# Patient Record
Sex: FEMALE | Race: WHITE | NOT HISPANIC OR LATINO | Employment: UNEMPLOYED | URBAN - METROPOLITAN AREA
[De-identification: names, ages, dates, MRNs, and addresses within clinical notes are randomized per-mention and may not be internally consistent; named-entity substitution may affect disease eponyms.]

---

## 2021-05-27 ENCOUNTER — RECORDS - HEALTHEAST (OUTPATIENT)
Dept: ADMINISTRATIVE | Facility: CLINIC | Age: 36
End: 2021-05-27

## 2021-12-16 ENCOUNTER — MEDICAL CORRESPONDENCE (OUTPATIENT)
Dept: HEALTH INFORMATION MANAGEMENT | Facility: CLINIC | Age: 36
End: 2021-12-16

## 2021-12-16 ENCOUNTER — TRANSFERRED RECORDS (OUTPATIENT)
Dept: HEALTH INFORMATION MANAGEMENT | Facility: CLINIC | Age: 36
End: 2021-12-16

## 2021-12-22 ENCOUNTER — HOSPITAL ENCOUNTER (OUTPATIENT)
Facility: HOSPITAL | Age: 36
End: 2021-12-22
Attending: SPECIALIST | Admitting: SPECIALIST
Payer: MEDICAID

## 2024-01-22 LAB
ALT SERPL-CCNC: 53 U/L (ref 7–52)
AST SERPL-CCNC: 77 U/L (ref 13–39)
CREATININE (EXTERNAL): 0.79 MG/DL (ref 0.6–1.2)
GFR ESTIMATED (EXTERNAL): >75 ML/MIN/1.73M2 (ref 60–90)
GLUCOSE (EXTERNAL): 249 MG/DL (ref 74–109)
POTASSIUM (EXTERNAL): 4.4 MMOL/L (ref 3.5–5.1)

## 2024-01-29 ENCOUNTER — TRANSFERRED RECORDS (OUTPATIENT)
Dept: HEALTH INFORMATION MANAGEMENT | Facility: CLINIC | Age: 39
End: 2024-01-29

## 2024-01-31 ENCOUNTER — MEDICAL CORRESPONDENCE (OUTPATIENT)
Dept: HEALTH INFORMATION MANAGEMENT | Facility: CLINIC | Age: 39
End: 2024-01-31

## 2024-01-31 ENCOUNTER — TRANSFERRED RECORDS (OUTPATIENT)
Dept: HEALTH INFORMATION MANAGEMENT | Facility: CLINIC | Age: 39
End: 2024-01-31

## 2024-02-02 ENCOUNTER — TRANSFERRED RECORDS (OUTPATIENT)
Dept: HEALTH INFORMATION MANAGEMENT | Facility: CLINIC | Age: 39
End: 2024-02-02
Payer: COMMERCIAL

## 2024-02-02 ENCOUNTER — TRANSCRIBE ORDERS (OUTPATIENT)
Dept: OTHER | Age: 39
End: 2024-02-02

## 2024-02-02 ENCOUNTER — MEDICAL CORRESPONDENCE (OUTPATIENT)
Dept: HEALTH INFORMATION MANAGEMENT | Facility: CLINIC | Age: 39
End: 2024-02-02
Payer: COMMERCIAL

## 2024-02-05 ENCOUNTER — TRANSCRIBE ORDERS (OUTPATIENT)
Dept: OTHER | Age: 39
End: 2024-02-05

## 2024-02-05 DIAGNOSIS — N63.21 MASS OF UPPER OUTER QUADRANT OF LEFT BREAST: Primary | ICD-10-CM

## 2024-02-07 ENCOUNTER — TRANSCRIBE ORDERS (OUTPATIENT)
Dept: OTHER | Age: 39
End: 2024-02-07

## 2024-02-07 ENCOUNTER — PATIENT OUTREACH (OUTPATIENT)
Dept: ONCOLOGY | Facility: CLINIC | Age: 39
End: 2024-02-07
Payer: COMMERCIAL

## 2024-02-07 DIAGNOSIS — N63.21 MASS OF UPPER OUTER QUADRANT OF LEFT BREAST: Primary | ICD-10-CM

## 2024-02-07 DIAGNOSIS — C50.919 BREAST CANCER (H): Primary | ICD-10-CM

## 2024-02-07 NOTE — PROGRESS NOTES
New Patient Oncology Nurse Navigator Note     Referring provider: Madison Roach NP      Referring Clinic/Organization: [facility]      Referred to (specialty:) Medical Oncology and Cancer Surgery     Requested provider (if applicable): JOSLYN     Date Referral Received: February 7, 2024     Evaluation for:  Benign breast condition  N63.21 (ICD-10-CM) - Mass of upper outer quadrant of left breast     Has potential tumor on mammogram, has lump under left arm.     Clinical History (per Nurse review of records provided):      Mammogram and U/S done on 1/31/24  Biopsy schedule for 2/9/24.    Records Location: [facility]     Records Needed:   Mammogram/Us of breasts 1/31/24  Pathology from biopsy to be done on 2/9/24     Additional testing needed prior to consult: NA    February 7, 2024  Called patient this afternoon to introduce self and role of RN SUZAN. Patient confirmed that biopsy is scheduled this Friday 2/9/24. Will follow up with patient next week to schedule consultation accordingly. Provided her with contact information and encouraged her to call with any questions or concerns.     February 13, 2024  Patient called in this afternoon in follow up. She stated that her biopsy is back showing cancer and she is ready to schedule next steps. Biopsy in media tab in Epic showing invasive ductal carcinoma. Hormone status and HER pending but per patient she can see the results and it's ER/AK + HER2 equivocal. Patient would like to be scheduled at the [facility] for consultations. Transferred her to NPS to schedule accordingly.     Morena NEWBYN, RN   Oncology Nurse Navigator   [facility]   [phone] / [phone]

## 2024-02-09 ENCOUNTER — TRANSFERRED RECORDS (OUTPATIENT)
Dept: HEALTH INFORMATION MANAGEMENT | Facility: CLINIC | Age: 39
End: 2024-02-09
Payer: COMMERCIAL

## 2024-02-09 ENCOUNTER — ANCILLARY PROCEDURE (OUTPATIENT)
Dept: RADIOLOGY | Facility: CLINIC | Age: 39
End: 2024-02-09
Payer: COMMERCIAL

## 2024-02-13 NOTE — TELEPHONE ENCOUNTER
RECORDS STATUS - ALL OTHER DIAGNOSIS      Action February 13, 2024 4:17 PM    Action Taken Called Sally Ely for a copy of the path report on 2/9/24.  - Request is faxed to [facility] for images.     4:51 PM:  - Received Path report, faxed to HIM and emailed to NN.     February 19, 2024 11:27 AM:  - HER2/Fish report is ready. Sally will fax it over.    11:37 AM:  -received path report, faxed to HIM and emailed to NN.      Imaging Received  February 14, 2024 8:40 AM    Action: Breast Images from [facility] received and email sent to  Imaging team to resolve breast images to PACS.     RECORDS RECEIVED FROM: ELIO/Sally   DATE RECEIVED:    NOTES STATUS DETAILS   OFFICE NOTE from referring provider [facility] 1/31/24: Madison Roach NP   OPERATIVE REPORT [facility] 2/9/24: US Breast Bx   MEDICATION LIST [facility]    LABS     PATHOLOGY REPORTS Report in [facility]  (Slides Requested)  Sally Fuentes Tracking #: [id number] 2/9/24: [id number] (positive)   ANYTHING RELATED TO DIAGNOSIS [facility]- [facility] Most recent 1/29/24   $IMAGING (NEED IMAGES & REPORT)     MRI (Img req from [facility]) 1/17/24: MR Brain   MA (Img req from [facility]) 1/29/24   ULTRASOUND ([facility] req from [facility]) 2/9/24: US Breast Bx  2/9/24: US Breast

## 2024-02-15 ENCOUNTER — ONCOLOGY VISIT (OUTPATIENT)
Dept: ONCOLOGY | Facility: CLINIC | Age: 39
End: 2024-02-15
Attending: SURGERY
Payer: COMMERCIAL

## 2024-02-15 ENCOUNTER — LAB (OUTPATIENT)
Dept: LAB | Facility: CLINIC | Age: 39
End: 2024-02-15
Attending: SURGERY
Payer: COMMERCIAL

## 2024-02-15 ENCOUNTER — ANCILLARY PROCEDURE (OUTPATIENT)
Dept: MAMMOGRAPHY | Facility: CLINIC | Age: 39
End: 2024-02-15
Attending: SURGERY
Payer: COMMERCIAL

## 2024-02-15 ENCOUNTER — PRE VISIT (OUTPATIENT)
Dept: ONCOLOGY | Facility: CLINIC | Age: 39
End: 2024-02-15
Payer: COMMERCIAL

## 2024-02-15 ENCOUNTER — PATIENT OUTREACH (OUTPATIENT)
Dept: ONCOLOGY | Facility: CLINIC | Age: 39
End: 2024-02-15

## 2024-02-15 VITALS
DIASTOLIC BLOOD PRESSURE: 90 MMHG | RESPIRATION RATE: 16 BRPM | HEART RATE: 119 BPM | BODY MASS INDEX: 44.26 KG/M2 | TEMPERATURE: 98 F | OXYGEN SATURATION: 97 % | SYSTOLIC BLOOD PRESSURE: 103 MMHG | WEIGHT: 282 LBS | HEIGHT: 67 IN

## 2024-02-15 DIAGNOSIS — C50.412 MALIGNANT NEOPLASM OF UPPER-OUTER QUADRANT OF LEFT BREAST IN FEMALE, ESTROGEN RECEPTOR POSITIVE (H): Primary | ICD-10-CM

## 2024-02-15 DIAGNOSIS — Z17.0 MALIGNANT NEOPLASM OF UPPER-OUTER QUADRANT OF LEFT BREAST IN FEMALE, ESTROGEN RECEPTOR POSITIVE (H): ICD-10-CM

## 2024-02-15 DIAGNOSIS — C50.412 MALIGNANT NEOPLASM OF UPPER-OUTER QUADRANT OF LEFT BREAST IN FEMALE, ESTROGEN RECEPTOR POSITIVE (H): ICD-10-CM

## 2024-02-15 DIAGNOSIS — R22.9 LOCALIZED SUPERFICIAL SWELLING, MASS, OR LUMP: ICD-10-CM

## 2024-02-15 DIAGNOSIS — Z17.0 MALIGNANT NEOPLASM OF UPPER-OUTER QUADRANT OF LEFT BREAST IN FEMALE, ESTROGEN RECEPTOR POSITIVE (H): Primary | ICD-10-CM

## 2024-02-15 LAB
INTERPRETATION: NORMAL
LAB PDF RESULT: NORMAL
SIGNIFICANT RESULTS: NORMAL
SPECIMEN DESCRIPTION: NORMAL
TEST DETAILS, MDL: NORMAL

## 2024-02-15 PROCEDURE — 76882 US LMTD JT/FCL EVL NVASC XTR: CPT | Mod: LT | Performed by: RADIOLOGY

## 2024-02-15 PROCEDURE — G0463 HOSPITAL OUTPT CLINIC VISIT: HCPCS | Performed by: SURGERY

## 2024-02-15 PROCEDURE — 99417 PROLNG OP E/M EACH 15 MIN: CPT | Performed by: SURGERY

## 2024-02-15 PROCEDURE — 99205 OFFICE O/P NEW HI 60 MIN: CPT | Performed by: SURGERY

## 2024-02-15 RX ORDER — LIRAGLUTIDE 6 MG/ML
1.8 INJECTION SUBCUTANEOUS EVERY EVENING
COMMUNITY
Start: 2024-01-31 | End: 2024-08-13

## 2024-02-15 RX ORDER — PREGABALIN 200 MG/1
200 CAPSULE ORAL
COMMUNITY
Start: 2023-11-29 | End: 2024-04-23

## 2024-02-15 RX ORDER — AMLODIPINE BESYLATE 10 MG/1
10 TABLET ORAL AT BEDTIME
COMMUNITY
Start: 2024-01-31 | End: 2024-07-24

## 2024-02-15 RX ORDER — FLUTICASONE PROPIONATE 100 UG/1
1 POWDER, METERED RESPIRATORY (INHALATION)
Status: ON HOLD | COMMUNITY
Start: 2023-10-07 | End: 2024-03-11

## 2024-02-15 RX ORDER — IBUPROFEN 200 MG
200 TABLET ORAL
COMMUNITY

## 2024-02-15 RX ORDER — FLUTICASONE PROPIONATE AND SALMETEROL XINAFOATE 115; 21 UG/1; UG/1
2 AEROSOL, METERED RESPIRATORY (INHALATION) 2 TIMES DAILY
COMMUNITY
Start: 2024-01-31

## 2024-02-15 RX ORDER — ATORVASTATIN CALCIUM 10 MG/1
10 TABLET, FILM COATED ORAL AT BEDTIME
COMMUNITY
Start: 2023-11-29 | End: 2024-11-23

## 2024-02-15 RX ORDER — CETIRIZINE HYDROCHLORIDE 10 MG/1
5 TABLET ORAL PRN
COMMUNITY

## 2024-02-15 RX ORDER — PROCHLORPERAZINE MALEATE 10 MG
TABLET ORAL
Status: ON HOLD | COMMUNITY
Start: 2023-04-24 | End: 2024-03-11

## 2024-02-15 RX ORDER — METHOCARBAMOL 500 MG/1
1000 TABLET, FILM COATED ORAL
COMMUNITY
Start: 2024-01-24 | End: 2024-04-25

## 2024-02-15 RX ORDER — ALBUTEROL SULFATE 90 UG/1
2 AEROSOL, METERED RESPIRATORY (INHALATION) EVERY 4 HOURS PRN
COMMUNITY
Start: 2023-11-24 | End: 2024-07-23

## 2024-02-15 RX ORDER — HYDROCHLOROTHIAZIDE 25 MG/1
25 TABLET ORAL PRN
COMMUNITY
Start: 2023-11-29 | End: 2024-11-23

## 2024-02-15 RX ORDER — BLOOD-GLUCOSE METER
EACH MISCELLANEOUS
COMMUNITY
Start: 2024-01-18

## 2024-02-15 RX ORDER — EPINEPHRINE 0.3 MG/.3ML
INJECTION SUBCUTANEOUS
COMMUNITY
Start: 2023-04-24

## 2024-02-15 ASSESSMENT — PAIN SCALES - GENERAL: PAINLEVEL: SEVERE PAIN (6)

## 2024-02-15 NOTE — TELEPHONE ENCOUNTER
Action 02/15/24  12:49 PM AV   Action Taken  Slides [id number] received from Sally Ely, taken to 5th [facility] Path lab to be processed.

## 2024-02-15 NOTE — PROGRESS NOTES
NEW SURGICAL CONSULTATION  Feb 15, 2024    Eliezer Izquierdo is a 38 year old woman who presents with a left  breast complaint.  She was self-referred.    HPI:    She noted a left breast mass since 2023, some enlargement since. She has had some aching since the biopsy. No nipple discharge or inversion.    Imaging showed a 2.5 cm mass at 2:00, 10 cm FN in the LEFT breast.    A biopsy was performed and a clip was placed.  It showed invasive ductal carcinoma, grade 1, ER+ MA+ HER2 is pending. The Ki-67 was 24%.      BREAST-SPECIFIC HISTORY:  Prior breast surgeries: No  Prior radiation history: No  Bra size: DD  Dominant hand: Right     FAMILY HISTORY:  Breast ca: Yes mother (dx 55, adopted),   Ovarian ca: Yes PGM (possible uterine or ovaries)  Pancreatic ca: No  Melanoma: Yes - mother (dx 40)  Gastric ca: No  Colon ca: No  Other cancer: Yes pat great GF w/ lung cancer (smoker)    Hysterectomy at 21; ovaries intact.  right foot melanoma - treated with excision; stage unknown.    There is no problem list on file for this patient.   T2DM; ; HbA1c 11.1 in 2024  HTN  No MI, CVA    Past Medical History:   Diagnosis Date    Hypercholesteraemia     Irritable bowel     Migraines        No past surgical history on file.  Hysterectomy  Cholecystectomy  Tonsillectomy    Knee surgery  No GA issues    Current Outpatient Medications   Medication Sig Dispense Refill    albuterol (PROAIR HFA/PROVENTIL HFA/VENTOLIN HFA) 108 (90 Base) MCG/ACT inhaler Inhale 2 puffs into the lungs      amLODIPine (NORVASC) 10 MG tablet Take 10 mg by mouth      atorvastatin (LIPITOR) 10 MG tablet Take 10 mg by mouth      Blood Glucose Monitoring Suppl (ONETOUCH VERIO FLEX SYSTEM) w/Device KIT       EPINEPHrine (ANY BX GENERIC EQUIV) 0.3 MG/0.3ML injection 2-pack       fluticasone-salmeterol (ADVAIR HFA) 115-21 MCG/ACT inhaler Inhale 2 puffs into the lungs      hydrochlorothiazide (HYDRODIURIL) 25 MG tablet Take 25 mg by  "mouth      methocarbamol (ROBAXIN) 500 MG tablet Take 1,000 mg by mouth      Pregabalin (LYRICA) 200 MG capsule Take 200 mg by mouth      prochlorperazine (COMPAZINE) 10 MG tablet       VICTOZA PEN 18 MG/3ML soln Inject 1.8 mg Subcutaneous      cetirizine (ZYRTEC) 10 MG tablet Take 5 mg by mouth      Cyclobenzaprine HCl (FLEXERIL PO) Take 10 mg by mouth 3 times daily as needed for muscle spasms (Patient not taking: Reported on 2/15/2024)      FLOVENT DISKUS 100 MCG/ACT inhaler Inhale 1 puff into the lungs (Patient not taking: Reported on 2/15/2024)      Gabapentin (NEURONTIN PO) Take 600 mg by mouth At Bedtime  (Patient not taking: Reported on 2/15/2024)      HYDROcodone-acetaminophen (NORCO) 7.5-325 MG per tablet Take 2 tablets by mouth daily (Patient not taking: Reported on 2/15/2024)      ibuprofen (ADVIL/MOTRIN) 200 MG tablet Take 200 mg by mouth      OMEPRAZOLE PO Take 40 mg by mouth every morning (Patient not taking: Reported on 2/15/2024)      Prochlorperazine Maleate (COMPAZINE PO) Take 10 mg by mouth 3 times daily as needed for nausea      PROMETHAZINE HCL RE Place 25 mg rectally 3 times daily as needed for nausea      SIMVASTATIN PO Take 5 mg by mouth      UNKNOWN TO PATIENT as needed (steroid cream, for sun allergy) (Patient not taking: Reported on 2/15/2024)             Allergies   Allergen Reactions    Estrogens     Maxalt [Rizatriptan]     Penicillins     Sulfa Antibiotics     Toradol [Ketorolac] Anxiety        SOCIAL HISTORY:  Smokes: Yes - 10 cigarettes per day  Occupation: does not currently wokr    ROS:  Back pain: No  Headache: No  Abdominal pain: No  Unexpected weight loss: Yes - with Victoza  Easy bruising/bleeding: No  History of DVT/PE: No    BP (!) 103/90 (BP Location: Right arm, Patient Position: Sitting, Cuff Size: Adult Large)   Pulse 119   Temp 98  F (36.7  C) (Oral)   Resp 16   Ht 1.71 m (5' 7.32\")   Wt 127.9 kg (282 lb)   SpO2 97%   BMI 43.74 kg/m     Physical " Exam  Constitutional:       Appearance: She is well-developed.   Chest:   Breasts:     Breasts are symmetrical (Bilateral ptosis).      Right: No inverted nipple, mass, nipple discharge, skin change or tenderness.      Left: Mass present. No inverted nipple, nipple discharge, skin change or tenderness.          Comments: Patient was examined in both supine and upright positions.   Lymphadenopathy:      Cervical: No cervical adenopathy.      Right cervical: No superficial, deep or posterior cervical adenopathy.     Left cervical: No superficial, deep or posterior cervical adenopathy.      Upper Body:      Right upper body: No supraclavicular, axillary or pectoral adenopathy.      Left upper body: No supraclavicular, axillary or pectoral adenopathy.      Comments: No lymphedema in bilateral upper extremities.   Skin:     General: Skin is warm and dry.          INVESTIGATIONS:    Bilateral Breast Ultrasound from [facility] (2/9/2024) showed:  FINDINGS: An irregular shadowing solid hypovascular mass is present in the 2:00 position of the left breast 10 cm from the nipple corresponding to the mammographic finding measuring 2.5 x 2.5 x 2.0 cm. This is as tall as it is wide. No adjacent abnormalities.   BI-RADS 5: Highly suggestive of malignancy.     Diagnostic Mammogram from [facility] (1/29/2024) showed:  FINDINGS:   Bilateral tomosynthesis and synthesized MLO and CC views are reviewed.   An exaggerated CC lateral view was obtained of the right breast.   There are scattered areas of fibroglandular density.    In the posterior third of the left breast, upper outer quadrant, at about 2 o'clock there is a 2 cm spiculated lesion correlating with the palpable lump.   No mammographic abnormality is identified in the right breast to explain the palpable lumps. Only normal-appearing fatty tissue is identified.   BIRADS  Category 5:  Highly suggestive of Malignancy.     Biopsy from [facility], [id number] (2/9/2024) showed:  Final  "Diagnosis:  A) Left breast, 2:00 2 cm from nipple  Invasive ductal carcinoma, grade I of III  ER positive  MN positive  HER2 2+ by IHC, FISH pending (ordered 2/13/2024)    ASSESSMENT:  Eliezer Izquierdo is a 38 year old woman with a LEFT breast cancer.    Her staging is incomplete.  I recommend US evaluation of the LEFT axilla.    I personally reviewed the imaging above with our in-house breast radiologist.  The mass measures >3 cm on mammogram.     I reviewed the imaging, diagnosis, staging, and management (including surgery, chemotherapy/systemic therapy, radiation therapy, and endocrine therapy) of breast cancer with Eliezer Izquierdo and her father. A copy of the biopsy pathology report was also provided.    The mainstay of treatment for resectable breast cancer is surgical resection, in the form of either breast conservation (segmental mastectomy plus radiation) or mastectomy.  We reviewed that the two strategies are equivalent in terms of overall survival.  The advantages and disadvantages of each were discussed.    The surgeries are performed as day surgeries.  Eliezer Izquierdo IS a candidate for breast conservation therapy.  We discussed that this involves two necessary components: the lumpectomy (or \"segmental mastectomy\"), and several weeks of whole breast radiation therapy.  We discussed that the overall survival after breast conservation therapy is identical to mastectomy and that local recurrence rates are significantly higher if segmental mastectomy was performed without subsequent radiation.  We also discussed the significance of clear margins and that a subsequent procedure may be necessary to achieve this.    The risks of a segmental mastectomy were discussed with the patient and family, including the risks of bleeding, wound infection, wound dehiscence, and post-operative contour change (including scar formation) to the breast.  We discussed obtaining a supportive bra for postoperative " recovery and that prescriptions for opioids are not typically provided for this procedure. Depending on the margin status post-resection, further surgery may be necessary.     Alternatively, we also discussed the various types of mastectomy, including simple, skin-sparing, and nipple-sparing mastectomy.  We reviewed that the nipple-sparing technique is cosmetic; sensation and contractility will likely be lost.  Eliezer Izquierdo is not a candidate for nipple-sparing mastectomy owing to bilateral ptosis.  In addition, given the new diagnosis of diabetes (HbA1c 11) and active ongoing cigarette smoking, Eliezer Izquierdo is not currently a candidate for immediate reconstruction.  The risks of a mastectomy were discussed with the patient and family, including the risks of bleeding, wound infection, wound dehiscence, skin flap/nipple necrosis, poor cosmetic outcomes with skin folds, decreased shoulder range of motion, chest wall numbness, etc.   A drain would be placed intra-operatively.  We discussed delayed reconstruction may be an option. In addition, if she were to initiate neoadjuvant systemic therapy, it will give her time to improve blood sugar control and quit smoking to become a potential candidate for immediate reconstruction.  Eliezer Izquierdo was interested in this; a Plastic Surgery consultation was offered and will be arranged. Depending on the margin and farhan status post-mastectomy, radiation may be necessary.      In addition to the surgical management of the breast, a sentinel lymph node biopsy is recommended for farhan staging of the axilla.  This is performed with the combination of the radioactive Tilmanocept and dye (lymphazurin or indocyanine green). The risks of a sentinel lymph node biopsy were discussed with the patient and family, including the risks of lymphedema (5%), bleeding, wound infection, wound dehiscence, seroma formation, and paresthesias. There is an approximately 10% false  negative rate associated with sentinel lymph node biopsy as published in the literature.  The findings of the sentinel lymph node biopsy may result in the need for further treatment. There is a 1-2% chance of patients whose sentinel lymph nodes do not map despite dual tracer (radioactive Tilmanocept and dye). Should this be the case, we discussed that I would proceed with an axillary lymph node dissection at the index procedure if proceeding with a mastectomy.  The higher risks of an axillary lymph node dissection were also reviewed, including lymphedema (20-30%), bleeding, wound infection, wound dehiscence, seroma formation, nerve injury, limited arm range of motion and paresthesias. We discussed that a drain would be placed intra-operatively should an axillary lymph node dissection be performed.     We discussed that surgical pathology results will be reviewed at the postoperative visit to allow for careful discussion of next steps and for answering questions.    Finally, we reviewed that as part of team-based approach to breast cancer, medical oncology and radiation oncology referrals may also be made to discuss systemic/endocrine therapy and radiation therapy.      We discussed that the HER2 result is pending. If she has HER2 amplified cancer, I would strongly recommend neoadjuvant systemic therapy.  If she has HER2 non-amplified cancer, given the size of the primary tumor, consideration could also be given to neoadjuvant systemic therapy, particularly if she is strongly interested in immediate reconstruction. I discussed her case with Dr Nolan of Medical Oncology today; she has a scheduled appointment with Dr Nolan on 2/22/2024.    We reviewed the genetic testing guidelines by the American Society of Breast Surgeons from February 2019.  I have recommended genetic testing and counseling.  The natural history of pathogenic variants in genes like BRCA and breast cancer were discussed with the patient. Should a  pathogenic variant be identified, she may  no longer be a good candidate for breast conservation.  We also reviewed the risk reduction benefits of a contralateral mastectomy in this situation. Because genetic testing results will influence surgical management of this breast cancer, I have recommended germline genetic testing on an urgent basis with the Breast Actionable panel.  We discussed that results can take 2-3 weeks to result. We reviewed that genetic counseling will be arranged.  We discussed genetic testing results may also inform the risk of other cancers and can provide information for family members. Please see below for additional details.     All of the above was discussed with Eliezer Izquierdo and all questions were answered.     Total time spent with the patient was 65 minutes, of which 75% was counseling.     PLAN:  Medical oncology evaluation for possible neoadjuvant systemic therapy  HER2 pending  LEFT segmental mastectomy vs simple mastectomy  Not currently a candidate for skin-sparing mastectomy due to active smoking and elevated HbA1c   LEFT axillary US today  Germline genetic testing with the Breast Actionable panel - I will review results with patient via [employer].   Genetic counseling referral  Plastic surgery referral     Albina Ford MD MS [facility] FACS  Associate Professor of Surgery  Division of Surgical Oncology  [facility]     ADDENDUM (2/15/2024):  LEFT axillary US shows three abnormal axillary nodes. In addition to the plan detailed above, I recommend a needle biopsy of the most suspicious appearing node.     Albina Ford MD MS [facility] FACS  Associate Professor of Surgery  Division of Surgical Oncology  [facility]     80 minutes spent on the date of the encounter doing chart review, review of outside records, review of test result(s), interpretation of test(s), patient visit, documentation, care coordination, discussion with other physician(s), and discussion with  family.

## 2024-02-15 NOTE — PROGRESS NOTES
[facility]: Surgical Oncology Plan of Care Education Note                                     Discussion with Patient:                                                         Met with Eliezer and her father following her visit with Dr. Ford on 2/15/2024. Introduced self and explained role of RNCC.       Plan:                                                       Reviewed plan for possible neoadjuvant treatment vs surgery. Eliezer is aware a lymph node biopsy was recommended following her ultrasound today and will be scheduled at the time of her New Patient Consult with Dr. Nolan on 2/22/2024.     Breast Action Panel consent was completed and labs were drawn. Reviewed expected timing of results and that Dr. Ford would reach out to discuss any change in plan based on the results.      Writer answered all questions and concerns to the best of her ability and provided her contact information. Reviewed use of [employer] as alternative way to contact team.  Encouraged patient to contact with questions.         Nichelle Gomez, RNNICANOR  [facility]  Surgical Onolcogy      Approximately 5 minutes was spent in discussion with patient.

## 2024-02-15 NOTE — LETTER
2/15/2024         RE: Eliezer Izquierdo  [address] [facility] [address]        Dear Colleague,    Thank you for referring your patient, Eliezer Izquierdo, to the [facility]. Please see a copy of my visit note below.    NEW SURGICAL CONSULTATION  Feb 15, 2024    Eliezer Izquierdo is a 38 year old woman who presents with a left  breast complaint.  She was self-referred.    HPI:    She noted a left breast mass since 2023, some enlargement since. She has had some aching since the biopsy. No nipple discharge or inversion.    Imaging showed a 2.5 cm mass at 2:00, 10 cm FN in the LEFT breast.    A biopsy was performed and a clip was placed.  It showed invasive ductal carcinoma, grade 1, ER+ [place]+ HER2 is pending. The Ki-67 was 24%.      BREAST-SPECIFIC HISTORY:  Prior breast surgeries: No  Prior radiation history: No  Bra size: DD  Dominant hand: Right     FAMILY HISTORY:  Breast ca: Yes mother (dx 55, adopted),   Ovarian ca: Yes PGM (possible uterine or ovaries)  Pancreatic ca: No  Melanoma: Yes - mother (dx 40)  Gastric ca: No  Colon ca: No  Other cancer: Yes pat great GF w/ lung cancer (smoker)    Hysterectomy at 21; ovaries intact.  right foot melanoma - treated with excision; stage unknown.    There is no problem list on file for this patient.   T2DM; ; HbA1c 11.1 in 2024  HTN  No MI, CVA    Past Medical History:   Diagnosis Date    Hypercholesteraemia     Irritable bowel     Migraines        No past surgical history on file.  Hysterectomy  Cholecystectomy  Tonsillectomy    Knee surgery  No GA issues    Current Outpatient Medications   Medication Sig Dispense Refill    albuterol (PROAIR HFA/PROVENTIL HFA/VENTOLIN HFA) 108 (90 Base) MCG/ACT inhaler Inhale 2 puffs into the lungs      amLODIPine (NORVASC) 10 MG tablet Take 10 mg by mouth      atorvastatin (LIPITOR) 10 MG tablet Take 10 mg by mouth      Blood Glucose Monitoring Suppl (ONETOUCH  VERIO FLEX SYSTEM) w/Device KIT       EPINEPHrine (ANY BX GENERIC EQUIV) 0.3 MG/0.3ML injection 2-pack       fluticasone-salmeterol (ADVAIR HFA) 115-21 MCG/ACT inhaler Inhale 2 puffs into the lungs      hydrochlorothiazide (HYDRODIURIL) 25 MG tablet Take 25 mg by mouth      methocarbamol (ROBAXIN) 500 MG tablet Take 1,000 mg by mouth      Pregabalin (LYRICA) 200 MG capsule Take 200 mg by mouth      prochlorperazine (COMPAZINE) 10 MG tablet       VICTOZA PEN 18 MG/3ML soln Inject 1.8 mg Subcutaneous      cetirizine (ZYRTEC) 10 MG tablet Take 5 mg by mouth      Cyclobenzaprine HCl (FLEXERIL PO) Take 10 mg by mouth 3 times daily as needed for muscle spasms (Patient not taking: Reported on 2/15/2024)      FLOVENT DISKUS 100 MCG/ACT inhaler Inhale 1 puff into the lungs (Patient not taking: Reported on 2/15/2024)      Gabapentin (NEURONTIN PO) Take 600 mg by mouth At Bedtime  (Patient not taking: Reported on 2/15/2024)      HYDROcodone-acetaminophen (NORCO) 7.5-325 MG per tablet Take 2 tablets by mouth daily (Patient not taking: Reported on 2/15/2024)      ibuprofen (ADVIL/MOTRIN) 200 MG tablet Take 200 mg by mouth      OMEPRAZOLE PO Take 40 mg by mouth every morning (Patient not taking: Reported on 2/15/2024)      Prochlorperazine Maleate (COMPAZINE PO) Take 10 mg by mouth 3 times daily as needed for nausea      PROMETHAZINE HCL RE Place 25 mg rectally 3 times daily as needed for nausea      SIMVASTATIN PO Take 5 mg by mouth      UNKNOWN TO PATIENT as needed (steroid cream, for sun allergy) (Patient not taking: Reported on 2/15/2024)             Allergies   Allergen Reactions    Estrogens     Maxalt [Rizatriptan]     Penicillins     Sulfa Antibiotics     Toradol [Ketorolac] Anxiety        SOCIAL HISTORY:  Smokes: Yes - 10 cigarettes per day  Occupation: does not currently wokr    ROS:  Back pain: No  Headache: No  Abdominal pain: No  Unexpected weight loss: Yes - with Victoza  Easy bruising/bleeding: No  History of  "DVT/PE: No    BP (!) 103/90 (BP Location: Right arm, Patient Position: Sitting, Cuff Size: Adult Large)   Pulse 119   Temp 98  F (36.7  C) (Oral)   Resp 16   Ht 1.71 m (5' 7.32\")   Wt 127.9 kg (282 lb)   SpO2 97%   BMI 43.74 kg/m     Physical Exam  Constitutional:       Appearance: She is well-developed.   Chest:   Breasts:     Breasts are symmetrical (Bilateral ptosis).      Right: No inverted nipple, mass, nipple discharge, skin change or tenderness.      Left: Mass present. No inverted nipple, nipple discharge, skin change or tenderness.          Comments: Patient was examined in both supine and upright positions.   Lymphadenopathy:      Cervical: No cervical adenopathy.      Right cervical: No superficial, deep or posterior cervical adenopathy.     Left cervical: No superficial, deep or posterior cervical adenopathy.      Upper Body:      Right upper body: No supraclavicular, axillary or pectoral adenopathy.      Left upper body: No supraclavicular, axillary or pectoral adenopathy.      Comments: No lymphedema in bilateral upper extremities.   Skin:     General: Skin is warm and dry.          INVESTIGATIONS:    Bilateral Breast Ultrasound from [facility] (2/9/2024) showed:  FINDINGS: An irregular shadowing solid hypovascular mass is present in the 2:00 position of the left breast 10 cm from the nipple corresponding to the mammographic finding measuring 2.5 x 2.5 x 2.0 cm. This is as tall as it is wide. No adjacent abnormalities.   BI-RADS 5: Highly suggestive of malignancy.     Diagnostic Mammogram from [facility] (1/29/2024) showed:  FINDINGS:   Bilateral tomosynthesis and synthesized MLO and CC views are reviewed.   An exaggerated CC lateral view was obtained of the right breast.   There are scattered areas of fibroglandular density.    In the posterior third of the left breast, upper outer quadrant, at about 2 o'clock there is a 2 cm spiculated lesion correlating with the palpable lump.   No mammographic " "abnormality is identified in the right breast to explain the palpable lumps. Only normal-appearing fatty tissue is identified.   BIRADS  Category 5:  Highly suggestive of Malignancy.     [facility]opsy from [facility] [id number] (2/9/2024) showed:  Final Diagnosis:  A) Left breast, 2:00 2 cm from nipple  Invasive ductal carcinoma, grade I of III  ER positive  CA positive  HER2 2+ by IHC, FISH pending (ordered 2/13/2024)    ASSESSMENT:  Eliezer Izquierdo is a 38 year old woman with a LEFT breast cancer.    Her staging is incomplete.  I recommend US evaluation of the LEFT axilla.    I personally reviewed the imaging above with our in-house breast radiologist.  The mass measures >3 cm on mammogram.     I reviewed the imaging, diagnosis, staging, and management (including surgery, chemotherapy/systemic therapy, radiation therapy, and endocrine therapy) of breast cancer with Eliezer Izquierdo and her father. A copy of the biopsy pathology report was also provided.    The mainstay of treatment for resectable breast cancer is surgical resection, in the form of either breast conservation (segmental mastectomy plus radiation) or mastectomy.  We reviewed that the two strategies are equivalent in terms of overall survival.  The advantages and disadvantages of each were discussed.    The surgeries are performed as day surgeries.  Eliezer Izquierdo IS a candidate for breast conservation therapy.  We discussed that this involves two necessary components: the lumpectomy (or \"segmental mastectomy\"), and several weeks of whole breast radiation therapy.  We discussed that the overall survival after breast conservation therapy is identical to mastectomy and that local recurrence rates are significantly higher if segmental mastectomy was performed without subsequent radiation.  We also discussed the significance of clear margins and that a subsequent procedure may be necessary to achieve this.    The risks of a segmental mastectomy were " discussed with the patient and family, including the risks of bleeding, wound infection, wound dehiscence, and post-operative contour change (including scar formation) to the breast.  We discussed obtaining a supportive bra for postoperative recovery and that prescriptions for opioids are not typically provided for this procedure. Depending on the margin status post-resection, further surgery may be necessary.     Alternatively, we also discussed the various types of mastectomy, including simple, skin-sparing, and nipple-sparing mastectomy.  We reviewed that the nipple-sparing technique is cosmetic; sensation and contractility will likely be lost.  Eliezer Izquierdo is not a candidate for nipple-sparing mastectomy owing to bilateral ptosis.  In addition, given the new diagnosis of diabetes (HbA1c 11) and active ongoing cigarette smoking, Eliezer Izquierdo is not currently a candidate for immediate reconstruction.  The risks of a mastectomy were discussed with the patient and family, including the risks of bleeding, wound infection, wound dehiscence, skin flap/nipple necrosis, poor cosmetic outcomes with skin folds, decreased shoulder range of motion, chest wall numbness, etc.   A drain would be placed intra-operatively.  We discussed delayed reconstruction may be an option. In addition, if she were to initiate neoadjuvant systemic therapy, it will give her time to improve blood sugar control and quit smoking to become a potential candidate for immediate reconstruction.  Eliezer Izquierdo was interested in this; a Plastic Surgery consultation was offered and will be arranged. Depending on the margin and farhan status post-mastectomy, radiation may be necessary.      In addition to the surgical management of the breast, a sentinel lymph node biopsy is recommended for farhan staging of the axilla.  This is performed with the combination of the radioactive Tilmanocept and dye (lymphazurin or indocyanine green). The  risks of a sentinel lymph node biopsy were discussed with the patient and family, including the risks of lymphedema (5%), bleeding, wound infection, wound dehiscence, seroma formation, and paresthesias. There is an approximately 10% false negative rate associated with sentinel lymph node biopsy as published in the literature.  The findings of the sentinel lymph node biopsy may result in the need for further treatment. There is a 1-2% chance of patients whose sentinel lymph nodes do not map despite dual tracer (radioactive Tilmanocept and dye). Should this be the case, we discussed that I would proceed with an axillary lymph node dissection at the index procedure if proceeding with a mastectomy.  The higher risks of an axillary lymph node dissection were also reviewed, including lymphedema (20-30%), bleeding, wound infection, wound dehiscence, seroma formation, nerve injury, limited arm range of motion and paresthesias. We discussed that a drain would be placed intra-operatively should an axillary lymph node dissection be performed.     We discussed that surgical pathology results will be reviewed at the postoperative visit to allow for careful discussion of next steps and for answering questions.    Finally, we reviewed that as part of team-based approach to breast cancer, medical oncology and radiation oncology referrals may also be made to discuss systemic/endocrine therapy and radiation therapy.      We discussed that the HER2 result is pending. If she has HER2 amplified cancer, I would strongly recommend neoadjuvant systemic therapy.  If she has HER2 non-amplified cancer, given the size of the primary tumor, consideration could also be given to neoadjuvant systemic therapy, particularly if she is strongly interested in immediate reconstruction. I discussed her case with Dr Nolan of Medical Oncology today; she has a scheduled appointment with Dr Nolan on 2/22/2024.    We reviewed the genetic testing guidelines by  the American Society of Breast Surgeons from February 2019.  I have recommended genetic testing and counseling.  The natural history of pathogenic variants in genes like BRCA and breast cancer were discussed with the patient. Should a pathogenic variant be identified, she may  no longer be a good candidate for breast conservation.  We also reviewed the risk reduction benefits of a contralateral mastectomy in this situation. Because genetic testing results will influence surgical management of this breast cancer, I have recommended germline genetic testing on an urgent basis with the Breast Actionable panel.  We discussed that results can take 2-3 weeks to result. We reviewed that genetic counseling will be arranged.  We discussed genetic testing results may also inform the risk of other cancers and can provide information for family members. Please see below for additional details.     All of the above was discussed with Eliezer Izquierdo and all questions were answered.     Total time spent with the patient was 65 minutes, of which 75% was counseling.     PLAN:  Medical oncology evaluation for possible neoadjuvant systemic therapy  HER2 pending  LEFT segmental mastectomy vs simple mastectomy  Not currently a candidate for skin-sparing mastectomy due to active smoking and elevated HbA1c   LEFT axillary US today  Germline genetic testing with the Breast Actionable panel - I will review results with patient via [employer].   Genetic counseling referral  Plastic surgery referral     Albina Ford MD MS [facility] FACS  Associate Professor of Surgery  Division of Surgical Oncology  [facility]     ADDENDUM (2/15/2024):  LEFT axillary US shows three abnormal axillary nodes. In addition to the plan detailed above, I recommend a needle biopsy of the most suspicious appearing node.     Albina Ford MD MS FRC FACS  Associate Professor of Surgery  Division of Surgical Oncology  [facility]     80 minutes spent on  the date of the encounter doing chart review, review of outside records, review of test result(s), interpretation of test(s), patient visit, documentation, care coordination, discussion with other physician(s), and discussion with family.

## 2024-02-15 NOTE — TELEPHONE ENCOUNTER
Action    Action Taken 2/15/24  Pastora w/ [facility] - advised HER2/FISH is still pending  12:54 PM

## 2024-02-15 NOTE — NURSING NOTE
"Oncology Rooming Note    February 15, 2024 12:46 PM   Eliezer Izquierdo is a 38 year old female who presents for:    Chief Complaint   Patient presents with    Oncology Clinic Visit     Breast cancer     Initial Vitals: BP (!) 103/90 (BP Location: Right arm, Patient Position: Sitting, Cuff Size: Adult Large)   Pulse 119   Temp 98  F (36.7  C) (Oral)   Resp 16   Ht 1.71 m (5' 7.32\")   Wt 127.9 kg (282 lb)   SpO2 97%   BMI 43.74 kg/m   Estimated body mass index is 43.74 kg/m  as calculated from the following:    Height as of this encounter: 1.71 m (5' 7.32\").    Weight as of this encounter: 127.9 kg (282 lb). Body surface area is 2.46 meters squared.  Severe Pain (6) Comment: Data Unavailable   No LMP recorded. (Menstrual status: UNKNOWN).  Allergies reviewed: Yes  Medications reviewed: Yes    Medications: Medication refills not needed today.  Pharmacy name entered into [email or web address]:    [id number] [address] AT [facility] [address]  [facility] [address] [place] [phone]     Frailty Screening:   Is the patient here for a new oncology consult visit in cancer care? 1. Yes. Over the past month, have you experienced difficulty or required a caregiver to assist with:   1. Balance, walking or general mobility (including any falls)? NO  2. Completion of self-care tasks such as bathing, dressing, toileting, grooming/hygiene?  NO  3. Concentration or memory that affects your daily life?  NO       Clinical concerns: none       Tiffanie García              "

## 2024-02-15 NOTE — NURSING NOTE
Chief Complaint   Patient presents with    Labs Only    Blood Draw     Labs drawn via  by RN.     Labs collected from venipuncture by RN.    Flores Wong RN

## 2024-02-16 LAB — INTERPRETATION: NORMAL

## 2024-02-19 ENCOUNTER — LAB REQUISITION (OUTPATIENT)
Dept: LAB | Facility: CLINIC | Age: 39
End: 2024-02-19
Payer: COMMERCIAL

## 2024-02-19 PROCEDURE — 88321 CONSLTJ&REPRT SLD PREP ELSWR: CPT | Performed by: PATHOLOGY

## 2024-02-20 NOTE — PROGRESS NOTES
[facility] Medical Oncology Note       Date of visit: February 22, 2024  New Outpatient Clinic Note        Assessment:     Clinical T2N1 low grade invasive breast cancer in this 39 year old woman.  As discussed previously with Dr. Ford and again today with me, the patient is appropriate for neoadjuvant treatment.  She has a large breast cancer with nodes that are positive.  Neoadjuvant therapy can frequently downstage somebody's cancer and I think it would be a reasonable approach with Eliezer.  The question is is she a candidate for neoadjuvant chemotherapy, or endocrine therapy?  Either way, she would be appropriate for participation in our I-SPY neoadjuvant clinical trial.  As part of this trial, patients get a MammaPrint genomic test on their tumor.  If it is high risk, they go on intravenous systemic therapy with chemotherapy and perhaps other therapies such as immunotherapy.  If the MammaPrint is low risk, then she would be appropriate for the endocrine optimization arm.  It would appear that her histology is consistent with a luminal A breast cancer so it just may be this is what ultimately would be recommended to her.  Eliezer has voiced a real interest in participating.  Elevated LFTs which are probably related to her prior diagnosis of fatty liver and hepatosteatosis.  We will get a CT/PET scan to further assess.  We did talk about that she will likely get a breast MRI as part of I-SPY.  I reviewed her recent lab workup from her primary care which shows a normal CBC, but with a CMP that shows elevated transaminases.  I do not need to repeat her blood work today, based on her request.  She hates needles.  Because of the youngest of her cancer she is getting genetic testing.  This is still pending at the time of this dictation.      Plan:     We will present her tomorrow at our breast conference  We will get in touch with Leidy Arteaga who will get in touch with the patient about participation in  I-SPY  CT/PET scan as soon as can be arranged  We will send a MammaPrint on her tumor specimen  Return to clinic with me after the above is done to discuss where we go from here regarding management.    Zachary Nolan MD, MSc  Associate Professor of Medicine  [employer] [facility] [employer]  [facility]  [address] [place] [id number]  [phone]    __________________________________________________________________    CHIEF COMPLAINT     I was asked to see this patient in consultation to give an opinion regarding further evaluation and management of a recent diagnosis of a clinical T2N1 invasive breast cancer      History of Present Illness:     Eliezer Izquierdo is a 38 yo previously healthy woman who palpated a mass in the left breast a few months ago. Mammogram and US showed a 2.5 x 2.5 x 2.0 cm spiculated mass at 2:00 position.  Biopsy of the mass was done on 2/9/2024.    Final path showed a Soper grade I IDC, 97% strongly positive for ER, 99% strongly positive for AK, and negative for HER2 by FISH (IHC 2+). KI-67 is 24%.    The patient then saw Albina Ford 2/15/2024. She ordered a left axillary US that showed three abnormal axillary nodes.  Biopsy of a node is scheduled for later today.    Review of physical symptoms finds that Kathy feels pretty well.  She has a little bit of pain at the site of the breast biopsy, which was done 2 weeks ago.  She is on a low-carb diet.  She is trying to cut down smoking.  She does not menstruate due to hysterectomy done at the age of 21.  She still has her ovaries, but tells me she has been having hot flashes.  She has no cough or shortness of breath.  There is no weight loss.  She has no other lumps or bumps.  She Tubby her right breast has always been a lot larger than the left breast.  The rest of the multiorgan review of physical symptoms is negative.          Past Medical History:   Melanoma of right foot???     Past Medical History:    Diagnosis Date    Hypercholesteraemia     Irritable bowel     Migraines           Past Surgical History:    I have reviewed this patient's past surgical history         Social History:   Tobacco, ETOH, and rec drugs reviewed and as noted below with the following exceptions:  Kathy grew up many places in [place] and [place], but went to high school in [place] and graduated in 2000.  She thought about being a , but then got pregnant with her son Homero.  She had a lot of different for jobs.  She spent some time in [place] where her mom and other family member lives.  She then came back to [place].  She has a fiancé of 6 years named Saul and they currently live in [place].  She is an avid reader, and likes romance novels, but really anything.  She says she read 250 books last year.  She does smoke but is trying to quit.  She is currently on a low-carb diet.    Family History:     Family History   Problem Relation Age of Onset    Genitourinary Problems Mother         renal disease - minimal change, sponge kidney            Medications:     Current Outpatient Medications   Medication Sig Dispense Refill    albuterol (PROAIR HFA/PROVENTIL HFA/VENTOLIN HFA) 108 (90 Base) MCG/ACT inhaler Inhale 2 puffs into the lungs      amLODIPine (NORVASC) 10 MG tablet Take 10 mg by mouth      atorvastatin (LIPITOR) 10 MG tablet Take 10 mg by mouth      Blood Glucose Monitoring Suppl (ONETOUCH VERIO FLEX SYSTEM) w/Device KIT       cetirizine (ZYRTEC) 10 MG tablet Take 5 mg by mouth      Cyclobenzaprine HCl (FLEXERIL PO) Take 10 mg by mouth 3 times daily as needed for muscle spasms (Patient not taking: Reported on 2/15/2024)      EPINEPHrine (ANY BX GENERIC EQUIV) 0.3 MG/0.3ML injection 2-pack       FLOVENT DISKUS 100 MCG/ACT inhaler Inhale 1 puff into the lungs (Patient not taking: Reported on 2/15/2024)      fluticasone-salmeterol (ADVAIR HFA) 115-21 MCG/ACT inhaler Inhale 2 puffs into the lungs      Gabapentin  (NEURONTIN PO) Take 600 mg by mouth At Bedtime  (Patient not taking: Reported on 2/15/2024)      hydrochlorothiazide (HYDRODIURIL) 25 MG tablet Take 25 mg by mouth      HYDROcodone-acetaminophen (NORCO) 7.5-325 MG per tablet Take 2 tablets by mouth daily (Patient not taking: Reported on 2/15/2024)      ibuprofen (ADVIL/MOTRIN) 200 MG tablet Take 200 mg by mouth      methocarbamol (ROBAXIN) 500 MG tablet Take 1,000 mg by mouth      OMEPRAZOLE PO Take 40 mg by mouth every morning (Patient not taking: Reported on 2/15/2024)      Pregabalin (LYRICA) 200 MG capsule Take 200 mg by mouth      prochlorperazine (COMPAZINE) 10 MG tablet       Prochlorperazine Maleate (COMPAZINE PO) Take 10 mg by mouth 3 times daily as needed for nausea      PROMETHAZINE HCL RE Place 25 mg rectally 3 times daily as needed for nausea      SIMVASTATIN PO Take 5 mg by mouth      UNKNOWN TO PATIENT as needed (steroid cream, for sun allergy) (Patient not taking: Reported on 2/15/2024)      VICTOZA PEN 18 MG/3ML soln Inject 1.8 mg Subcutaneous                Physical Exam:   There were no vitals taken for this visit.    ECOG PS: 0  Constitutional: WDWN female in NAD, pleasant and appropriate  HEENT:  NC/AT, no icterus, OP clear, MMM  Skin: No jaundice nor ecchymoses  Lungs: CTAB, no w/r/r, nonlabored breathing  Cardiovascular: RRR, S1, S2, no m/r/g  Abdomen: +BS, morbidly obese, minimal periumbilical tenderness, no right upper quadrant tenderness.  MSK/Extremities: Warm, well perfused. No edema  LN: no cervical, supraclavicular, axillary, nor inguinal lymphadenopathy  Neurologic: alert, answering questions appropriately, moving all extremities spontaneously. CN 2-12 grossly intact.  Psych: appropriate affect  Breast exam: The left breast has a palpable deep mass in the 2 o'clock position measuring roughly 4 cm x 4 cm.  It is somewhat irregular.  The rest of the breast has a heterogeneous exam.  There are no nipple abnormalities.  The left axilla is  negative.  The left breast is smaller than the right side.  The right breast has a very heterogeneous exam but there is no obvious palpable lesion of concern.  The right axilla is negative.  Data:   Labs from a few weeks ago primary care were reviewed.  Her hemoglobin is 16 but the rest of the CBC is normal  Her CMP shows elevation in her transaminases to twice normal.  Her sodium is 128.    No results found for this or any previous visit (from the past 24 hour(s)).    Other Data       Seen with Christi Perez MD PGY-2    Labs, imaging and treatment plan reviewed with patient. All questions answered.        60 minutes spent on the date of the encounter doing chart review, review of outside records, review of test results, interpretation of tests, patient visit, documentation, discussion with other provider(s), and discussion with family

## 2024-02-22 ENCOUNTER — ANCILLARY PROCEDURE (OUTPATIENT)
Dept: MAMMOGRAPHY | Facility: CLINIC | Age: 39
End: 2024-02-22
Attending: SURGERY
Payer: COMMERCIAL

## 2024-02-22 ENCOUNTER — ONCOLOGY VISIT (OUTPATIENT)
Dept: ONCOLOGY | Facility: CLINIC | Age: 39
End: 2024-02-22
Attending: INTERNAL MEDICINE
Payer: COMMERCIAL

## 2024-02-22 VITALS
HEART RATE: 61 BPM | WEIGHT: 285 LBS | DIASTOLIC BLOOD PRESSURE: 89 MMHG | BODY MASS INDEX: 44.21 KG/M2 | OXYGEN SATURATION: 98 % | RESPIRATION RATE: 16 BRPM | SYSTOLIC BLOOD PRESSURE: 153 MMHG | TEMPERATURE: 98.2 F

## 2024-02-22 DIAGNOSIS — C50.919 BREAST CANCER (H): ICD-10-CM

## 2024-02-22 DIAGNOSIS — C50.412 MALIGNANT NEOPLASM OF UPPER-OUTER QUADRANT OF LEFT BREAST IN FEMALE, ESTROGEN RECEPTOR POSITIVE (H): Primary | ICD-10-CM

## 2024-02-22 DIAGNOSIS — Z17.0 MALIGNANT NEOPLASM OF UPPER-OUTER QUADRANT OF LEFT BREAST IN FEMALE, ESTROGEN RECEPTOR POSITIVE (H): ICD-10-CM

## 2024-02-22 DIAGNOSIS — C50.412 MALIGNANT NEOPLASM OF UPPER-OUTER QUADRANT OF LEFT BREAST IN FEMALE, ESTROGEN RECEPTOR POSITIVE (H): ICD-10-CM

## 2024-02-22 DIAGNOSIS — Z17.0 MALIGNANT NEOPLASM OF UPPER-OUTER QUADRANT OF LEFT BREAST IN FEMALE, ESTROGEN RECEPTOR POSITIVE (H): Primary | ICD-10-CM

## 2024-02-22 LAB
PATH REPORT.COMMENTS IMP SPEC: ABNORMAL
PATH REPORT.COMMENTS IMP SPEC: YES
PATH REPORT.FINAL DX SPEC: ABNORMAL
PATH REPORT.GROSS SPEC: ABNORMAL
PATH REPORT.MICROSCOPIC SPEC OTHER STN: ABNORMAL
PATH REPORT.RELEVANT HX SPEC: ABNORMAL
PATH REPORT.RELEVANT HX SPEC: ABNORMAL
PATH REPORT.SITE OF ORIGIN SPEC: ABNORMAL

## 2024-02-22 PROCEDURE — 88360 TUMOR IMMUNOHISTOCHEM/MANUAL: CPT | Mod: 26 | Performed by: PATHOLOGY

## 2024-02-22 PROCEDURE — 76942 ECHO GUIDE FOR BIOPSY: CPT | Mod: GC | Performed by: RADIOLOGY

## 2024-02-22 PROCEDURE — 38505 NEEDLE BIOPSY LYMPH NODES: CPT | Mod: LT | Performed by: RADIOLOGY

## 2024-02-22 PROCEDURE — 88360 TUMOR IMMUNOHISTOCHEM/MANUAL: CPT | Mod: TC | Performed by: SURGERY

## 2024-02-22 PROCEDURE — 99205 OFFICE O/P NEW HI 60 MIN: CPT | Performed by: INTERNAL MEDICINE

## 2024-02-22 PROCEDURE — 88305 TISSUE EXAM BY PATHOLOGIST: CPT | Mod: 26 | Performed by: PATHOLOGY

## 2024-02-22 PROCEDURE — G0463 HOSPITAL OUTPT CLINIC VISIT: HCPCS | Performed by: INTERNAL MEDICINE

## 2024-02-22 RX ORDER — LIDOCAINE HYDROCHLORIDE 10 MG/ML
9 INJECTION, SOLUTION EPIDURAL; INFILTRATION; INTRACAUDAL; PERINEURAL ONCE
Status: COMPLETED | OUTPATIENT
Start: 2024-02-22 | End: 2024-02-22

## 2024-02-22 RX ORDER — OXYCODONE AND ACETAMINOPHEN 5; 325 MG/1; MG/1
2 TABLET ORAL EVERY 6 HOURS PRN
COMMUNITY
Start: 2024-02-21 | End: 2024-02-28

## 2024-02-22 RX ADMIN — LIDOCAINE HYDROCHLORIDE 9 ML: 10 INJECTION, SOLUTION EPIDURAL; INFILTRATION; INTRACAUDAL; PERINEURAL at 11:33

## 2024-02-22 ASSESSMENT — PAIN SCALES - GENERAL: PAINLEVEL: NO PAIN (0)

## 2024-02-22 NOTE — LETTER
2/22/2024         RE: Eliezer Izquierdo  [address] [address] [facility] [address]        Dear Colleague,    Thank you for referring your patient, Eliezer Izquierdo, to the [facility]. Please see a copy of my visit note below.      [facility] Medical Oncology Note       Date of visit: February 22, 2024  New Outpatient Clinic Note        Assessment:     Clinical T2N1 low grade invasive breast cancer in this 39 year old woman.  As discussed previously with Dr. Ford and again today with me, the patient is appropriate for neoadjuvant treatment.  She has a large breast cancer with nodes that are positive.  Neoadjuvant therapy can frequently downstage somebody's cancer and I think it would be a reasonable approach with Eliezer.  The question is is she a candidate for neoadjuvant chemotherapy, or endocrine therapy?  Either way, she would be appropriate for participation in our I-SPY neoadjuvant clinical trial.  As part of this trial, patients get a MammaPrint genomic test on their tumor.  If it is high risk, they go on intravenous systemic therapy with chemotherapy and perhaps other therapies such as immunotherapy.  If the MammaPrint is low risk, then she would be appropriate for the endocrine optimization arm.  It would appear that her histology is consistent with a luminal A breast cancer so it just may be this is what ultimately would be recommended to her.  Eliezer has voiced a real interest in participating.  Elevated LFTs which are probably related to her prior diagnosis of fatty liver and hepatosteatosis.  We will get a CT/PET scan to further assess.  We did talk about that she will likely get a breast MRI as part of I-SPY.  I reviewed her recent lab workup from her primary care which shows a normal CBC, but with a CMP that shows elevated transaminases.  I do not need to repeat her blood work today, based on her request.  She hates needles.  Because of the youngest of her cancer  she is getting genetic testing.  This is still pending at the time of this dictation.      Plan:     We will present her tomorrow at our breast conference  We will get in touch with Leidy Arteaga who will get in touch with the patient about participation in I-SPY  CT/PET scan as soon as can be arranged  We will send a MammaPrint on her tumor specimen  Return to clinic with me after the above is done to discuss where we go from here regarding management.    Zachary Nolan MD, MSc  Associate Professor of Medicine  [facility] [employer]  [facility] [place] [address]  [phone]    __________________________________________________________________    CHIEF COMPLAINT     I was asked to see this patient in consultation to give an opinion regarding further evaluation and management of a recent diagnosis of a clinical T2N1 invasive breast cancer      History of Present Illness:     Eliezer Izquierdo is a 38 yo previously healthy woman who palpated a mass in the left breast a few months ago. Mammogram and US showed a 2.5 x 2.5 x 2.0 cm spiculated mass at 2:00 position.  Biopsy of the mass was done on 2/9/2024.    Final path showed a Jeniffer grade I IDC, 97% strongly positive for ER, 99% strongly positive for [place], and negative for HER2 by FISH (IHC 2+). KI-67 is 24%.    The patient then saw Albina Ford 2/15/2024. She ordered a left axillary US that showed three abnormal axillary nodes.  Biopsy of a node is scheduled for later today.    Review of physical symptoms finds that Kathy feels pretty well.  She has a little bit of pain at the site of the breast biopsy, which was done 2 weeks ago.  She is on a low-carb diet.  She is trying to cut down smoking.  She does not menstruate due to hysterectomy done at the age of 21.  She still has her ovaries, but tells me she has been having hot flashes.  She has no cough or shortness of breath.  There is no weight loss.  She  has no other lumps or bumps.  She Tubby her right breast has always been a lot larger than the left breast.  The rest of the multiorgan review of physical symptoms is negative.          Past Medical History:   Melanoma of right foot???     Past Medical History:   Diagnosis Date    Hypercholesteraemia     Irritable bowel     Migraines           Past Surgical History:    I have reviewed this patient's past surgical history         Social History:   Tobacco, ETOH, and rec drugs reviewed and as noted below with the following exceptions:  Kathy grew up many places in [place] and [place], but went to high school in [place] and graduated in 2000.  She thought about being a , but then got pregnant with her son Homero.  She had a lot of different for jobs.  She spent some time in [place] where her mom and other family member lives.  She then came back to [place].  She has a fiancé of 6 years named Saul and they currently live in [place].  She is an avid reader, and likes romance novels, but really anything.  She says she read 250 books last year.  She does smoke but is trying to quit.  She is currently on a low-carb diet.    Family History:     Family History   Problem Relation Age of Onset    Genitourinary Problems Mother         renal disease - minimal change, sponge kidney            Medications:     Current Outpatient Medications   Medication Sig Dispense Refill    albuterol (PROAIR HFA/PROVENTIL HFA/VENTOLIN HFA) 108 (90 Base) MCG/ACT inhaler Inhale 2 puffs into the lungs      amLODIPine (NORVASC) 10 MG tablet Take 10 mg by mouth      atorvastatin (LIPITOR) 10 MG tablet Take 10 mg by mouth      Blood Glucose Monitoring Suppl (ONETOUCH VERIO FLEX SYSTEM) w/Device KIT       cetirizine (ZYRTEC) 10 MG tablet Take 5 mg by mouth      Cyclobenzaprine HCl (FLEXERIL PO) Take 10 mg by mouth 3 times daily as needed for muscle spasms (Patient not taking: Reported on 2/15/2024)      EPINEPHrine (ANY BX GENERIC  EQUIV) 0.3 MG/0.3ML injection 2-pack       FLOVENT DISKUS 100 MCG/ACT inhaler Inhale 1 puff into the lungs (Patient not taking: Reported on 2/15/2024)      fluticasone-salmeterol (ADVAIR HFA) 115-21 MCG/ACT inhaler Inhale 2 puffs into the lungs      Gabapentin (NEURONTIN PO) Take 600 mg by mouth At Bedtime  (Patient not taking: Reported on 2/15/2024)      hydrochlorothiazide (HYDRODIURIL) 25 MG tablet Take 25 mg by mouth      HYDROcodone-acetaminophen (NORCO) 7.5-325 MG per tablet Take 2 tablets by mouth daily (Patient not taking: Reported on 2/15/2024)      ibuprofen (ADVIL/MOTRIN) 200 MG tablet Take 200 mg by mouth      methocarbamol (ROBAXIN) 500 MG tablet Take 1,000 mg by mouth      OMEPRAZOLE PO Take 40 mg by mouth every morning (Patient not taking: Reported on 2/15/2024)      Pregabalin (LYRICA) 200 MG capsule Take 200 mg by mouth      prochlorperazine (COMPAZINE) 10 MG tablet       Prochlorperazine Maleate (COMPAZINE PO) Take 10 mg by mouth 3 times daily as needed for nausea      PROMETHAZINE HCL RE Place 25 mg rectally 3 times daily as needed for nausea      SIMVASTATIN PO Take 5 mg by mouth      UNKNOWN TO PATIENT as needed (steroid cream, for sun allergy) (Patient not taking: Reported on 2/15/2024)      VICTOZA PEN 18 MG/3ML soln Inject 1.8 mg Subcutaneous                Physical Exam:   There were no vitals taken for this visit.    ECOG PS: 0  Constitutional: WDWN female in NAD, pleasant and appropriate  HEENT:  NC/AT, no icterus, OP clear, MMM  Skin: No jaundice nor ecchymoses  Lungs: CTAB, no w/r/r, nonlabored breathing  Cardiovascular: RRR, S1, S2, no m/r/g  Abdomen: +BS, morbidly obese, minimal periumbilical tenderness, no right upper quadrant tenderness.  MSK/Extremities: Warm, well perfused. No edema  LN: no cervical, supraclavicular, axillary, nor inguinal lymphadenopathy  Neurologic: alert, answering questions appropriately, moving all extremities spontaneously. CN 2-12 grossly intact.  Psych:  appropriate affect  Breast exam: The left breast has a palpable deep mass in the 2 o'clock position measuring roughly 4 cm x 4 cm.  It is somewhat irregular.  The rest of the breast has a heterogeneous exam.  There are no nipple abnormalities.  The left axilla is negative.  The left breast is smaller than the right side.  The right breast has a very heterogeneous exam but there is no obvious palpable lesion of concern.  The right axilla is negative.  Data:   Labs from a few weeks ago primary care were reviewed.  Her hemoglobin is 16 but the rest of the CBC is normal  Her CMP shows elevation in her transaminases to twice normal.  Her sodium is 128.    No results found for this or any previous visit (from the past 24 hour(s)).    Other Data       Seen with Christi Perez MD PGY-2    Labs, imaging and treatment plan reviewed with patient. All questions answered.        60 minutes spent on the date of the encounter doing chart review, review of outside records, review of test results, interpretation of tests, patient visit, documentation, discussion with other provider(s), and discussion with family

## 2024-02-23 ENCOUNTER — TUMOR CONFERENCE (OUTPATIENT)
Dept: ONCOLOGY | Facility: CLINIC | Age: 39
End: 2024-02-23
Payer: COMMERCIAL

## 2024-02-23 NOTE — TUMOR CONFERENCE
Tumor Conference Information       Due to her A1C she will not be eligible for ISPY. Will send a Mammoprint. Recommends patient get diabetes and quit smoking prior to surgery. Recommendation is to give neoadjuvant therapy.     Documentation / Disclaimer Cancer Tumor Board Note  Cancer tumor board recommendations do not override what is determined to be reasonable care and treatment, which is dependent on the circumstances of a patient's case; the patient's medical, social, and personal concerns; and the clinical judgment of the oncologist [physician].

## 2024-02-23 NOTE — TUMOR CONFERENCE
Tumor Conference Information  Tumor Conference: Breast  Specialties Present: Medical oncology, Radiation oncology, Pathology, Radiology, Surgery, Research  Patient Status: Prospective  Did the review exceed 30 minutes?: did not           Documentation / Disclaimer Cancer Tumor Board Note  Cancer tumor board recommendations do not override what is determined to be reasonable care and treatment, which is dependent on the circumstances of a patient's case; the patient's medical, social, and personal concerns; and the clinical judgment of the oncologist [physician].

## 2024-02-26 ENCOUNTER — PATIENT OUTREACH (OUTPATIENT)
Dept: ONCOLOGY | Facility: CLINIC | Age: 39
End: 2024-02-26
Payer: COMMERCIAL

## 2024-02-26 ENCOUNTER — TELEPHONE (OUTPATIENT)
Dept: GENERAL RADIOLOGY | Facility: CLINIC | Age: 39
End: 2024-02-26
Payer: COMMERCIAL

## 2024-02-26 DIAGNOSIS — C50.412 MALIGNANT NEOPLASM OF UPPER-OUTER QUADRANT OF LEFT BREAST IN FEMALE, ESTROGEN RECEPTOR POSITIVE (H): Primary | ICD-10-CM

## 2024-02-26 DIAGNOSIS — Z17.0 MALIGNANT NEOPLASM OF UPPER-OUTER QUADRANT OF LEFT BREAST IN FEMALE, ESTROGEN RECEPTOR POSITIVE (H): Primary | ICD-10-CM

## 2024-02-26 LAB
PATH REPORT.COMMENTS IMP SPEC: ABNORMAL
PATH REPORT.COMMENTS IMP SPEC: ABNORMAL
PATH REPORT.COMMENTS IMP SPEC: YES
PATH REPORT.FINAL DX SPEC: ABNORMAL
PATH REPORT.GROSS SPEC: ABNORMAL
PATH REPORT.MICROSCOPIC SPEC OTHER STN: ABNORMAL
PATHOLOGY SYNOPTIC REPORT: ABNORMAL
PHOTO IMAGE: ABNORMAL

## 2024-02-26 NOTE — TELEPHONE ENCOUNTER
Spoke with patient regarding left axillary lymph node biopsy results, which indicate metastatic breast carcinoma. Notified patient that the radiologist's recommendation is continued surgical and oncologic consultation. Patient verbalized understanding of these results and all questions answered to her satisfaction.

## 2024-02-26 NOTE — PROGRESS NOTES
[facility]: Cancer Care                                                                                          Mammaprint ordered     Ana NEWBYN, RN, OCN  Care Coordinator  [facility] [place]

## 2024-03-01 ENCOUNTER — DOCUMENTATION ONLY (OUTPATIENT)
Dept: ONCOLOGY | Facility: CLINIC | Age: 39
End: 2024-03-01

## 2024-03-01 ENCOUNTER — ANCILLARY PROCEDURE (OUTPATIENT)
Dept: CT IMAGING | Facility: CLINIC | Age: 39
End: 2024-03-01
Attending: INTERNAL MEDICINE
Payer: COMMERCIAL

## 2024-03-01 DIAGNOSIS — C50.412 MALIGNANT NEOPLASM OF UPPER-OUTER QUADRANT OF LEFT BREAST IN FEMALE, ESTROGEN RECEPTOR POSITIVE (H): ICD-10-CM

## 2024-03-01 DIAGNOSIS — Z17.0 MALIGNANT NEOPLASM OF UPPER-OUTER QUADRANT OF LEFT BREAST IN FEMALE, ESTROGEN RECEPTOR POSITIVE (H): Primary | ICD-10-CM

## 2024-03-01 DIAGNOSIS — C50.412 MALIGNANT NEOPLASM OF UPPER-OUTER QUADRANT OF LEFT BREAST IN FEMALE, ESTROGEN RECEPTOR POSITIVE (H): Primary | ICD-10-CM

## 2024-03-01 DIAGNOSIS — Z17.0 MALIGNANT NEOPLASM OF UPPER-OUTER QUADRANT OF LEFT BREAST IN FEMALE, ESTROGEN RECEPTOR POSITIVE (H): ICD-10-CM

## 2024-03-01 LAB
CREAT BLD-MCNC: 0.3 MG/DL (ref 0.5–1)
EGFRCR SERPLBLD CKD-EPI 2021: >60 ML/MIN/1.73M2

## 2024-03-01 PROCEDURE — 74177 CT ABD & PELVIS W/CONTRAST: CPT | Performed by: STUDENT IN AN ORGANIZED HEALTH CARE EDUCATION/TRAINING PROGRAM

## 2024-03-01 PROCEDURE — 82565 ASSAY OF CREATININE: CPT | Performed by: PATHOLOGY

## 2024-03-01 RX ORDER — IOPAMIDOL 755 MG/ML
122 INJECTION, SOLUTION INTRAVASCULAR ONCE
Status: COMPLETED | OUTPATIENT
Start: 2024-03-01 | End: 2024-03-01

## 2024-03-01 RX ADMIN — IOPAMIDOL 122 ML: 755 INJECTION, SOLUTION INTRAVASCULAR at 08:22

## 2024-03-01 NOTE — DISCHARGE INSTRUCTIONS

## 2024-03-01 NOTE — PROGRESS NOTES
Oncology phone note 3/1/2024    I just got off the phone with Eliezer.  Her CT scan looks good.  There is no sign of distant metastatic disease.  She does have fatty infiltration of the liver.  There is also some retroperitoneal adenopathy that has not changed since 2015.    Therefore, we can move forward with neoadjuvant therapy of her breast cancer. Unfortunatgely, given her Hgb A1c of>10, she is not a candidate for I-SPY2.  I await the MammaPrint to tell me whether or not chemotherapy will be of benefit.  There is no question however in the premenopausal patient population with brightly ER positive breast cancer, that ovarian function suppression is critical to the management of this cancer.  Therefore I am setting her up to come in next week to get started with goserelin 10.8 mg.  Will give it a every 3 month intervals.  At some point she may want to get her ovaries out.    After that, we await the results of the MammaPrint.  If it is high risk then we go forward with neoadjuvant chemotherapy that would include 12 weeks of Taxol as well as dose dense Adriamycin and Cytoxan.  If it is low risk however, then we would move forward with neoadjuvant endocrine therapy which would be ovarian function suppression and an aromatase inhibitor.    She does have an appointment with me at the end of March to discuss this.  I will try to get her in sooner, particularly once we get the results of her MammaPrint.    Zachary Nolan MD, MSc  Associate Professor of Medicine  [facility] [employer]  [facility] [address] [address] [phone]

## 2024-03-04 ENCOUNTER — VIRTUAL VISIT (OUTPATIENT)
Dept: ONCOLOGY | Facility: CLINIC | Age: 39
End: 2024-03-04
Attending: SURGERY
Payer: COMMERCIAL

## 2024-03-04 DIAGNOSIS — C50.412 MALIGNANT NEOPLASM OF UPPER-OUTER QUADRANT OF LEFT BREAST IN FEMALE, ESTROGEN RECEPTOR POSITIVE (H): Primary | ICD-10-CM

## 2024-03-04 DIAGNOSIS — Z17.0 MALIGNANT NEOPLASM OF UPPER-OUTER QUADRANT OF LEFT BREAST IN FEMALE, ESTROGEN RECEPTOR POSITIVE (H): Primary | ICD-10-CM

## 2024-03-04 DIAGNOSIS — Z80.3 FAMILY HISTORY OF MALIGNANT NEOPLASM OF BREAST: ICD-10-CM

## 2024-03-04 DIAGNOSIS — Z80.49 FAMILY HISTORY OF UTERINE CANCER: ICD-10-CM

## 2024-03-04 DIAGNOSIS — Z85.820 PERSONAL HISTORY OF MALIGNANT MELANOMA: ICD-10-CM

## 2024-03-04 PROCEDURE — 96040 HC GENETIC COUNSELING, EACH 30 MINUTES: CPT | Mod: TEL,95 | Performed by: GENETIC COUNSELOR, MS

## 2024-03-04 NOTE — PROGRESS NOTES
3/4/2024    Virtual Visit Details  Type of service:  Telephone Visit   Phone call duration: 43 minutes   Originating Location (pt. Location): Home  Distant Location (provider location):  Off-site    Referring Provider: Albina Ford MD    Presenting Information:   Today Eliezer elected for a virtual genetic counseling visit through the Cancer Risk Management Program to discuss her personal and family history of cancer. We reviewed this history, cancer screening recommendations, and available genetic testing options.    Personal History:  Eliezer is a 39 year old female. She had a mammogram in January 2024 after palpating a left-sided breast mass, which resulted in a biopsy. 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 Treatment is currently being planned, but will likely begin with neoadjuvant chemotherapy and/or endocrine therapy followed by surgery. She also reports having a melanoma removed from her right foot around age 24.    Elieezr had a hysterectomy at age 21 to address possible endometriosis; her ovaries remain in place and she has had no ovarian cancer screening to date.      Her first colonoscopy in her 20's to address IBS-type symptoms was normal and no specific follow-up was recommended. She does not regularly do any other cancer screening at this time.    Family History: (Please see scanned pedigree for detailed family history information)  Eliezer's mother is 62 and was diagnosed with breast cancer in her 50's, as well as melanoma (site of cancer unknown). She also recently had a PET scan that identified a possible cancerous spot on her tongue and is undergoing a work-up. She has not pursued genetic testing.  Of note, she was adopted and limited information is available regarding her biological relatives.  Eliezer's paternal grandmother was diagnosed with uterine cancer in her 30/40's; no other information is  "available regarding her health.  Her sister was diagnosed with breast cancer at an unknown age.  Her father was diagnosed with lung cancer and her mother was diagnosed with an unknown cancer.  An extended cousin may have been diagnosed with a \"female\" cancer.  Eliezer reports that she has no other known family history of cancer.  Her maternal ethnicity is Scandinavian, [place], and Eastern  Judaism. Her paternal ethnicity is [place].    Discussion:  We reviewed the features of sporadic, familial, and hereditary cancers. In looking at Eliezer's family history, it is possible that a cancer susceptibility gene is present as Eliezer and relatives on each side of her family have been diagnosed with potentially related cancers; Eliezer and several of her relatives were also diagnosed under age 50 and/or with multiple primary cancers.  In order to inform treatment decisions, Dr. Ford ordered testing of the BRCA1 and BRCA2 genes with reflex to the Breast Actionable Panel through the [facility]. Her blood was drawn on 2/15/24 and results are pending at this time.  We discussed the natural history and genetics of the 11 genes included in the complete panel: JAGDISH, BARD1, BRCA1, BRCA2, CDH1, CHEK2, NF1, PALB2, PTEN, STK11, and TP53. If a mutation is identified in one of these genes, it may inform current treatment decisions (i.e. lumpectomy versus bilateral mastectomy, certain chemotherapies, avoidance of radiation, etc.) as well as future medical management decisions (i.e. surgery to remove ovaries, more frequent colonoscopies, etc.).  A detailed handout regarding these genes/syndromes and the information we discussed was provided to Eliezer at the end of our appointment today and can be found in the after visit summary. Topics included: inheritance pattern, cancer risks, cancer screening recommendations, and also risks, benefits and limitations of testing.   Based on her " personal and family history, Eliezer meets current National Comprehensive Cancer Network (NCCN) criteria for genetic testing of high penetrance breast cancer genes (i.e. BRCA1, BRCA2, CDH1, PALB2, PTEN, TP53, etc.). Of note, We discussed that there are three mutations in the BRCA genes that are more common in the Ashkenazi Buddhist population. About 1 in 40 individuals of Ashkenazi Buddhist background have one of these three mutations.   We discussed that results are pending at this time and should be available shortly after insurance pre-authorization is obtained. The results will be communicated by Dr. Ford's team, but I would be happy to meet again if she has any questions regarding the results. Eliezer verbalized understanding.  We then discussed that there are additional genes that could cause increased risk for the cancers in her family that are not included in the Breast Actionable Panel. As many of these genes present with overlapping features in a family and accurate cancer risk cannot always be established based upon the pedigree analysis alone, it would be reasonable for Eliezer to consider expanded testing to analyze additional genes.  We reviewed Eliezer's expanded genetic testing options: Comprehensive Hereditary Cancer Panel + MC1R Gene and Comprehensive Hereditary Cancer Expanded Panel + MC1R Gene. She opted for the Comprehensive Hereditary Cancer Expanded Panel + MC1R Gene.  Genetic testing is available for 88 (77 additional) genes associated with cancer risk: ALK, APC, JAGDISH, AXIN2, BAP1, BARD1, BLM, BMPR1A, BRCA1, BRCA2, BRIP1, CASR, CDC73, CDH1, CDK4, CDKN1B, CDKN1C, CDKN2A, CEBPA, CHEK2, CTNNA1, DICER1, DIS3L2, EGFR, EPCAM, FH, FLCN, GATA2, GPC3, GREM1, HOXB13, HRAS, KIT, MAX, MBD4, MC1R, MEN1, MET, MITF, MLH1, MLH3, MRE11, MSH2, MSH3, MSH6, MUTYH, NBN, NF1, NF2, NTHL1, PALB2, PDGFRA, PHOX2B, PMS2, POLD1, POLE, POT1, ZJGYW1Z, PTCH1, PTCH2, PTEN, RAD50, RAD51C, RAD51D, RB1, RECQL4, RET, RUNX1, SDHA,  SDHAF2, SDHB, SDHC, SDHD, SMAD4, SMARCA4, SMARCB1, SMARCE1, STK11, SUFU, TERC, TERT, WPLU722, TP53, TSC1, TSC2, VHL, WRN, WT1.    Consent was obtained over the phone. Genetic testing using the Comprehensive Hereditary Cancer Expanded Panel + MC1R Gene will be performed by the [facility]; no additional blood sample is needed to complete this testing. Turn around time: 3-4 weeks after testing of the BRCA1 and BRCA2 genes, with reflex to the Breast Actionable Panel, is completed.  Medical Management: For Eliezer, we reviewed that the information from genetic testing may determine:  surgery to treat Eliezer's active cancer diagnosis (i.e. lumpectomy versus bilateral mastectomy),  additional cancer screening for which Eliezer may qualify (i.e. mammogram and breast MRI, more frequent colonoscopies, more frequent dermatologic exams, etc.),  options for risk reducing surgeries Eliezer could consider (i.e. surgery to remove her ovaries, etc.),    and targeted chemotherapies for Dats active cancer, or if she were to develop certain cancers in the future (i.e. immunotherapy for individuals with Altamirano syndrome, PARP inhibitors, etc.).   These recommendations and possible targeted chemotherapies will be discussed in detail once genetic testing is completed.     Plan:  1) Today we discussed testing of the BRCA1 and BRCA2 genes, with reflex to the Breast Actionable Panel, ordered by Dr. Ford through the [facility]. Results are pending at this time and will be communicated by Dr. Ford's team when available.  2) Eliezer then elected to proceed with additional testing using the Comprehensive Hereditary Cancer Expanded Panel + MC1R Gene; no additional blood sample is needed to complete this testing.  3) The results should be available 3-4 weeks after testing of the BRCA1 and BRCA2 genes, with reflex to the Breast Actionable Panel, is completed.  4) Eliezer will be  contacted to schedule a virtual return visit with me to discuss the results.    Qing Franco MS, [facility] [place]  Licensed, Certified Genetic Counselor  Office: [phone]  Pager: [phone]

## 2024-03-04 NOTE — PATIENT INSTRUCTIONS
BREAST ACTIONABLE PANEL    The Breast Actionable cancer panel is a genetic test which analyzes 11 genes known to be associated with an increased lifetime risk of breast and other cancers. Published medical guidelines exist for detection, prevention, risk reduction, and/or treatment for individuals with mutations in these genes.     Of note, the Breast Actionable cancer panel is not considered to be comprehensive. Individuals with a family history of other cancers should be seen by a genetic counselor to discuss the family history and the possibility of expanded genetic testing.     GENES (11) ASSOCIATED CANCER(S) ASSOCIATED CANCER SYNDROME(S)   JAGDISH breast, pancreas    BARD1 breast    BRCA1 breast, ovary, pancreas, melanoma, prostate, male breast Hereditary breast /ovarian cancer syndrome (HBOC)   BRCA2 breast, ovary, pancreas, melanoma, prostate, male breast Hereditary breast /ovarian cancer syndrome (HBOC)   CDH1 breast, stomach, colon Hereditary diffuse gastric cancer   CHEK2 breast, colon, prostate    NF1 breast, brain, peripheral nervous system Neurofibromatosis 1   PALB2 breast, pancreas    PTEN breast, thyroid, uterus, colon, kidney Cowden syndrome, Yvjvhjbf-Rkixj-Ayvbpwmkc syndrome (BRRS), PTEN-related Proteus syndrome (PS), Proteus-like syndrome   STK11 breast, ovary, colon, pancreas, stomach, uterus, cervix Peutz-Jeghers syndrome   TP53 breast, bone, brain, soft tissues, adrenal cortex Li-Fraumeni syndrome     Meeting with a Genetic Counselor  After you receive the results of the Breast Actionable Panel testing, providers will often refer you to see a genetic counselor. This is because patients can have a family history of cancer other than breast cancer and may benefit from a discussion about comprehensive, expanded genetic testing.     Additionally, you and the genetic counselor will discuss the impact of genetic testing on you and your family members, go over your family health history, and talk  about potential screening and interventions.     Assessing Cancer Risk  Only about 5-10% of cancers are thought to be due to an inherited cancer susceptibility gene.  These families often have:  Several people with the same or related types of cancer  Cancers diagnosed at a young age (before age 50)  Individuals with more than one primary cancer  Multiple generations of the family affected with cancer    Some people may be candidates for genetic testing of more than one gene.  For these families, genetic testing using a multi-gene cancer panel may be offered.  These panels may test genes known to increase the risk for breast (and other) cancers: JAGDISH, BRCA1, BRCA2, CDH1, CHEK2, PALB2, PTEN, and TP53.  The purpose of this handout is to serve as a brief summary of the high/moderate-risk breast cancer genes which have published clinical management guidelines for individuals who are found to carry a mutation.    BRCA1 and BRCA2-Hereditary Breast and Ovarian Cancer Syndrome (HBOC)  A single mutation in one of the copies of BRCA1 or BRCA2 increases the risk for breast and ovarian cancer, among others.  The risk for pancreatic cancer and melanoma may also be slightly increased in some families.  The tables below list the chance that someone with a BRCA mutation would develop cancer in his or her lifetime1,2,3,4.          Women   Men     General Population BRCA+    General Population BRCA+   Breast  12% 40-80%  Breast <1% ~7%   Ovarian  1-2% 10-40%  Prostate 16% 20%       A person s ethnic background is also important to consider, as individuals of Ashkenazi Gnosticist ancestry have a higher chance of having a BRCA gene mutation.  There are three BRCA mutations that occur more frequently in this population.     Individuals who inherit two BRCA2 mutations--one from their mother and one from their father--have a condition called Fanconi Anemia.  This rare autosomal recessive condition is associated with short stature,  developmental delay, bone marrow failure, and increased risk for childhood cancers.    TP53-Li-Fraumeni Syndrome (LFS)  LFS is a cancer predisposition syndrome. Individuals with LFS are at an increased risk for developing cancer at a young age. The general lifetime risk for development of cancer is 50% by age 30 and 90% by age 60.  LFS is caused by a mutation in the TP53 gene.  A single mutation in one of the copies of TP53 increases the risk for multiple cancers.     Core Cancers: Sarcomas, Breast, Brain, Lung, Leukemias/Lymphomas, Adrenocortical carcinomas  Other Cancers: Gastrointestinal, Thyroid, Skin, Genitourinary    PTEN-Cowden Syndrome  Cowden syndrome is a hereditary condition that increases the risk for breast, thyroid, endometrial, and kidney cancer.  Cowden syndrome is caused by a mutation in the PTEN gene.  A single mutation in one of the copies of PTEN causes Cowden syndrome and increases cancer risk.  The table below shows the chance that someone with a PTEN mutation would develop cancer in their lifetime5,6.  Other benign features seen in some individuals with Cowden syndrome include benign skin lesions (facial papules, keratoses, lipomas), learning disability, autism, thyroid nodules, colon polyps, and larger head size.      Lifetime Cancer Risk   Cancer Type General Population Cowden Syndrome   Breast  12% 25-50%*   Thyroid  1% 35%   Renal 1-2% 35%   Endometrial  2-3% 28%   Colon 5% 9%   Melanoma 2-3% >5%     *One recent study found breast cancer risk to be increased to 85%    CDH1-Hereditary Diffuse Gastric Cancer (HDGC)  Currently, one gene is known to cause hereditary diffuse gastric cancer: CDH1.  Individuals with HDGC are at increased risk for diffuse gastric cancer and lobular breast cancer. Of people diagnosed with HDGC, 30-50% have a mutation in the CDH1 gene.  This suggests there are likely other genes that may cause HDGC that have not been identified yet.      Lifetime Cancer Risks     General Pop HDGC    Diffuse Gastric  <1% ~80%   Breast 12% 39-52%       Additional Genes Associated with Increased Breast Cancer Risk  PALB2  Mutations in PALB2 have been shown to increase the risk of breast cancer up to 33-58% in some families; where individuals fall within this risk range is dependent upon family history.9 PALB2 mutations have also been associated with increased risk for pancreatic cancer, although this risk has not been quantified yet.  Individuals who inherit two PALB2 mutations--one from their mother and one from their father--have a condition called Fanconi Anemia.  This rare autosomal recessive condition is associated with short stature, developmental delay, bone marrow failure, and increased risk for childhood cancers.    JAGDISH  JAGDISH is a moderate-risk breast cancer gene. Women who have a mutation in JAGDISH can have between a 2-4 fold increased risk for breast cancer compared to the general population.10 JAGDISH mutations have also been associated with increased risk for pancreatic cancer, however an estimate of this cancer risk is not well understood.11  Individuals who inherit two JAGDISH mutations have a condition called ataxia-telangiectasia (AT).  This rare autosomal recessive condition affects the nervous system and immune system, and is associated with progressive cerebellar ataxia beginning in childhood.  Individuals with ataxia-telangiectasia often have a weakened immune system and have an increased risk for childhood cancers.         CHEK2   CHEK2 is a moderate-risk breast cancer gene.  Women who have a mutation in CHEK2 have around a 2-fold increased risk for breast cancer compared to the general population, and this risk may be higher depending upon family history.12,13,14 Mutations in CHEK2 have also been shown to increase the risk of a number of other cancers, including colon and prostate, however these cancer risks are currently not well understood.      Inheritance   All of the genes  reviewed above are inherited in an autosomal dominant pattern. This means that if a parent has a mutation, each of his or her children will have a 50% chance of inheriting that same mutation.  Therefore, each child--male or female--would have a 50% chance of being at increased risk for developing cancer.        Image obtained from Genetics Home Reference, 2013     In rare cases, there can be mutations in both copies of these genes (one from each parent), leading to different autosomal recessive conditions.  Mutations in some genes can occur de carlos, which means that a person s mutation occurred for the first time in them and was not inherited from a parent.  Now that they have the mutation, however, it can be passed on to future generations.     Genetic Testing  Genetic testing involves a blood test and will look for any harmful mutations within genes that are associated with increased cancer risk.  If possible, it is recommended that the person(s) who has had cancer be tested before other family members.  That person will give us the most useful information about whether or not a specific gene is associated with the cancer in the family.    Results  There are three possible results of genetic testing:  Positive--a harmful mutation was identified in one or more of the genes  Negative--no mutation was identified in any of the genes on this panel  Variant of unknown significance--a variation in one of the genes was identified, but it is unclear how this impacts cancer risk in the family    Advantages and Disadvantages  There are advantages and disadvantages to genetic testing.  Advantages  May clarify your cancer risk  Can help you make medical decisions  May explain the cancers in your family  May give useful information to your family members (if you share your results)    Disadvantages  Possible negative emotional impact of learning about inherited cancer risk  Uncertainty in interpreting a negative test result in  some situations  Possible genetic discrimination concerns (see below)    Genetic Information Nondiscrimination Act (KIKI)  KIKI is a federal law that protects individuals from health insurance or employment discrimination based on a genetic test result alone.  Although rare, there are currently no legal discrimination protections in terms of life insurance, long term care, or disability insurances. Visit the National Human Genome Research [place] genome.gov/17540378 to learn more.    Reducing Cancer Risk  Each of the genes listed within this handout have nationally recognized cancer screening guidelines that would be recommended for individuals who test positive.  In addition to increased cancer screening, surgeries may be offered or recommended to reduce cancer risk.  Recommendations are based upon an individual s genetic test result as well as their personal and family history of cancer.    Questions to Think About Regarding Genetic Testing  What effect will the test result have on me and my relationship with my family members if I have an inherited gene mutation?  If I don t have a gene mutation?  Should I share my test results, and how will my family react to this news, which may also affect them?  Are my children ready to learn new information that may one day affect their own health?    Resources  FORCE: Facing Our Risk of Cancer Empowered facingourrisk.org   Bright Pink bebrightpink.org   Li-Fraumeni Syndrome Association lfsassociation.org   PTEN World [employer].uShip   No stomach for cancer, Inc. nostomachforcancer.org   Stomach cancer relief network scrnet.org   Collaborative Group of the Americas on Inherited Colorectal Cancer (CGA) cgaicc.com    Cancer Care cancercare.org   American Cancer Society (ACS) cancer.org   National Cancer [place] (NCI) cancer.gov     Cancer Risk Management Program [phone] ([phone])  Denver Brewer, MS [facility] [place]  [phone]  Francesca Benítez, MS, [facility] [place] [phone]  Sue  Kera, MS, [facility]  [phone]  Jenna Lujan, MS, [facility]  [phone]  Wanda Couch, MS, [facility]  [phone]  Qing Franco, MS, [facility]  [phone]  Mildred Martinez, MS, [facility]  [phone]    References  Ashtyn MUELLER, Zuleyka PDP, Narod S, Rislisa STONE, Sushil JE, Anton JL, Addy N, Garry H, Yahir O, Jacky A, Haim B, Radiemilio P, Manvalentino S, Xiomara DM, Orozco N, Maru E, Christiano H, Mingo E, Annette J, Bernadette J, Michelle B, Gera H, Martin S, Eerola H, Emilie H, Mason K, Karoline OP. Average risks of breast and ovarian cancer associated with BRCA1 or BRCA2 mutations detected in case series unselected for family history: a combined analysis of 222 studies. Am J Hum Rosa. 2003;72:1117-30.  Nhung N, Gerda M, Amauri G.  BRCA1 and BRCA2 Hereditary Breast and Ovarian Cancer. Gene Reviews online. 2013.  Calin YC, Vini S, Stainslav G, Brown S. Breast cancer risk among male BRCA1 and BRCA2 mutation carriers. J Natl Cancer Inst. 2007;99:1811-4.  Pratik DG, Gokul I, Noam J, Geni E, Ellen ER, Ladewey F. Risk of breast cancer in male BRCA2 carriers. J Med Rosa. 2010;47:710-1.  Riley MH, Jason J, Mike J, Edward EVANS, Gail MS, Eng C. Lifetime cancer risks in individuals with germline PTEN mutations. Clin Cancer Res. 2012;18:400-7.  Pilaria MENA. Cowden Syndrome: A Critical Review of the Clinical Literature. J Rosa . 2009:18:13-27.  National Comprehensive Cancer Network. Clinical practice guidelines in oncology, colorectal cancer screening. Available online (registration required). 2013.  National Cancer [place]. SEER Cancer Stat Fact Sheets.  December 2013.  Ashtyn DRAPER, et al. Breast-Cancer Risk in Families with Mutations in PALB2. NE. 2014; 371(6):497-506.  Shannon A, Saroj D, Dick S, Zainab P, Jimi T, Fabien M, Nomi B, Elena H, Dmitri R, Alfredo K, Slade L, Pratik DG, Xiomara D, Sloan DF, Alex MR, The Breast Cancer Susceptibility Collaboration (UK) & Zak QUINONES. JAGDISH  mutations that cause ataxia-telangiectasia are breast cancer susceptibility alleles. Nature Genetics. 2006;38:873-875  Jean N , Seema Y, Reba J, Ruby L, Joann GM , Bonnie ML, Yvonne S, Crowe AG, Beverley S, Lukas ML, Kasey J , Kaitlynn R, Carmelita SZ, Erlinda JR, Car VE, Leslie M, Vojadielstein B, Ale N, Myra RH, Dianna KW, and Eddie AP. JAGDISH mutations in patients with hereditary pancreatic cancer. Cancer Discover. 2012;2:41-46  CHEK2 Breast Cancer Case-Control Consortium. CHEK2*1100delC and susceptibility to breast cancer: A collaborative analysis involving 10,860 breast cancer cases and 9,065 controls from 10 studies. Am J Hum Rosa, 74 (2004), pp. 6990-8080  Marcella T, Rupa S, Fay K, et al. Spectrum of Mutations in BRCA1, BRCA2, CHEK2, and TP53 in Families at High Risk of Breast Cancer. RANJANA, 2006;295(12):6178-5116.   Nyasia C, Mehreen D, Flako A, et al. Risk of breast cancer in women with a CHEK2 mutation with and without a family history of breast cancer. JClin Oncol. 2011;29:5243-9129.      TURNAROUND TIME  4 - 6 weeks    INSURANCE COVERAGE   A prior authorization will be submitted when your provider submits the order for this panel. Testing will be held until prior authorization is obtained. You will be contacted once prior authorization is completed. For details regarding coverage and out-of-pocket estimates, please contact a  at the [facility]) at [phone].    About the [facility] ([facility]), [facility]  In collaborative effort with the [facility] and the [place] [employer], the [facility] offers a full range of diagnostic testing services, with a strong focus on quality, accuracy, and timeliness.

## 2024-03-04 NOTE — LETTER
3/4/2024         RE: Eliezer Izquierdo  [address] [facility] [address]        Dear Colleague,    Thank you for referring your patient, Eliezer Izquierdo, to the [facility]. Please see a copy of my visit note below.    3/4/2024    Virtual Visit Details  Type of service:  Telephone Visit   Phone call duration: 43 minutes   Originating Location (pt. Location): Home  Distant Location (provider location):  Off-site    Referring Provider: Albina Ford MD    Presenting Information:   Today Eliezer elected for a virtual genetic counseling visit through the Cancer Risk Management Program to discuss her personal and family history of cancer. We reviewed this history, cancer screening recommendations, and available genetic testing options.    Personal History:  Eliezer is a 39 year old female. She had a mammogram in January 2024 after palpating a left-sided breast mass, which resulted in a biopsy. 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 Treatment is currently being planned, but will likely begin with neoadjuvant chemotherapy and/or endocrine therapy followed by surgery. She also reports having a melanoma removed from her right foot around age 24.    Eliezer had a hysterectomy at age 21 to address possible endometriosis; her ovaries remain in place and she has had no ovarian cancer screening to date.      Her first colonoscopy in her 20's to address IBS-type symptoms was normal and no specific follow-up was recommended. She does not regularly do any other cancer screening at this time.    Family History: (Please see scanned pedigree for detailed family history information)  Eliezer's mother is 62 and was diagnosed with breast cancer in her 50's, as well as melanoma (site of cancer unknown). She also recently had a PET scan that identified a possible cancerous spot on her tongue and is  "undergoing a work-up. She has not pursued genetic testing.  Of note, she was adopted and limited information is available regarding her biological relatives.  Eliezer's paternal grandmother was diagnosed with uterine cancer in her 30/40's; no other information is available regarding her health.  Her sister was diagnosed with breast cancer at an unknown age.  Her father was diagnosed with lung cancer and her mother was diagnosed with an unknown cancer.  An extended cousin may have been diagnosed with a \"female\" cancer.  Eliezer reports that she has no other known family history of cancer.  Her maternal ethnicity is Scandinavian, [place], and Eastern  [place]os[place]. Her paternal ethnicity is [place].    Discussion:  We reviewed the features of sporadic, familial, and hereditary cancers. In looking at Eliezer's family history, it is possible that a cancer susceptibility gene is present as Eliezer and relatives on each side of her family have been diagnosed with potentially related cancers; Eliezer and several of her relatives were also diagnosed under age 50 and/or with multiple primary cancers.  In order to inform treatment decisions, Dr. Ford ordered testing of the BRCA1 and BRCA2 genes with reflex to the Breast Actionable Panel through the [facility]. Her blood was drawn on 2/15/24 and results are pending at this time.  We discussed the natural history and genetics of the 11 genes included in the complete panel: JAGDISH, BARD1, BRCA1, BRCA2, CDH1, CHEK2, NF1, PALB2, PTEN, STK11, and TP53. If a mutation is identified in one of these genes, it may inform current treatment decisions (i.e. lumpectomy versus bilateral mastectomy, certain chemotherapies, avoidance of radiation, etc.) as well as future medical management decisions (i.e. surgery to remove ovaries, more frequent colonoscopies, etc.).  A detailed handout regarding these genes/syndromes and the information we discussed " was provided to Eliezer at the end of our appointment today and can be found in the after visit summary. Topics included: inheritance pattern, cancer risks, cancer screening recommendations, and also risks, benefits and limitations of testing.   Based on her personal and family history, Eliezer meets current National Comprehensive Cancer Network (NCCN) criteria for genetic testing of high penetrance breast cancer genes (i.e. BRCA1, BRCA2, CDH1, PALB2, PTEN, TP53, etc.). Of note, We discussed that there are three mutations in the BRCA genes that are more common in the Ashkenazi Yazidi population. About 1 in 40 individuals of Ashkenazi Yazidi background have one of these three mutations.   We discussed that results are pending at this time and should be available shortly after insurance pre-authorization is obtained. The results will be communicated by Dr. Ford's team, but I would be happy to meet again if she has any questions regarding the results. Eliezer verbalized understanding.  We then discussed that there are additional genes that could cause increased risk for the cancers in her family that are not included in the Breast Actionable Panel. As many of these genes present with overlapping features in a family and accurate cancer risk cannot always be established based upon the pedigree analysis alone, it would be reasonable for Eliezer to consider expanded testing to analyze additional genes.  We reviewed Eliezer's expanded genetic testing options: Comprehensive Hereditary Cancer Panel + MC1R Gene and Comprehensive Hereditary Cancer Expanded Panel + MC1R Gene. She opted for the Comprehensive Hereditary Cancer Expanded Panel + MC1R Gene.  Genetic testing is available for 88 (77 additional) genes associated with cancer risk: ALK, APC, JAGDISH, AXIN2, BAP1, BARD1, BLM, BMPR1A, BRCA1, BRCA2, BRIP1, CASR, CDC73, CDH1, CDK4, CDKN1B, CDKN1C, CDKN2A, CEBPA, CHEK2, CTNNA1, DICER1, DIS3L2, EGFR, EPCAM, FH, FLCN, GATA2,  GPC3, GREM1, HOXB13, HRAS, KIT, MAX, MBD4, MC1R, MEN1, MET, MITF, MLH1, MLH3, MRE11, MSH2, MSH3, MSH6, MUTYH, NBN, NF1, NF2, NTHL1, PALB2, PDGFRA, PHOX2B, PMS2, POLD1, POLE, POT1, DIWRY1K, PTCH1, PTCH2, PTEN, RAD50, RAD51C, RAD51D, RB1, RECQL4, RET, RUNX1, SDHA, SDHAF2, SDHB, SDHC, SDHD, SMAD4, SMARCA4, SMARCB1, SMARCE1, STK11, SUFU, TERC, TERT, DFDX965, TP53, TSC1, TSC2, VHL, WRN, WT1.    Consent was obtained over the phone. Genetic testing using the Comprehensive Hereditary Cancer Expanded Panel + MC1R Gene will be performed by the [facility]; no additional blood sample is needed to complete this testing. Turn around time: 3-4 weeks after testing of the BRCA1 and BRCA2 genes, with reflex to the Breast Actionable Panel, is completed.  Medical Management: For Eliezer, we reviewed that the information from genetic testing may determine:  surgery to treat Eliezer's active cancer diagnosis (i.e. lumpectomy versus bilateral mastectomy),  additional cancer screening for which Eliezer may qualify (i.e. mammogram and breast MRI, more frequent colonoscopies, more frequent dermatologic exams, etc.),  options for risk reducing surgeries Eliezer could consider (i.e. surgery to remove her ovaries, etc.),    and targeted chemotherapies for Dats active cancer, or if she were to develop certain cancers in the future (i.e. immunotherapy for individuals with Altamirano syndrome, PARP inhibitors, etc.).   These recommendations and possible targeted chemotherapies will be discussed in detail once genetic testing is completed.     Plan:  1) Today we discussed testing of the BRCA1 and BRCA2 genes, with reflex to the Breast Actionable Panel, ordered by Dr. Ford through the [facility]. Results are pending at this time and will be communicated by Dr. Ford's team when available.  2) Eliezer then elected to proceed with additional testing using the Comprehensive Hereditary  Cancer Expanded Panel + MC1R Gene; no additional blood sample is needed to complete this testing.  3) The results should be available 3-4 weeks after testing of the BRCA1 and BRCA2 genes, with reflex to the Breast Actionable Panel, is completed.  4) Eliezer will be contacted to schedule a virtual return visit with me to discuss the results.    Qing Franco MS, [facility]  Licensed, Certified Genetic Counselor  Office: [phone]  Pager: [phone]

## 2024-03-04 NOTE — NURSING NOTE
Is the patient currently in the state of [place]? YES    Visit mode:TELEPHONE    If the visit is dropped, the patient can be reconnected by: TELEPHONE VISIT: Phone number: [phone]    Will anyone else be joining the visit? NO  (If patient encounters technical issues they should call [phone]    How would you like to obtain your AVS? MyChart    Are changes needed to the allergy or medication list? N/A    Reason for visit: Consult    Reema VILLAGOMEZ

## 2024-03-06 ENCOUNTER — DOCUMENTATION ONLY (OUTPATIENT)
Dept: ONCOLOGY | Facility: CLINIC | Age: 39
End: 2024-03-06
Payer: COMMERCIAL

## 2024-03-06 DIAGNOSIS — C50.412 MALIGNANT NEOPLASM OF UPPER-OUTER QUADRANT OF LEFT BREAST IN FEMALE, ESTROGEN RECEPTOR POSITIVE (H): Primary | ICD-10-CM

## 2024-03-06 DIAGNOSIS — Z17.0 MALIGNANT NEOPLASM OF UPPER-OUTER QUADRANT OF LEFT BREAST IN FEMALE, ESTROGEN RECEPTOR POSITIVE (H): Primary | ICD-10-CM

## 2024-03-06 NOTE — PROGRESS NOTES
Oncology phone note 3/6/2024    I just got off the phone with Eliezer.    Now that the dust is settled, here is what we know.  She has a luminal B breast cancer, since her MammaPrint came back high risk.  Her imaging did not show any signs of distant metastatic disease.  Unfortunately, because of her hemoglobin A1c, she is not a candidate for our eye-spy clinical trial.  But nonetheless she is appropriate for neoadjuvant therapy, after presenting her at our breast conference.    Standard therapy in this setting is to give a combination of dose dense Adriamycin and Cytoxan for 4 cycles, as well as 12 weeks of paclitaxel.  The order in which these are given is not important for patients off of a clinical trial.  I tend to give VAC upfront because it is more difficult to take, and I have found patients like to get it over with as soon as possible.  The plan then will be for neoadjuvant dose dense AC x 4 cycles, followed by 12 weeks of paclitaxel.    We talked about the possible side effects of this regimen.  These include, but are not limited to, alopecia, fatigue, myelosuppression, constipation, diarrhea, possible nausea (the patient is intolerant of ondansetron which she has been given in the past to treat the nausea associated with her migraine headaches) as well as the very rare chance of cardiac damage and secondary leukemias.    Previously, we had talked about the importance of ovarian function suppression, but this is actually more important if she had luminal a breast cancer.  Because of the high risk MammaPrint, it is less important to move forward with OFS now.  In fact, I really expect the chemotherapy to cause temporary anovulation.  Once she has completed all of her systemic therapy and gets surgery, we could consider adjuvant OFS or bilateral oophorectomy.  But this is not important now.    It is important to get a baseline echocardiogram since of the potential cardiac toxicity of the Adriamycin.  We  will also refer her on to interventional radiology for port placement.  After this, the neck step is to get her into clinic and get started ASAP.    Eliezer voiced an understanding of all this and agreed appreciation that we can finally start moving forward.  We will schedule everything for her and get her into clinic as soon as we can.    Zachary Nolan MD, MSc  Associate Professor of Medicine  [employer] [facility] [employer]  [facility] [address] [address] [phone]

## 2024-03-07 ENCOUNTER — TELEPHONE (OUTPATIENT)
Dept: INTERVENTIONAL RADIOLOGY/VASCULAR | Facility: CLINIC | Age: 39
End: 2024-03-07
Payer: COMMERCIAL

## 2024-03-07 NOTE — TELEPHONE ENCOUNTER
INTERVENTIONAL RADIOLOGY INSTRUCTIONS     You are scheduled for an upcoming procedure in the   Interventional Radiology Department at [facility].       Date: 3/11/24      Procedure: Port Placement     Address: [facility]                 [address], [place]  [address]     [address] is located on [facility], across the street from hospital.  There is a skyway attached to this ramp that will direct you to the patient check in area.       Check into the Skyway Lounge at: 7:30 am    Do not eat any food after: midnight  You may drink clear liquids until 5:30 am then nothing to drink until after your procedure.  Clear liquids are: water, apple juice, black coffee (no cream, sugar or milk), gatorade, jello       Two visitors may accompany you to your procedure.    You will need to have someone available to drive you home.    We recommend that you have a responsible adult with you for 24 hours after you get home.    Hold your Victoza for 24 hours before your procedure. You can take all other medications with clear liquids in the morning.     If you have any questions, please call the IR nurses at [phone].     Thank you!      Interventional Radiology Intake Nurse Coordinator  [phone]

## 2024-03-11 ENCOUNTER — APPOINTMENT (OUTPATIENT)
Dept: INTERVENTIONAL RADIOLOGY/VASCULAR | Facility: CLINIC | Age: 39
End: 2024-03-11
Attending: INTERNAL MEDICINE
Payer: COMMERCIAL

## 2024-03-11 ENCOUNTER — HOSPITAL ENCOUNTER (OUTPATIENT)
Facility: CLINIC | Age: 39
Discharge: HOME | End: 2024-03-11
Admitting: RADIOLOGY
Payer: COMMERCIAL

## 2024-03-11 ENCOUNTER — HOSPITAL ENCOUNTER (OUTPATIENT)
Dept: CARDIOLOGY | Facility: CLINIC | Age: 39
Discharge: HOME | End: 2024-03-11
Attending: INTERNAL MEDICINE | Admitting: INTERNAL MEDICINE
Payer: COMMERCIAL

## 2024-03-11 VITALS
SYSTOLIC BLOOD PRESSURE: 128 MMHG | BODY MASS INDEX: 41.92 KG/M2 | RESPIRATION RATE: 18 BRPM | HEART RATE: 94 BPM | HEIGHT: 69 IN | OXYGEN SATURATION: 95 % | DIASTOLIC BLOOD PRESSURE: 66 MMHG | WEIGHT: 283 LBS | TEMPERATURE: 98.2 F

## 2024-03-11 DIAGNOSIS — C50.412 MALIGNANT NEOPLASM OF UPPER-OUTER QUADRANT OF LEFT BREAST IN FEMALE, ESTROGEN RECEPTOR POSITIVE (H): ICD-10-CM

## 2024-03-11 DIAGNOSIS — Z17.0 MALIGNANT NEOPLASM OF UPPER-OUTER QUADRANT OF LEFT BREAST IN FEMALE, ESTROGEN RECEPTOR POSITIVE (H): ICD-10-CM

## 2024-03-11 LAB — LVEF ECHO: NORMAL

## 2024-03-11 PROCEDURE — 272N000196 HC ACCESSORY CR5

## 2024-03-11 PROCEDURE — 999N000208 ECHOCARDIOGRAM LIMITED

## 2024-03-11 PROCEDURE — 250N000011 HC RX IP 250 OP 636: Performed by: PHYSICIAN ASSISTANT

## 2024-03-11 PROCEDURE — 93325 DOPPLER ECHO COLOR FLOW MAPG: CPT | Mod: 26 | Performed by: INTERNAL MEDICINE

## 2024-03-11 PROCEDURE — 255N000002 HC RX 255 OP 636: Performed by: INTERNAL MEDICINE

## 2024-03-11 PROCEDURE — 250N000011 HC RX IP 250 OP 636: Performed by: RADIOLOGY

## 2024-03-11 PROCEDURE — 93308 TTE F-UP OR LMTD: CPT | Mod: 26 | Performed by: INTERNAL MEDICINE

## 2024-03-11 PROCEDURE — 93321 DOPPLER ECHO F-UP/LMTD STD: CPT | Mod: 26 | Performed by: INTERNAL MEDICINE

## 2024-03-11 PROCEDURE — 250N000009 HC RX 250: Performed by: RADIOLOGY

## 2024-03-11 PROCEDURE — 999N000163 HC STATISTIC SIMPLE TUBE INSERTION/CHARGE, PORT, CATH, FISTULOGRAM

## 2024-03-11 PROCEDURE — 99152 MOD SED SAME PHYS/QHP 5/>YRS: CPT

## 2024-03-11 PROCEDURE — 250N000009 HC RX 250: Performed by: PHYSICIAN ASSISTANT

## 2024-03-11 PROCEDURE — C1788 PORT, INDWELLING, IMP: HCPCS

## 2024-03-11 RX ORDER — CLINDAMYCIN PHOSPHATE 900 MG/50ML
900 INJECTION, SOLUTION INTRAVENOUS
Status: COMPLETED | OUTPATIENT
Start: 2024-03-11 | End: 2024-03-11

## 2024-03-11 RX ORDER — ACETAMINOPHEN 325 MG/1
650 TABLET ORAL
Status: DISCONTINUED | OUTPATIENT
Start: 2024-03-11 | End: 2024-03-11 | Stop reason: HOSPADM

## 2024-03-11 RX ORDER — NALOXONE HYDROCHLORIDE 0.4 MG/ML
0.2 INJECTION, SOLUTION INTRAMUSCULAR; INTRAVENOUS; SUBCUTANEOUS
Status: DISCONTINUED | OUTPATIENT
Start: 2024-03-11 | End: 2024-03-11 | Stop reason: HOSPADM

## 2024-03-11 RX ORDER — NALOXONE HYDROCHLORIDE 0.4 MG/ML
0.4 INJECTION, SOLUTION INTRAMUSCULAR; INTRAVENOUS; SUBCUTANEOUS
Status: DISCONTINUED | OUTPATIENT
Start: 2024-03-11 | End: 2024-03-11 | Stop reason: HOSPADM

## 2024-03-11 RX ORDER — FLUMAZENIL 0.1 MG/ML
0.2 INJECTION, SOLUTION INTRAVENOUS
Status: DISCONTINUED | OUTPATIENT
Start: 2024-03-11 | End: 2024-03-11 | Stop reason: HOSPADM

## 2024-03-11 RX ORDER — OXYCODONE AND ACETAMINOPHEN 5; 325 MG/1; MG/1
2 TABLET ORAL EVERY 6 HOURS PRN
COMMUNITY
Start: 2024-02-28 | End: 2024-03-13

## 2024-03-11 RX ORDER — FENTANYL CITRATE 50 UG/ML
25-50 INJECTION, SOLUTION INTRAMUSCULAR; INTRAVENOUS EVERY 5 MIN PRN
Status: DISCONTINUED | OUTPATIENT
Start: 2024-03-11 | End: 2024-03-11 | Stop reason: HOSPADM

## 2024-03-11 RX ORDER — HEPARIN SODIUM (PORCINE) LOCK FLUSH IV SOLN 100 UNIT/ML 100 UNIT/ML
500 SOLUTION INTRAVENOUS ONCE
Status: COMPLETED | OUTPATIENT
Start: 2024-03-11 | End: 2024-03-11

## 2024-03-11 RX ADMIN — FENTANYL CITRATE 50 MCG: 50 INJECTION, SOLUTION INTRAMUSCULAR; INTRAVENOUS at 09:48

## 2024-03-11 RX ADMIN — FENTANYL CITRATE 25 MCG: 50 INJECTION, SOLUTION INTRAMUSCULAR; INTRAVENOUS at 09:27

## 2024-03-11 RX ADMIN — HEPARIN SODIUM (PORCINE) LOCK FLUSH IV SOLN 100 UNIT/ML 500 UNITS: 100 SOLUTION at 09:54

## 2024-03-11 RX ADMIN — FENTANYL CITRATE 25 MCG: 50 INJECTION, SOLUTION INTRAMUSCULAR; INTRAVENOUS at 09:53

## 2024-03-11 RX ADMIN — HUMAN ALBUMIN MICROSPHERES AND PERFLUTREN 9 ML: 10; .22 INJECTION, SOLUTION INTRAVENOUS at 08:49

## 2024-03-11 RX ADMIN — MIDAZOLAM 0.5 MG: 1 INJECTION INTRAMUSCULAR; INTRAVENOUS at 09:44

## 2024-03-11 RX ADMIN — MIDAZOLAM 1 MG: 1 INJECTION INTRAMUSCULAR; INTRAVENOUS at 09:48

## 2024-03-11 RX ADMIN — FENTANYL CITRATE 25 MCG: 50 INJECTION, SOLUTION INTRAMUSCULAR; INTRAVENOUS at 09:44

## 2024-03-11 RX ADMIN — CLINDAMYCIN PHOSPHATE 900 MG: 900 INJECTION, SOLUTION INTRAVENOUS at 09:09

## 2024-03-11 RX ADMIN — MIDAZOLAM 0.5 MG: 1 INJECTION INTRAMUSCULAR; INTRAVENOUS at 09:53

## 2024-03-11 RX ADMIN — LIDOCAINE HYDROCHLORIDE 20 ML: 10; .005 INJECTION, SOLUTION EPIDURAL; INFILTRATION; INTRACAUDAL; PERINEURAL at 09:54

## 2024-03-11 RX ADMIN — LIDOCAINE HYDROCHLORIDE 9 ML: 10 INJECTION, SOLUTION INFILTRATION; PERINEURAL at 09:54

## 2024-03-11 RX ADMIN — FENTANYL CITRATE 50 MCG: 50 INJECTION, SOLUTION INTRAMUSCULAR; INTRAVENOUS at 09:41

## 2024-03-11 RX ADMIN — MIDAZOLAM 0.5 MG: 1 INJECTION INTRAMUSCULAR; INTRAVENOUS at 09:27

## 2024-03-11 RX ADMIN — MIDAZOLAM 1 MG: 1 INJECTION INTRAMUSCULAR; INTRAVENOUS at 09:41

## 2024-03-11 ASSESSMENT — ACTIVITIES OF DAILY LIVING (ADL)
ADLS_ACUITY_SCORE: 35

## 2024-03-11 NOTE — DISCHARGE INSTRUCTIONS
Port Insertion Discharge Instructions     After you go home:    Have an adult stay with you for the first 6 hours  You may resume your normal diet       For 24 hours - due to the sedation you received:  Relax and take it easy  Do NOT make any important or legal decisions  Do NOT drive or operate machines at home or at work  Do NOT drink alcohol    Care of Puncture Sites:    Keep the dressings on your sites clean & dry for 24-48 hours. Change it only if it gets wet or dirty.  You may take a shower 24-48 hours after your procedure. Do not scrub site.  No submerging site for 2 weeks or until the incision is completely healed.  Do not remove the small white strips of tape, if present. Allow them to fall off on their own.   You may cover the wound with a bandaid if needed for comfort.      Activity     Avoid heavy lifting (greater than 10 pounds) or the overuse of your shoulder for 48-72 hours.    Bleeding:    If you start bleeding from the incision sites in your chest or neck - or have swelling in your neck, sit down and press on the site for 5-10 minutes.   If bleeding has not stopped after 10 minutes, call your provider.        Call 911 right away if you have heavy bleeding or bleeding that does not stop.      Medicines:    You may resume all medications  For minor pain, you may take Acetaminophen (Tylenol) or Ibuprofen (Advil)    Call the provider who ordered this procedure if:    You have swelling in your neck or over your port site  The incision area is red, swollen, hot or tender  You have chills or a fever greater than 101 F (38 C)  Any questions or concerns    Call  911 or go to the Emergency Room if you have:    Severe chest pain or trouble breathing  Bleeding that you cannot control    Additional Information:    Your port may be accessed right away.   You will need to have your port flushed every 30 days or after each use.      If you have questions call:          [facility] Radiology Dept @  [phone]

## 2024-03-11 NOTE — PROGRESS NOTES
Care Suites Admission Nursing Note    Patient Information  Name: Eliezer Izquierdo  Age: 39 year old  Reason for admission: port placement  Care Suites arrival time: 0700    Visitor Information  Name: [phone] - Nathan bonilla      Patient Admission/Assessment   Pre-procedure assessment complete: Yes  If abnormal assessment/labs, provider notified: No  NPO: Yes  Medications held per instructions/orders: Yes  Consent: obtained  If applicable, pregnancy test status: declined  Patient oriented to room: Yes  Education/questions answered: Yes  Plan/other: obtain consent and proceed with scheduled treatment or intervention      Discharge Planning  Discharge name/phone number: [phone] - Nathan bonilla   Overnight post sedation caregiver: [phone] - Nathan bonilla   Discharge location: home    Jose Ortiz RN

## 2024-03-11 NOTE — PROGRESS NOTES
Care Suites Post Procedure Note    Patient Information  Name: Eliezer Izquierdo  Age: 39 year old    Post Procedure  Time patient returned to Care Suites: 1010  Concerns/abnormal assessment: none  If abnormal assessment, provider notified: N/A  Plan/Other: Right port site with liquid bandage with steri-strips CDI, denies pain VSS. Tolerating PO. Given sandwich and fluids denies nausea.     Tracie Mariano RN      Care Suites Discharge Nursing Note    Patient Information  Name: Eliezer Izquierdo  Age: 39 year old    Discharge Education:  Discharge instructions reviewed: Yes  Additional education/resources provided: ID card and port information given to pt.  Patient/patient representative verbalizes understanding: Yes  Patient discharging on new medications: No  Medication education completed: N/A    Discharge Plans:   Discharge location: home  Discharge ride contacted: Yes  Approximate discharge time: 1055    Discharge Criteria:  Discharge criteria met and vital signs stable: Yes    Patient Belongs:  Patient belongings returned to patient: Yes    Tracie Mariano RN

## 2024-03-11 NOTE — PROGRESS NOTES
patient Name:  Eliezer Izquierdo    Medical Record Number: [id number]  Today's Date:  March 11, 2024  Procedure: right chest port placement with moderate sedation  Start Time: 0942  End of procedure time: 1002    Report given to: care suite RN  Time pt departs:  1005       Notes:       Pt transferred to IR table. Prepped and draped appropriately. Monitoring equipment applied. NC applied. No complaints of pain at this time. Timeout recorded.       VSS. Pt remains on RA. No c/o pain at this time. Right chest port site closed with sutures and derma bond, site  CDI, soft. No further questions from RN or pt.      Versed 3.5 mg,   Fentanyl 175 mcg  Heparin flush to lock port 500 units.     Brannon iSm RN

## 2024-03-11 NOTE — PRE-PROCEDURE
GENERAL PRE-PROCEDURE:   Procedure:  RIGHT CHEST PORT PLACEMENT WITH MODERATE SEDATION  Date/Time:  3/11/2024 8:42 AM    Written consent obtained?: Yes    Risks and benefits: Risks, benefits and alternatives were discussed    Consent given by:  Patient  Patient states understanding of procedure being performed: Yes    Patient's understanding of procedure matches consent: Yes    Procedure consent matches procedure scheduled: Yes    Expected level of sedation:  Moderate  Appropriately NPO:  Yes  ASA Class:  2  Mallampati  :  Grade 3- soft palate visible, posterior pharyngeal wall not visible  Lungs:  Lungs clear with good breath sounds bilaterally  Heart:  Normal heart sounds and rate  History & Physical reviewed:  History and physical reviewed and no updates needed  Statement of review:  I have reviewed the lab findings, diagnostic data, medications, and the plan for sedation

## 2024-03-13 ENCOUNTER — NURSE TRIAGE (OUTPATIENT)
Dept: ONCOLOGY | Facility: CLINIC | Age: 39
End: 2024-03-13

## 2024-03-13 ENCOUNTER — PATIENT OUTREACH (OUTPATIENT)
Dept: ONCOLOGY | Facility: CLINIC | Age: 39
End: 2024-03-13

## 2024-03-13 NOTE — PROGRESS NOTES
"[facility]: Cancer Care Plan of Care Education Note                                    Discussion with Patient:                                                      Met with Eliezer to discuss chemotherapy and resources available at the [facility]. Provided patient with \"My Cancer Guidebook\", and [facility] printouts on AC. Reviewed administration, side effects (including myelosuppression, nausea/vomiting, diarrhea/constipation, hair loss, mouth sores) and ongoing symptom management by APPs in clinic. Provided phone numbers for triage and after hours care. Highlighted steps to expect when getting chemotherapy (check in, labs, pre- meds, infusion), Discussed that chemo treatment may be delayed a day or two due to blood counts, infusion schedule or patient's need to modify. Included a one page resource of symptoms and when to contact the Cancer Clinic with questions or concerns.      Eliezer has a sensitivity to Zofran, its listed in her chart as \"muscle pain\". However, patient describes it more like a panic attack, she develops Chest pain and SOB. Patient prefers not to receive Aloxi or Zofran. Patient stated that Phenergan and Reglan has been helpful in the past.       Patient reported hearing a \"pop\" at her new port site after she bend down. She felt that it has moved positions. RN suggested someone look at it. Patient stated she had her PCP look at it and felt it was fine and hadn't moved. RN sent Martha and Dr. Nolan a message about port and medication sensitivityl  UPDATE: Since I talked to her she had called triage reporting a lump that has developed by her port site. She is seeking medical attention in the ED.     Answered all Eliezer's questions to her stated satisfaction.                                                                                                                                                       Goals          General    Other     Notes - Note created  3/13/2024  " 2:17 PM by Ana Cotto, JAMSHID    Goal Statement: I will use my clinic and care team resources as directed.  Date Goal set: 3/13/2024  Barriers: disease burden  Strengths: support, coping, motivation, health awareness, and involvement with care team  Date to Achieve By: ongoing  Patient expressed understanding of goal: Yes  Action steps to achieve this goal:  I will contact triage with new, worsening or uncontrolled symptoms.   I will contact triage with temperature over 100.4  I will call with difficulties of scheduling and/or transportation.   I will request needed refills when there are 7 days of medication remaining.   I will not send urgent or symptomatic messages through [employer].   I will contact scheduling to arrange or make changes in my appointments.                Assessment:                                                      Assessment completed with:: Patient    Plan of Care Education   Yearly learning assessment completed?: Yes (see Education tab)  Diagnosis:: breast cancer  Does patient understand diagnosis?: Yes  Tx plan/regimen:: AC  Does patient understand treatment plan/regimen?: Yes  Preparing for treatment:: Reviewed treatment preparation information with patient (vascular access, day of chemo, visitor policy, what to bring, etc.)  Vascular access education provided for:: Port  Side effect education:: Diarrhea/Constipation;Fatigue;Hair loss;Infection;Nausea/Vomiting;Mylosuppression;Mouth sores;Lab value monitoring (anemia, neutropenia, thrombocytopenia);Urinary  Safety/self care at home reviewed with patient:: Yes  Coping - concerns/fears reviewed with patient:: Yes  Plan of Care:: SHERYL follow-up appointment;Lab appointment;MD follow-up appointment;Treatment schedule  When to call provider:: Bleeding;Uncontrolled nausea/vomiting;Increased shortness of breath;New/worsening pain;Shaking chills;Temperature >100.4F;Uncontrolled diarrhea/constipation  Reasons for deferring treatment reviewed with  patient:: Yes  Additional education provided for: : Injectable therapies (Neulasta)    Evaluation of Learning  Patient Education Provided: Yes  Readiness:: Acceptance  Method:: Literature  Response:: Verbalizes understanding      Intervention/Education provided during outreach:                                                       See above    Patient to follow up as scheduled at next appt    Ana NEWBYN, RN, OCN  Care Coordinator  [facility]

## 2024-03-13 NOTE — TELEPHONE ENCOUNTER
"Eliezer (pt) called in, she was driving to local ED [facility] so reception was poor so this writer only heard bits and pieces, what this writer did hear is that    \"[place] a pop earlier (unsure if last night or this morning) and called after hours triage for IR then had General Practitioner assess pt in person at 9:10am, however since that in-person provider assessment now has a \"big lump size of an egg that developed on same size as port\" and is on way to local ED [facility] to get reevaluated.    This writer validated pt making right choice to go to nearest ED for further evaluation since pt lives in [place].     Will update care team.   "

## 2024-03-14 DIAGNOSIS — C50.412 MALIGNANT NEOPLASM OF UPPER-OUTER QUADRANT OF LEFT BREAST IN FEMALE, ESTROGEN RECEPTOR POSITIVE (H): Primary | ICD-10-CM

## 2024-03-14 DIAGNOSIS — Z17.0 MALIGNANT NEOPLASM OF UPPER-OUTER QUADRANT OF LEFT BREAST IN FEMALE, ESTROGEN RECEPTOR POSITIVE (H): Primary | ICD-10-CM

## 2024-03-14 DIAGNOSIS — L64.0 DRUG-INDUCED ANDROGENIC ALOPECIA: Primary | ICD-10-CM

## 2024-03-14 RX ORDER — DOXORUBICIN HYDROCHLORIDE 2 MG/ML
60 INJECTION, SOLUTION INTRAVENOUS ONCE
Status: CANCELLED | OUTPATIENT
Start: 2024-03-15

## 2024-03-14 RX ORDER — HEPARIN SODIUM,PORCINE 10 UNIT/ML
5-20 VIAL (ML) INTRAVENOUS DAILY PRN
Status: CANCELLED | OUTPATIENT
Start: 2024-03-15

## 2024-03-14 RX ORDER — DIPHENHYDRAMINE HYDROCHLORIDE 50 MG/ML
50 INJECTION INTRAMUSCULAR; INTRAVENOUS
Status: CANCELLED
Start: 2024-03-15

## 2024-03-14 RX ORDER — ALBUTEROL SULFATE 0.83 MG/ML
2.5 SOLUTION RESPIRATORY (INHALATION)
Status: CANCELLED | OUTPATIENT
Start: 2024-03-15

## 2024-03-14 RX ORDER — ALBUTEROL SULFATE 90 UG/1
1-2 AEROSOL, METERED RESPIRATORY (INHALATION)
Status: CANCELLED
Start: 2024-03-15

## 2024-03-14 RX ORDER — EPINEPHRINE 1 MG/ML
0.3 INJECTION, SOLUTION, CONCENTRATE INTRAVENOUS EVERY 5 MIN PRN
Status: CANCELLED | OUTPATIENT
Start: 2024-03-15

## 2024-03-14 RX ORDER — MEPERIDINE HYDROCHLORIDE 25 MG/ML
25 INJECTION INTRAMUSCULAR; INTRAVENOUS; SUBCUTANEOUS EVERY 30 MIN PRN
Status: CANCELLED | OUTPATIENT
Start: 2024-03-15

## 2024-03-14 RX ORDER — HEPARIN SODIUM (PORCINE) LOCK FLUSH IV SOLN 100 UNIT/ML 100 UNIT/ML
5 SOLUTION INTRAVENOUS
Status: CANCELLED | OUTPATIENT
Start: 2024-03-15

## 2024-03-14 RX ORDER — LORAZEPAM 2 MG/ML
0.5 INJECTION INTRAMUSCULAR EVERY 4 HOURS PRN
Status: CANCELLED | OUTPATIENT
Start: 2024-03-15

## 2024-03-15 ENCOUNTER — PATIENT OUTREACH (OUTPATIENT)
Dept: ONCOLOGY | Facility: CLINIC | Age: 39
End: 2024-03-15

## 2024-03-15 ENCOUNTER — INFUSION THERAPY VISIT (OUTPATIENT)
Dept: ONCOLOGY | Facility: CLINIC | Age: 39
End: 2024-03-15
Attending: INTERNAL MEDICINE
Payer: COMMERCIAL

## 2024-03-15 ENCOUNTER — APPOINTMENT (OUTPATIENT)
Dept: LAB | Facility: CLINIC | Age: 39
End: 2024-03-15
Attending: INTERNAL MEDICINE
Payer: COMMERCIAL

## 2024-03-15 VITALS
BODY MASS INDEX: 41.66 KG/M2 | HEART RATE: 121 BPM | SYSTOLIC BLOOD PRESSURE: 128 MMHG | TEMPERATURE: 99.1 F | RESPIRATION RATE: 18 BRPM | DIASTOLIC BLOOD PRESSURE: 87 MMHG | WEIGHT: 282.1 LBS | OXYGEN SATURATION: 96 %

## 2024-03-15 DIAGNOSIS — C50.412 MALIGNANT NEOPLASM OF UPPER-OUTER QUADRANT OF LEFT BREAST IN FEMALE, ESTROGEN RECEPTOR POSITIVE (H): ICD-10-CM

## 2024-03-15 DIAGNOSIS — T45.1X5A CHEMOTHERAPY INDUCED NAUSEA AND VOMITING: Primary | ICD-10-CM

## 2024-03-15 DIAGNOSIS — Z17.0 MALIGNANT NEOPLASM OF LOWER-OUTER QUADRANT OF BREAST OF FEMALE, ESTROGEN RECEPTOR POSITIVE, UNSPECIFIED LATERALITY (H): ICD-10-CM

## 2024-03-15 DIAGNOSIS — C50.519 MALIGNANT NEOPLASM OF LOWER-OUTER QUADRANT OF BREAST OF FEMALE, ESTROGEN RECEPTOR POSITIVE, UNSPECIFIED LATERALITY (H): ICD-10-CM

## 2024-03-15 DIAGNOSIS — Z17.0 MALIGNANT NEOPLASM OF UPPER-OUTER QUADRANT OF LEFT BREAST IN FEMALE, ESTROGEN RECEPTOR POSITIVE (H): ICD-10-CM

## 2024-03-15 DIAGNOSIS — C50.412 MALIGNANT NEOPLASM OF UPPER-OUTER QUADRANT OF LEFT BREAST IN FEMALE, ESTROGEN RECEPTOR POSITIVE (H): Primary | ICD-10-CM

## 2024-03-15 DIAGNOSIS — R11.2 CHEMOTHERAPY INDUCED NAUSEA AND VOMITING: Primary | ICD-10-CM

## 2024-03-15 DIAGNOSIS — Z17.0 MALIGNANT NEOPLASM OF UPPER-OUTER QUADRANT OF LEFT BREAST IN FEMALE, ESTROGEN RECEPTOR POSITIVE (H): Primary | ICD-10-CM

## 2024-03-15 DIAGNOSIS — Z80.3 FAMILY HISTORY OF MALIGNANT NEOPLASM OF BREAST: ICD-10-CM

## 2024-03-15 LAB
ALBUMIN SERPL BCG-MCNC: 4.1 G/DL (ref 3.5–5.2)
ALP SERPL-CCNC: 62 U/L (ref 40–150)
ALT SERPL W P-5'-P-CCNC: 28 U/L (ref 0–50)
ANION GAP SERPL CALCULATED.3IONS-SCNC: 12 MMOL/L (ref 7–15)
AST SERPL W P-5'-P-CCNC: 32 U/L (ref 0–45)
BASOPHILS # BLD AUTO: 0.1 10E3/UL (ref 0–0.2)
BASOPHILS NFR BLD AUTO: 1 %
BILIRUB SERPL-MCNC: 0.4 MG/DL
BUN SERPL-MCNC: 6.4 MG/DL (ref 6–20)
CALCIUM SERPL-MCNC: 9.4 MG/DL (ref 8.6–10)
CHLORIDE SERPL-SCNC: 98 MMOL/L (ref 98–107)
CREAT SERPL-MCNC: 0.43 MG/DL (ref 0.51–0.95)
DEPRECATED HCO3 PLAS-SCNC: 25 MMOL/L (ref 22–29)
EGFRCR SERPLBLD CKD-EPI 2021: >90 ML/MIN/1.73M2
EOSINOPHIL # BLD AUTO: 0.2 10E3/UL (ref 0–0.7)
EOSINOPHIL NFR BLD AUTO: 2 %
ERYTHROCYTE [DISTWIDTH] IN BLOOD BY AUTOMATED COUNT: 13.2 % (ref 10–15)
GLUCOSE SERPL-MCNC: 189 MG/DL (ref 70–99)
HCT VFR BLD AUTO: 43.2 % (ref 35–47)
HGB BLD-MCNC: 14.6 G/DL (ref 11.7–15.7)
IMM GRANULOCYTES # BLD: 0 10E3/UL
IMM GRANULOCYTES NFR BLD: 0 %
LYMPHOCYTES # BLD AUTO: 2.1 10E3/UL (ref 0.8–5.3)
LYMPHOCYTES NFR BLD AUTO: 21 %
MCH RBC QN AUTO: 30.7 PG (ref 26.5–33)
MCHC RBC AUTO-ENTMCNC: 33.8 G/DL (ref 31.5–36.5)
MCV RBC AUTO: 91 FL (ref 78–100)
MONOCYTES # BLD AUTO: 1 10E3/UL (ref 0–1.3)
MONOCYTES NFR BLD AUTO: 10 %
NEUTROPHILS # BLD AUTO: 6.6 10E3/UL (ref 1.6–8.3)
NEUTROPHILS NFR BLD AUTO: 66 %
NRBC # BLD AUTO: 0 10E3/UL
NRBC BLD AUTO-RTO: 0 /100
PLATELET # BLD AUTO: 335 10E3/UL (ref 150–450)
POTASSIUM SERPL-SCNC: 3.3 MMOL/L (ref 3.4–5.3)
PROT SERPL-MCNC: 7.2 G/DL (ref 6.4–8.3)
RBC # BLD AUTO: 4.75 10E6/UL (ref 3.8–5.2)
SODIUM SERPL-SCNC: 135 MMOL/L (ref 135–145)
WBC # BLD AUTO: 9.9 10E3/UL (ref 4–11)

## 2024-03-15 PROCEDURE — 96413 CHEMO IV INFUSION 1 HR: CPT

## 2024-03-15 PROCEDURE — 80053 COMPREHEN METABOLIC PANEL: CPT | Performed by: PHYSICIAN ASSISTANT

## 2024-03-15 PROCEDURE — 36591 DRAW BLOOD OFF VENOUS DEVICE: CPT | Performed by: PHYSICIAN ASSISTANT

## 2024-03-15 PROCEDURE — 96367 TX/PROPH/DG ADDL SEQ IV INF: CPT

## 2024-03-15 PROCEDURE — 96372 THER/PROPH/DIAG INJ SC/IM: CPT | Performed by: INTERNAL MEDICINE

## 2024-03-15 PROCEDURE — 250N000011 HC RX IP 250 OP 636: Performed by: INTERNAL MEDICINE

## 2024-03-15 PROCEDURE — 250N000011 HC RX IP 250 OP 636: Performed by: PHYSICIAN ASSISTANT

## 2024-03-15 PROCEDURE — 96375 TX/PRO/DX INJ NEW DRUG ADDON: CPT

## 2024-03-15 PROCEDURE — G0463 HOSPITAL OUTPT CLINIC VISIT: HCPCS | Mod: 25 | Performed by: PHYSICIAN ASSISTANT

## 2024-03-15 PROCEDURE — 250N000013 HC RX MED GY IP 250 OP 250 PS 637: Performed by: INTERNAL MEDICINE

## 2024-03-15 PROCEDURE — 96411 CHEMO IV PUSH ADDL DRUG: CPT

## 2024-03-15 PROCEDURE — 250N000013 HC RX MED GY IP 250 OP 250 PS 637: Performed by: PHYSICIAN ASSISTANT

## 2024-03-15 PROCEDURE — 96377 APPLICATON ON-BODY INJECTOR: CPT | Mod: 59

## 2024-03-15 PROCEDURE — 85025 COMPLETE CBC W/AUTO DIFF WBC: CPT | Performed by: PHYSICIAN ASSISTANT

## 2024-03-15 PROCEDURE — 258N000003 HC RX IP 258 OP 636: Performed by: INTERNAL MEDICINE

## 2024-03-15 PROCEDURE — 99215 OFFICE O/P EST HI 40 MIN: CPT | Performed by: PHYSICIAN ASSISTANT

## 2024-03-15 RX ORDER — HEPARIN SODIUM (PORCINE) LOCK FLUSH IV SOLN 100 UNIT/ML 100 UNIT/ML
5 SOLUTION INTRAVENOUS
Status: CANCELLED | OUTPATIENT
Start: 2024-03-18

## 2024-03-15 RX ORDER — ALBUTEROL SULFATE 0.83 MG/ML
2.5 SOLUTION RESPIRATORY (INHALATION)
Status: CANCELLED | OUTPATIENT
Start: 2024-03-18

## 2024-03-15 RX ORDER — BLOOD SUGAR DIAGNOSTIC
1 STRIP MISCELLANEOUS
COMMUNITY
Start: 2024-01-17 | End: 2024-04-26

## 2024-03-15 RX ORDER — POTASSIUM CHLORIDE 1500 MG/1
40 TABLET, EXTENDED RELEASE ORAL ONCE
Status: COMPLETED | OUTPATIENT
Start: 2024-03-15 | End: 2024-03-15

## 2024-03-15 RX ORDER — PROMETHAZINE HYDROCHLORIDE 25 MG/1
25 SUPPOSITORY RECTAL EVERY 6 HOURS PRN
Qty: 60 SUPPOSITORY | Refills: 3 | Status: SHIPPED | OUTPATIENT
Start: 2024-03-15 | End: 2024-05-10

## 2024-03-15 RX ORDER — DIPHENHYDRAMINE HYDROCHLORIDE 50 MG/ML
50 INJECTION INTRAMUSCULAR; INTRAVENOUS
Status: CANCELLED
Start: 2024-03-18

## 2024-03-15 RX ORDER — OXYCODONE AND ACETAMINOPHEN 5; 325 MG/1; MG/1
TABLET ORAL
COMMUNITY
Start: 2024-03-13 | End: 2024-04-10

## 2024-03-15 RX ORDER — SITAGLIPTIN 50 MG/1
50 TABLET, FILM COATED ORAL
COMMUNITY
Start: 2024-02-16 | End: 2024-07-23

## 2024-03-15 RX ORDER — HEPARIN SODIUM (PORCINE) LOCK FLUSH IV SOLN 100 UNIT/ML 100 UNIT/ML
5 SOLUTION INTRAVENOUS ONCE
Status: COMPLETED | OUTPATIENT
Start: 2024-03-15 | End: 2024-03-15

## 2024-03-15 RX ORDER — LORAZEPAM 0.5 MG/1
0.5 TABLET ORAL EVERY 4 HOURS PRN
Status: DISCONTINUED | OUTPATIENT
Start: 2024-03-15 | End: 2024-03-15 | Stop reason: HOSPADM

## 2024-03-15 RX ORDER — METHYLPREDNISOLONE SODIUM SUCCINATE 125 MG/2ML
125 INJECTION, POWDER, LYOPHILIZED, FOR SOLUTION INTRAMUSCULAR; INTRAVENOUS
Status: CANCELLED
Start: 2024-03-18

## 2024-03-15 RX ORDER — DEXAMETHASONE 4 MG/1
8 TABLET ORAL DAILY
Qty: 18 TABLET | Refills: 3 | Status: SHIPPED | OUTPATIENT
Start: 2024-03-15 | End: 2024-04-12

## 2024-03-15 RX ORDER — DOXORUBICIN HYDROCHLORIDE 2 MG/ML
60 INJECTION, SOLUTION INTRAVENOUS ONCE
Status: COMPLETED | OUTPATIENT
Start: 2024-03-15 | End: 2024-03-15

## 2024-03-15 RX ORDER — HEPARIN SODIUM,PORCINE 10 UNIT/ML
5-20 VIAL (ML) INTRAVENOUS DAILY PRN
Status: CANCELLED | OUTPATIENT
Start: 2024-03-18

## 2024-03-15 RX ORDER — LORAZEPAM 2 MG/ML
0.5 INJECTION INTRAMUSCULAR EVERY 4 HOURS PRN
Status: DISCONTINUED | OUTPATIENT
Start: 2024-03-15 | End: 2024-03-15

## 2024-03-15 RX ORDER — ALBUTEROL SULFATE 90 UG/1
1-2 AEROSOL, METERED RESPIRATORY (INHALATION)
Status: CANCELLED
Start: 2024-03-18

## 2024-03-15 RX ORDER — HEPARIN SODIUM (PORCINE) LOCK FLUSH IV SOLN 100 UNIT/ML 100 UNIT/ML
5 SOLUTION INTRAVENOUS
Status: DISCONTINUED | OUTPATIENT
Start: 2024-03-15 | End: 2024-03-15 | Stop reason: HOSPADM

## 2024-03-15 RX ORDER — EPINEPHRINE 1 MG/ML
0.3 INJECTION, SOLUTION INTRAMUSCULAR; SUBCUTANEOUS EVERY 5 MIN PRN
Status: CANCELLED | OUTPATIENT
Start: 2024-03-18

## 2024-03-15 RX ORDER — RAMELTEON 8 MG/1
8 TABLET ORAL
COMMUNITY
Start: 2024-02-21 | End: 2024-04-21

## 2024-03-15 RX ORDER — MEPERIDINE HYDROCHLORIDE 25 MG/ML
25 INJECTION INTRAMUSCULAR; INTRAVENOUS; SUBCUTANEOUS EVERY 30 MIN PRN
Status: CANCELLED | OUTPATIENT
Start: 2024-03-18

## 2024-03-15 RX ORDER — LORAZEPAM 0.5 MG/1
0.5 TABLET ORAL EVERY 6 HOURS PRN
Qty: 20 TABLET | Refills: 0 | Status: SHIPPED | OUTPATIENT
Start: 2024-03-15 | End: 2024-03-19

## 2024-03-15 RX ORDER — PROCHLORPERAZINE MALEATE 10 MG
10 TABLET ORAL EVERY 6 HOURS PRN
Qty: 30 TABLET | Refills: 2 | Status: SHIPPED | OUTPATIENT
Start: 2024-03-15 | End: 2024-06-28

## 2024-03-15 RX ORDER — OLANZAPINE 2.5 MG/1
2.5 TABLET, FILM COATED ORAL AT BEDTIME
Qty: 30 TABLET | Refills: 1 | Status: SHIPPED | OUTPATIENT
Start: 2024-03-15 | End: 2024-04-10

## 2024-03-15 RX ORDER — VARENICLINE TARTRATE 0.5 (11)-1
KIT ORAL
COMMUNITY
Start: 2024-02-21 | End: 2024-04-10

## 2024-03-15 RX ADMIN — Medication 5 ML: at 12:30

## 2024-03-15 RX ADMIN — PEGFILGRASTIM 6 MG: KIT SUBCUTANEOUS at 11:46

## 2024-03-15 RX ADMIN — FAMOTIDINE 20 MG: 10 INJECTION INTRAVENOUS at 11:24

## 2024-03-15 RX ADMIN — POTASSIUM CHLORIDE 40 MEQ: 1500 TABLET, EXTENDED RELEASE ORAL at 10:37

## 2024-03-15 RX ADMIN — CYCLOPHOSPHAMIDE 1500 MG: 1 INJECTION, POWDER, FOR SOLUTION INTRAVENOUS; ORAL at 11:44

## 2024-03-15 RX ADMIN — Medication 5 ML: at 08:24

## 2024-03-15 RX ADMIN — DEXAMETHASONE SODIUM PHOSPHATE: 10 INJECTION, SOLUTION INTRAMUSCULAR; INTRAVENOUS at 10:59

## 2024-03-15 RX ADMIN — DOXORUBICIN HYDROCHLORIDE 150 MG: 2 INJECTION, SOLUTION INTRAVENOUS at 11:32

## 2024-03-15 RX ADMIN — LORAZEPAM 0.5 MG: 0.5 TABLET ORAL at 11:08

## 2024-03-15 RX ADMIN — SODIUM CHLORIDE 250 ML: 9 INJECTION, SOLUTION INTRAVENOUS at 10:34

## 2024-03-15 ASSESSMENT — PAIN SCALES - GENERAL: PAINLEVEL: MILD PAIN (3)

## 2024-03-15 NOTE — NURSING NOTE
"Oncology Rooming Note    March 15, 2024 8:55 AM   Eliezer Izquierdo is a 39 year old female who presents for:    Chief Complaint   Patient presents with    Port Draw     Labs drawn via port by RN in lab. VS taken.     Oncology Clinic Visit     Malignant neoplasm of upper-outer quadrant of left breast in female, estrogen receptor positive         Initial Vitals: /87 (BP Location: Right arm, Patient Position: Sitting, Cuff Size: Adult Regular)   Pulse (!) 121   Temp 99.1  F (37.3  C) (Oral)   Resp 18   Wt 128 kg (282 lb 1.6 oz)   SpO2 96%   BMI 41.66 kg/m   Estimated body mass index is 41.66 kg/m  as calculated from the following:    Height as of 3/11/24: 1.753 m (5' 9\").    Weight as of this encounter: 128 kg (282 lb 1.6 oz). Body surface area is 2.5 meters squared.  Mild Pain (3) Comment: Data Unavailable   No LMP recorded. (Menstrual status: UNKNOWN).  Allergies reviewed: Yes  Medications reviewed: Yes    Medications: Medication refills not needed today.  Pharmacy name entered into EPIC: [facility] - [place], [place] - [address] [place] [address]    Frailty Screening:   Is the patient here for a new oncology consult visit in cancer care? 2. No      Clinical concerns: none.    Kirk Escamilla"

## 2024-03-15 NOTE — NURSING NOTE
Chief Complaint   Patient presents with    Port Draw     Labs drawn via port by RN in lab. VS taken.      Labs drawn via port by RN. Port accessed with 20g flat needle. Flushed with saline and heparin. Pt tolerated well. Vitals taken. Pt checked into next appt.     Idalia Pizano RN

## 2024-03-15 NOTE — PROGRESS NOTES
[facility] Medical Oncology Note       Date of visit: Mar 15, 2024            Assessment:     Clinical T2N1 low grade invasive breast cancer in this 39 year old woman.  As discussed previously with Dr. Ford and with Dr Nolan, the patient is appropriate for neoadjuvant treatment.  She has a large breast cancer with nodes that are positive.  Neoadjuvant therapy was recommended.  MammaPrint was high risk, however Eliezer was not a canditate for ISPY due to elevated hemoglobin A1C.  She discussed SOC with DR Nolan with ddAC-->t.    Eliezer has an extensive history of migraines with associated nausea/vomiting.  She has trialed may anti emetics and is very sensitive to N/V. We discussed a very thorough plan with anti emetics today.  Specifically using Zyprexa at night, tonight and for ~5-7 nights.  Watch for sedation.  During the day she prefers to use phenergan suppositories.  These have historically worked great for her.  We cannot use Reglan as this is a category X with Zyprxa and phenergan.  She can use prn compazine, but this increases the risk of anti-cholinergic side effects.  We reviewed if she needs anything further than the phenergan during they day, she can use lorazepam prn (watch for sedation) or the compazine (watch for anti-cholinergic side effects).  Ultimately, I scheduled her for a Day 4 IV + IV Dex/Emend -- if needed.  I am worried she will have severe nausea/vomiting, which is what we prepared her for.  She will take oral Dex (standard take home) on days 2-4.    We reviewed all other supportive cares needed for chemo toxicities including mucositis, GERD, stool changes.  She has alva's esophagus and already takes PPI.  She has IBS-diarrhea type and has frequent loose stools.   Her diabetes will be harder to manage with the Dex, discussed this.    Elevated LFTs which are probably related to her prior diagnosis of fatty liver and hepatosteatosis.  CT showed hepatic steatosis.   Because of the  "youngest of her cancer she is getting genetic testing.  This is still pending at the time of this dictation.  After neoadjuvant chemo, plan will be adjuvant OFS or bilateral oophorectomy with endocrine therapy.       Plan:     Start AC today, with neulasta support  Anti emetics per instructions (regimen printed off for her avoiding zofran/aloxi)  Day 4 IVF + IV anti emetics If needed  CT/PET scan as soon as can be arranged  Oncology psychotherapy referral placed  Provider/AC every 2 weeks x 4 schedule requested     Martha Kellogg PA-C    __________________________________________________________________    CHIEF COMPLAINT     New clinical T2N1 invasive breast cancer, here for pre chemo assessment       History of Present Illness:     Eliezer Izquierdo is a 40 yo previously healthy woman who palpated a mass in the left breast a few months ago. Mammogram and US showed a 2.5 x 2.5 x 2.0 cm spiculated mass at 2:00 position.  Biopsy of the mass was done on 2/9/2024.    Final path showed a Jeniffer grade I IDC, 97% strongly positive for ER, 99% strongly positive for DC, and negative for HER2 by FISH (IHC 2+). KI-67 is 24%.    The patient then saw Albina Ford 2/15/2024. She ordered a left axillary US that showed three abnormal axillary nodes.  Biopsy + for ER+ breast cancer    She did not qualify for ISPY due to elevated HgbA1c.     She presents here to start ddAC---weekly T.      Her port is feeling fine now, she felt a \"pop\" the other day, there was a swelling at her neck.  It was evaluated at the ER.  Its better now.     She has Barretts, takes Nexium BID.  Has IBS, diarrhea type, her stools alternate all the time.  She has chronic migraines.     She is trying to quit smoking.  Taking chantix.  Down to ~ 8 /day .        Past Medical History:   Melanoma of right foot???     Past Medical History:   Diagnosis Date    Hypercholesteraemia     Irritable bowel     Migraines           Past Surgical History:    I have reviewed " this patient's past surgical history         Social History:   Tobacco, ETOH, and rec drugs reviewed and as noted below with the following exceptions:  Kathy grew up many places in [place] and [place], but went to high school in [place] and graduated in 2000.  She thought about being a , but then got pregnant with her son Homero.  She had a lot of different for jobs.  She spent some time in [place] where her mom and other family member lives.  She then came back to [place].  She has a fiancé of 6 years named Saul and they currently live in [place].  She is an avid reader, and likes romance novels, but really anything.  She says she read 250 books last year.  She does smoke but is trying to quit.  She is currently on a low-carb diet.    Family History:     Family History   Problem Relation Age of Onset    Genitourinary Problems Mother         renal disease - minimal change, sponge kidney            Medications:     Current Outpatient Medications   Medication Sig Dispense Refill    FREESTYLE TEST STRIPS test strip 1 each      JANUVIA 50 MG tablet Take 50 mg by mouth      oxyCODONE-acetaminophen (PERCOCET) 5-325 MG tablet       varenicline (CHANTIX MIA) 0.5 MG X 11 & 1 MG X 42 tablet Take according to dose pack instructions      albuterol (PROAIR HFA/PROVENTIL HFA/VENTOLIN HFA) 108 (90 Base) MCG/ACT inhaler Inhale 2 puffs into the lungs      amLODIPine (NORVASC) 10 MG tablet Take 10 mg by mouth      atorvastatin (LIPITOR) 10 MG tablet Take 10 mg by mouth      Blood Glucose Monitoring Suppl (ONETOUCH VERIO FLEX SYSTEM) w/Device KIT       cetirizine (ZYRTEC) 10 MG tablet Take 5 mg by mouth      EPINEPHrine (ANY BX GENERIC EQUIV) 0.3 MG/0.3ML injection 2-pack       fluticasone-salmeterol (ADVAIR HFA) 115-21 MCG/ACT inhaler Inhale 2 puffs into the lungs      hydrochlorothiazide (HYDRODIURIL) 25 MG tablet Take 25 mg by mouth      ibuprofen (ADVIL/MOTRIN) 200 MG tablet Take 200 mg by mouth       methocarbamol (ROBAXIN) 500 MG tablet Take 1,000 mg by mouth      naloxone (NARCAN) 4 MG/0.1ML nasal spray Spray 4 mg into one nostril alternating nostrils as needed for opioid reversal every 2-3 minutes until assistance arrives      Pregabalin (LYRICA) 200 MG capsule Take 200 mg by mouth      Prochlorperazine Maleate (COMPAZINE PO) Take 10 mg by mouth 3 times daily as needed for nausea      PROMETHAZINE HCL RE Place 25 mg rectally 3 times daily as needed for nausea      ramelteon (ROZEREM) 8 MG tablet Take 8 mg by mouth      VICTOZA PEN 18 MG/3ML soln Inject 1.8 mg Subcutaneous                Physical Exam:   Blood pressure 128/87, pulse (!) 121, temperature 99.1  F (37.3  C), temperature source Oral, resp. rate 18, weight 128 kg (282 lb 1.6 oz), SpO2 96%.    ECOG PS: 0  Constitutional: WDWN female in NAD, pleasant and appropriate  HEENT:  NC/AT, no icterus, OP clear, MMM  Skin: No jaundice nor ecchymoses  Lungs: CTAB, no w/r/r, nonlabored breathing  Cardiovascular: RRR, S1, S2, no m/r/g  Abdomen: +BS, morbidly obese, minimal periumbilical tenderness, no right upper quadrant tenderness.  MSK/Extremities: Warm, well perfused. No edema  LN: no cervical, supraclavicular, axillary, nor inguinal lymphadenopathy  Neurologic: alert, answering questions appropriately, moving all extremities spontaneously. CN 2-12 grossly intact.  Psych: appropriate affect  Breast exam: The left breast has a palpable deep mass in the 2 o'clock position measuring roughly 5 cm x 4 cm.  It is somewhat irregular.  The rest of the breast has a heterogeneous exam.  There are no nipple abnormalities.  The left axilla is negative.    Data:   Most Recent 3 CBC's:  Recent Labs   Lab Test 03/15/24  0836   WBC 9.9   HGB 14.6   MCV 91      ANEUTAUTO 6.6     Most Recent 3 BMP's:  Recent Labs   Lab Test 03/15/24  0836 03/01/24  0817     --    POTASSIUM 3.3*  --    CHLORIDE 98  --    CO2 25  --    BUN 6.4  --    CR 0.43* 0.3*   ANIONGAP 12  --     REMI 9.4  --    *  --    PROTTOTAL 7.2  --    ALBUMIN 4.1  --     Most Recent 3 LFT's:  Recent Labs   Lab Test 03/15/24  0836   AST 32   ALT 28   ALKPHOS 62   BILITOTAL 0.4    Most Recent 2 TSH and T4:No lab results found.   I reviewed the above labs today.     No results found for this or any previous visit (from the past 24 hour(s)).      50 minutes spent on the date of the encounter doing chart review, review of outside records, review of test results, interpretation of tests, patient visit, documentation, discussion with other provider(s), and discussion with family

## 2024-03-15 NOTE — LETTER
3/15/2024         RE: Eliezer Izquierdo  [id number] [facility] [id number]        Dear Colleague,    Thank you for referring your patient, Eliezer Izquierdo, to the [facility]. Please see a copy of my visit note below.      [facility] Medical Oncology Note       Date of visit: Mar 15, 2024            Assessment:     Clinical T2N1 low grade invasive breast cancer in this 39 year old woman.  As discussed previously with Dr. Ford and with Dr Nolan, the patient is appropriate for neoadjuvant treatment.  She has a large breast cancer with nodes that are positive.  Neoadjuvant therapy was recommended.  MammaPrint was high risk, however Eliezer was not a canditate for ISPY due to elevated hemoglobin A1C.  She discussed SOC with DR Nolan with ddAC-->t.    Eliezer has an extensive history of migraines with associated nausea/vomiting.  She has trialed may anti emetics and is very sensitive to N/V. We discussed a very thorough plan with anti emetics today.  Specifically using Zyprexa at night, tonight and for ~5-7 nights.  Watch for sedation.  During the day she prefers to use phenergan suppositories.  These have historically worked great for her.  We cannot use Reglan as this is a category X with Zyprxa and phenergan.  She can use prn compazine, but this increases the risk of anti-cholinergic side effects.  We reviewed if she needs anything further than the phenergan during they day, she can use lorazepam prn (watch for sedation) or the compazine (watch for anti-cholinergic side effects).  Ultimately, I scheduled her for a Day 4 IV + IV Dex/Emend -- if needed.  I am worried she will have severe nausea/vomiting, which is what we prepared her for.  She will take oral Dex (standard take home) on days 2-4.    We reviewed all other supportive cares needed for chemo toxicities including mucositis, GERD, stool changes.  She has alva's esophagus and already takes PPI.  She has  "IBS-diarrhea type and has frequent loose stools.   Her diabetes will be harder to manage with the Dex, discussed this.    Elevated LFTs which are probably related to her prior diagnosis of fatty liver and hepatosteatosis.  CT showed hepatic steatosis.   Because of the youngest of her cancer she is getting genetic testing.  This is still pending at the time of this dictation.  After neoadjuvant chemo, plan will be adjuvant OFS or bilateral oophorectomy with endocrine therapy.       Plan:     Start AC today, with neulasta support  Anti emetics per instructions (regimen printed off for her avoiding zofran/aloxi)  Day 4 IVF + IV anti emetics If needed  CT/PET scan as soon as can be arranged  Oncology psychotherapy referral placed  Provider/AC every 2 weeks x 4 schedule requested     Martha Kellogg PA-C    __________________________________________________________________    CHIEF COMPLAINT     New clinical T2N1 invasive breast cancer, here for pre chemo assessment       History of Present Illness:     Eliezer Izquierdo is a 38 yo previously healthy woman who palpated a mass in the left breast a few months ago. Mammogram and US showed a 2.5 x 2.5 x 2.0 cm spiculated mass at 2:00 position.  Biopsy of the mass was done on 2/9/2024.    Final path showed a Jeniffer grade I IDC, 97% strongly positive for ER, 99% strongly positive for TN, and negative for HER2 by FISH (IHC 2+). KI-67 is 24%.    The patient then saw Albina Ford 2/15/2024. She ordered a left axillary US that showed three abnormal axillary nodes.  Biopsy + for ER+ breast cancer    She did not qualify for ISPY due to elevated HgbA1c.     She presents here to start ddAC---weekly T.      Her port is feeling fine now, she felt a \"pop\" the other day, there was a swelling at her neck.  It was evaluated at the ER.  Its better now.     She has Barretts, takes Nexium BID.  Has IBS, diarrhea type, her stools alternate all the time.  She has chronic migraines.     She is " trying to quit smoking.  Taking chantix.  Down to ~ 8 /day .        Past Medical History:   Melanoma of right foot???     Past Medical History:   Diagnosis Date    Hypercholesteraemia     Irritable bowel     Migraines           Past Surgical History:    I have reviewed this patient's past surgical history         Social History:   Tobacco, ETOH, and rec drugs reviewed and as noted below with the following exceptions:  Kathy grew up many places in [place] and [place], but went to high school in [place] and graduated in 2000.  She thought about being a , but then got pregnant with her son Homero.  She had a lot of different for jobs.  She spent some time in [place] where her mom and other family member lives.  She then came back to [place].  She has a fiancé of 6 years named Saul and they currently live in [place].  She is an avid reader, and likes romance novels, but really anything.  She says she read 250 books last year.  She does smoke but is trying to quit.  She is currently on a low-carb diet.    Family History:     Family History   Problem Relation Age of Onset    Genitourinary Problems Mother         renal disease - minimal change, sponge kidney            Medications:     Current Outpatient Medications   Medication Sig Dispense Refill    FREESTYLE TEST STRIPS test strip 1 each      JANUVIA 50 MG tablet Take 50 mg by mouth      oxyCODONE-acetaminophen (PERCOCET) 5-325 MG tablet       varenicline (CHANTIX MIA) 0.5 MG X 11 & 1 MG X 42 tablet Take according to dose pack instructions      albuterol (PROAIR HFA/PROVENTIL HFA/VENTOLIN HFA) 108 (90 Base) MCG/ACT inhaler Inhale 2 puffs into the lungs      amLODIPine (NORVASC) 10 MG tablet Take 10 mg by mouth      atorvastatin (LIPITOR) 10 MG tablet Take 10 mg by mouth      Blood Glucose Monitoring Suppl (ONETOUCH VERIO FLEX SYSTEM) w/Device KIT       cetirizine (ZYRTEC) 10 MG tablet Take 5 mg by mouth      EPINEPHrine (ANY BX GENERIC EQUIV) 0.3  MG/0.3ML injection 2-pack       fluticasone-salmeterol (ADVAIR HFA) 115-21 MCG/ACT inhaler Inhale 2 puffs into the lungs      hydrochlorothiazide (HYDRODIURIL) 25 MG tablet Take 25 mg by mouth      ibuprofen (ADVIL/MOTRIN) 200 MG tablet Take 200 mg by mouth      methocarbamol (ROBAXIN) 500 MG tablet Take 1,000 mg by mouth      naloxone (NARCAN) 4 MG/0.1ML nasal spray Spray 4 mg into one nostril alternating nostrils as needed for opioid reversal every 2-3 minutes until assistance arrives      Pregabalin (LYRICA) 200 MG capsule Take 200 mg by mouth      Prochlorperazine Maleate (COMPAZINE PO) Take 10 mg by mouth 3 times daily as needed for nausea      PROMETHAZINE HCL RE Place 25 mg rectally 3 times daily as needed for nausea      ramelteon (ROZEREM) 8 MG tablet Take 8 mg by mouth      VICTOZA PEN 18 MG/3ML soln Inject 1.8 mg Subcutaneous                Physical Exam:   Blood pressure 128/87, pulse (!) 121, temperature 99.1  F (37.3  C), temperature source Oral, resp. rate 18, weight 128 kg (282 lb 1.6 oz), SpO2 96%.    ECOG PS: 0  Constitutional: WDWN female in NAD, pleasant and appropriate  HEENT:  NC/AT, no icterus, OP clear, MMM  Skin: No jaundice nor ecchymoses  Lungs: CTAB, no w/r/r, nonlabored breathing  Cardiovascular: RRR, S1, S2, no m/r/g  Abdomen: +BS, morbidly obese, minimal periumbilical tenderness, no right upper quadrant tenderness.  MSK/Extremities: Warm, well perfused. No edema  LN: no cervical, supraclavicular, axillary, nor inguinal lymphadenopathy  Neurologic: alert, answering questions appropriately, moving all extremities spontaneously. CN 2-12 grossly intact.  Psych: appropriate affect  Breast exam: The left breast has a palpable deep mass in the 2 o'clock position measuring roughly 5 cm x 4 cm.  It is somewhat irregular.  The rest of the breast has a heterogeneous exam.  There are no nipple abnormalities.  The left axilla is negative.    Data:   Most Recent 3 CBC's:  Recent Labs   Lab Test  03/15/24  0836   WBC 9.9   HGB 14.6   MCV 91      ANEUTAUTO 6.6     Most Recent 3 BMP's:  Recent Labs   Lab Test 03/15/24  0836 03/01/24  0817     --    POTASSIUM 3.3*  --    CHLORIDE 98  --    CO2 25  --    BUN 6.4  --    CR 0.43* 0.3*   ANIONGAP 12  --    REMI 9.4  --    *  --    PROTTOTAL 7.2  --    ALBUMIN 4.1  --     Most Recent 3 LFT's:  Recent Labs   Lab Test 03/15/24  0836   AST 32   ALT 28   ALKPHOS 62   BILITOTAL 0.4    Most Recent 2 TSH and T4:No lab results found.   I reviewed the above labs today.     No results found for this or any previous visit (from the past 24 hour(s)).      50 minutes spent on the date of the encounter doing chart review, review of outside records, review of test results, interpretation of tests, patient visit, documentation, discussion with other provider(s), and discussion with family       Erica Kellogg PA-C

## 2024-03-15 NOTE — PATIENT INSTRUCTIONS
Nausea Regimen with AC     You will receive IV Dexamethasone, and Emend as premedications before chemo. These are long-acting and will last in your body for 3 days.      Evening of chemo:  Ativan 1 tablets every 4-6 hours during day watch for sedation  1-2 before bed as needed for the dex-related insomnia (OR OK TO TAKE OLANZAPINE instead)     Day 2: (day after chemo)  Rectal phenergan   Dexamethasone 2 tablets with breakfast  Ativan as needed  Compazine last resort (watch for dry mouth, dry eyes, urinary hesitancy, constipation)  Olazapine nightly x 5 nights (no ativan at night with this)     Day 3:  Same as day 2     Day 4:  Same as day 2     Day 5 and beyond: you will be finished with the Dex.  Just using phenergan + ativan, last resort compazine.            For Neulasta bone pain prevention:  Take Claritin (loratadine is an alternate name) 10 mg the evening prior to the shot (same evening as chemo), the evening of the shot, and the evening following the shot. OK for Tylenol, Ibu, or percocet for 1-2 days if you have severe pain     Constipation  Miralax daily, colace or Senna-s     Heartburn  Continue nexium OK to add in pepcid, tums, anything OTC     Fatigue  Severe days 3-7      Reasons to call: fever 100.5, any concern of infection, no BM in 3 days, concerning change in bowel habits, uncontrolled pain or nausea, dehydration/vomiting,

## 2024-03-15 NOTE — PROGRESS NOTES
Infusion Nursing Note:  Eliezer Izquierdo presents today for Cycle 1 Day 1 Adriamycin/Cytoxan, Neulasta On-Pro and Potassium replacement.    Patient seen by provider today: Yes: Martha Kellogg PA-C   present during visit today: Not Applicable.    Note: Patient new to oncology infusion room and is receiving A/C for the first time. Pt oriented to infusion room and call light. Chemotherapy teaching done previously by RNCC.  Reinforced chemotherapy teaching/side effects and schedule during infusion visit.     Explained increased risk of hypersensitivity reaction with new Emend. She did experience watery eyes when Emend first started, but symptoms resolved and no other symptoms developed throughout the remainder of infusion.      PRN Ativan and Pepsid given today per pt request. Pt has reflux issues at baseline d/t Nicolas's Esophagus. Also has anxiety.    Okay to replace potassium orally per protocol per written communication Martha Kellogg PA-C/Francesca Deleon RN @1013 3/15/24    Copy of AVS reviewed with patient verbally. Pt did not want a paper copy of her AVS. Pt instructed to call care coordinator, triage (or MD on call if after hours/weekends) with chills/temp >=100.4, questions/concerns. Pt states she has working thermometers at home. Pt stated understanding of plan.      Intravenous Access:  Implanted Port.    Treatment Conditions:  Lab Results   Component Value Date    HGB 14.6 03/15/2024    WBC 9.9 03/15/2024    ANEU 3.6 06/21/2015    ANEUTAUTO 6.6 03/15/2024     03/15/2024        Lab Results   Component Value Date     03/15/2024    POTASSIUM 3.3 (L) 03/15/2024    CR 0.43 (L) 03/15/2024    REMI 9.4 03/15/2024    BILITOTAL 0.4 03/15/2024    ALBUMIN 4.1 03/15/2024    ALT 28 03/15/2024    AST 32 03/15/2024       Results reviewed, labs MET treatment parameters, ok to proceed with treatment.  ECHO completed 3/11/24 EF 60-65%    Post Infusion Assessment:  Patient tolerated infusion  without incident.  Blood return noted pre and post infusion.  Blood return noted during Adriamycin administration every 2 cc.  Site patent and intact, free from redness, edema or discomfort.  No evidence of extravasations.  Access discontinued per protocol.     Neulasta Onpro On-Body injector applied to CARMEN at 1146.  Writer discussed Neulasta injection would start tomorrow 3/16 at 1450, approximately 27 hours after application applied today.    Written and Verbal instruction reviewed with patient.  Pt instructed when the dose delivery starts, it will take about 45 minutes to complete.  Pt aware Neulasta Onpro On-Body should have green flashing light and to call triage or on-call MD if injector flashes red or appears to be leaking.   Pt aware to keep Onpro On-Body Neulasta 4 inches away from electrical equipment and to avoid showering 4 hours prior to injection.   Neulasta Onpro Lot number: See MAR.      Discharge Plan:   Prescription refills given for Dex, Ativan, Zyprexa, Compazine, Phenergan suppository.  Discharge instructions reviewed with: Patient and father.  Patient and/or family verbalized understanding of discharge instructions and all questions answered.  AVS to patient via [employer].  Patient scheduled at  for possible fluids on Monday 3/18. If pt doesn't feel she needs it, she will call in advance to cancel. Otherwise will expect to return 3/19 for next chemo. Yet to be scheduled, will look on [employer] for updates and will call if no updates seen by early next week.  Patient discharged in stable condition accompanied by: father.  Departure Mode: Ambulatory.    After pt discharged, noticed that patient was scheduled for Neulasta on Sunday @ 1330 but not needed due to receiving On-Pro today. Appointment cancelled and patient called and notified.    Francesca Deleon RN

## 2024-03-19 ENCOUNTER — PATIENT OUTREACH (OUTPATIENT)
Dept: ONCOLOGY | Facility: CLINIC | Age: 39
End: 2024-03-19
Payer: COMMERCIAL

## 2024-03-19 DIAGNOSIS — R11.2 CHEMOTHERAPY INDUCED NAUSEA AND VOMITING: ICD-10-CM

## 2024-03-19 DIAGNOSIS — T45.1X5A CHEMOTHERAPY INDUCED NAUSEA AND VOMITING: ICD-10-CM

## 2024-03-19 RX ORDER — LORAZEPAM 0.5 MG/1
1 TABLET ORAL EVERY 6 HOURS PRN
Qty: 30 TABLET | Refills: 1 | Status: SHIPPED | OUTPATIENT
Start: 2024-03-19 | End: 2024-05-10

## 2024-03-19 NOTE — PROGRESS NOTES
[facility]: Cancer Care                                                                                        Patient called in and said she wasn't going to take her last dex dose. She reports it has caused her to have a hoarse throat. Patient feels like the antinausea meds have been helping and she doesn't feel she needs the dex. Dr. Nolan aware      Ana NEWBYN, RN, OCN  Care Coordinator  [facility]

## 2024-03-19 NOTE — PROGRESS NOTES
"[facility]: Cancer Care - RN CC Symptom Call          Situation                                                    RN called to follow up post AC infusion. Patient reports that right after her Neulasta injected she has bad muscle pain throughout her whole body, which isn't getting better with time. She rates it a \"9/10\". She has tried tylenol, ibuprofen and claritin. Oycodone and sleep have been helpful. She has been taking ativan at night to assist with her sleep. Zyprexa hasn't been working. Patient is requesting a refill of her ativan. Otherwise, no other side effects reported.                                               Assessment                                                      Presenting Problem: Pain  Cancer Diagnosis: breast cancer  Cancer treatment & last dose:   Infusion given in last 28 days       Administered MAR Action Medication Dose Rate Visit    03/15/2024 11:32 Given DOXOrubicin (ADRIAMYCIN) injection 150 mg 150 mg   Infusion Therapy Visit on 03/15/2024 in [facility]    03/15/2024 11:44 New Bag cycloPHOSphamide (CYTOXAN) 1,500 mg in sodium chloride 0.9 % 625 mL infusion 1,500 mg 1,250 mL/hr Infusion Therapy Visit on 03/15/2024 in [facility]            Subjective/Objective: see above    Pain Location: whole body   Pain Description: miserable  Pain Scale: 9/10    Onset: waxing and waning  Duration: ongoing  days    Progression of Symptoms: same    Accompanying signs and symptoms:        Fever: No       Nausea/vomiting: No       Abdominal pain: No       Other GI concerns: No       Other: N/A    History:        Taking pain medication: Yes: oxycodone       Recent medication - New or changes (RX or OTC): YES- zyprexa, which hasn't helped       Established with Pain Specialist or Palliative Care:NO/YES :[id number]}       Allergies verified: Yes       Recent infusions: Yes: AC       Oral chemotherapy: No    Precipitating or alleviating " factors: after Neulasta injected    Therapies tried and outcome: OTC pain medication, prescription pain medication, heat therapy, and ativan for sleep                Intervention                                                      Homecare instructions: continue to take her pain medication and Ativan for sleep. Recommended Epson Salt baths, however patient doesn't have a bathtub at her home.     Plan                                                         -   Documented and routed to MD/DO, SHERYL:   - recommendations     Patient verbalizes understanding of and agrees with plan? YES    Ana NEWBYN, RN, OCN  Care Coordinator  [facility]

## 2024-03-20 ENCOUNTER — LAB (OUTPATIENT)
Dept: LAB | Facility: CLINIC | Age: 39
End: 2024-03-20
Payer: COMMERCIAL

## 2024-03-20 DIAGNOSIS — C50.412 MALIGNANT NEOPLASM OF UPPER-OUTER QUADRANT OF LEFT BREAST IN FEMALE, ESTROGEN RECEPTOR POSITIVE (H): Primary | ICD-10-CM

## 2024-03-20 DIAGNOSIS — Z17.0 MALIGNANT NEOPLASM OF UPPER-OUTER QUADRANT OF LEFT BREAST IN FEMALE, ESTROGEN RECEPTOR POSITIVE (H): Primary | ICD-10-CM

## 2024-03-20 PROCEDURE — 81162 BRCA1&2 GEN FULL SEQ DUP/DEL: CPT | Performed by: SURGERY

## 2024-03-20 PROCEDURE — 36415 COLL VENOUS BLD VENIPUNCTURE: CPT

## 2024-03-20 PROCEDURE — G0452 MOLECULAR PATHOLOGY INTERPR: HCPCS | Mod: 26 | Performed by: STUDENT IN AN ORGANIZED HEALTH CARE EDUCATION/TRAINING PROGRAM

## 2024-03-23 ENCOUNTER — NURSE TRIAGE (OUTPATIENT)
Dept: NURSING | Facility: CLINIC | Age: 39
End: 2024-03-23
Payer: COMMERCIAL

## 2024-03-23 NOTE — TELEPHONE ENCOUNTER
Pt calling with NP on call. Pt has a dental abscess and the provider would like to prescribe her an ABX but wants to check if ok to prescribe this prior to pt starting chemo next week.     Transferred them to the [facility] service.    Consuelo Meadows, RN, BSN  [facility] Nurse Advisor 10:27 AM 3/23/2024    Reason for Disposition   [1] Caller has URGENT medicine question about med that PCP or specialist prescribed AND [2] triager unable to answer question    Additional Information   Negative: [1] Intentional drug overdose AND [2] suicidal thoughts or ideas   Negative: Drug overdose and triager unable to answer question   Negative: Caller requesting a renewal or refill of a medicine patient is currently taking   Negative: Caller requesting information unrelated to medicine   Negative: Caller requesting information about COVID-19 Vaccine   Negative: Caller requesting information about Emergency Contraception   Negative: Caller requesting information about Combined Birth Control Pills   Negative: Caller requesting information about Progestin Birth Control Pills   Negative: Caller requesting information about Post-Op pain or medicines   Negative: Caller requesting a prescription antibiotic (such as Penicillin) for Strep throat and has a positive culture result   Negative: Caller requesting a prescription anti-viral med (such as Tamiflu) and has influenza (flu) symptoms   Negative: Immunization reaction suspected   Negative: Rash while taking a medicine or within 3 days of stopping it   Negative: [1] Asthma and [2] having symptoms of asthma (cough, wheezing, etc.)   Negative: [1] Symptom of illness (e.g., headache, abdominal pain, earache, vomiting) AND [2] more than mild   Negative: Breastfeeding questions about mother's medicines and diet   Negative: MORE THAN A DOUBLE DOSE of a prescription or over-the-counter (OTC) drug   Negative: [1] DOUBLE DOSE (an extra dose or lesser amount) of prescription drug AND [2]  any symptoms (e.g., dizziness, nausea, pain, sleepiness)   Negative: [1] DOUBLE DOSE (an extra dose or lesser amount) of over-the-counter (OTC) drug AND [2] any symptoms (e.g., dizziness, nausea, pain, sleepiness)   Negative: Took another person's prescription drug   Negative: [1] DOUBLE DOSE (an extra dose or lesser amount) of prescription drug AND [2] NO symptoms  (Exception: A double dose of antibiotics.)   Negative: Diabetes drug error or overdose (e.g., took wrong type of insulin or took extra dose)   Negative: [1] Prescription not at pharmacy AND [2] was prescribed by PCP recently (Exception: Triager has access to EMR and prescription is recorded there. Go to Home Care and confirm for pharmacy.)   Negative: [1] Pharmacy calling with prescription question AND [2] triager unable to answer question    Protocols used: Medication Question Call-A-

## 2024-03-25 DIAGNOSIS — C50.412 MALIGNANT NEOPLASM OF UPPER-OUTER QUADRANT OF LEFT BREAST IN FEMALE, ESTROGEN RECEPTOR POSITIVE (H): Primary | ICD-10-CM

## 2024-03-25 DIAGNOSIS — Z17.0 MALIGNANT NEOPLASM OF UPPER-OUTER QUADRANT OF LEFT BREAST IN FEMALE, ESTROGEN RECEPTOR POSITIVE (H): Primary | ICD-10-CM

## 2024-03-25 RX ORDER — OXYCODONE HYDROCHLORIDE 5 MG/1
5 TABLET ORAL EVERY 6 HOURS PRN
Qty: 12 TABLET | Refills: 0 | Status: SHIPPED | OUTPATIENT
Start: 2024-03-25 | End: 2024-03-28

## 2024-03-26 ENCOUNTER — NURSE TRIAGE (OUTPATIENT)
Dept: NURSING | Facility: CLINIC | Age: 39
End: 2024-03-26
Payer: COMMERCIAL

## 2024-03-26 ENCOUNTER — PATIENT OUTREACH (OUTPATIENT)
Dept: ONCOLOGY | Facility: CLINIC | Age: 39
End: 2024-03-26
Payer: COMMERCIAL

## 2024-03-26 DIAGNOSIS — Z80.49 FAMILY HISTORY OF UTERINE CANCER: ICD-10-CM

## 2024-03-26 DIAGNOSIS — C50.412 MALIGNANT NEOPLASM OF UPPER-OUTER QUADRANT OF LEFT BREAST IN FEMALE, ESTROGEN RECEPTOR POSITIVE (H): ICD-10-CM

## 2024-03-26 DIAGNOSIS — Z85.820 PERSONAL HISTORY OF MALIGNANT MELANOMA: ICD-10-CM

## 2024-03-26 DIAGNOSIS — Z80.3 FAMILY HISTORY OF MALIGNANT NEOPLASM OF BREAST: ICD-10-CM

## 2024-03-26 DIAGNOSIS — Z17.0 MALIGNANT NEOPLASM OF UPPER-OUTER QUADRANT OF LEFT BREAST IN FEMALE, ESTROGEN RECEPTOR POSITIVE (H): ICD-10-CM

## 2024-03-26 PROCEDURE — G0452 MOLECULAR PATHOLOGY INTERPR: HCPCS | Mod: 26 | Performed by: PATHOLOGY

## 2024-03-26 PROCEDURE — 36415 COLL VENOUS BLD VENIPUNCTURE: CPT

## 2024-03-26 NOTE — PROGRESS NOTES
[facility]: Cancer Care Follow-Up Note                                    Discussion with Patient:                                                    RN was talking to patient on the phone about switching from virtual to in person d/t patient reporting that her breast lump appears firmer. Patient mentioned that she has been experiencing burning and pain in her esophagus while drinking and eating. Patient has hx of Nciolas's esophagus and is on a PPI. Patient is post chemo day # 8 of AC. Patient reports that this developed 3-4 days after her last infusion. Patient currently has a tooth abscess that started on her gumline, this has since drained. Patient is on a course of abx.She did vomit, however, hasn't vomiting for about 3 days, no blood noted.  Patient is taking her antiemetics as prescribed. Patient did drink a protein shake today and reported no burning or difficulties in swallowing. Patient reports adequate PO intake. She is scheduled to see her PCP tomorrow and follow up with Dr. Nolan on Thursday.         Goals          General    Other     Notes - Note created  3/13/2024  2:17 PM by Ana Cotto RN    Goal Statement: I will use my clinic and care team resources as directed.  Date Goal set: 3/13/2024  Barriers: disease burden  Strengths: support, coping, motivation, health awareness, and involvement with care team  Date to Achieve By: ongoing  Patient expressed understanding of goal: Yes  Action steps to achieve this goal:  I will contact triage with new, worsening or uncontrolled symptoms.   I will contact triage with temperature over 100.4  I will call with difficulties of scheduling and/or transportation.   I will request needed refills when there are 7 days of medication remaining.   I will not send urgent or symptomatic messages through [employer].   I will contact scheduling to arrange or make changes in my appointments.                Dates of Treatment:                                                       Infusion given in last 28 days       Administered MAR Action Medication Dose Rate Visit    03/15/2024 11:32 Given DOXOrubicin (ADRIAMYCIN) injection 150 mg 150 mg   Infusion Therapy Visit on 03/15/2024 in [facility]    03/15/2024 11:44 New Bag cycloPHOSphamide (CYTOXAN) 1,500 mg in sodium chloride 0.9 % 625 mL infusion 1,500 mg 1,250 mL/hr Infusion Therapy Visit on 03/15/2024 in [facility]            Assessment:                                                      See above    Intervention/Education provided during outreach:                                                       Drinking high caloric protein shakes, avoid spicy foods or foods that can irritate her esophagus. Seek immediate help for any vomiting up blood or difficulties breathing.      Patient to follow up as scheduled at next appt  Route to provider to further advise    Ana NEWBYN, RN, OCN  Care Coordinator  [facility]

## 2024-03-27 ENCOUNTER — PATIENT OUTREACH (OUTPATIENT)
Dept: ONCOLOGY | Facility: CLINIC | Age: 39
End: 2024-03-27
Payer: COMMERCIAL

## 2024-03-27 DIAGNOSIS — Z17.0 MALIGNANT NEOPLASM OF UPPER-OUTER QUADRANT OF LEFT BREAST IN FEMALE, ESTROGEN RECEPTOR POSITIVE (H): Primary | ICD-10-CM

## 2024-03-27 DIAGNOSIS — C50.412 MALIGNANT NEOPLASM OF UPPER-OUTER QUADRANT OF LEFT BREAST IN FEMALE, ESTROGEN RECEPTOR POSITIVE (H): Primary | ICD-10-CM

## 2024-03-27 NOTE — PROGRESS NOTES
"[facility]: Cancer Care - RN CC Symptom Call          Situation                                                    Patient called triage last night reporting \"10/10\" breat pain in her left breast mass. Was advised to go to the ED. RN called back today. Patient didn't go to the ED d/t boyfriend having to get up at 3 am. Patient feels like the mass is getting bigger and shifting to the middle. She reports white, thick discharge from the nipple, breast isn't warm to touch, in fact she says the mass is cold. She denies any fevers.  She is still on abx for her tooth abscess. She seen PCP today and prescribed percocet. Patient has  appt with Dr. Nolan tomorrow. Will notify Dr. Nolan                                       Assessment                                                      Presenting Problem: Pain  Cancer Diagnosis: breast cancer  Cancer treatment & last dose:   Infusion given in last 28 days       Administered MAR Action Medication Dose Rate Visit    03/15/2024 11:32 Given DOXOrubicin (ADRIAMYCIN) injection 150 mg 150 mg   Infusion Therapy Visit on 03/15/2024 in [facility]    03/15/2024 11:44 New Bag cycloPHOSphamide (CYTOXAN) 1,500 mg in sodium chloride 0.9 % 625 mL infusion 1,500 mg 1,250 mL/hr Infusion Therapy Visit on 03/15/2024 in [facility]            Subjective/Objective: see above    Pain Location: left breast mass  Pain Description: deep  Pain Scale: 10/10    Onset: gradual got worse yesterday  Duration: 1 days    Progression of Symptoms: same    Accompanying signs and symptoms:        Fever: No       Nausea/vomiting: No       Abdominal pain: No       Other GI concerns: No       Other:     History:        Taking pain medication: Yes: hydrocodone       Recent medication - New or changes (RX or OTC): YES abx for tooth abscess and percocet prescribed by her PCP today        Established with Pain Specialist or Palliative Care:NO/YES " :[id number]}       Allergies verified: Yes       Recent infusions: Yes: AC post chemo day # 9       Oral chemotherapy: No    Precipitating or alleviating factors: None    Therapies tried and outcome: prescription pain medication                Intervention                                                      Homecare instructions: Patient will take her pain meds and plan to see Dr. Nolan tomorrow.     Plan                                                         -   Notified Dr. Nolan, will order an ultrasound and try to make an appt with the surgical team     Patient verbalizes understanding of and agrees with plan? YES    Ana NEWBYN, RN, OCN  Care Coordinator  [facility]

## 2024-03-27 NOTE — TELEPHONE ENCOUNTER
Nurse Triage SBAR    Situation: Left breast pain.     Background: Patient calling. Hx of breast cancer.     Assessment: Severe 10/10 breast pain. She feels like the breast mass has gotten bigger and harder. Oxycodone is not helping for her pain. Denied feeling feverish. Discharge from the nipple - white.     Protocol Recommended Disposition: Emergency Department (Or PCP Triage)    Recommendation: According to the protocol, Patient should go to the ED now (Or PCP Triage). Advised Patient that the patient needs to go to the ED now (Or PCP Triage). Care advice given. Patient verbalizes understanding and agrees with plan of care. Reviewed concerning symptoms and when to call back.     Sienna Tran, RN Nursing Advisor 3/26/2024 11:18 PM     Reason for Disposition   Patient sounds very sick or weak to the triager    Additional Information   Negative: Chest pain   Negative: Breastfeeding questions about baby   Negative: Breastfeeding questions about mother (breast symptoms or feeling sick)   Negative: Breastfeeding questions about mother's medicines and diet   Negative: Postpartum breast pain and swelling, not breastfeeding   Negative: Small spot, skin growth or mole   Negative: [1] SEVERE breast pain AND [2] fever > 103 F (39.4 C)    Protocols used: Breast Symptoms-A-AH

## 2024-03-28 ENCOUNTER — ANCILLARY PROCEDURE (OUTPATIENT)
Dept: MAMMOGRAPHY | Facility: CLINIC | Age: 39
End: 2024-03-28
Attending: INTERNAL MEDICINE
Payer: COMMERCIAL

## 2024-03-28 ENCOUNTER — ONCOLOGY VISIT (OUTPATIENT)
Dept: ONCOLOGY | Facility: CLINIC | Age: 39
End: 2024-03-28
Attending: INTERNAL MEDICINE
Payer: COMMERCIAL

## 2024-03-28 VITALS
WEIGHT: 290.2 LBS | SYSTOLIC BLOOD PRESSURE: 145 MMHG | RESPIRATION RATE: 16 BRPM | BODY MASS INDEX: 43.98 KG/M2 | OXYGEN SATURATION: 97 % | DIASTOLIC BLOOD PRESSURE: 85 MMHG | HEART RATE: 117 BPM | HEIGHT: 68 IN | TEMPERATURE: 98 F

## 2024-03-28 DIAGNOSIS — C50.412 MALIGNANT NEOPLASM OF UPPER-OUTER QUADRANT OF LEFT BREAST IN FEMALE, ESTROGEN RECEPTOR POSITIVE (H): Primary | ICD-10-CM

## 2024-03-28 DIAGNOSIS — C50.412 MALIGNANT NEOPLASM OF UPPER-OUTER QUADRANT OF LEFT BREAST IN FEMALE, ESTROGEN RECEPTOR POSITIVE (H): ICD-10-CM

## 2024-03-28 DIAGNOSIS — Z17.0 MALIGNANT NEOPLASM OF UPPER-OUTER QUADRANT OF LEFT BREAST IN FEMALE, ESTROGEN RECEPTOR POSITIVE (H): ICD-10-CM

## 2024-03-28 DIAGNOSIS — Z17.0 MALIGNANT NEOPLASM OF UPPER-OUTER QUADRANT OF LEFT BREAST IN FEMALE, ESTROGEN RECEPTOR POSITIVE (H): Primary | ICD-10-CM

## 2024-03-28 PROCEDURE — 99215 OFFICE O/P EST HI 40 MIN: CPT | Performed by: INTERNAL MEDICINE

## 2024-03-28 PROCEDURE — G0463 HOSPITAL OUTPT CLINIC VISIT: HCPCS | Performed by: INTERNAL MEDICINE

## 2024-03-28 PROCEDURE — 76642 ULTRASOUND BREAST LIMITED: CPT | Mod: LT | Performed by: RADIOLOGY

## 2024-03-28 RX ORDER — LORAZEPAM 1 MG/1
1 TABLET ORAL EVERY 6 HOURS PRN
Qty: 50 TABLET | Refills: 1 | Status: SHIPPED | OUTPATIENT
Start: 2024-03-28 | End: 2024-04-21

## 2024-03-28 RX ORDER — ALBUTEROL SULFATE 90 UG/1
1-2 AEROSOL, METERED RESPIRATORY (INHALATION)
Status: CANCELLED
Start: 2024-03-29

## 2024-03-28 RX ORDER — HEPARIN SODIUM (PORCINE) LOCK FLUSH IV SOLN 100 UNIT/ML 100 UNIT/ML
5 SOLUTION INTRAVENOUS
Status: CANCELLED | OUTPATIENT
Start: 2024-03-29

## 2024-03-28 RX ORDER — METHYLPREDNISOLONE SODIUM SUCCINATE 125 MG/2ML
125 INJECTION, POWDER, LYOPHILIZED, FOR SOLUTION INTRAMUSCULAR; INTRAVENOUS
Status: CANCELLED
Start: 2024-03-29

## 2024-03-28 RX ORDER — EPINEPHRINE 1 MG/ML
0.3 INJECTION, SOLUTION INTRAMUSCULAR; SUBCUTANEOUS EVERY 5 MIN PRN
Status: CANCELLED | OUTPATIENT
Start: 2024-03-29

## 2024-03-28 RX ORDER — ALBUTEROL SULFATE 0.83 MG/ML
2.5 SOLUTION RESPIRATORY (INHALATION)
Status: CANCELLED | OUTPATIENT
Start: 2024-03-29

## 2024-03-28 RX ORDER — HEPARIN SODIUM,PORCINE 10 UNIT/ML
5-20 VIAL (ML) INTRAVENOUS DAILY PRN
Status: CANCELLED | OUTPATIENT
Start: 2024-03-29

## 2024-03-28 RX ORDER — DIPHENHYDRAMINE HYDROCHLORIDE 50 MG/ML
50 INJECTION INTRAMUSCULAR; INTRAVENOUS
Status: CANCELLED
Start: 2024-03-29

## 2024-03-28 RX ORDER — DOXORUBICIN HYDROCHLORIDE 2 MG/ML
60 INJECTION, SOLUTION INTRAVENOUS ONCE
Status: CANCELLED | OUTPATIENT
Start: 2024-03-29

## 2024-03-28 RX ORDER — MEPERIDINE HYDROCHLORIDE 25 MG/ML
25 INJECTION INTRAMUSCULAR; INTRAVENOUS; SUBCUTANEOUS EVERY 30 MIN PRN
Status: CANCELLED | OUTPATIENT
Start: 2024-03-29

## 2024-03-28 RX ORDER — OXYCODONE AND ACETAMINOPHEN 7.5; 325 MG/1; MG/1
1 TABLET ORAL EVERY 6 HOURS PRN
Qty: 60 TABLET | Refills: 0 | Status: SHIPPED | OUTPATIENT
Start: 2024-03-28 | End: 2024-04-10

## 2024-03-28 RX ORDER — LORAZEPAM 2 MG/ML
0.5 INJECTION INTRAMUSCULAR EVERY 4 HOURS PRN
Status: CANCELLED | OUTPATIENT
Start: 2024-03-29

## 2024-03-28 NOTE — LETTER
3/28/2024         RE: Eliezer Izquierdo  [address] [facility] [address]        Dear Colleague,    Thank you for referring your patient, Eliezer Izquierdo, to the [facility]. Please see a copy of my visit note below.      [facility] Medical Oncology Note  Date of visit: March 28, 2024  New Outpatient Clinic Note        Assessment:     Clinical T2N1 invasive breast cancer with a high risk Mammaprint in this 39 year old woman. As discussed previously with Dr. Ford and again today with me, the patient is appropriate for neoadjuvant treatment.    S/P one cycle of neoadjuvant ddAC with signifcant worsening of breast symptoms.  However, today's ultrasound was very reassuring.  Pain out of proportion to exam.  It starting to become clear to me that Kathy has a history of opiate use and while I am sympathetic to her pain, I do not want to participate in enabling drug dependence.  I told her that I could prescribe some more oxycodone today, but I insist that she be seen by our palliative care team to help manage her pain with an up-to-date approach to management.  She is in agreement with this.    Plan:     Referral to palliative care to help with pain management  RTC tomorrow for cycle 2 ddAC  Decrease IV dex (premed) to 10 mg, and go to prn dosing of the 4 mg po bid days 2-4  Percocet 7.5/325 q 6 hours. A total of 60 were given that I hope will last through the month  Lorazepam 1 mg po q 4-6 hours prn, for anxiety, nausea and sleep prn. A total of 50 were given.      Addendum:  After Kathy went to our pharmacy to  the Percocet and lorazepam, I was texted by Margarette, the pharmacist about her recent opiate history.  To whit:  3/27 norco 5 #36   3/27 percocet 5 #20   3/25 oxy 5 #12   3/20 percocet 5 #112   3/13 percocet 5 #112   2/28 percocet 5 #112     To me this is adequate evidence that she is using this new diagnosis of breast cancer as a way to get more opiates.   The plan is to refill the above on Monday, but she definitely needs to be seen by palliative care as soon as possible.    Zachary Nolan MD, MSc  Associate Professor of Medicine  [employer]  [facility]  [address] [place] [id number]  [phone]    __________________________________________________________________    CHIEF COMPLAINT     Clinical T2N1 invasive breast cancer diagnosed at breast biopsy 2/9/2024. Eliezer palpated a left breast lump. Mammogram and US showed a 2.5 x 2.5 x 2.0 cm spiculated mass at 2:00 position.   Biopsy showed a Jeniffer grade I IDC, 97% strongly positive for ER, 99% strongly positive for SC, and negative for HER2 by FISH (IHC 2+). KI-67 is 24%. Mammaprint was high risk Luminal B. Left axillary US that showed three abnormal axillary nodes, biopsy positive for IDC. Biopsy showed METASTATIC BREAST CARCINOMA, almost entirely effacing lymph node, at least 11 mm in linear extent.         History of Present Illness/Therapy to dte:       Neoadjuvant ddAC initiated 3/15/2024. She is back to day has an add-on before cycle 2.      Interval History   Developed 10/10 pain in the involved breast, with white discharge coming from the nipple.. Eliezer is afebrile.  On antibiotics for an oral abscess  States her breast is still pretty painful today.  Also says she has bilateral knee pain of longstanding.  Her partner Saul mentioned that she had been on narcotics previously but did wean herself off.  Now she is back on them.  She is relieved that her breast imaging is unremarkable  She denies cough or shortness of breath.  She does smoke.  She has heard it is bad for her.    Past Medical History:   Melanoma of right foot???     Past Medical History:   Diagnosis Date    Hypercholesteraemia     Irritable bowel     Migraines           Past Surgical History:    I have reviewed this patient's past surgical history         Social History:   Tobacco,  ETOH, and rec drugs reviewed and as noted below with the following exceptions:  Kathy grew up many places in [place] and [place], but went to high school in [employer] and graduated in 2000.  She thought about being a , but then got pregnant with her son Homero.  She had a lot of different for jobs.  She spent some time in [place] where her mom and other family member lives.  She then came back to [employer].  She has a fiancé of 6 years named Saul and they currently live in [place].  She is an avid reader, and likes romance novels, but really anything.  She says she read 250 books last year.  She does smoke but is trying to quit.  She is currently on a low-carb diet.    Family History:     Family History   Problem Relation Age of Onset    Genitourinary Problems Mother         renal disease - minimal change, sponge kidney            Medications:     Current Outpatient Medications   Medication Sig Dispense Refill    albuterol (PROAIR HFA/PROVENTIL HFA/VENTOLIN HFA) 108 (90 Base) MCG/ACT inhaler Inhale 2 puffs into the lungs      amLODIPine (NORVASC) 10 MG tablet Take 10 mg by mouth      atorvastatin (LIPITOR) 10 MG tablet Take 10 mg by mouth      Blood Glucose Monitoring Suppl (ONETOUCH VERIO FLEX SYSTEM) w/Device KIT       cetirizine (ZYRTEC) 10 MG tablet Take 5 mg by mouth      dexAMETHasone (DECADRON) 4 MG tablet Take 2 tablets (8 mg) by mouth daily for 12 doses Start on Day 2 of Cycles 1 through 4. 18 tablet 3    EPINEPHrine (ANY BX GENERIC EQUIV) 0.3 MG/0.3ML injection 2-pack       fluticasone-salmeterol (ADVAIR HFA) 115-21 MCG/ACT inhaler Inhale 2 puffs into the lungs      FREESTYLE TEST STRIPS test strip 1 each      hydrochlorothiazide (HYDRODIURIL) 25 MG tablet Take 25 mg by mouth      ibuprofen (ADVIL/MOTRIN) 200 MG tablet Take 200 mg by mouth      JANUVIA 50 MG tablet Take 50 mg by mouth      LORazepam (ATIVAN) 0.5 MG tablet Take 2 tablets (1 mg) by mouth every 6 hours as needed for nausea or  anxiety 30 tablet 1    methocarbamol (ROBAXIN) 500 MG tablet Take 1,000 mg by mouth      naloxone (NARCAN) 4 MG/0.1ML nasal spray Spray 4 mg into one nostril alternating nostrils as needed for opioid reversal every 2-3 minutes until assistance arrives      OLANZapine (ZYPREXA) 2.5 MG tablet Take 1 tablet (2.5 mg) by mouth at bedtime 30 tablet 1    oxyCODONE (ROXICODONE) 5 MG tablet Take 1 tablet (5 mg) by mouth every 6 hours as needed for pain 12 tablet 0    oxyCODONE-acetaminophen (PERCOCET) 5-325 MG tablet       Pregabalin (LYRICA) 200 MG capsule Take 200 mg by mouth      prochlorperazine (COMPAZINE) 10 MG tablet Take 1 tablet (10 mg) by mouth every 6 hours as needed for nausea or vomiting 30 tablet 2    Prochlorperazine Maleate (COMPAZINE PO) Take 10 mg by mouth 3 times daily as needed for nausea      promethazine (PHENERGAN) 25 MG suppository Place 1 suppository (25 mg) rectally every 6 hours as needed for nausea 60 suppository 3    PROMETHAZINE HCL RE Place 25 mg rectally 3 times daily as needed for nausea      ramelteon (ROZEREM) 8 MG tablet Take 8 mg by mouth      varenicline (CHANTIX MIA) 0.5 MG X 11 & 1 MG X 42 tablet Take according to dose pack instructions      VICTOZA PEN 18 MG/3ML soln Inject 1.8 mg Subcutaneous                Physical Exam:   There were no vitals taken for this visit.    ECOG PS: 0  Constitutional: WDWN female in NAD, pleasant and appropriate  HEENT:  NC/AT, no icterus, OP clear, MMM  Skin: No jaundice nor ecchymoses  Lungs: CTAB, no w/r/r, nonlabored breathing  Cardiovascular: RRR, S1, S2, no m/r/g  Abdomen: +BS, morbidly obese, minimal periumbilical tenderness, no right upper quadrant tenderness.  MSK/Extremities: Warm, well perfused. No edema  LN: no cervical, supraclavicular, axillary, nor inguinal lymphadenopathy  Neurologic: alert, answering questions appropriately, moving all extremities spontaneously. CN 2-12 grossly intact.  Psych: appropriate affect  Breast exam: The left  breast has a palpable deep mass in the 2 o'clock position measuring roughly 4 cm x 4 cm.  It is somewhat irregular.  It is unchanged from previous.  The rest of the breast has a heterogeneous exam.  There are no nipple abnormalities.  The left axilla is negative.  The left breast is smaller than the right side.  The right breast has a very heterogeneous exam but there is no obvious palpable lesion of concern.  The right axilla is negative.  Data:   Labs from a few weeks ago primary care were reviewed.  Her hemoglobin is 16 but the rest of the CBC is normal  Her CMP shows elevation in her transaminases to twice normal.  Her sodium is 128.    No results found for this or any previous visit (from the past 24 hour(s)).    Other Data     Breast ultrasound from today, personally reviewed with Dr. Madison Conley    Findings:     Targeted left breast ultrasound evaluation was performed by the  technologist and radiologist. No significant change in size of the  biopsied cancer at 2:00 position, 10 cm from the nipple, now measuring  2.3 x 2.5 x 1.4 cm (previously 2.5 cm on 2/9/2024). In the subareolar  region, there is only normal breast tissue.                                                                      IMPRESSION: BI-RADS CATEGORY: 6 - Known Biopsy-Proven Malignancy.      Labs, imaging and treatment plan reviewed with patient. All questions answered.        40 minutes spent on the date of the encounter doing chart review, review of outside records, review of test results, interpretation of tests, patient visit, documentation, discussion with other provider(s), and discussion with family  This is also included discussion with pharmacy noted above

## 2024-03-28 NOTE — NURSING NOTE
"Oncology Rooming Note    March 28, 2024 3:32 PM   Eliezer Izquierdo is a 39 year old female who presents for:    Chief Complaint   Patient presents with    Oncology Clinic Visit     Malignant neoplasm of upper-outer quadrant of left breast in female, estrogen receptor positive     Initial Vitals: BP (!) 145/85 (BP Location: Right arm, Patient Position: Sitting, Cuff Size: Adult Large)   Pulse 117   Temp 98  F (36.7  C) (Tympanic)   Resp 16   Ht 1.721 m (5' 7.75\")   Wt 131.6 kg (290 lb 3.2 oz)   SpO2 97%   BMI 44.45 kg/m   Estimated body mass index is 44.45 kg/m  as calculated from the following:    Height as of this encounter: 1.721 m (5' 7.75\").    Weight as of this encounter: 131.6 kg (290 lb 3.2 oz). Body surface area is 2.51 meters squared.  Data Unavailable Comment: Data Unavailable   No LMP recorded. (Menstrual status: UNKNOWN).  Allergies reviewed: Yes  Medications reviewed: Yes    Medications: Medication refills not needed today.  Pharmacy name entered into EPIC: [facility] - [place], [place] - [address] [place] [address]    Frailty Screening:   Is the patient here for a new oncology consult visit in cancer care? 2. No      Clinical concerns: none       Tiffanie García"

## 2024-03-29 ENCOUNTER — APPOINTMENT (OUTPATIENT)
Dept: LAB | Facility: CLINIC | Age: 39
End: 2024-03-29
Attending: INTERNAL MEDICINE
Payer: COMMERCIAL

## 2024-03-29 ENCOUNTER — INFUSION THERAPY VISIT (OUTPATIENT)
Dept: ONCOLOGY | Facility: CLINIC | Age: 39
End: 2024-03-29
Attending: INTERNAL MEDICINE
Payer: COMMERCIAL

## 2024-03-29 VITALS
WEIGHT: 289.2 LBS | TEMPERATURE: 98.4 F | OXYGEN SATURATION: 96 % | SYSTOLIC BLOOD PRESSURE: 138 MMHG | DIASTOLIC BLOOD PRESSURE: 81 MMHG | BODY MASS INDEX: 44.3 KG/M2 | RESPIRATION RATE: 16 BRPM | HEART RATE: 128 BPM

## 2024-03-29 DIAGNOSIS — Z17.0 MALIGNANT NEOPLASM OF UPPER-OUTER QUADRANT OF LEFT BREAST IN FEMALE, ESTROGEN RECEPTOR POSITIVE (H): Primary | ICD-10-CM

## 2024-03-29 DIAGNOSIS — C50.412 MALIGNANT NEOPLASM OF UPPER-OUTER QUADRANT OF LEFT BREAST IN FEMALE, ESTROGEN RECEPTOR POSITIVE (H): Primary | ICD-10-CM

## 2024-03-29 LAB
BASOPHILS # BLD AUTO: 0.1 10E3/UL (ref 0–0.2)
BASOPHILS NFR BLD AUTO: 1 %
EOSINOPHIL # BLD AUTO: 0 10E3/UL (ref 0–0.7)
EOSINOPHIL NFR BLD AUTO: 0 %
ERYTHROCYTE [DISTWIDTH] IN BLOOD BY AUTOMATED COUNT: 13.9 % (ref 10–15)
HCT VFR BLD AUTO: 37.3 % (ref 35–47)
HGB BLD-MCNC: 12.6 G/DL (ref 11.7–15.7)
IMM GRANULOCYTES # BLD: 0.1 10E3/UL
IMM GRANULOCYTES NFR BLD: 1 %
LYMPHOCYTES # BLD AUTO: 1.4 10E3/UL (ref 0.8–5.3)
LYMPHOCYTES NFR BLD AUTO: 22 %
MCH RBC QN AUTO: 31.1 PG (ref 26.5–33)
MCHC RBC AUTO-ENTMCNC: 33.8 G/DL (ref 31.5–36.5)
MCV RBC AUTO: 92 FL (ref 78–100)
MONOCYTES # BLD AUTO: 0.6 10E3/UL (ref 0–1.3)
MONOCYTES NFR BLD AUTO: 10 %
NEUTROPHILS # BLD AUTO: 4.1 10E3/UL (ref 1.6–8.3)
NEUTROPHILS NFR BLD AUTO: 66 %
NRBC # BLD AUTO: 0 10E3/UL
NRBC BLD AUTO-RTO: 0 /100
PLATELET # BLD AUTO: 293 10E3/UL (ref 150–450)
RBC # BLD AUTO: 4.05 10E6/UL (ref 3.8–5.2)
WBC # BLD AUTO: 6.2 10E3/UL (ref 4–11)

## 2024-03-29 PROCEDURE — 96375 TX/PRO/DX INJ NEW DRUG ADDON: CPT

## 2024-03-29 PROCEDURE — 96411 CHEMO IV PUSH ADDL DRUG: CPT

## 2024-03-29 PROCEDURE — 250N000013 HC RX MED GY IP 250 OP 250 PS 637: Performed by: INTERNAL MEDICINE

## 2024-03-29 PROCEDURE — 250N000011 HC RX IP 250 OP 636: Mod: JZ | Performed by: INTERNAL MEDICINE

## 2024-03-29 PROCEDURE — 85025 COMPLETE CBC W/AUTO DIFF WBC: CPT | Performed by: INTERNAL MEDICINE

## 2024-03-29 PROCEDURE — 96372 THER/PROPH/DIAG INJ SC/IM: CPT | Performed by: INTERNAL MEDICINE

## 2024-03-29 PROCEDURE — 96367 TX/PROPH/DG ADDL SEQ IV INF: CPT

## 2024-03-29 PROCEDURE — 96377 APPLICATON ON-BODY INJECTOR: CPT | Mod: 59

## 2024-03-29 PROCEDURE — 258N000003 HC RX IP 258 OP 636: Performed by: INTERNAL MEDICINE

## 2024-03-29 PROCEDURE — 36591 DRAW BLOOD OFF VENOUS DEVICE: CPT | Performed by: INTERNAL MEDICINE

## 2024-03-29 PROCEDURE — 96413 CHEMO IV INFUSION 1 HR: CPT

## 2024-03-29 RX ORDER — HEPARIN SODIUM (PORCINE) LOCK FLUSH IV SOLN 100 UNIT/ML 100 UNIT/ML
5 SOLUTION INTRAVENOUS
Status: DISCONTINUED | OUTPATIENT
Start: 2024-03-29 | End: 2024-03-29 | Stop reason: HOSPADM

## 2024-03-29 RX ORDER — DOXORUBICIN HYDROCHLORIDE 2 MG/ML
60 INJECTION, SOLUTION INTRAVENOUS ONCE
Status: COMPLETED | OUTPATIENT
Start: 2024-03-29 | End: 2024-03-29

## 2024-03-29 RX ORDER — HEPARIN SODIUM (PORCINE) LOCK FLUSH IV SOLN 100 UNIT/ML 100 UNIT/ML
500 SOLUTION INTRAVENOUS ONCE
Status: COMPLETED | OUTPATIENT
Start: 2024-03-29 | End: 2024-03-29

## 2024-03-29 RX ORDER — LORAZEPAM 0.5 MG/1
0.5 TABLET ORAL EVERY 4 HOURS PRN
Status: DISCONTINUED | OUTPATIENT
Start: 2024-03-29 | End: 2024-03-29 | Stop reason: HOSPADM

## 2024-03-29 RX ORDER — LORAZEPAM 2 MG/ML
0.5 INJECTION INTRAMUSCULAR EVERY 4 HOURS PRN
Status: DISCONTINUED | OUTPATIENT
Start: 2024-03-29 | End: 2024-03-29

## 2024-03-29 RX ADMIN — DEXAMETHASONE SODIUM PHOSPHATE: 10 INJECTION, SOLUTION INTRAMUSCULAR; INTRAVENOUS at 07:54

## 2024-03-29 RX ADMIN — CYCLOPHOSPHAMIDE 1500 MG: 1 INJECTION, POWDER, FOR SOLUTION INTRAVENOUS; ORAL at 08:39

## 2024-03-29 RX ADMIN — Medication 500 UNITS: at 06:37

## 2024-03-29 RX ADMIN — SODIUM CHLORIDE 250 ML: 9 INJECTION, SOLUTION INTRAVENOUS at 07:54

## 2024-03-29 RX ADMIN — PEGFILGRASTIM 6 MG: KIT SUBCUTANEOUS at 09:13

## 2024-03-29 RX ADMIN — LORAZEPAM 0.5 MG: 0.5 TABLET ORAL at 07:49

## 2024-03-29 RX ADMIN — DOXORUBICIN HYDROCHLORIDE 150 MG: 2 INJECTION, SOLUTION INTRAVENOUS at 08:26

## 2024-03-29 RX ADMIN — Medication 5 ML: at 09:23

## 2024-03-29 ASSESSMENT — PAIN SCALES - GENERAL: PAINLEVEL: EXTREME PAIN (8)

## 2024-03-29 NOTE — PROGRESS NOTES
Infusion Nursing Note:  Eliezer Izquierdo presents today for Cycle 2 Day 1 doxorubicin, cyclophosphamide; neulasta onpro application.    Patient seen by provider today: No, visit with Dr. Nolan yesterday   present during visit today: Not Applicable.    Note: Eliezer presents today feeling overall well. Endorses ongoing breast pain, but has not yet taken her pain medications this morning. Declines intervention for pain at this visit. Denies nausea/vomiting. Denies fevers/chills. Offers no changes or concerns since visit with Dr. Nolan yesterday. Requested ativan for anxiety, has not taken any at home today; administered per treatment plan orders.    Neulasta Onpro On-Body injector applied to R upper abdomen at 0915.  Writer discussed Neulasta injection would start tomorrow 03/30 at 1215, approximately 27 hours after application today.    Written and Verbal instruction reviewed with patient.  Pt instructed when the dose delivery starts, it will take about 45 minutes to complete.  Pt aware Neulasta Onpro On-Body should have green flashing light and to call triage or on-call MD if injector flashes red or appears to be leaking.   Pt aware to keep Onpro On-Body Neulasta 4 inches away from electrical equipment and to avoid showering 4 hours prior to injection.   Neulasta Onpro Lot number: [id number]      Intravenous Access:  Implanted Port.    Treatment Conditions:     Latest Reference Range & Units 03/29/24 06:35   WBC 4.0 - 11.0 10e3/uL 6.2   Hemoglobin 11.7 - 15.7 g/dL 12.6   Hematocrit 35.0 - 47.0 % 37.3   Platelet Count 150 - 450 10e3/uL 293   RBC Count 3.80 - 5.20 10e6/uL 4.05   MCV 78 - 100 fL 92   MCH 26.5 - 33.0 pg 31.1   MCHC 31.5 - 36.5 g/dL 33.8   RDW 10.0 - 15.0 % 13.9   % Neutrophils % 66   % Lymphocytes % 22   % Monocytes % 10   % Eosinophils % 0   % Basophils % 1   Absolute Basophils 0.0 - 0.2 10e3/uL 0.1   Absolute Eosinophils 0.0 - 0.7 10e3/uL 0.0   Absolute Immature Granulocytes <=0.4 10e3/uL  0.1   Absolute Lymphocytes 0.8 - 5.3 10e3/uL 1.4   Absolute Monocytes 0.0 - 1.3 10e3/uL 0.6   % Immature Granulocytes % 1   Absolute Neutrophils 1.6 - 8.3 10e3/uL 4.1   Absolute NRBCs 10e3/uL 0.0   NRBCs per 100 WBC <1 /100 0     Results reviewed, labs MET treatment parameters, ok to proceed with treatment.  ECHO/MUGA completed 03/11/24  EF 60-65%.      Post Infusion Assessment:  Patient tolerated infusion without incident.  Blood return noted pre and post infusion.  Site patent and intact, free from redness, edema or discomfort.  No evidence of extravasations.  Access discontinued per protocol.       Discharge Plan:   Patient declined prescription refills.  Discharge instructions reviewed with: Patient.  Patient and/or family verbalized understanding of discharge instructions and all questions answered.  AVS to patient via simfyT.  Patient will return 04/12 for next infusion appointment.   Patient discharged in stable condition accompanied by: father.  Departure Mode: Ambulatory.      Melissa Mcpherson RN

## 2024-03-29 NOTE — PATIENT INSTRUCTIONS
Neulasta injection will start on 03/30 at 12:15 PM, approximately 27 hours after application today. When the dose delivery starts, it will take about 45 minutes to complete. You may remove the On-Body Neulasta on 03/30 at 1 PM.  Neulasta Onpro On-Body should have green flashing light.  Call triage or on-call MD if injector flashes red or appears to be leaking.  Keep Onpro On-Body Neulasta 4 inches away from electrical equipment and to avoid showering 4 hours prior to injection.    [facility] Triage and after hours / weekends / holidays:  [phone]    Please call the triage or after hours line if you experience a temperature greater than or equal to 100.4, shaking chills, have uncontrolled nausea, vomiting and/or diarrhea, dizziness, shortness of breath, chest pain, bleeding, unexplained bruising, or if you have any other new/concerning symptoms, questions, or concerns.      If you are having any concerning symptoms or wish to speak to a provider before your next infusion visit, please call your care coordinator or triage to notify them so we can adequately serve you.     If you need a refill on a narcotic prescription or other medication, please call before your infusion appointment.

## 2024-03-29 NOTE — NURSING NOTE
"Chief Complaint   Patient presents with    Port Draw     Labs drawn via port by RN in lab.  VS taken       Port accessed with 20 gauge 3/4\" Power needle by RN, labs collected, line flushed with saline and heparin.  Vitals taken. Pt checked in for appointment(s).    Anjelica Sawyer RN    "

## 2024-04-05 DIAGNOSIS — Z80.49 FAMILY HISTORY OF UTERINE CANCER: ICD-10-CM

## 2024-04-05 DIAGNOSIS — C50.412 MALIGNANT NEOPLASM OF UPPER-OUTER QUADRANT OF LEFT BREAST IN FEMALE, ESTROGEN RECEPTOR POSITIVE (H): Primary | ICD-10-CM

## 2024-04-05 DIAGNOSIS — Z80.3 FAMILY HISTORY OF MALIGNANT NEOPLASM OF BREAST: ICD-10-CM

## 2024-04-05 DIAGNOSIS — Z17.0 MALIGNANT NEOPLASM OF UPPER-OUTER QUADRANT OF LEFT BREAST IN FEMALE, ESTROGEN RECEPTOR POSITIVE (H): Primary | ICD-10-CM

## 2024-04-05 DIAGNOSIS — Z85.820 PERSONAL HISTORY OF MALIGNANT MELANOMA: ICD-10-CM

## 2024-04-07 ENCOUNTER — HEALTH MAINTENANCE LETTER (OUTPATIENT)
Age: 39
End: 2024-04-07

## 2024-04-08 ENCOUNTER — PATIENT OUTREACH (OUTPATIENT)
Dept: ONCOLOGY | Facility: CLINIC | Age: 39
End: 2024-04-08
Payer: COMMERCIAL

## 2024-04-08 NOTE — PROGRESS NOTES
[facility]: Cancer Care - RN CC Symptom Call          Situation                                                    Patient called and reported N/V and diarrhea since chemotherapy on 3/29. Patient called 9-1-1 on 4/3 and was transported to nearest medical facility. Patient was given IVF and antemetic via port. Patient went to her PCP on 4/5 and was dx with thrush, was given medication. Patient requested reduction of dexamethasone at her last chemo appt and hasn't taking any post chemo. Patient stated she feels this was a mistake. Patient has been taking Imodium as directed. Patient reported a weight loss of 22# in 5 days. Currently patient has vomited daily in the morning and diarrhea is under control. Patient feels she is hydrated well and refused to come in for IVF and anti emetics. Patient wanted to cancel appt with mental health provider tomorrow. RN will send message                                    Assessment                                                      Presenting Problem: Diarrhea  Cancer Diagnosis: breast cancer  Cancer treatment & last dose:   Infusion given in last 28 days       Administered MAR Action Medication Dose Rate Visit    03/15/2024 11:32 Given DOXOrubicin (ADRIAMYCIN) injection 150 mg 150 mg   Infusion Therapy Visit on 03/15/2024 in [facility]    03/15/2024 11:44 New Bag cycloPHOSphamide (CYTOXAN) 1,500 mg in sodium chloride 0.9 % 625 mL infusion 1,500 mg 1,250 mL/hr Infusion Therapy Visit on 03/15/2024 in [facility]    03/29/2024 08:26 Given DOXOrubicin (ADRIAMYCIN) injection 150 mg 150 mg   Infusion Therapy Visit on 03/29/2024 in [facility]    03/29/2024 08:39 New Bag cycloPHOSphamide (CYTOXAN) 1,500 mg in sodium chloride 0.9 % 625 mL infusion 1,500 mg 900 mL/hr Infusion Therapy Visit on 03/29/2024 in [facility]            Subjective/Objective: see above  note    Onset: waxing and waning  Duration: since chemo on 3/29 days    Description:       Consistency of stool: loose       Blood in stool: No       Number of loose stools in the past 24 hours: 1 x daily in between doses of Immodium      Progression of Symptoms: same    Accompanying signs and symptoms:        Fever: No        Nausea/Vomiting: YES - see below assessment       Weight loss: YES - reports 22# in 5 days       Episodes of constipation: No        Dehydration concerns (i.e., dizziness, lightheadedness, decreased urination): No       Other: N/A    History:        Ill contacts: No       Recent use of antibiotics: No       History of C. Diff: No       Recent travels: No       Recent medication changes or new (RX or OTC): No       Allergies verified: Yes       Recent infusions: Yes: A/C       Oral chemotherapy: No    Precipitating or alleviating factors: chemotherapy     Therapies tried and outcome: Imodium AD                          Intervention                                                      Homecare instructions:      -   May use loperamide (Imodium). Follow package instructions. If treatment with immunotherapy or targeted therapies, consult provider first.      -   Avoid fried, fatty, greasy, and spicy foods.     -   Avoid high fiber foods (raw fruits and vegetables, skins, seeds, legumes).     -   Avoid caffeine, alcohol, dairy, sucrose, and sorbitol products     -   Apply skin barrier (e.g., zinc oxide) to protect the rectum from irritation.    Plan                                                    Patient has appt with SHERYL on Wednesday (virtual).       Patient verbalizes understanding of and agrees with plan? YES    Signature:  Ana Cotto RN      Protocol used: Cancer Care Nursing Triage - Diarrhea                            [facility]: Cancer Care - RN CC Symptom Call        Situation                                                               See above                                                  Assessment                                                      Presenting Problem: Nausea & Vomiting  Cancer Diagnosis: breast   Cancer treatment & last dose:   Infusion given in last 28 days       Administered MAR Action Medication Dose Rate Visit    03/15/2024 11:32 Given DOXOrubicin (ADRIAMYCIN) injection 150 mg 150 mg   Infusion Therapy Visit on 03/15/2024 in [facility]    03/15/2024 11:44 New Bag cycloPHOSphamide (CYTOXAN) 1,500 mg in sodium chloride 0.9 % 625 mL infusion 1,500 mg 1,250 mL/hr Infusion Therapy Visit on 03/15/2024 in [facility]    03/29/2024 08:26 Given DOXOrubicin (ADRIAMYCIN) injection 150 mg 150 mg   Infusion Therapy Visit on 03/29/2024 in [facility]    03/29/2024 08:39 New Bag cycloPHOSphamide (CYTOXAN) 1,500 mg in sodium chloride 0.9 % 625 mL infusion 1,500 mg 900 mL/hr Infusion Therapy Visit on 03/29/2024 in [facility]            Subjective/Objective: see above    Onset: waxing and waning  Duration: 10 days    Description: severe     Progression of Symptoms: improving    If vomiting, description: normal gastric contents    Accompanying signs and symptoms:        Abdominal Pain: No        Abdominal distention: No       Diarrhea:YES -  3 to 4 stools more than normal       Inability to keep down fluids: No       Fever: No        Passing flatus: Yes       Headache: No        Close contact with similar illness: No       Suspicious food or drink: No       Other neuro symptoms: No       Other: Patient not taking post chemo dex as prescribed     History:        History of abdominal surgery: No       Anxiety: YES - ativan PRN       Allergies verified: Yes       Recent infusions: Yes: AC       Oral chemotherapy: No    Precipitating or alleviating factors:        Recent use of narcotics or iron supplements: YES - oxycodone        Anti-emetic use?: Yes: phenergan,  didn't take her dex as prescribed        Any other new medication?: No        Other: N/A     Therapies tried and outcome:                              Aromatherapy?: No       Relaxation techniques?: No       Diet changes?: No       Other: N/A    Intervention                                                      Homecare instructions:      -   Try a clear liquid diet if nausea is accompanied by vomiting     -   Continue or start taking antiemetics as prescribed     -   Take antiemetics before meals for maximum benefit     -   Avoid spicy, fatty, and salty foods     -   Eat small, frequent meals    Plan                                                         -   Documented and routed to MD/DO, SHERYL: Dr. Nolan - discuss plan     Patient verbalizes understanding of and agrees with plan? YES    Ana NEWBYN, RN, OCN  Care Coordinator  [facility]       Protocol used: Cancer Care Nursing Triage - Nausea & Vomiting

## 2024-04-09 ENCOUNTER — VIRTUAL VISIT (OUTPATIENT)
Dept: PALLIATIVE CARE | Facility: CLINIC | Age: 39
End: 2024-04-09
Attending: STUDENT IN AN ORGANIZED HEALTH CARE EDUCATION/TRAINING PROGRAM
Payer: COMMERCIAL

## 2024-04-09 VITALS
BODY MASS INDEX: 42.38 KG/M2 | HEIGHT: 67 IN | SYSTOLIC BLOOD PRESSURE: 131 MMHG | WEIGHT: 270 LBS | DIASTOLIC BLOOD PRESSURE: 64 MMHG

## 2024-04-09 DIAGNOSIS — R11.2 CHEMOTHERAPY INDUCED NAUSEA AND VOMITING: ICD-10-CM

## 2024-04-09 DIAGNOSIS — G89.3 CANCER RELATED PAIN: ICD-10-CM

## 2024-04-09 DIAGNOSIS — Z71.89 ADVANCED CARE PLANNING/COUNSELING DISCUSSION: ICD-10-CM

## 2024-04-09 DIAGNOSIS — C50.412 MALIGNANT NEOPLASM OF UPPER-OUTER QUADRANT OF LEFT BREAST IN FEMALE, ESTROGEN RECEPTOR POSITIVE (H): Primary | ICD-10-CM

## 2024-04-09 DIAGNOSIS — N64.4 BREAST PAIN: ICD-10-CM

## 2024-04-09 DIAGNOSIS — Z51.5 ENCOUNTER FOR PALLIATIVE CARE: ICD-10-CM

## 2024-04-09 DIAGNOSIS — F41.9 ANXIETY: ICD-10-CM

## 2024-04-09 DIAGNOSIS — Z17.0 MALIGNANT NEOPLASM OF UPPER-OUTER QUADRANT OF LEFT BREAST IN FEMALE, ESTROGEN RECEPTOR POSITIVE (H): Primary | ICD-10-CM

## 2024-04-09 DIAGNOSIS — T45.1X5A CHEMOTHERAPY INDUCED NAUSEA AND VOMITING: ICD-10-CM

## 2024-04-09 PROCEDURE — 99205 OFFICE O/P NEW HI 60 MIN: CPT | Mod: 95 | Performed by: STUDENT IN AN ORGANIZED HEALTH CARE EDUCATION/TRAINING PROGRAM

## 2024-04-09 PROCEDURE — 99417 PROLNG OP E/M EACH 15 MIN: CPT | Mod: 95 | Performed by: STUDENT IN AN ORGANIZED HEALTH CARE EDUCATION/TRAINING PROGRAM

## 2024-04-09 RX ORDER — TRAMADOL HYDROCHLORIDE 50 MG/1
50 TABLET ORAL EVERY 6 HOURS PRN
Qty: 90 TABLET | Refills: 0 | Status: SHIPPED | OUTPATIENT
Start: 2024-04-09 | End: 2024-04-09

## 2024-04-09 RX ORDER — OLANZAPINE 2.5 MG/1
2.5-5 TABLET, FILM COATED ORAL 3 TIMES DAILY PRN
Qty: 150 TABLET | Refills: 2 | Status: SHIPPED | OUTPATIENT
Start: 2024-04-09

## 2024-04-09 RX ORDER — BUPRENORPHINE 10 UG/H
1 PATCH TRANSDERMAL
Qty: 4 PATCH | Refills: 0 | Status: SHIPPED | OUTPATIENT
Start: 2024-04-09 | End: 2024-04-11 | Stop reason: SINTOL

## 2024-04-09 RX ORDER — TRAMADOL HYDROCHLORIDE 50 MG/1
50 TABLET ORAL EVERY 6 HOURS PRN
Qty: 10 TABLET | Refills: 0 | Status: SHIPPED | OUTPATIENT
Start: 2024-04-09 | End: 2024-04-09

## 2024-04-09 RX ORDER — OLANZAPINE 2.5 MG/1
2.5-5 TABLET, FILM COATED ORAL 3 TIMES DAILY PRN
Qty: 150 TABLET | Refills: 2 | Status: SHIPPED | OUTPATIENT
Start: 2024-04-09 | End: 2024-04-09

## 2024-04-09 RX ORDER — BUPRENORPHINE 10 UG/H
1 PATCH TRANSDERMAL
Qty: 4 PATCH | Refills: 0 | Status: SHIPPED | OUTPATIENT
Start: 2024-04-09 | End: 2024-04-09

## 2024-04-09 RX ORDER — TRAMADOL HYDROCHLORIDE 50 MG/1
50 TABLET ORAL EVERY 6 HOURS PRN
Qty: 90 TABLET | Refills: 0 | Status: SHIPPED | OUTPATIENT
Start: 2024-04-09 | End: 2024-04-16

## 2024-04-09 ASSESSMENT — PAIN SCALES - GENERAL: PAINLEVEL: SEVERE PAIN (6)

## 2024-04-09 NOTE — PROGRESS NOTES
Palliative Care Clinic Consult Note    Patient Name: Eliezer Izquierdo  Primary Provider: Madison Roach    Chief Complaint/Patient ID: Eliezer Izquierdo is a 39 year old female with PMHx of T2 N1 invasive breast cancer on the left side.  Initiated neoadjuvant ddAC 3/15/24. Planning mastectomy in the future, however decision on type of surgery has not been made yet.  -Per chart review, some history of chronic pain (knees, migraines).  Mention about being on opiates in the past but weaned herself off.     Reviewed: Yes    Recent opiate prescriptions:  4/1  percocet 7.5 #60  3/27 norco 5 #36   3/27 percocet 5 #20   3/25 oxy 5 #12   3/20 percocet 5 #112   3/13 percocet 5 #112   2/28 percocet 5 #112    Additionally, Rx for #50 tablets 1mg lorazepam 3/28 and #56 tablets of 0.5mg lorazepam on 4/5 (patient reported higher dose made her more anxious).    History of Present Illness:  Eliezer Izquierdo is a 39 year old female who is seen for a new consult via Video visit today with Palliative Care.     Reviewed Surgical consult note from 2/15/24.  Discussed segmental mastectomy and assessment for candidacy for neoadjuvant chemo.     Reviewed family medicine note from 3/27.  Concern about pain management and opioid use, so recommended switching her pain management to oncology.    Reviewed oncology note from 3/28.  Concerned that pain is out of proportion to exam.  Provided refill for pain medication, however referred to palliative care for ongoing pain management.  Concerns were expressed about opiate use.    Reviewed oncology triage/symptom phone call from 4/8.  Patient had N/V and diarrhea since last chemotherapy round and was taken by EMS to ED on 4/3 where she received IVF and antiemetics.  Subsequently diagnosed with thrush by her PCP on 4/5 and given treatment.  Had been advised to follow-up with mental health clinician appointments on 4/9, however she requested to cancel these.    Pain Hx:    Reports that she  "had been on pain medications for many years.  Originally was thought to have endometriosis, however after having surgery, was found to have more of an IBS picture.  She is also dealt with chronic pain in both of her knees, as she needs knee replacements.  She gets injections in her knees regularly.  Additionally deals with daily migraines.  Currently working on [employer] process for the treatment for the migraines.     More recently has been having increased pain in her left breast.  When asked to describe it she states \"it pulls, aches, tears, stabs.  Right now it is about a 6/10\".  She states that she is taking the oxycodone, however she does not feel that it really does anything for the pain.  It might knock it down about 1 point on the scale.  She says this makes her feel like \"I am taking a pill to just take a pill\".  Describes a similar response to hydrocodone.  She says in the past, she has had a much better response to tramadol actually.  She has gotten this after her knee injections and states that when she takes the tramadol, her pain is significantly improved.  Wondering if this could be an option instead of the oxycodone.    Of note, she has been on extended release tramadol in the past and did not feel that that was as beneficial and caused her to have stomach pain.    She is on Lyrica 200 mg twice daily, and this was started for her migraines.  She does not feel that this changes any of her other pains substantially and has wondered if it is worth it to keep taking this as well.    For the rest pain, ice does help sometimes, however heat seems to make it feel worse.  She has not tried anything topical as she has had some allergies.    Appetite/Nausea:   Nausea has been significantly worse with chemotherapy.  She had to go to the emergency room a couple of times so far.  She actually got a banana bag yesterday and says she felt so much better this morning.  Wondering if these could be done with each of " her infusions.    No longer taking Zyprexa 2.5 mg at bedtime, as it looks like from chart documentation it was not doing anything for her at this dose.  She does get some benefit from Compazine.     Mood:   Describes increased anxiety since her diagnosis.  States she had previously dealt with occasional anxiety in the past, and she did not really seek treatment for it.  Had always been able to manage symptoms with breathing exercises and taking short walks/walking around.  Since going through her diagnosis and starting treatment, she is having anxiety symptoms more than half the days in a week, and feels she was having a panic attack during our conversation.  She got out and walked some laps around the car to try to help.  She has Ativan, however she feels that this actually worsens her anxiety while helping her nausea.  She is wondering about anything else that she could possibly try to help with anxiety symptoms.     Coping:  Struggling with more anxiety since her diagnosis, however states that she has really good support.  Her father is actively involved and helps her with appointments and such, and her fiancé is also very supportive.    She has been working on decreasing her tobacco use.  She previously was smoking 2 packs of cigarettes per day and is now down to 4 cigarettes/day.     Social History:  Lives with clifton.  Has a son named Homero.  Has lived in [place], [place], and [place].  Enjoys reading.  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  Social History     Tobacco Use    Smoking status: Former     Packs/day: 0     Types: Cigarettes    Smokeless tobacco: Never   Substance Use Topics    Alcohol use: No    Drug use: No     Advanced Care Planning: Has not completed an HCD.  States her dad would be her HCA.    Family History- Reviewed in Epic.     Medications- Reviewed in Epic.    Past Medical History- Reviewed in  [facility].    Past Surgical History- Reviewed in Epic.    Physical Exam:   Constitutional: Alert, pleasant, no apparent distress. Sitting up in chair.  Eyes: Sclera non-icteric, no eye discharge.  ENT: No nasal discharge. Ears grossly normal.  Respiratory: Unlabored respirations. Speaking in full sentences.  Musculoskeletal: Extremities appear normal- no gross deformities noted. No edema noted on upper body.   Skin: No suspicious lesions or rashes on visible skin.  Neurologic: Clear speech, no aphasia. No facial droop.  Psychiatric: Mentation appears normal, appropriate attention. Affect normal/bright. Does not appear anxious or depressed.    Key Data Reviewed:  LABS: 3/15/2024- Cr 0.43, GFR >90, Albumin 4.1, LFTs WNL.  3/29/2024- WBC 6.2, Hgb 12.6, Plts 293.     IMAGING: Left breast ultrasound 3/28/2024- Findings:     Targeted left breast ultrasound evaluation was performed by the  technologist and radiologist. No significant change in size of the  biopsied cancer at 2:00 position, 10 cm from the nipple, now measuring 2.3 x 2.5 x 1.4 cm (previously 2.5 cm on 2/9/2024). In the subareolar region, there is only normal breast tissue.                                                                  IMPRESSION: BI-RADS CATEGORY: 6 - Known Biopsy-Proven Malignancy.    Impression & Recommendations & Counseling:  Eliezer Izquierdo is a 39 year old female with history of left-sided breast cancer.     Introduced the role of palliative care as an interdisciplinary team that cares for patients with serious illness to help support symptom management, communication, coping for patients and their families as well as support with medical decision making.    Pain - Hx chronic pain related to joint disease in the knees as well as migraines, however she has now been having worsening breast pain related to the cancer.  Note that she has received a number of opiate prescriptions over the last month, and we discussed this.  She honestly  does not feel that hydrocodone or oxycodone formulations help her pain at all, and she had been taking more to try to get some sort of a result.  In the past, she has used tramadol with better results on pain control, and when she gets the prescription, it does sometimes last for longer than prescribed.  She was open to making some adjustments to her pain regimen today.  -We will trial starting Butrans patch at 10 mcg Q7 days.  Hopefully she does okay with the adhesive.  Discussed we are starting on a relatively low-dose, however there would be quite a bit of room to increase this if needed.  -If she does not tolerate the adhesive, we could try Belbuca.  -Will stop oxycodone/Percocet and send a new prescription for tramadol 50 mg Q6H PRN for breakthrough pain.  -Advised to dispose of any leftover opioid prescriptions at the nearest controlled substance dropbox.  -Continue to follow with Ortho and her migraine specialist for other chronic pain.  -Plan to continue Lyrica for now.  -Separate any opiates from the Ativan by at least 2 hours.  -She has an Rx for Narcan.    Anxiety - Has been more pronounced since going through her diagnosis and treatment.  She does have Ativan, however she does not feel that this helps anxiety.  -Trial of increasing Zyprexa to 2.5 to 5 mg up to 3 times daily as needed.  Recommended planning to schedule the dose at bedtime every night, and then she could use the other 2 doses as needed for severe anxiety symptoms.  -Asked her to let me know how this goes, as there are other options for anxiety as well.    Nausea - Ongoing struggle with chemo.  -Resuming Zyprexa at a higher dose as above.  -Okay to use Compazine, and Ativan.  Ensure to separate Ativan from opiates by at least 2 hours.  -She plans to talk to oncology tomorrow about possibly getting a banana bag with her infusions.    ACP - Has not completed an HCD.  Says her dad would be her HCA.  -Provided [place] honoring [place] information in  her after visit summary.      Follow up: About a month      Video-Visit Details  Video Start Time: 2:22PM  Video End Time: 2:57PM    Originating Location (pt. Location): Home     Distant Location (provider location):  Offsite- Personal Home      Platform used for Video Visit: Steve     Total time spent on day of encounter is 95 mins, including reviewing record, review of above studies, above visit with patient, symptomatic discussion as above, including medication adjustments/prescription management, and documentation.     Kylie Rodriguez,   Palliative Medicine   [id number]    Some chart documentation performed using Dragon Voice recognition Software. Although reviewed after completion, some words and grammatical errors may remain.

## 2024-04-09 NOTE — LETTER
4/9/2024       RE: Eliezer Izquierdo  [address] [facility] [address]       Dear Colleague,    Thank you for referring your patient, [facility]quierdo[facility]. Please see a copy of my visit note below.    Palliative Care Clinic Consult Note    Patient Name: Eliezer Izquierdo  Primary Provider: Madison Roach    Chief Complaint/Patient ID: Eliezer Izquierdo is a 39 year old female with PMHx of T2 N1 invasive breast cancer on the left side.  Initiated neoadjuvant ddAC 3/15/24. Planning mastectomy in the future, however decision on type of surgery has not been made yet.  -Per chart review, some history of chronic pain (knees, migraines).  Mention about being on opiates in the past but weaned herself off.     Reviewed: Yes    Recent opiate prescriptions:  4/1  percocet 7.5 #60  3/27 norco 5 #36   3/27 percocet 5 #20   3/25 oxy 5 #12   3/20 percocet 5 #112   3/13 percocet 5 #112   2/28 percocet 5 #112    Additionally, Rx for #50 tablets 1mg lorazepam 3/28 and #56 tablets of 0.5mg lorazepam on 4/5 (patient reported higher dose made her more anxious).    History of Present Illness:  Eliezer Izquierdo is a 39 year old female who is seen for a new consult via Video visit today with Palliative Care.     Reviewed Surgical consult note from 2/15/24.  Discussed segmental mastectomy and assessment for candidacy for neoadjuvant chemo.     Reviewed family medicine note from 3/27.  Concern about pain management and opioid use, so recommended switching her pain management to oncology.    Reviewed oncology note from 3/28.  Concerned that pain is out of proportion to exam.  Provided refill for pain medication, however referred to palliative care for ongoing pain management.  Concerns were expressed about opiate use.    Reviewed oncology triage/symptom phone call from 4/8.  Patient had N/V and diarrhea since last  "chemotherapy round and was taken by EMS to ED on 4/3 where she received IVF and antiemetics.  Subsequently diagnosed with thrush by her PCP on 4/5 and given treatment.  Had been advised to follow-up with mental health clinician appointments on 4/9, however she requested to cancel these.    Pain Hx:    Reports that she had been on pain medications for many years.  Originally was thought to have endometriosis, however after having surgery, was found to have more of an IBS picture.  She is also dealt with chronic pain in both of her knees, as she needs knee replacements.  She gets injections in her knees regularly.  Additionally deals with daily migraines.  Currently working on appeals process for the treatment for the migraines.     More recently has been having increased pain in her left breast.  When asked to describe it she states \"it pulls, aches, tears, stabs.  Right now it is about a 6/10\".  She states that she is taking the oxycodone, however she does not feel that it really does anything for the pain.  It might knock it down about 1 point on the scale.  She says this makes her feel like \"I am taking a pill to just take a pill\".  Describes a similar response to hydrocodone.  She says in the past, she has had a much better response to tramadol actually.  She has gotten this after her knee injections and states that when she takes the tramadol, her pain is significantly improved.  Wondering if this could be an option instead of the oxycodone.    Of note, she has been on extended release tramadol in the past and did not feel that that was as beneficial and caused her to have stomach pain.    She is on Lyrica 200 mg twice daily, and this was started for her migraines.  She does not feel that this changes any of her other pains substantially and has wondered if it is worth it to keep taking this as well.    For the rest pain, ice does help sometimes, however heat seems to make it feel worse.  She has not tried " anything topical as she has had some allergies.    Appetite/Nausea:   Nausea has been significantly worse with chemotherapy.  She had to go to the emergency room a couple of times so far.  She actually got a banana bag yesterday and says she felt so much better this morning.  Wondering if these could be done with each of her infusions.    No longer taking Zyprexa 2.5 mg at bedtime, as it looks like from chart documentation it was not doing anything for her at this dose.  She does get some benefit from Compazine.     Mood:   Describes increased anxiety since her diagnosis.  States she had previously dealt with occasional anxiety in the past, and she did not really seek treatment for it.  Had always been able to manage symptoms with breathing exercises and taking short walks/walking around.  Since going through her diagnosis and starting treatment, she is having anxiety symptoms more than half the days in a week, and feels she was having a panic attack during our conversation.  She got out and walked some laps around the car to try to help.  She has Ativan, however she feels that this actually worsens her anxiety while helping her nausea.  She is wondering about anything else that she could possibly try to help with anxiety symptoms.     Coping:  Struggling with more anxiety since her diagnosis, however states that she has really good support.  Her father is actively involved and helps her with appointments and such, and her fiancé is also very supportive.    She has been working on decreasing her tobacco use.  She previously was smoking 2 packs of cigarettes per day and is now down to 4 cigarettes/day.     Social History:  Lives with [place]ifton.  Has a son named Homero.  Has lived in [place], [place], and [place].  Enjoys reading.  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  Social History     Tobacco Use    Smoking status: Former      Packs/day: 0     Types: Cigarettes    Smokeless tobacco: Never   Substance Use Topics    Alcohol use: No    Drug use: No     Advanced Care Planning: Has not completed an HCD.  States her dad would be her HCA.    Family History- Reviewed in Epic.     Medications- Reviewed in Epic.    Past Medical History- Reviewed in [facility].    Past Surgical History- Reviewed in Epic.    Physical Exam:   Constitutional: Alert, pleasant, no apparent distress. Sitting up in chair.  Eyes: Sclera non-icteric, no eye discharge.  ENT: No nasal discharge. Ears grossly normal.  Respiratory: Unlabored respirations. Speaking in full sentences.  Musculoskeletal: Extremities appear normal- no gross deformities noted. No edema noted on upper body.   Skin: No suspicious lesions or rashes on visible skin.  Neurologic: Clear speech, no aphasia. No facial droop.  Psychiatric: Mentation appears normal, appropriate attention. Affect normal/bright. Does not appear anxious or depressed.    Key Data Reviewed:  LABS: 3/15/2024- Cr 0.43, GFR >90, Albumin 4.1, LFTs WNL.  3/29/2024- WBC 6.2, Hgb 12.6, Plts 293.     IMAGING: Left breast ultrasound 3/28/2024- Findings:     Targeted left breast ultrasound evaluation was performed by the  technologist and radiologist. No significant change in size of the  biopsied cancer at 2:00 position, 10 cm from the nipple, now measuring 2.3 x 2.5 x 1.4 cm (previously 2.5 cm on 2/9/2024). In the subareolar region, there is only normal breast tissue.                                                                  IMPRESSION: BI-RADS CATEGORY: 6 - Known Biopsy-Proven Malignancy.    Impression & Recommendations & Counseling:  Eliezer Izquierdo is a 39 year old female with history of left-sided breast cancer.     Introduced the role of palliative care as an interdisciplinary team that cares for patients with serious illness to help support symptom management, communication, coping for patients and their families as well as  support with medical decision making.    Pain - Hx chronic pain related to joint disease in the knees as well as migraines, however she has now been having worsening breast pain related to the cancer.  Note that she has received a number of opiate prescriptions over the last month, and we discussed this.  She honestly does not feel that hydrocodone or oxycodone formulations help her pain at all, and she had been taking more to try to get some sort of a result.  In the past, she has used tramadol with better results on pain control, and when she gets the prescription, it does sometimes last for longer than prescribed.  She was open to making some adjustments to her pain regimen today.  -We will trial starting Butrans patch at 10 mcg Q7 days.  Hopefully she does okay with the adhesive.  Discussed we are starting on a relatively low-dose, however there would be quite a bit of room to increase this if needed.  -If she does not tolerate the adhesive, we could try Belbuca.  -Will stop oxycodone/Percocet and send a new prescription for tramadol 50 mg Q6H PRN for breakthrough pain.  -Advised to dispose of any leftover opioid prescriptions at the nearest controlled substance dropbox.  -Continue to follow with Ortho and her migraine specialist for other chronic pain.  -Plan to continue Lyrica for now.  -Separate any opiates from the Ativan by at least 2 hours.  -She has an Rx for Narcan.    Anxiety - Has been more pronounced since going through her diagnosis and treatment.  She does have Ativan, however she does not feel that this helps anxiety.  -Trial of increasing Zyprexa to 2.5 to 5 mg up to 3 times daily as needed.  Recommended planning to schedule the dose at bedtime every night, and then she could use the other 2 doses as needed for severe anxiety symptoms.  -Asked her to let me know how this goes, as there are other options for anxiety as well.    Nausea - Ongoing struggle with chemo.  -Resuming Zyprexa at a higher  dose as above.  -Okay to use Compazine, and Ativan.  Ensure to separate Ativan from opiates by at least 2 hours.  -She plans to talk to oncology tomorrow about possibly getting a banana bag with her infusions.    ACP - Has not completed an HCD.  Says her dad would be her HCA.  -Provided [place] hon[place] information in her after visit summary.      Follow up: About a month      Total time spent on day of encounter is 95 mins, including reviewing record, review of above studies, above visit with patient, symptomatic discussion as above, including medication adjustments/prescription management, and documentation.       Some chart documentation performed using Dragon Voice recognition Software. Although reviewed after completion, some words and grammatical errors may remain.      Again, thank you for allowing me to participate in the care of your patient.      Sincerely,    Kylie Rodriguez, DO

## 2024-04-09 NOTE — NURSING NOTE
Is the patient currently in the state of [place]? YES    Visit mode:VIDEO    If the visit is dropped, the patient can be reconnected by: VIDEO VISIT: Text to cell phone:   Telephone Information:   Mobile [phone]       Will anyone else be joining the visit? NO  (If patient encounters technical issues they should call [phone]    How would you like to obtain your AVS? MyChart    Are changes needed to the allergy or medication list? Pt stated no changes to allergies and Pt stated no med changes    Are refills needed on medications prescribed by this physician?     Reason for visit: Consult    Kaylynn VILLAGOMEZ

## 2024-04-09 NOTE — PATIENT INSTRUCTIONS
"Recommendations:  -Described the different types of medications, including long and short acting opioid versions, in the context of how we try to manage pain.  I talked about how I mainly have experience treating pain related to cancer and that my background really does not include managing chronic noncancer related pain.  However, if some of the changes may make to try to help manage cancer pain help your other pain, that is wonderful.  -Plan to start the Butrans patch (buprenorphine) for longer acting pain control.  This patch is placed anywhere on the body, left in place for 7 days, and then changed out with a new patch.  I am purposely starting you on a little bit lower dose, the equivalent of 4 tablets of oxycodone given slow release in a 24-hour period.  There is then room to increase it if needed.  Please also let me know if you have an issue with the adhesive, as there are other options we can do for long-acting pain control.  -Okay with switching your short acting opioid to the tramadol.  You can take 1 tablet up to every 6 hours as needed for severe breakthrough pain.    -Please talk to us before making any adjustments to your medications, including increasing doses.  This might be something that we could do, however it needs to be done safely and in conversation with our team.  -If you do use the Ativan, please separate it from the tramadol by at least 2 hours for safety reasons.  -Recommend resuming the olanzapine (Zyprexa) but at a higher dose, as this can help with both anxiety and nausea.  Recommend 2 tablets at bedtime, and then he can take an additional 1 to 2 tablets up to twice daily as needed for breakthrough symptoms.  -We talked about starting to look at completing a healthcare directive.  I recommend you check out- [email or web address]     They have several tools and some advice about getting started.  Under the \"How do I document my " "choices\" section, I am a fan of the full length healthcare directive (for adults with serious illness) because I think it has a good combination of the medical questions that are important as well as the social and personal questions that allow healthcare providers to get to know you as a person if you are unable to speak for yourself.  You do not have to complete every question on the document.  I generally recommend patients start with 3 or 4 questions on the goals page and work from there.    There is also a Short Form, which primarily serves to designate health care agents. These are people who can speak on your behalf about your healthcare wishes/goals if you are not able to speak for yourself.      Follow up: We will plan on about a month, but please reach out sooner with any questions or concerns beforehand.      Reasons to Call    If you are having worsening/uncontrolled symptoms we want you to call!    You or your other physicians make any changes to medications we have prescribed.  -Please call for refills 4-5 days before you will run out of medication.    Important Phone Numbers, including: Refills, scheduling, and general questions     Palliative Care RN: Cecy Ghotra - [phone]  *After hours or on weekends. Will connect you with on call MD. [phone]    "

## 2024-04-10 ENCOUNTER — VIRTUAL VISIT (OUTPATIENT)
Dept: ONCOLOGY | Facility: CLINIC | Age: 39
End: 2024-04-10
Attending: PHYSICIAN ASSISTANT
Payer: COMMERCIAL

## 2024-04-10 VITALS — HEIGHT: 68 IN | WEIGHT: 270 LBS | BODY MASS INDEX: 40.92 KG/M2

## 2024-04-10 DIAGNOSIS — R19.7 DIARRHEA, UNSPECIFIED TYPE: ICD-10-CM

## 2024-04-10 DIAGNOSIS — C50.412 MALIGNANT NEOPLASM OF UPPER-OUTER QUADRANT OF LEFT BREAST IN FEMALE, ESTROGEN RECEPTOR POSITIVE (H): Primary | ICD-10-CM

## 2024-04-10 DIAGNOSIS — R11.2 CHEMOTHERAPY INDUCED NAUSEA AND VOMITING: ICD-10-CM

## 2024-04-10 DIAGNOSIS — Z17.0 MALIGNANT NEOPLASM OF UPPER-OUTER QUADRANT OF LEFT BREAST IN FEMALE, ESTROGEN RECEPTOR POSITIVE (H): Primary | ICD-10-CM

## 2024-04-10 DIAGNOSIS — T45.1X5A CHEMOTHERAPY INDUCED NAUSEA AND VOMITING: ICD-10-CM

## 2024-04-10 PROCEDURE — 99215 OFFICE O/P EST HI 40 MIN: CPT | Mod: 95 | Performed by: PHYSICIAN ASSISTANT

## 2024-04-10 ASSESSMENT — PAIN SCALES - GENERAL: PAINLEVEL: MODERATE PAIN (4)

## 2024-04-10 NOTE — LETTER
April 10, 2024       TO: Eliezer Izquierdo  [address] [facility] [address]       DearMs.Timbo,    We are writing to inform you of your test results.    {[facility] results letter list:[id number]}    No results found from the In Basket message.    ***

## 2024-04-10 NOTE — NURSING NOTE
Is the patient currently in the state of [place]? YES at [facility] in [place], [place]    Visit mode:VIDEO    If the visit is dropped, the patient can be reconnected by: VIDEO VISIT: Text to cell phone:   Telephone Information:   Mobile [phone]       How would you like to obtain your AVS? MyChart    Are changes needed to the allergy or medication list? Pt stated no changes to allergies and Pt stated no med changes      Reason for visit: WILI Devine LPN

## 2024-04-10 NOTE — PROGRESS NOTES
Virtual Visit Details    Type of service:  Video Visit   Video Start Time: 8:00 AM  Video End Time:8:19 AM    Originating Location (pt. Location): Home    Distant Location (provider location):  On-site  Platform used for Video Visit: [facility] Medical Oncology Note       Date of visit: Apr 10, 2024        Assessment:     Clinical T2N1 low grade invasive breast cancer in this 39 year old woman.  As discussed previously with Dr. Ford and with Dr Nolan, the patient is appropriate for neoadjuvant treatment.  She has a large breast cancer with nodes that are positive.  Neoadjuvant therapy was recommended.  MammaPrint was high risk, however Eliezer was not a canditate for ISPY due to elevated hemoglobin A1C.  She discussed SOC with DR Nolan with ddAC-->t.  She has completed two cycles now of AC.    Eliezer has an extensive history of migraines with associated nausea/vomiting.  She has trialed may anti emetics and is very sensitive to N/V.  Her C2, was complicated by multiple ED visit for IVF and anti emetics.  The Zyprexa works well, DR ARTIS instructed her to take 2.5-5 mg 2-3 x daily prn nausea and anxiety.  She uses the compazine prn as well.  We discussed coming in for Day 4 and Day 7 IVF and anti emetics to try and keep her out of the ED. She was appreciative of that.  She did not take the oral Dex (standard take home) on days 2-4 last cycle due to hyperglycemia.  This time she will consider taking a 2 mg dose as it does help with nausea but she needs to manage her sugars.    She has alva's esophagus and already takes PPI, no breakthrough GERD.  She has IBS-diarrhea type and has frequent loose stools. She had an exacerbation last week, with 5-6 watery stools daily, which also likely lead to dehydration. IVF scheduled will help.   Her diabetes will be harder to manage with the Dex, discussed this.    Elevated LFTs which are probably related to her prior diagnosis of fatty liver and  hepatosteatosis.  CT showed hepatic steatosis.   Genetic testing was negative   After neoadjuvant chemo, plan will be adjuvant OFS or bilateral oophorectomy with endocrine therapy.       Plan:     Pending labs, C3 of AC on Friday, with neulasta support  Anti emetics schedule changes: Day 4 and Day 7 IVF + Dex 6 mg + emend, PO 2mg dex on days 2-4 if needed (and if sugars can take it)  Provider/AC every 2 weeks x 4 schedule requested     Martha Kellogg PA-C    __________________________________________________________________    CHIEF COMPLAINT     New clinical T2N1 invasive breast cancer, here for pre chemo assessment       History of Present Illness/INTERVAL HISTORY:     Eliezer Izquierdo is a 40 yo previously healthy woman who palpated a mass in the left breast a few months ago. Mammogram and US showed a 2.5 x 2.5 x 2.0 cm spiculated mass at 2:00 position.  Biopsy of the mass was done on 2/9/2024.    Final path showed a [place] grade I IDC, 97% strongly positive for ER, 99% strongly positive for MO, and negative for HER2 by FISH (IHC 2+). KI-67 is 24%.    The patient then saw Albina Ford 2/15/2024. She ordered a left axillary US that showed three abnormal axillary nodes.  Biopsy + for ER+ breast cancer    She did not qualify for ISPY due to elevated HgbA1c.     She started ddAC, with plans for weekly T afterwards.  This week is C3 ddAC.  Cycle 2 was very hard with CINV and diarrhea.  The worst days were 4-8.  She did not take PO dex this cycle due to hyperglycemia.  One day out of desparation she took a 4 mg pill, which did help.  The zyprexa works well for her, she discussed with Dr ARTIS yesterday taking this 2-3 x daily for nausea/anxiety.   They also switched her to tramadol and a butrens patch, but she hasn't obtained the patch yet.      Has IBS, diarrhea type, her stools alternate all the time. Diarrhea the worse was x 6 episodes.  Felt very dehydrated last week.  Had a banana bag yesterday which helped.      Mouth feels sore, but no sores.  Lost about 35 pounds since 8 weeks.  Some was due to to stress prior to the chemo but most has been just decreased appetite during chemo.     She has Barretts, takes Nexium BID.  No flare.  She has chronic migraines.     She is trying to quit smoking.  Taking chantix.  Down to ~ 4 /day .        Past Medical History:   Melanoma of right foot???     Past Medical History:   Diagnosis Date    Hypercholesteraemia     Irritable bowel     Migraines           Past Surgical History:    I have reviewed this patient's past surgical history         Social History:   There was evidence in the  that she has been obtaining large amounts of opioids from various providers.  See Dr Nolan's note for details.  Oncology is not participating in distribution of opioids.    Patient now met with Dr ARTIS in [facility].       Family History:     Family History   Problem Relation Age of Onset    Genitourinary Problems Mother         renal disease - minimal change, sponge kidney            Medications:     Current Outpatient Medications   Medication Sig Dispense Refill    albuterol (PROAIR HFA/PROVENTIL HFA/VENTOLIN HFA) 108 (90 Base) MCG/ACT inhaler Inhale 2 puffs into the lungs      amLODIPine (NORVASC) 10 MG tablet Take 10 mg by mouth      atorvastatin (LIPITOR) 10 MG tablet Take 10 mg by mouth      Blood Glucose Monitoring Suppl (ONETOUCH VERIO FLEX SYSTEM) w/Device KIT       buprenorphine (BUTRANS) 10 MCG/HR WK patch Place 1 patch onto the skin every 7 days 4 patch 0    cetirizine (ZYRTEC) 10 MG tablet Take 5 mg by mouth      dexAMETHasone (DECADRON) 4 MG tablet Take 2 tablets (8 mg) by mouth daily for 12 doses Start on Day 2 of Cycles 1 through 4. 18 tablet 3    EPINEPHrine (ANY BX GENERIC EQUIV) 0.3 MG/0.3ML injection 2-pack       esomeprazole (NEXIUM) 20 MG DR capsule Take 20 mg by mouth daily before breakfast      fluticasone-salmeterol (ADVAIR HFA) 115-21 MCG/ACT inhaler Inhale 2 puffs into the  "lungs      FREESTYLE TEST STRIPS test strip 1 each      hydrochlorothiazide (HYDRODIURIL) 25 MG tablet Take 25 mg by mouth      ibuprofen (ADVIL/MOTRIN) 200 MG tablet Take 200 mg by mouth      JANUVIA 50 MG tablet Take 50 mg by mouth      LORazepam (ATIVAN) 0.5 MG tablet Take 2 tablets (1 mg) by mouth every 6 hours as needed for nausea or anxiety 30 tablet 1    LORazepam (ATIVAN) 1 MG tablet Take 1 tablet (1 mg) by mouth every 6 hours as needed for anxiety, nausea or sleep 50 tablet 1    methocarbamol (ROBAXIN) 500 MG tablet Take 1,000 mg by mouth      naloxone (NARCAN) 4 MG/0.1ML nasal spray Spray 4 mg into one nostril alternating nostrils as needed for opioid reversal every 2-3 minutes until assistance arrives      OLANZapine (ZYPREXA) 2.5 MG tablet Take 1-2 tablets (2.5-5 mg) by mouth 3 times daily as needed (Nausea or anxiety) 150 tablet 2    Pregabalin (LYRICA) 200 MG capsule Take 200 mg by mouth      prochlorperazine (COMPAZINE) 10 MG tablet Take 1 tablet (10 mg) by mouth every 6 hours as needed for nausea or vomiting 30 tablet 2    Prochlorperazine Maleate (COMPAZINE PO) Take 10 mg by mouth 3 times daily as needed for nausea      promethazine (PHENERGAN) 25 MG suppository Place 1 suppository (25 mg) rectally every 6 hours as needed for nausea 60 suppository 3    PROMETHAZINE HCL RE Place 25 mg rectally 3 times daily as needed for nausea      ramelteon (ROZEREM) 8 MG tablet Take 8 mg by mouth      traMADol (ULTRAM) 50 MG tablet Take 1 tablet (50 mg) by mouth every 6 hours as needed for severe pain 90 tablet 0    VICTOZA PEN 18 MG/3ML soln Inject 1.8 mg Subcutaneous                Physical Exam:   Height 1.721 m (5' 7.75\"), weight 122.5 kg (270 lb).    Video physical exam  General: Patient appears well in no acute distress.   Skin: No visualized rash or lesions on visualized skin  Eyes: EOMI, no erythema, sclera icterus or discharge noted  Resp: Appears to be breathing comfortably without accessory muscle " usage, speaking in full sentences, no cough  MSK: Appears to have normal range of motion based on visualized movements  Neurologic: No apparent tremors, facial movements symmetric  Psych: affect bright, alert and oriented    Data:   Most Recent 3 CBC's:  Recent Labs   Lab Test 03/29/24  0635 03/15/24  0836   WBC 6.2 9.9   HGB 12.6 14.6   MCV 92 91    335   ANEUTAUTO 4.1 6.6     Most Recent 3 BMP's:  Recent Labs   Lab Test 03/15/24  0836 03/01/24  0817     --    POTASSIUM 3.3*  --    CHLORIDE 98  --    CO2 25  --    BUN 6.4  --    CR 0.43* 0.3*   ANIONGAP 12  --    REMI 9.4  --    *  --    PROTTOTAL 7.2  --    ALBUMIN 4.1  --     Most Recent 3 LFT's:  Recent Labs   Lab Test 03/15/24  0836   AST 32   ALT 28   ALKPHOS 62   BILITOTAL 0.4    Most Recent 2 TSH and T4:No lab results found.   I reviewed the above labs today.     No results found for this or any previous visit (from the past 24 hour(s)).      50 minutes spent on the date of the encounter doing chart review, review of outside records, review of test results, interpretation of tests, patient visit, documentation, discussion with other provider(s), and discussion with family

## 2024-04-10 NOTE — LETTER
4/10/2024         RE: Eliezer Izquierdo  [id number] [facility] [address]        Dear Colleague,    Thank you for referring your patient, Eliezer Izquierdo, to the [facility]. Please see a copy of my visit note below.    Virtual Visit Details    Type of service:  Video Visit   Video Start Time: 8:00 AM  Video End Time:8:19 AM    Originating Location (pt. Location): Home    Distant Location (provider location):  On-site  Platform used for Video Visit: [facility] Medical Oncology Note       Date of visit: Apr 10, 2024        Assessment:     Clinical T2N1 low grade invasive breast cancer in this 39 year old woman.  As discussed previously with Dr. Ford and with Dr Nolan, the patient is appropriate for neoadjuvant treatment.  She has a large breast cancer with nodes that are positive.  Neoadjuvant therapy was recommended.  MammaPrint was high risk, however Eliezer was not a canditate for ISPY due to elevated hemoglobin A1C.  She discussed SOC with DR Nolan with ddAC-->t.  She has completed two cycles now of AC.    Eliezer has an extensive history of migraines with associated nausea/vomiting.  She has trialed may anti emetics and is very sensitive to N/V.  Her C2, was complicated by multiple ED visit for IVF and anti emetics.  The Zyprexa works well, DR ARTIS instructed her to take 2.5-5 mg 2-3 x daily prn nausea and anxiety.  She uses the compazine prn as well.  We discussed coming in for Day 4 and Day 7 IVF and anti emetics to try and keep her out of the ED. She was appreciative of that.  She did not take the oral Dex (standard take home) on days 2-4 last cycle due to hyperglycemia.  This time she will consider taking a 2 mg dose as it does help with nausea but she needs to manage her sugars.    She has alva's esophagus and already takes PPI, no breakthrough GERD.  She has IBS-diarrhea type and has frequent loose stools. She had an exacerbation last  week, with 5-6 watery stools daily, which also likely lead to dehydration. IVF scheduled will help.   Her diabetes will be harder to manage with the Dex, discussed this.    Elevated LFTs which are probably related to her prior diagnosis of fatty liver and hepatosteatosis.  CT showed hepatic steatosis.   Genetic testing was negative   After neoadjuvant chemo, plan will be adjuvant OFS or bilateral oophorectomy with endocrine therapy.       Plan:     Pending labs, C3 of AC on Friday, with neulasta support  Anti emetics schedule changes: Day 4 and Day 7 IVF + Dex 6 mg + emend, PO 2mg dex on days 2-4 if needed (and if sugars can take it)  Provider/AC every 2 weeks x 4 schedule requested     Martha Kellogg PA-C    __________________________________________________________________    CHIEF COMPLAINT     New clinical T2N1 invasive breast cancer, here for pre chemo assessment       History of Present Illness/INTERVAL HISTORY:     Eliezer Izquierdo is a 38 yo previously healthy woman who palpated a mass in the left breast a few months ago. Mammogram and US showed a 2.5 x 2.5 x 2.0 cm spiculated mass at 2:00 position.  Biopsy of the mass was done on 2/9/2024.    Final path showed a [place] grade I IDC, 97% strongly positive for ER, 99% strongly positive for CT, and negative for HER2 by FISH (IHC 2+). KI-67 is 24%.    The patient then saw Albina Ford 2/15/2024. She ordered a left axillary US that showed three abnormal axillary nodes.  Biopsy + for ER+ breast cancer    She did not qualify for ISPY due to elevated HgbA1c.     She started ddAC, with plans for weekly T afterwards.  This week is C3 ddAC.  Cycle 2 was very hard with CINV and diarrhea.  The worst days were 4-8.  She did not take PO dex this cycle due to hyperglycemia.  One day out of desparation she took a 4 mg pill, which did help.  The zyprexa works well for her, she discussed with Dr ARTIS yesterday taking this 2-3 x daily for nausea/anxiety.   They also switched  her to tramadol and a butrens patch, but she hasn't obtained the patch yet.      Has IBS, diarrhea type, her stools alternate all the time. Diarrhea the worse was x 6 episodes.  Felt very dehydrated last week.  Had a banana bag yesterday which helped.     Mouth feels sore, but no sores.  Lost about 35 pounds since 8 weeks.  Some was due to to stress prior to the chemo but most has been just decreased appetite during chemo.     She has Barretts, takes Nexium BID.  No flare.  She has chronic migraines.     She is trying to quit smoking.  Taking chantix.  Down to ~ 4 /day .        Past Medical History:   Melanoma of right foot???     Past Medical History:   Diagnosis Date    Hypercholesteraemia     Irritable bowel     Migraines           Past Surgical History:    I have reviewed this patient's past surgical history         Social History:   There was evidence in the  that she has been obtaining large amounts of opioids from various providers.  See Dr Nolan's note for details.  Oncology is not participating in distribution of opioids.    Patient now met with Dr ARTIS in [facility].       Family History:     Family History   Problem Relation Age of Onset    Genitourinary Problems Mother         renal disease - minimal change, sponge kidney            Medications:     Current Outpatient Medications   Medication Sig Dispense Refill    albuterol (PROAIR HFA/PROVENTIL HFA/VENTOLIN HFA) 108 (90 Base) MCG/ACT inhaler Inhale 2 puffs into the lungs      amLODIPine (NORVASC) 10 MG tablet Take 10 mg by mouth      atorvastatin (LIPITOR) 10 MG tablet Take 10 mg by mouth      Blood Glucose Monitoring Suppl (ONETOUCH VERIO FLEX SYSTEM) w/Device KIT       buprenorphine (BUTRANS) 10 MCG/HR WK patch Place 1 patch onto the skin every 7 days 4 patch 0    cetirizine (ZYRTEC) 10 MG tablet Take 5 mg by mouth      dexAMETHasone (DECADRON) 4 MG tablet Take 2 tablets (8 mg) by mouth daily for 12 doses Start on Day 2 of Cycles 1 through 4. 18  "tablet 3    EPINEPHrine (ANY BX GENERIC EQUIV) 0.3 MG/0.3ML injection 2-pack       esomeprazole (NEXIUM) 20 MG DR capsule Take 20 mg by mouth daily before breakfast      fluticasone-salmeterol (ADVAIR HFA) 115-21 MCG/ACT inhaler Inhale 2 puffs into the lungs      FREESTYLE TEST STRIPS test strip 1 each      hydrochlorothiazide (HYDRODIURIL) 25 MG tablet Take 25 mg by mouth      ibuprofen (ADVIL/MOTRIN) 200 MG tablet Take 200 mg by mouth      JANUVIA 50 MG tablet Take 50 mg by mouth      LORazepam (ATIVAN) 0.5 MG tablet Take 2 tablets (1 mg) by mouth every 6 hours as needed for nausea or anxiety 30 tablet 1    LORazepam (ATIVAN) 1 MG tablet Take 1 tablet (1 mg) by mouth every 6 hours as needed for anxiety, nausea or sleep 50 tablet 1    methocarbamol (ROBAXIN) 500 MG tablet Take 1,000 mg by mouth      naloxone (NARCAN) 4 MG/0.1ML nasal spray Spray 4 mg into one nostril alternating nostrils as needed for opioid reversal every 2-3 minutes until assistance arrives      OLANZapine (ZYPREXA) 2.5 MG tablet Take 1-2 tablets (2.5-5 mg) by mouth 3 times daily as needed (Nausea or anxiety) 150 tablet 2    Pregabalin (LYRICA) 200 MG capsule Take 200 mg by mouth      prochlorperazine (COMPAZINE) 10 MG tablet Take 1 tablet (10 mg) by mouth every 6 hours as needed for nausea or vomiting 30 tablet 2    Prochlorperazine Maleate (COMPAZINE PO) Take 10 mg by mouth 3 times daily as needed for nausea      promethazine (PHENERGAN) 25 MG suppository Place 1 suppository (25 mg) rectally every 6 hours as needed for nausea 60 suppository 3    PROMETHAZINE HCL RE Place 25 mg rectally 3 times daily as needed for nausea      ramelteon (ROZEREM) 8 MG tablet Take 8 mg by mouth      traMADol (ULTRAM) 50 MG tablet Take 1 tablet (50 mg) by mouth every 6 hours as needed for severe pain 90 tablet 0    VICTOZA PEN 18 MG/3ML soln Inject 1.8 mg Subcutaneous                Physical Exam:   Height 1.721 m (5' 7.75\"), weight 122.5 kg (270 lb).    Video " physical exam  General: Patient appears well in no acute distress.   Skin: No visualized rash or lesions on visualized skin  Eyes: EOMI, no erythema, sclera icterus or discharge noted  Resp: Appears to be breathing comfortably without accessory muscle usage, speaking in full sentences, no cough  MSK: Appears to have normal range of motion based on visualized movements  Neurologic: No apparent tremors, facial movements symmetric  Psych: affect bright, alert and oriented    Data:   Most Recent 3 CBC's:  Recent Labs   Lab Test 03/29/24  0635 03/15/24  0836   WBC 6.2 9.9   HGB 12.6 14.6   MCV 92 91    335   ANEUTAUTO 4.1 6.6     Most Recent 3 BMP's:  Recent Labs   Lab Test 03/15/24  0836 03/01/24  0817     --    POTASSIUM 3.3*  --    CHLORIDE 98  --    CO2 25  --    BUN 6.4  --    CR 0.43* 0.3*   ANIONGAP 12  --    REMI 9.4  --    *  --    PROTTOTAL 7.2  --    ALBUMIN 4.1  --     Most Recent 3 LFT's:  Recent Labs   Lab Test 03/15/24  0836   AST 32   ALT 28   ALKPHOS 62   BILITOTAL 0.4    Most Recent 2 TSH and T4:No lab results found.   I reviewed the above labs today.     No results found for this or any previous visit (from the past 24 hour(s)).      50 minutes spent on the date of the encounter doing chart review, review of outside records, review of test results, interpretation of tests, patient visit, documentation, discussion with other provider(s), and discussion with family         Erica Kellogg PA-C

## 2024-04-11 ENCOUNTER — TELEPHONE (OUTPATIENT)
Dept: PALLIATIVE CARE | Facility: CLINIC | Age: 39
End: 2024-04-11
Payer: COMMERCIAL

## 2024-04-11 DIAGNOSIS — Z17.0 MALIGNANT NEOPLASM OF UPPER-OUTER QUADRANT OF LEFT BREAST IN FEMALE, ESTROGEN RECEPTOR POSITIVE (H): Primary | ICD-10-CM

## 2024-04-11 DIAGNOSIS — C50.412 MALIGNANT NEOPLASM OF UPPER-OUTER QUADRANT OF LEFT BREAST IN FEMALE, ESTROGEN RECEPTOR POSITIVE (H): Primary | ICD-10-CM

## 2024-04-11 DIAGNOSIS — G89.3 CANCER RELATED PAIN: ICD-10-CM

## 2024-04-11 DIAGNOSIS — N64.4 BREAST PAIN: ICD-10-CM

## 2024-04-11 RX ORDER — DIPHENHYDRAMINE HYDROCHLORIDE 50 MG/ML
50 INJECTION INTRAMUSCULAR; INTRAVENOUS
Status: CANCELLED
Start: 2024-04-12

## 2024-04-11 RX ORDER — MEPERIDINE HYDROCHLORIDE 25 MG/ML
25 INJECTION INTRAMUSCULAR; INTRAVENOUS; SUBCUTANEOUS EVERY 30 MIN PRN
Status: CANCELLED | OUTPATIENT
Start: 2024-04-12

## 2024-04-11 RX ORDER — HEPARIN SODIUM (PORCINE) LOCK FLUSH IV SOLN 100 UNIT/ML 100 UNIT/ML
5 SOLUTION INTRAVENOUS
Status: CANCELLED | OUTPATIENT
Start: 2024-04-12

## 2024-04-11 RX ORDER — LORAZEPAM 2 MG/ML
0.5 INJECTION INTRAMUSCULAR EVERY 4 HOURS PRN
Status: CANCELLED | OUTPATIENT
Start: 2024-04-12

## 2024-04-11 RX ORDER — BUPRENORPHINE AND NALOXONE 4; 1 MG/1; MG/1
FILM, SOLUBLE BUCCAL; SUBLINGUAL
Qty: 28 FILM | Refills: 0 | Status: SHIPPED | OUTPATIENT
Start: 2024-04-11 | End: 2024-04-16

## 2024-04-11 RX ORDER — HEPARIN SODIUM,PORCINE 10 UNIT/ML
5-20 VIAL (ML) INTRAVENOUS DAILY PRN
Status: CANCELLED | OUTPATIENT
Start: 2024-04-12

## 2024-04-11 RX ORDER — ALBUTEROL SULFATE 0.83 MG/ML
2.5 SOLUTION RESPIRATORY (INHALATION)
Status: CANCELLED | OUTPATIENT
Start: 2024-04-12

## 2024-04-11 RX ORDER — METHYLPREDNISOLONE SODIUM SUCCINATE 125 MG/2ML
125 INJECTION, POWDER, LYOPHILIZED, FOR SOLUTION INTRAMUSCULAR; INTRAVENOUS
Status: CANCELLED
Start: 2024-04-12

## 2024-04-11 RX ORDER — DOXORUBICIN HYDROCHLORIDE 2 MG/ML
60 INJECTION, SOLUTION INTRAVENOUS ONCE
Status: CANCELLED | OUTPATIENT
Start: 2024-04-12

## 2024-04-11 RX ORDER — ALBUTEROL SULFATE 90 UG/1
1-2 AEROSOL, METERED RESPIRATORY (INHALATION)
Status: CANCELLED
Start: 2024-04-12

## 2024-04-11 RX ORDER — EPINEPHRINE 1 MG/ML
0.3 INJECTION, SOLUTION INTRAMUSCULAR; SUBCUTANEOUS EVERY 5 MIN PRN
Status: CANCELLED | OUTPATIENT
Start: 2024-04-12

## 2024-04-11 NOTE — TELEPHONE ENCOUNTER
Pt reporting a skin rash after applying the first Butrans patch. Will discontinue and change to suboxone films.    KEYONNA KulkarniN, RN  Palliative Care Nurse Clinician    [phone] (Direct)  [phone] (Main)  [phone] (Appointment Scheduling)

## 2024-04-12 ENCOUNTER — INFUSION THERAPY VISIT (OUTPATIENT)
Dept: ONCOLOGY | Facility: CLINIC | Age: 39
End: 2024-04-12
Attending: PHYSICIAN ASSISTANT
Payer: COMMERCIAL

## 2024-04-12 ENCOUNTER — APPOINTMENT (OUTPATIENT)
Dept: LAB | Facility: CLINIC | Age: 39
End: 2024-04-12
Attending: PHYSICIAN ASSISTANT
Payer: COMMERCIAL

## 2024-04-12 ENCOUNTER — MYC REFILL (OUTPATIENT)
Dept: ONCOLOGY | Facility: CLINIC | Age: 39
End: 2024-04-12

## 2024-04-12 VITALS
HEART RATE: 101 BPM | SYSTOLIC BLOOD PRESSURE: 159 MMHG | RESPIRATION RATE: 16 BRPM | DIASTOLIC BLOOD PRESSURE: 87 MMHG | OXYGEN SATURATION: 95 % | WEIGHT: 278.7 LBS | BODY MASS INDEX: 42.69 KG/M2 | TEMPERATURE: 98.9 F

## 2024-04-12 DIAGNOSIS — C50.412 MALIGNANT NEOPLASM OF UPPER-OUTER QUADRANT OF LEFT BREAST IN FEMALE, ESTROGEN RECEPTOR POSITIVE (H): Primary | ICD-10-CM

## 2024-04-12 DIAGNOSIS — R11.2 CHEMOTHERAPY INDUCED NAUSEA AND VOMITING: ICD-10-CM

## 2024-04-12 DIAGNOSIS — Z17.0 MALIGNANT NEOPLASM OF UPPER-OUTER QUADRANT OF LEFT BREAST IN FEMALE, ESTROGEN RECEPTOR POSITIVE (H): ICD-10-CM

## 2024-04-12 DIAGNOSIS — Z17.0 MALIGNANT NEOPLASM OF UPPER-OUTER QUADRANT OF LEFT BREAST IN FEMALE, ESTROGEN RECEPTOR POSITIVE (H): Primary | ICD-10-CM

## 2024-04-12 DIAGNOSIS — C50.412 MALIGNANT NEOPLASM OF UPPER-OUTER QUADRANT OF LEFT BREAST IN FEMALE, ESTROGEN RECEPTOR POSITIVE (H): ICD-10-CM

## 2024-04-12 DIAGNOSIS — T45.1X5A CHEMOTHERAPY INDUCED NAUSEA AND VOMITING: ICD-10-CM

## 2024-04-12 LAB
ALBUMIN SERPL BCG-MCNC: 4 G/DL (ref 3.5–5.2)
ALP SERPL-CCNC: 66 U/L (ref 40–150)
ALT SERPL W P-5'-P-CCNC: 20 U/L (ref 0–50)
ANION GAP SERPL CALCULATED.3IONS-SCNC: 13 MMOL/L (ref 7–15)
AST SERPL W P-5'-P-CCNC: 21 U/L (ref 0–45)
BASOPHILS # BLD AUTO: NORMAL 10*3/UL
BASOPHILS # BLD MANUAL: 0.1 10E3/UL (ref 0–0.2)
BASOPHILS NFR BLD AUTO: NORMAL %
BASOPHILS NFR BLD MANUAL: 1 %
BILIRUB SERPL-MCNC: 0.2 MG/DL
BUN SERPL-MCNC: 7 MG/DL (ref 6–20)
CALCIUM SERPL-MCNC: 9.3 MG/DL (ref 8.6–10)
CHLORIDE SERPL-SCNC: 102 MMOL/L (ref 98–107)
CREAT SERPL-MCNC: 0.44 MG/DL (ref 0.51–0.95)
DEPRECATED HCO3 PLAS-SCNC: 23 MMOL/L (ref 22–29)
EGFRCR SERPLBLD CKD-EPI 2021: >90 ML/MIN/1.73M2
EOSINOPHIL # BLD AUTO: NORMAL 10*3/UL
EOSINOPHIL # BLD MANUAL: 0 10E3/UL (ref 0–0.7)
EOSINOPHIL NFR BLD AUTO: NORMAL %
EOSINOPHIL NFR BLD MANUAL: 0 %
ERYTHROCYTE [DISTWIDTH] IN BLOOD BY AUTOMATED COUNT: 14.6 % (ref 10–15)
GLUCOSE SERPL-MCNC: 206 MG/DL (ref 70–99)
HCT VFR BLD AUTO: 37.1 % (ref 35–47)
HGB BLD-MCNC: 12.8 G/DL (ref 11.7–15.7)
IMM GRANULOCYTES # BLD: NORMAL 10*3/UL
IMM GRANULOCYTES NFR BLD: NORMAL %
LYMPHOCYTES # BLD AUTO: NORMAL 10*3/UL
LYMPHOCYTES # BLD MANUAL: 2.2 10E3/UL (ref 0.8–5.3)
LYMPHOCYTES NFR BLD AUTO: NORMAL %
LYMPHOCYTES NFR BLD MANUAL: 21 %
MAGNESIUM SERPL-MCNC: 1.8 MG/DL (ref 1.7–2.3)
MCH RBC QN AUTO: 31.2 PG (ref 26.5–33)
MCHC RBC AUTO-ENTMCNC: 34.5 G/DL (ref 31.5–36.5)
MCV RBC AUTO: 91 FL (ref 78–100)
MONOCYTES # BLD AUTO: NORMAL 10*3/UL
MONOCYTES # BLD MANUAL: 1.1 10E3/UL (ref 0–1.3)
MONOCYTES NFR BLD AUTO: NORMAL %
MONOCYTES NFR BLD MANUAL: 10 %
NEUTROPHILS # BLD AUTO: NORMAL 10*3/UL
NEUTROPHILS # BLD MANUAL: 7.2 10E3/UL (ref 1.6–8.3)
NEUTROPHILS NFR BLD AUTO: NORMAL %
NEUTROPHILS NFR BLD MANUAL: 68 %
NRBC # BLD AUTO: 0 10E3/UL
NRBC BLD AUTO-RTO: 0 /100
PHOSPHATE SERPL-MCNC: 4.7 MG/DL (ref 2.5–4.5)
PLAT MORPH BLD: ABNORMAL
PLATELET # BLD AUTO: 275 10E3/UL (ref 150–450)
POLYCHROMASIA BLD QL SMEAR: SLIGHT
POTASSIUM SERPL-SCNC: 3.5 MMOL/L (ref 3.4–5.3)
PROT SERPL-MCNC: 7 G/DL (ref 6.4–8.3)
RBC # BLD AUTO: 4.1 10E6/UL (ref 3.8–5.2)
RBC MORPH BLD: ABNORMAL
SODIUM SERPL-SCNC: 138 MMOL/L (ref 135–145)
WBC # BLD AUTO: 10.6 10E3/UL (ref 4–11)

## 2024-04-12 PROCEDURE — 96375 TX/PRO/DX INJ NEW DRUG ADDON: CPT

## 2024-04-12 PROCEDURE — 82374 ASSAY BLOOD CARBON DIOXIDE: CPT | Performed by: PHYSICIAN ASSISTANT

## 2024-04-12 PROCEDURE — 83735 ASSAY OF MAGNESIUM: CPT | Performed by: PHYSICIAN ASSISTANT

## 2024-04-12 PROCEDURE — 96411 CHEMO IV PUSH ADDL DRUG: CPT

## 2024-04-12 PROCEDURE — 258N000003 HC RX IP 258 OP 636: Performed by: PHYSICIAN ASSISTANT

## 2024-04-12 PROCEDURE — 85027 COMPLETE CBC AUTOMATED: CPT | Performed by: PHYSICIAN ASSISTANT

## 2024-04-12 PROCEDURE — 250N000011 HC RX IP 250 OP 636: Performed by: PHYSICIAN ASSISTANT

## 2024-04-12 PROCEDURE — 96372 THER/PROPH/DIAG INJ SC/IM: CPT | Performed by: INTERNAL MEDICINE

## 2024-04-12 PROCEDURE — 84100 ASSAY OF PHOSPHORUS: CPT | Performed by: PHYSICIAN ASSISTANT

## 2024-04-12 PROCEDURE — 85007 BL SMEAR W/DIFF WBC COUNT: CPT | Performed by: PHYSICIAN ASSISTANT

## 2024-04-12 PROCEDURE — 96377 APPLICATON ON-BODY INJECTOR: CPT | Mod: 59

## 2024-04-12 PROCEDURE — 250N000011 HC RX IP 250 OP 636: Performed by: INTERNAL MEDICINE

## 2024-04-12 PROCEDURE — 96367 TX/PROPH/DG ADDL SEQ IV INF: CPT

## 2024-04-12 PROCEDURE — 258N000003 HC RX IP 258 OP 636: Performed by: INTERNAL MEDICINE

## 2024-04-12 PROCEDURE — 96413 CHEMO IV INFUSION 1 HR: CPT

## 2024-04-12 PROCEDURE — 36591 DRAW BLOOD OFF VENOUS DEVICE: CPT | Performed by: PHYSICIAN ASSISTANT

## 2024-04-12 RX ORDER — DEXAMETHASONE 4 MG/1
8 TABLET ORAL DAILY
Qty: 18 TABLET | Refills: 3 | Status: SHIPPED | OUTPATIENT
Start: 2024-04-12 | End: 2024-05-10

## 2024-04-12 RX ORDER — METHYLPREDNISOLONE SODIUM SUCCINATE 125 MG/2ML
125 INJECTION, POWDER, LYOPHILIZED, FOR SOLUTION INTRAMUSCULAR; INTRAVENOUS
Status: CANCELLED
Start: 2024-04-12

## 2024-04-12 RX ORDER — EPINEPHRINE 1 MG/ML
0.3 INJECTION, SOLUTION, CONCENTRATE INTRAVENOUS EVERY 5 MIN PRN
Status: CANCELLED | OUTPATIENT
Start: 2024-04-12

## 2024-04-12 RX ORDER — DIPHENHYDRAMINE HYDROCHLORIDE 50 MG/ML
50 INJECTION INTRAMUSCULAR; INTRAVENOUS
Status: CANCELLED
Start: 2024-04-12

## 2024-04-12 RX ORDER — ALBUTEROL SULFATE 0.83 MG/ML
2.5 SOLUTION RESPIRATORY (INHALATION)
Status: CANCELLED | OUTPATIENT
Start: 2024-04-12

## 2024-04-12 RX ORDER — ALBUTEROL SULFATE 90 UG/1
1-2 AEROSOL, METERED RESPIRATORY (INHALATION)
Status: CANCELLED
Start: 2024-04-12

## 2024-04-12 RX ORDER — HEPARIN SODIUM,PORCINE 10 UNIT/ML
5-20 VIAL (ML) INTRAVENOUS DAILY PRN
Status: CANCELLED | OUTPATIENT
Start: 2024-04-12

## 2024-04-12 RX ORDER — HEPARIN SODIUM (PORCINE) LOCK FLUSH IV SOLN 100 UNIT/ML 100 UNIT/ML
5 SOLUTION INTRAVENOUS ONCE
Status: COMPLETED | OUTPATIENT
Start: 2024-04-12 | End: 2024-04-12

## 2024-04-12 RX ORDER — LORAZEPAM 2 MG/ML
0.5 INJECTION INTRAMUSCULAR EVERY 4 HOURS PRN
Status: DISCONTINUED | OUTPATIENT
Start: 2024-04-12 | End: 2024-04-12 | Stop reason: HOSPADM

## 2024-04-12 RX ORDER — MEPERIDINE HYDROCHLORIDE 25 MG/ML
25 INJECTION INTRAMUSCULAR; INTRAVENOUS; SUBCUTANEOUS EVERY 30 MIN PRN
Status: CANCELLED | OUTPATIENT
Start: 2024-04-12

## 2024-04-12 RX ORDER — DOXORUBICIN HYDROCHLORIDE 2 MG/ML
60 INJECTION, SOLUTION INTRAVENOUS ONCE
Status: COMPLETED | OUTPATIENT
Start: 2024-04-12 | End: 2024-04-12

## 2024-04-12 RX ORDER — HEPARIN SODIUM (PORCINE) LOCK FLUSH IV SOLN 100 UNIT/ML 100 UNIT/ML
5 SOLUTION INTRAVENOUS
Status: CANCELLED | OUTPATIENT
Start: 2024-04-12

## 2024-04-12 RX ORDER — HEPARIN SODIUM (PORCINE) LOCK FLUSH IV SOLN 100 UNIT/ML 100 UNIT/ML
5 SOLUTION INTRAVENOUS
Status: DISCONTINUED | OUTPATIENT
Start: 2024-04-12 | End: 2024-04-12 | Stop reason: HOSPADM

## 2024-04-12 RX ADMIN — PEGFILGRASTIM 6 MG: KIT SUBCUTANEOUS at 09:19

## 2024-04-12 RX ADMIN — Medication 5 ML: at 10:04

## 2024-04-12 RX ADMIN — CYCLOPHOSPHAMIDE 1500 MG: 1 INJECTION, POWDER, FOR SOLUTION INTRAVENOUS; ORAL at 09:17

## 2024-04-12 RX ADMIN — LORAZEPAM 0.5 MG: 2 INJECTION INTRAMUSCULAR; INTRAVENOUS at 08:37

## 2024-04-12 RX ADMIN — DOXORUBICIN HYDROCHLORIDE 150 MG: 2 INJECTION, SOLUTION INTRAVENOUS at 09:04

## 2024-04-12 RX ADMIN — DEXAMETHASONE SODIUM PHOSPHATE: 10 INJECTION, SOLUTION INTRAMUSCULAR; INTRAVENOUS at 08:40

## 2024-04-12 RX ADMIN — Medication 5 ML: at 07:26

## 2024-04-12 RX ADMIN — SODIUM CHLORIDE 1000 ML: 9 INJECTION, SOLUTION INTRAVENOUS at 08:18

## 2024-04-12 ASSESSMENT — PAIN SCALES - GENERAL: PAINLEVEL: SEVERE PAIN (6)

## 2024-04-12 NOTE — PATIENT INSTRUCTIONS
Contact Numbers    [facility] Main Line/[facility]: [phone]    Call with chills and/or temperature greater than or equal to 100.5, uncontrolled nausea/vomiting, diarrhea, constipation, dizziness, shortness of breath, chest pain, bleeding, unexplained bruising, or any new/concerning symptoms, questions/concerns.     If you are having any concerning symptoms or wish to speak to a provider before your next infusion visit, please call your care coordinator or triage to notify them so we can adequately serve you.       After Hours: [phone]    If after hours, weekends, or holidays, call main hospital  and ask for Oncology doctor on call.      April 2024 Sunday Monday Tuesday Wednesday Thursday Friday Saturday        1     2     3     4     5     6       7     8     9    NEW PALLIATIVE   2:05 PM   (80 min.)   Kylie Rodriguez DO   [facility] 10    RETURN CCSL   7:45 AM   (45 min.)   Erica Kellogg PA-C   [facility] 11     12    LAB CENTRAL   7:45 AM   (15 min.)    [facility]   [facility]    ONC INFUSION 2 HR (120 MIN)   8:30 AM   (120 min.)    ONC INFUSION NURSE   [facility] 13       14     15    RETURN CCSL   7:45 AM   (60 min.)   Qing Franco GC   [facility]    INFUSION 1 HR (60 MIN)   2:30 PM   (60 min.)   [place] CAN[place] INFUSION NURSE   [facility] 16     17     18    INFUSION 1 HR (60 MIN)   2:00 PM   (60 min.)   [place] CANCER INFUSION NURSE   [facility] 19     20       21     22     23     24     25     26    LAB CENTRAL  10:15 AM   (15 min.)   [facility]   [facility]    RETURN CCSL  10:30 AM   (30 min.)   Zachary Nolan MD   [facility]    ONC INFUSION 2 HR (120 MIN)  12:30 PM   (120 min.)   [facility]FUS[facility]  [facility] 27       28     29     30                                     May 2024      [place] Monday Tuesday Wednesday Thursday Friday Saturday                  1     2     3     4       5     6     7     8     9     10     11       12     13     14     15     16     17     18       19     20     21     22     23     24     25       26     27     28     29     30     31                          Lab Results:  Recent Results (from the past 12 hour(s))   Magnesium    Collection Time: 04/12/24  7:34 AM   Result Value Ref Range    Magnesium 1.8 1.7 - 2.3 mg/dL   Phosphorus    Collection Time: 04/12/24  7:34 AM   Result Value Ref Range    Phosphorus 4.7 (H) 2.5 - 4.5 mg/dL   Comprehensive metabolic panel    Collection Time: 04/12/24  7:34 AM   Result Value Ref Range    Sodium 138 135 - 145 mmol/L    Potassium 3.5 3.4 - 5.3 mmol/L    Carbon Dioxide (CO2) 23 22 - 29 mmol/L    Anion Gap 13 7 - 15 mmol/L    Urea Nitrogen 7.0 6.0 - 20.0 mg/dL    Creatinine 0.44 (L) 0.51 - 0.95 mg/dL    GFR Estimate >90 >60 mL/min/1.73m2    Calcium 9.3 8.6 - 10.0 mg/dL    Chloride 102 98 - 107 mmol/L    Glucose 206 (H) 70 - 99 mg/dL    Alkaline Phosphatase 66 40 - 150 U/L    AST 21 0 - 45 U/L    ALT 20 0 - 50 U/L    Protein Total 7.0 6.4 - 8.3 g/dL    Albumin 4.0 3.5 - 5.2 g/dL    Bilirubin Total 0.2 <=1.2 mg/dL   CBC with platelets and differential    Collection Time: 04/12/24  7:34 AM   Result Value Ref Range    WBC Count 10.6 4.0 - 11.0 10e3/uL    RBC Count 4.10 3.80 - 5.20 10e6/uL    Hemoglobin 12.8 11.7 - 15.7 g/dL    Hematocrit 37.1 35.0 - 47.0 %    MCV 91 78 - 100 fL    MCH 31.2 26.5 - 33.0 pg    MCHC 34.5 31.5 - 36.5 g/dL    RDW 14.6 10.0 - 15.0 %    Platelet Count 275 150 - 450 10e3/uL    % Neutrophils      % Lymphocytes      % Monocytes      % Eosinophils      % Basophils      % Immature Granulocytes      NRBCs per 100 WBC 0 <1 /100    Absolute Neutrophils      Absolute Lymphocytes      Absolute  Monocytes      Absolute Eosinophils      Absolute Basophils      Absolute Immature Granulocytes      Absolute NRBCs 0.0 10e3/uL   Manual Differential    Collection Time: 04/12/24  7:34 AM   Result Value Ref Range    % Neutrophils 68 %    % Lymphocytes 21 %    % Monocytes 10 %    % Eosinophils 0 %    % Basophils 1 %    Absolute Neutrophils 7.2 1.6 - 8.3 10e3/uL    Absolute Lymphocytes 2.2 0.8 - 5.3 10e3/uL    Absolute Monocytes 1.1 0.0 - 1.3 10e3/uL    Absolute Eosinophils 0.0 0.0 - 0.7 10e3/uL    Absolute Basophils 0.1 0.0 - 0.2 10e3/uL    RBC Morphology Confirmed RBC Indices     Platelet Assessment  Automated Count Confirmed. Platelet morphology is normal.     Automated Count Confirmed. Platelet morphology is normal.    Polychromasia Slight (A) None Seen

## 2024-04-12 NOTE — PROGRESS NOTES
"Infusion Nursing Note:  Eliezer Izquierdo presents today for Cycle 3, day 1 Adriamycin and Cytoxan.    Patient seen by provider today: No    Note: Patient presents to clinic today feeling well with no questions.  Pt would like to proceed with therapy today. Pt did not request or require any intervention for pain today.  Pt requested \"banana bag\".  Written communication 4/12/2024 DESEAN Champagne/ROSALIA Hernandez RN: Ok to administer IVF from therapy plan, does not need lytes.    Intravenous Access:  Implanted Port.    Treatment Conditions:  Lab Results   Component Value Date    HGB 12.8 04/12/2024    WBC 10.6 04/12/2024    ANEU 7.2 04/12/2024    ANEUTAUTO 4.1 03/29/2024     04/12/2024        Lab Results   Component Value Date     04/12/2024    POTASSIUM 3.5 04/12/2024    MAG 1.8 04/12/2024    CR 0.44 (L) 04/12/2024    REMI 9.3 04/12/2024    BILITOTAL 0.2 04/12/2024    ALBUMIN 4.0 04/12/2024    ALT 20 04/12/2024    AST 21 04/12/2024     ECHO/MUGA completed 3/11/24 EF 60-65%.    Post Infusion Assessment:  Blood return noted pre and post infusion.  Blood return noted during Adriamycin administration every 2 cc.  Site patent and intact, free from redness, edema or discomfort.  No evidence of extravasations.  Access discontinued per protocol.    Neulasta Onpro On-Body injector applied to RUQ of abdomen at 0920 with light facing up/toward pt.  Writer discussed Neulasta injection would start on 4/13/2024 at 1220, approximately 27 hours after application applied today.  Written and Verbal instruction reviewed with patient.  Pt instructed when the dose delivery starts, it will take about 45 minutes to complete.  Pt aware Neulasta Onpro On-Body should have green flashing light and to call triage or on-call MD if injector flashes red or appears to be leaking. Pt aware to keep Onpro On-Body Neualsta 4 inches away from electrical equipment and to avoid showering 4 hours prior to injection.   Neulasta Onpro Lot " number: [id number]    Discharge Plan:   Patient declined prescription refills.  Discharge instructions reviewed with: Patient.  Patient and/or family verbalized understanding of discharge instructions and all questions answered.  AVS to patient via [employer].  Patient will return 4/15/2024 for next appointment.   Patient discharged in stable condition accompanied by: father.  Departure Mode: Ambulatory.    Jessica Hernandez RN

## 2024-04-15 ENCOUNTER — INFUSION THERAPY VISIT (OUTPATIENT)
Dept: INFUSION THERAPY | Facility: CLINIC | Age: 39
End: 2024-04-15
Attending: INTERNAL MEDICINE
Payer: COMMERCIAL

## 2024-04-15 VITALS — TEMPERATURE: 98.7 F | HEART RATE: 117 BPM | SYSTOLIC BLOOD PRESSURE: 114 MMHG | DIASTOLIC BLOOD PRESSURE: 63 MMHG

## 2024-04-15 DIAGNOSIS — R11.2 CHEMOTHERAPY INDUCED NAUSEA AND VOMITING: Primary | ICD-10-CM

## 2024-04-15 DIAGNOSIS — T45.1X5A CHEMOTHERAPY INDUCED NAUSEA AND VOMITING: Primary | ICD-10-CM

## 2024-04-15 PROCEDURE — 250N000011 HC RX IP 250 OP 636: Performed by: PHYSICIAN ASSISTANT

## 2024-04-15 PROCEDURE — 96365 THER/PROPH/DIAG IV INF INIT: CPT

## 2024-04-15 PROCEDURE — 258N000003 HC RX IP 258 OP 636: Performed by: PHYSICIAN ASSISTANT

## 2024-04-15 RX ORDER — ALBUTEROL SULFATE 0.83 MG/ML
2.5 SOLUTION RESPIRATORY (INHALATION)
OUTPATIENT
Start: 2024-04-15

## 2024-04-15 RX ORDER — HEPARIN SODIUM,PORCINE 10 UNIT/ML
5-20 VIAL (ML) INTRAVENOUS DAILY PRN
OUTPATIENT
Start: 2024-04-15

## 2024-04-15 RX ORDER — HEPARIN SODIUM (PORCINE) LOCK FLUSH IV SOLN 100 UNIT/ML 100 UNIT/ML
5 SOLUTION INTRAVENOUS
OUTPATIENT
Start: 2024-04-15

## 2024-04-15 RX ORDER — DIPHENHYDRAMINE HYDROCHLORIDE 50 MG/ML
50 INJECTION INTRAMUSCULAR; INTRAVENOUS
Start: 2024-04-15

## 2024-04-15 RX ORDER — METHYLPREDNISOLONE SODIUM SUCCINATE 125 MG/2ML
125 INJECTION, POWDER, LYOPHILIZED, FOR SOLUTION INTRAMUSCULAR; INTRAVENOUS
Start: 2024-04-15

## 2024-04-15 RX ORDER — EPINEPHRINE 1 MG/ML
0.3 INJECTION, SOLUTION, CONCENTRATE INTRAVENOUS EVERY 5 MIN PRN
OUTPATIENT
Start: 2024-04-15

## 2024-04-15 RX ORDER — MEPERIDINE HYDROCHLORIDE 25 MG/ML
25 INJECTION INTRAMUSCULAR; INTRAVENOUS; SUBCUTANEOUS EVERY 30 MIN PRN
OUTPATIENT
Start: 2024-04-15

## 2024-04-15 RX ORDER — HEPARIN SODIUM (PORCINE) LOCK FLUSH IV SOLN 100 UNIT/ML 100 UNIT/ML
5 SOLUTION INTRAVENOUS
Status: DISCONTINUED | OUTPATIENT
Start: 2024-04-15 | End: 2024-04-15 | Stop reason: HOSPADM

## 2024-04-15 RX ORDER — ALBUTEROL SULFATE 90 UG/1
1-2 AEROSOL, METERED RESPIRATORY (INHALATION)
Start: 2024-04-15

## 2024-04-15 RX ADMIN — DEXAMETHASONE SODIUM PHOSPHATE: 10 INJECTION, SOLUTION INTRAMUSCULAR; INTRAVENOUS at 14:57

## 2024-04-15 RX ADMIN — Medication 5 ML: at 15:20

## 2024-04-15 RX ADMIN — SODIUM CHLORIDE 1000 ML: 9 INJECTION, SOLUTION INTRAVENOUS at 14:24

## 2024-04-15 NOTE — PROGRESS NOTES
Infusion Nursing Note:  Eliezer Izquierdo presents today for PAC fluids and Emend.    Patient seen by provider today: No   present during visit today: Not Applicable.    Note: Pt states she only wants half a liter today. She has been drinking fluids.     Intravenous Access:  Peripheral IV placed.    Treatment Conditions:  Not Applicable.    Post Infusion Assessment:  Patient tolerated infusion without incident.  Blood return noted pre and post infusion.  Site patent and intact, free from redness, edema or discomfort.  No evidence of extravasations.  Access discontinued per protocol.     Discharge Plan:   Patient discharged in stable condition accompanied by: self.  Departure Mode: Ambulatory.    Kevin Jamil RN

## 2024-04-16 DIAGNOSIS — Z17.0 MALIGNANT NEOPLASM OF UPPER-OUTER QUADRANT OF LEFT BREAST IN FEMALE, ESTROGEN RECEPTOR POSITIVE (H): ICD-10-CM

## 2024-04-16 DIAGNOSIS — N64.4 BREAST PAIN: ICD-10-CM

## 2024-04-16 DIAGNOSIS — C50.412 MALIGNANT NEOPLASM OF UPPER-OUTER QUADRANT OF LEFT BREAST IN FEMALE, ESTROGEN RECEPTOR POSITIVE (H): ICD-10-CM

## 2024-04-16 DIAGNOSIS — G89.3 CANCER RELATED PAIN: ICD-10-CM

## 2024-04-16 RX ORDER — FENTANYL 12.5 UG/1
1 PATCH TRANSDERMAL
Qty: 10 PATCH | Refills: 0 | Status: SHIPPED | OUTPATIENT
Start: 2024-04-16 | End: 2024-04-23 | Stop reason: DRUGHIGH

## 2024-04-16 RX ORDER — TRAMADOL HYDROCHLORIDE 50 MG/1
50-100 TABLET ORAL EVERY 6 HOURS PRN
Qty: 180 TABLET | Refills: 0 | Status: SHIPPED | OUTPATIENT
Start: 2024-04-16 | End: 2024-04-30

## 2024-04-16 NOTE — TELEPHONE ENCOUNTER
Pt reporting continued rash with use of the butrans patch. She is aware her insurance will not cover suboxone for pain. Pt agreeable to trying a fentanyl patch. She reports a number of GI issues with use of pain pills and is hopeful she will tolerate this since it is absorbed transdermally. Dr. Rodriguez would like her to start with a 12 mcg patch. Pt aware this may require a prior authorization. Tramadol dosing instructions have also been adjusted.    KEYONNA KulkarniN, RN  Palliative Care Nurse Clinician    [phone] (Direct)  [phone] (Main)  [phone] (Appointment Scheduling)

## 2024-04-17 ENCOUNTER — VIRTUAL VISIT (OUTPATIENT)
Dept: ONCOLOGY | Facility: CLINIC | Age: 39
End: 2024-04-17
Attending: GENETIC COUNSELOR, MS
Payer: COMMERCIAL

## 2024-04-17 DIAGNOSIS — Z14.8 CARRIER OF GENE MUTATION FOR HIGH RISK OF CANCER: Primary | ICD-10-CM

## 2024-04-17 DIAGNOSIS — C50.412 MALIGNANT NEOPLASM OF UPPER-OUTER QUADRANT OF LEFT BREAST IN FEMALE, ESTROGEN RECEPTOR POSITIVE (H): ICD-10-CM

## 2024-04-17 DIAGNOSIS — Z85.820 PERSONAL HISTORY OF MALIGNANT MELANOMA: ICD-10-CM

## 2024-04-17 DIAGNOSIS — Z80.3 FAMILY HISTORY OF MALIGNANT NEOPLASM OF BREAST: ICD-10-CM

## 2024-04-17 DIAGNOSIS — Z14.8 CARRIER OF HIGH RISK CANCER GENE MUTATION: ICD-10-CM

## 2024-04-17 DIAGNOSIS — Z17.0 MALIGNANT NEOPLASM OF UPPER-OUTER QUADRANT OF LEFT BREAST IN FEMALE, ESTROGEN RECEPTOR POSITIVE (H): ICD-10-CM

## 2024-04-17 DIAGNOSIS — Z80.49 FAMILY HISTORY OF UTERINE CANCER: ICD-10-CM

## 2024-04-17 PROCEDURE — 96040 HC GENETIC COUNSELING, EACH 30 MINUTES: CPT | Mod: TEL,95 | Performed by: GENETIC COUNSELOR, MS

## 2024-04-17 NOTE — NURSING NOTE
Is the patient currently in the state of [place]? YES    Visit mode:TELEPHONE    If the visit is dropped, the patient can be reconnected by: TELEPHONE VISIT: Phone number:   Telephone Information:   Mobile [phone]       Will anyone else be joining the visit? NO  (If patient encounters technical issues they should call [phone])    How would you like to obtain your AVS? MyChart    Are changes needed to the allergy or medication list? N/A    Are refills needed on medications prescribed by this physician? NO    Reason for visit: RECHECK    Cecilia VILLAGOMEZ

## 2024-04-17 NOTE — PROGRESS NOTES
"4/17/2024    Virtual Visit Details  Type of service:  Telephone Visit   Phone call duration: 23 minutes   Originating Location (pt. Location): Home  Distant Location (provider location):  Off-site    Referring Provider: Albina Ford MD    Presenting Information:   I spoke to Eliezer by phone today to discuss her genetic testing results. When we last met on 3/4/2024, Eliezer elected to proceed with expanded genetic testing using the Comprehensive Hereditary Cancer Expanded Panel + MC1R Gene. This testing was done because of Eliezer's personal and family history of cancer.    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 Specifically the risk variants are called c.451C>T (also known as p.Sgg308Qie) and c.478C>T (also known as p.Dbn690Xae). We discussed that these risk variants are associated moderately increased risk for melanoma. We discussed the impact of this testing on Eliezer in detail.     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 This variant is called c.2764A>G (also known as p.Xak664Ovy). No other variants or mutations were found in the RB1 gene. Given the uncertain significance of this result, medical management decisions should NOT be made based on this test result alone.    RB1 VUS Interpretation:  We discussed several different interpretations of this inconclusive test result. It is not clear if this variant in the RB1 gene is associated with increased cancer risk.  1. This variant may be a benign change that does not increase cancer risk.  2. This variant may be a harmful mutation that causes hereditary retinoblastoma.  The laboratory is working to determine if this variant is harmful or benign, and they will contact me if it is reclassified.  Eliezer's relatives may also carry this RB1 variant. Because it is unclear " "what, if any, risk is associated with this variant, clinical genetic testing for this RB1 variant alone is not recommended for relatives.    Of note, Eliezer also tested negative for variants and mutations in the following genes by sequencing and deletion/duplication analysis (unless otherwise specified in the test report): ALK, APC, JAGDISH, AXIN2, BAP1, BARD1, BLM, BMPR1A, BRCA1, BRCA2, BRIP1, CASR, CDC73, CDH1, CDK4, CDKN1B, CDKN1C, CDKN2A, CEBPA, CHEK2, CTNNA1, DICER1, DIS3L2, EGFR, EPCAM, FH, FLCN, GATA2, GPC3, GREM1, HOXB13, HRAS, KIT, MAX, MBD4, MEN1, MET, MITF, MLH1, MLH3, MRE11, MSH2, MSH3, MSH6, MUTYH, NBN, NF1, NF2, NTHL1, PALB2, PDGFRA, PHOX2B, PMS2, POLD1, POLE, POT1, ZNMSK2A, PTCH1, PTCH2, PTEN, RAD50, RAD51C, RAD51D, RECQL4, RET, RUNX1, SDHA, SDHAF2, SDHB, SDHC, SDHD, SMAD4, SMARCA4, SMARCB1, SMARCE1, STK11, SUFU, TERC, TERT, KJFG034, TP53, TSC1, TSC2, VHL, WRN, WT1.  We reviewed the autosomal dominant inheritance of these genes.   Eliezer cannot pass on a mutation in any of these genes to her son based on this test result. Mutations in these genes do not skip generations.      A copy of the test report can be found in the Laboratory tab, dated 3/26/2024, and named \"Next generation sequencing\".     MC1R Cancer Risks:   We reviewed that variants in this MC1R gene are common, as up to 11% of individuals in the general population carries at least one variant in the MC1R gene. Often, variants in this gene are associated with red hair color and/or fair skin coloring. Other variants in the MC1R gene are not significantly associated with hair or skin coloring.    Several of these variants, including the two specific variants identified in Eliezer, have also been linked to an increased chance of developing melanoma. These increased risk variants are often called \"R\" variants, as compared to \"r\" variants that are less associated with melanoma risk (MT Jalyn et al. MC1R, ASIP, and DNA Repair in Sporadic and " "Familial Melanoma in a Mediterranean Population. JNCI: Journal of the National Cancer [place]. 2005; ELIDA Mejia et al. MC1R variants, melanoma and red hair color phenotype: A meta-analysis. Int. J. Cancer. 2008; CHRIS Plaza; AMI Pollock et al. Familial Melanoma and Susceptibility Genes: A Review of the Most Common Clinical and Dermoscopic Phenotypic Aspect, Associated Malignancies and Practical Tips for Management. J. Clin. Med. 2021).   The lifetime risk associated with a single \"R\" variant in the MC1R gene is estimated to be 2-4 fold higher than the general population risk of 3% (for White individuals). Though Eliezer was diagnosed with a melanoma in her 20's, we reviewed that it is currently unknown if [id number]-[id number] melanomas typically occur at a younger age as compared to melanomas in the general population.  As this risk is considered a low-moderate risk, MC1R risk variants are not expected to be the single explanation for an individual's melanoma.   There are likely other genetic and/or environmental risk factors, that in combination with MC1R risk variants, cause a melanoma to develop.   We discussed that data from Eliezer's genetic test suggest the two MC1R variants are in trans, meaning one variant is located in each copy of her MC1R gene. Though the exact risks associated with two or more MC1R variants are uncertain, the risk may be as high as 6 times the general population risk.  There also appears to be an increased risk to develop multiple primary melanomas, though the exact risk is uncertain.  Carrying a MC1R risk variant also modifies melanoma risk for individuals that carry additional genetic risk factors for melanoma (i.e. individuals that carry both a CDKN2A mutation and a MC1R risk variant have a different melanoma risk as compared to individuals that only carry one CDKN2A mutation).  Studies have also suggested that MC1R risk variants also increase the risk for non-melanoma skin cancers, " specifically squamous cell carcinoma and basal cell carcinoma.     Other cancer risks potentially associated with a MC1R risk variant are not currently well understood. For example, MC1R risk variants have not been associated with increased risk for female breast cancer. As such, based on current data it is unlikely that these two MC1R risk variants are associated with D[place] breast cancer. This means there may be other genetic and/or environmental risk factors associated with her recent breast cancer diagnosis that are not identifiable using this genetic test.    Cancer Screening and Prevention:  We reviewed that at this time, there are no published cancer screening guidelines for individuals who carry one or more MC1R risk variants. Though this may change in the future, Eliezer and her relatives should currently follow cancer screening recommendations made based on their personal and family histories:  Eliezer should continue to follow all breast cancer treatment and follow-up recommendations as made by her medical providers.  Given Eliezer's prior history of a melanoma and these MC1R variants, it would be reasonable for Eliezer to reestablish care with a dermatologist for regular skin exams.  Eliezer's close female relatives remain at increased risk for breast cancer given their family history. Breast cancer screening is generally recommended to begin approximately 10 years younger than the earliest age of breast cancer diagnosis in the family, or at age 40, whichever comes first. In this family, screening may begin at age 30. Breast screening options should be discussed with an individual's primary care provider and a Genetic Counselor, to determine at what age to begin screening, what screening is appropriate, and if additional screening (such as breast MRI) is necessary based on personal/family history factors.  Due to the family history of melanoma and the moderate risks associated with MC1R risk  variants, Eliezer's son and siblings remain at some increased risk for developing melanoma likely regardless of whether or not they carry these MC1R risk variants. According to the American Cancer Society, they are encouraged to speak to their primary care providers about having regular skin exams and safe skin practices (i.e. sunscreen, self skin exams, limited sun exposure, etc.). As Eliezer was diagnosed with a melanoma at age 24, it may be recommended that skin exams begin at a younger age.  Other population cancer screening options, such as those recommended by the American Cancer Society and the National Comprehensive Cancer Network (NCCN), are also appropriate for Eliezer and her family. These screening recommendations may change if there are changes to Eliezer's personal and/or family history of cancer. Final screening recommendations should be made by each individual's primary care provider.    Of note, the above information is based on our current understanding of Eliezer's genetic findings. Eliezer is encouraged to reach out to me regularly and with any pertinent updates to her personal and/or family history of cancer, as our understanding of the genetic findings in her family may change over time.     Implications for Family Members:  We reviewed that variants in the MC1R gene are inherited in an autosomal dominant pattern. As data from Eliezer's testing suggest one variant is located in each copy of her MC1R gene, it is likely that she inherited one MC1R risk variant from each parent. Testing other relatives, such as her parents and/or her siblings, would be needed to confirm this interpretation, though.    If it is assumed that Eliezer inherited one MC1R variant from each parent:  Eliezer's son is expected to have inherited one of the MC1R variants identified in Eliezer.   Each of her full siblings has a 25% chance to inherit both MC1R variants, 50% chance of having just one MC1R variant, and  "25% chance to carry neither MC1R variant.   As each of Eliezer's parents are expected to carry one of the MC1R variants, extended relatives on each side of her family (such as her paternal half siblings) may also carry one of the MC1R variants. Eliezer is encouraged to share this information with her family members on both sides of the family.     I am happy to help her relatives connect with a genetic counselor in their area if they would like to discuss testing.    Additional Testing Considerations:  Even though Eliezer's genetic testing identified these two MC1R risk variants, other relatives may carry a different gene mutation associated with hereditary cancer. As such, Eliezer's relatives (particularly her relatives with cancer) are encouraged to meet with a genetic counselor to discuss their MC1R single gene versus multi-gene testing options. If any of her relatives do pursue genetic testing, Eliezer is encouraged to contact me so that we may review the impact of their test results on her.    Plan:  1. Eliezer was provided a copy of her test results via [employer].  2. I will provide a \"Dear Relative\" letter via [employer] (After Visit Summary) for Eliezer to share with her family members.  3. She plans to follow up with her medical providers, as needed.  4. Eliezer is encouraged to contact me regularly and if there are any changes to her personal/family history of cancer.    If Eliezer has additional questions, I encouraged her to contact me directly at [phone].     Qing Franco MS, [facility]  Licensed, Certified Genetic Counselor  Office: [phone]  [email or web address]  "

## 2024-04-17 NOTE — LETTER
"    4/17/2024         RE: Eliezer Izquierdo  [address] [facility] [address]        Dear Colleague,    Thank you for referring your patient, Eliezer Izquierdo, to the [facility]. Please see a copy of my visit note below.    4/17/2024    Virtual Visit Details  Type of service:  Telephone Visit   Phone call duration: 23 minutes   Originating Location (pt. Location): Home  Distant Location (provider location):  Off-site    Referring Provider: Albina Ford MD    Presenting Information:   I spoke to Eliezer by phone today to discuss her genetic testing results. When we last met on 3/4/2024, Eliezer elected to proceed with expanded genetic testing using the Comprehensive Hereditary Cancer Expanded Panel + MC1R Gene. This testing was done because of Eliezer's personal and family history of cancer.    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 Specifically the risk variants are called c.451C>T (also known as p.Sxi353Chp) and c.478C>T (also known as p.Gdw443Yaq). We discussed that these risk variants are associated moderately increased risk for melanoma. We discussed the impact of this testing on Eliezer in detail.     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 This variant is called c.2764A>G (also known as p.Wuc109Axw). No other variants or mutations were found in the RB1 gene. Given the uncertain significance of this result, medical management decisions should NOT be made based on this test result alone.    RB1 VUS Interpretation:  We discussed several different interpretations of this inconclusive test result. It is not clear if this variant in the RB1 gene is associated with increased cancer risk.  1. This variant may be a benign change that does not increase cancer risk.  2. This variant may be a " "harmful mutation that causes hereditary retinoblastoma.  The laboratory is working to determine if this variant is harmful or benign, and they will contact me if it is reclassified.  Eliezer's relatives may also carry this RB1 variant. Because it is unclear what, if any, risk is associated with this variant, clinical genetic testing for this RB1 variant alone is not recommended for relatives.    Of note, Eliezer also tested negative for variants and mutations in the following genes by sequencing and deletion/duplication analysis (unless otherwise specified in the test report): ALK, APC, JAGDISH, AXIN2, BAP1, BARD1, BLM, BMPR1A, BRCA1, BRCA2, BRIP1, CASR, CDC73, CDH1, CDK4, CDKN1B, CDKN1C, CDKN2A, CEBPA, CHEK2, CTNNA1, DICER1, DIS3L2, EGFR, EPCAM, FH, FLCN, GATA2, GPC3, GREM1, HOXB13, HRAS, KIT, MAX, MBD4, MEN1, MET, MITF, MLH1, MLH3, MRE11, MSH2, MSH3, MSH6, MUTYH, NBN, NF1, NF2, NTHL1, PALB2, PDGFRA, PHOX2B, PMS2, POLD1, POLE, POT1, KTAAD3D, PTCH1, PTCH2, PTEN, RAD50, RAD51C, RAD51D, RECQL4, RET, RUNX1, SDHA, SDHAF2, SDHB, SDHC, SDHD, SMAD4, SMARCA4, SMARCB1, SMARCE1, STK11, SUFU, TERC, TERT, BUSV514, TP53, TSC1, TSC2, VHL, WRN, WT1.  We reviewed the autosomal dominant inheritance of these genes.   Eliezer cannot pass on a mutation in any of these genes to her son based on this test result. Mutations in these genes do not skip generations.      A copy of the test report can be found in the Laboratory tab, dated 3/26/2024, and named \"Next generation sequencing\".     MC1R Cancer Risks:   We reviewed that variants in this MC1R gene are common, as up to 11% of individuals in the general population carries at least one variant in the MC1R gene. Often, variants in this gene are associated with red hair color and/or fair skin coloring. Other variants in the MC1R gene are not significantly associated with hair or skin coloring.    Several of these variants, including the two specific variants identified in Eliezer, have " "also been linked to an increased chance of developing melanoma. These increased risk variants are often called \"R\" variants, as compared to \"r\" variants that are less associated with melanoma risk (FLACO Gunderson et al. MC1R, ASIP, and DNA Repair in Sporadic and Familial Melanoma in a Mediterranean Population. JNCI: Journal of the National Cancer Scio. 2005; S Roberto et al. MC1R variants, melanoma and red hair color phenotype: A meta-analysis. Int. J. Cancer. 2008; [facility] [place].; AMI Pollock et al. Familial Melanoma and Susceptibility Genes: A Review of the Most Common Clinical and Dermoscopic Phenotypic Aspect, Associated Malignancies and Practical Tips for Management. J. Clin. Med. 2021).   The lifetime risk associated with a single \"R\" variant in the MC1R gene is estimated to be 2-4 fold higher than the general population risk of 3% (for White individuals). Though Eliezer was diagnosed with a melanoma in her 20's, we reviewed that it is currently unknown if ZT7L-[id number] melanomas typically occur at a younger age as compared to melanomas in the general population.  As this risk is considered a low-moderate risk, MC1R risk variants are not expected to be the single explanation for an individual's melanoma.   There are likely other genetic and/or environmental risk factors, that in combination with MC1R risk variants, cause a melanoma to develop.   We discussed that data from Eliezer's genetic test suggest the two MC1R variants are in trans, meaning one variant is located in each copy of her MC1R gene. Though the exact risks associated with two or more MC1R variants are uncertain, the risk may be as high as 6 times the general population risk.  There also appears to be an increased risk to develop multiple primary melanomas, though the exact risk is uncertain.  Carrying a MC1R risk variant also modifies melanoma risk for individuals that carry additional genetic risk factors for melanoma (i.e. individuals that " carry both a CDKN2A mutation and a MC1R risk variant have a different melanoma risk as compared to individuals that only carry one CDKN2A mutation).  Studies have also suggested that MC1R risk variants also increase the risk for non-melanoma skin cancers, specifically squamous cell carcinoma and basal cell carcinoma.     Other cancer risks potentially associated with a MC1R risk variant are not currently well understood. For example, MC1R risk variants have not been associated with increased risk for female breast cancer. As such, based on current data it is unlikely that these two MC1R risk variants are associated with Dats breast cancer. This means there may be other genetic and/or environmental risk factors associated with her recent breast cancer diagnosis that are not identifiable using this genetic test.    Cancer Screening and Prevention:  We reviewed that at this time, there are no published cancer screening guidelines for individuals who carry one or more MC1R risk variants. Though this may change in the future, Eliezer and her relatives should currently follow cancer screening recommendations made based on their personal and family histories:  Eliezer should continue to follow all breast cancer treatment and follow-up recommendations as made by her medical providers.  Given Eliezer's prior history of a melanoma and these MC1R variants, it would be reasonable for Eliezer to reestablish care with a dermatologist for regular skin exams.  Eliezer's close female relatives remain at increased risk for breast cancer given their family history. Breast cancer screening is generally recommended to begin approximately 10 years younger than the earliest age of breast cancer diagnosis in the family, or at age 40, whichever comes first. In this family, screening may begin at age 30. Breast screening options should be discussed with an individual's primary care provider and a Genetic Counselor, to determine at  what age to begin screening, what screening is appropriate, and if additional screening (such as breast MRI) is necessary based on personal/family history factors.  Due to the family history of melanoma and the moderate risks associated with MC1R risk variants, Eliezer's son and siblings remain at some increased risk for developing melanoma likely regardless of whether or not they carry these MC1R risk variants. According to the American Cancer Society, they are encouraged to speak to their primary care providers about having regular skin exams and safe skin practices (i.e. sunscreen, self skin exams, limited sun exposure, etc.). As Eliezer was diagnosed with a melanoma at age 24, it may be recommended that skin exams begin at a younger age.  Other population cancer screening options, such as those recommended by the American Cancer Society and the National Comprehensive Cancer Network (NCCN), are also appropriate for Eliezer and her family. These screening recommendations may change if there are changes to Eliezer's personal and/or family history of cancer. Final screening recommendations should be made by each individual's primary care provider.    Of note, the above information is based on our current understanding of Eliezer's genetic findings. Eliezer is encouraged to reach out to me regularly and with any pertinent updates to her personal and/or family history of cancer, as our understanding of the genetic findings in her family may change over time.     Implications for Family Members:  We reviewed that variants in the MC1R gene are inherited in an autosomal dominant pattern. As data from Eliezer's testing suggest one variant is located in each copy of her MC1R gene, it is likely that she inherited one MC1R risk variant from each parent. Testing other relatives, such as her parents and/or her siblings, would be needed to confirm this interpretation, though.    If it is assumed that Eliezer inherited  "one MC1R variant from each parent:  Eliezer's son is expected to have inherited one of the MC1R variants identified in Eliezer.   Each of her full siblings has a 25% chance to inherit both MC1R variants, 50% chance of having just one MC1R variant, and 25% chance to carry neither MC1R variant.   As each of Eliezer's parents are expected to carry one of the MC1R variants, extended relatives on each side of her family (such as her paternal half siblings) may also carry one of the MC1R variants. Eliezer is encouraged to share this information with her family members on both sides of the family.     I am happy to help her relatives connect with a genetic counselor in their area if they would like to discuss testing.    Additional Testing Considerations:  Even though Eliezer's genetic testing identified these two MC1R risk variants, other relatives may carry a different gene mutation associated with hereditary cancer. As such, Eliezer's relatives (particularly her relatives with cancer) are encouraged to meet with a genetic counselor to discuss their MC1R single gene versus multi-gene testing options. If any of her relatives do pursue genetic testing, Eliezer is encouraged to contact me so that we may review the impact of their test results on her.    Plan:  1. Eliezer was provided a copy of her test results via [email or web address].  2. I will provide a \"Dear Relative\" letter via [email or web address] (After Visit Summary) for Eliezer to share with her family members.  3. She plans to follow up with her medical providers, as needed.  4. Eliezer is encouraged to contact me regularly and if there are any changes to her personal/family history of cancer.    If Eliezer has additional questions, I encouraged her to contact me directly at [phone].     Qing Franco MS, [facility]  Licensed, Certified Genetic Counselor  Office: [phone]  [email or web address]    "

## 2024-04-19 NOTE — TELEPHONE ENCOUNTER
Retail Pharmacy Prior Authorization Team   Phone: [phone]    PA Initiation-Faxed form to pharmacy to be called in to [facility]    Medication: BUPRENORPHINE HCL-NALOXONE HCL 4-1 MG SL FILM  Insurance Company: [employer] ([facility]) - Phone [phone] Fax [phone]  Pharmacy Filling the Rx: [facility] - [place], [place] - [address] [place] [address]  Filling Pharmacy Phone: [phone]  Filling Pharmacy Fax: [phone]  Start Date: 4/11/2024

## 2024-04-19 NOTE — TELEPHONE ENCOUNTER
Prior Authorization Not Needed per Insurance-Her insurance doesn't cover this for pain. Alternative has been prescribed.     Medication: BUPRENORPHINE HCL-NALOXONE HCL 4-1 MG SL FILM  Insurance Company: [employer] ([facility]) - Phone [phone] Fax [phone]  Expected CoPay: $    Pharmacy Filling the Rx: [facility] - [place], [place] - [address] [place] [address]  Pharmacy Notified:   Patient Notified:

## 2024-04-21 ENCOUNTER — MYC REFILL (OUTPATIENT)
Dept: ONCOLOGY | Facility: CLINIC | Age: 39
End: 2024-04-21
Payer: COMMERCIAL

## 2024-04-21 DIAGNOSIS — C50.412 MALIGNANT NEOPLASM OF UPPER-OUTER QUADRANT OF LEFT BREAST IN FEMALE, ESTROGEN RECEPTOR POSITIVE (H): ICD-10-CM

## 2024-04-21 DIAGNOSIS — Z17.0 MALIGNANT NEOPLASM OF UPPER-OUTER QUADRANT OF LEFT BREAST IN FEMALE, ESTROGEN RECEPTOR POSITIVE (H): ICD-10-CM

## 2024-04-22 PROBLEM — Z14.8 CARRIER OF HIGH RISK CANCER GENE MUTATION: Status: ACTIVE | Noted: 2024-04-22

## 2024-04-22 RX ORDER — LORAZEPAM 1 MG/1
1 TABLET ORAL EVERY 6 HOURS PRN
Qty: 30 TABLET | Refills: 1 | Status: SHIPPED | OUTPATIENT
Start: 2024-04-22 | End: 2024-05-09

## 2024-04-22 NOTE — PATIENT INSTRUCTIONS
"Dear Relative:    The purpose of this letter is to inform you that your relative recently underwent genetic counseling and genetic testing due to the family history of cancer. The testing done through the [facility] identified two \"risk variants\" in the MC1R gene. Specifically, the variants are called c.451C>T (also known as p.Mkn133Nse) and c.478C>T (also known as p.Zie366Noz). The accession number linked to their test result is [id number].     Certain variants (or changes in the genetic code) cause a specific gene to stop working properly. Ultimately, individuals who have a \"risk variant\" in this MC1R gene are at increased risk for certain cancers, primarily melanoma. Studies suggest that individuals who carry at least one \"risk variant\" in the MC1R gene may have a lifetime melanoma risk of 2-6 fold higher than average. An individual's exact risk for melanoma, though, likely depends not only on the number of MC1R risk variants but also other genetic and/or environmental risk factors. Studies have suggested that individuals with at least one MC1R risk variant may also be at increased risk for non-melanoma skin cancers. Also, both men and women can carry MC1R risk variants and have a 50% chance of passing the variants on to each of their children.    If individuals are found to have MC1R risk variants, increased screening (such as regular skin exams) may be recommended based on not only their genetic testing results but also their personal and family history of cancer.     I understand that this may be surprising, unexpected, and even scary news. Because these MC1R risk variants have been identified in your family, you are at risk for having the same risk variants. As mentioned earlier, your children may also be at risk. Of note, depending on how you are related to other family members that have already pursued genetic testing, more targeted versus comprehensive analysis of " the MC1R gene may be recommended.    Scheduling a genetic counseling appointment does not mean you have to undergo genetic testing. The decision to pursue such testing is a very personal one that is discussed in more detail during the session. Much of cancer genetic counseling is providing valuable information to individuals who are impacted by genetic information such as this.      If you are interested in scheduling a genetic counseling appointment at [facility], please call [phone] to schedule an appointment. You can also find a genetic counselor close to you or at another health system at [email or web address]    Sincerely,   Qing Franco MS, [facility]  Licensed, Certified Genetic Counselor  Office: [phone]  [email or web address]

## 2024-04-22 NOTE — CONFIDENTIAL NOTE
Lorazepam Refill   Last prescribing provider: DR Nolan     Last clinic visit date: 4/10/24 Erica Kellogg     Recommendations for requested medication (if none, N/A): Copied from chart note 4/10/24 Erica Kellogg   LORazepam (ATIVAN) 0.5 MG tablet Take 2 tablets (1 mg) by mouth every 6 hours as needed for nausea or anxiety 30 tablet 1  LORazepam (ATIVAN) 1 MG tablet Take 1 tablet (1 mg) by mouth every 6 hours as needed for anxiety, nausea or sleep 50 tablet 1     Any other pertinent information (if none, N/A): N/A    Refilled: Y/N, if NO, why?

## 2024-04-23 DIAGNOSIS — C50.412 MALIGNANT NEOPLASM OF UPPER-OUTER QUADRANT OF LEFT BREAST IN FEMALE, ESTROGEN RECEPTOR POSITIVE (H): Primary | ICD-10-CM

## 2024-04-23 DIAGNOSIS — N64.4 BREAST PAIN: ICD-10-CM

## 2024-04-23 DIAGNOSIS — Z17.0 MALIGNANT NEOPLASM OF UPPER-OUTER QUADRANT OF LEFT BREAST IN FEMALE, ESTROGEN RECEPTOR POSITIVE (H): Primary | ICD-10-CM

## 2024-04-23 DIAGNOSIS — G89.3 CANCER RELATED PAIN: ICD-10-CM

## 2024-04-23 RX ORDER — PREGABALIN 200 MG/1
200 CAPSULE ORAL 3 TIMES DAILY
Qty: 90 CAPSULE | Refills: 0 | Status: SHIPPED | OUTPATIENT
Start: 2024-04-23 | End: 2024-04-25

## 2024-04-23 RX ORDER — FENTANYL 25 UG/1
1 PATCH TRANSDERMAL
Qty: 10 PATCH | Refills: 0 | Status: SHIPPED | OUTPATIENT
Start: 2024-04-23 | End: 2024-05-10

## 2024-04-23 NOTE — TELEPHONE ENCOUNTER
Pt reports poor pain control with current regimen of fentanyl and tramadol. Current dose of fentanyl is 12 mcg/hr. She is currently taking 8 tabs of tramadol in a 24 hour period. She also reports new burning/pulling pain in her chest. Will increase fentanyl to 25 mcg/hr. Will increase pregabalin to 200 mg TID.    Last refill of fentanyl: 4/16/24  Last refill of pregabalin: 4/22/24  Last office visit: 4/9/24  Scheduled for follow up 5/7/24     Will route request to MD for review.     Reviewed [place]  Report.

## 2024-04-24 NOTE — PROGRESS NOTES
[facility] Medical Oncology Note  Date of visit: April 26, 2024  New Outpatient Clinic Note        Assessment:     Clinical T2N1 invasive breast cancer with a high risk Mammaprint in this 39 year old woman. As discussed previously with Dr. Ford and again today with me, the patient is appropriate for neoadjuvant treatment.    S/P three cycles of neoadjuvant ddAC with exam showing a dramatic response.  The mass has gone from 4 cm across to just 1 cm.  I cannot palpate anything in the axilla.  I could not be happier for this very nice person.  She certainly has had some side effects with treatment and I get that.  She is done pretty well with fluids and antiemetics given these 3 and 7 in [place] and so we will make sure that this happens again for her.  She is clearly a candidate for breast conservation now.  However we are going to give her all the chemotherapy upfront in the hope that we can get her down to an RCB 1 or possibly pathologic complete response at the time of surgery.  Issues with chronic pain.  She is being managed beautifully by palliative care and I defer all management of her pain to that group.  She is doing very well on the fentanyl patch.  Fatigue I think it is due to the fact that she is becoming a little anemic.  She is a heavy smoker and when I first met her her hemoglobin was 16, implying an EPO driven compensatory response to chronic hypoxia caused by the smoking.  Now that her hemoglobin is lower she really does feel fatigued and it is understandable.  I would like her to quit smoking.  It does not look like this will be happening soon    Plan:     Cycle 4 dose dense AC today  On body Neulasta will go off tomorrow  We will make sure she gets fluids and antiemetics Monday and Thursday of next week at [place]  Return to clinic in 2 weeks to begin Taxol.  I would like to get a left breast ultrasound just prior to document the clinical response we are seeing on exam.  Continue to follow-up  "with palliative care for management of her chronic pain issues that predated her breast cancer diagnosis        Zachary Nolan MD, MSc  Associate Professor of Medicine  [employer]  [facility]  [address], [place] [address] [phone]    __________________________________________________________________    DIAGNOSES     Clinical T2N1 invasive breast cancer diagnosed at breast biopsy 2/9/2024. Alleyson palpated a left breast lump. Mammogram and US showed a 2.5 x 2.5 x 2.0 cm spiculated mass at 2:00 position.   Biopsy showed a [place] grade I IDC, 97% strongly positive for ER, 99% strongly positive for VT, and negative for HER2 by FISH (IHC 2+). KI-67 is 24%. Mammaprint was high risk Luminal B. Left axillary US that showed three abnormal axillary nodes, biopsy positive for IDC. Biopsy showed METASTATIC BREAST CARCINOMA, almost entirely effacing lymph node, at least 11 mm in linear extent.  My first exam prior to neoadjuvant therapy showed a palpable deep left breast mass in the 2 o'clock position measuring roughly 4 cm x 4 cm.   Chronic pain with history of opiate prescriptions from multiple providers. She is currently seeing palliative care (Dr. Kylie Rodriguez), and gets buprenorphine, started on 4/11/2024. 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  But it was negative for everything else.        History of Present Illness/Therapy to dte:       Neoadjuvant ddAC initiated 3/15/2024. She is back today prior to cycle 4. The plan will be to go weekly paclitaxel times 12 after this cycle.      Interval History   Got bilateral knee injections from ortho 4/15/2024.  Feels the mass is shrinking.  Does feel fatigued all of the time.  It is the major side effect here.  She also " "gets significant nausea in the first week after getting the AC.  This is improved with fluids and antiemetics given on days 4 and 7.  She would like this to happen again.  Pain is under excellent control on the fentanyl patch given to her by the palliative care people.  She is still smoking.  She knows it is bad for her.  She is ready for her last cycle of dose dense AC today.    Past Medical History:   Melanoma of right foot???     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  She thought about being a , but then got pregnant with her son Homero.  She had a lot of different for jobs.  She spent some time in [place] where her mom and other family member lives.  She then came back to [place].  She has a fiancé of 6 years named Saul and they currently live in [place].  She is an avid reader, and likes romance novels, but really anything.  She says she read 250 books last year.  She does smoke but is trying to quit.  She is currently on a low-carb diet.    Family History:     Family History   Problem Relation Age of Onset    Genitourinary Problems Mother         renal disease - minimal change, sponge kidney            Medications:     Current Outpatient Medications   Medication Sig Dispense Refill    albuterol (PROAIR HFA/PROVENTIL HFA/VENTOLIN HFA) 108 (90 Base) MCG/ACT inhaler Inhale 2 puffs into the lungs      amLODIPine (NORVASC) 10 MG tablet Take " 10 mg by mouth      atorvastatin (LIPITOR) 10 MG tablet Take 10 mg by mouth      Blood Glucose Monitoring Suppl (ONETOUCH VERIO FLEX SYSTEM) w/Device KIT       cetirizine (ZYRTEC) 10 MG tablet Take 5 mg by mouth      dexAMETHasone (DECADRON) 4 MG tablet Take 2 tablets (8 mg) by mouth daily Start on Day 2 of Cycles 1 through 4. 18 tablet 3    EPINEPHrine (ANY BX GENERIC EQUIV) 0.3 MG/0.3ML injection 2-pack       esomeprazole (NEXIUM) 20 MG DR capsule Take 20 mg by mouth daily before breakfast      fentaNYL (DURAGESIC) 25 mcg/hr 72 hr patch Place 1 patch onto the skin every 72 hours remove old patch. 10 patch 0    fluticasone-salmeterol (ADVAIR HFA) 115-21 MCG/ACT inhaler Inhale 2 puffs into the lungs      FREESTYLE TEST STRIPS test strip 1 each      hydrochlorothiazide (HYDRODIURIL) 25 MG tablet Take 25 mg by mouth      ibuprofen (ADVIL/MOTRIN) 200 MG tablet Take 200 mg by mouth      JANUVIA 50 MG tablet Take 50 mg by mouth      LORazepam (ATIVAN) 0.5 MG tablet Take 2 tablets (1 mg) by mouth every 6 hours as needed for nausea or anxiety 30 tablet 1    LORazepam (ATIVAN) 1 MG tablet Take 1 tablet (1 mg) by mouth every 6 hours as needed for anxiety, nausea or sleep 30 tablet 1    methocarbamol (ROBAXIN) 500 MG tablet Take 1,000 mg by mouth      naloxone (NARCAN) 4 MG/0.1ML nasal spray Spray 4 mg into one nostril alternating nostrils as needed for opioid reversal every 2-3 minutes until assistance arrives      OLANZapine (ZYPREXA) 2.5 MG tablet Take 1-2 tablets (2.5-5 mg) by mouth 3 times daily as needed (Nausea or anxiety) 150 tablet 2    Pregabalin (LYRICA) 200 MG capsule Take 1 capsule (200 mg) by mouth 3 times daily 90 capsule 0    prochlorperazine (COMPAZINE) 10 MG tablet Take 1 tablet (10 mg) by mouth every 6 hours as needed for nausea or vomiting 30 tablet 2    Prochlorperazine Maleate (COMPAZINE PO) Take 10 mg by mouth 3 times daily as needed for nausea      promethazine (PHENERGAN) 25 MG suppository Place 1  suppository (25 mg) rectally every 6 hours as needed for nausea 60 suppository 3    PROMETHAZINE HCL RE Place 25 mg rectally 3 times daily as needed for nausea      traMADol (ULTRAM) 50 MG tablet Take 1-2 tablets ( mg) by mouth every 6 hours as needed for severe pain 180 tablet 0    VICTOZA PEN 18 MG/3ML soln Inject 1.8 mg Subcutaneous                Physical Exam:   There were no vitals taken for this visit.    ECOG PS: 0  Constitutional: WDWN female in NAD, pleasant and appropriate  HEENT:  NC/AT, no icterus, OP clear, MMM  Skin: No jaundice nor ecchymoses  Lungs: CTAB, no w/r/r, nonlabored breathing  Cardiovascular: RRR, S1, S2, no m/r/g  Abdomen: +BS, morbidly obese, minimal periumbilical tenderness, no right upper quadrant tenderness.  MSK/Extremities: Warm, well perfused. No edema  LN: no cervical, supraclavicular, axillary, nor inguinal lymphadenopathy  Neurologic: alert, answering questions appropriately, moving all extremities spontaneously. CN 2-12 grossly intact.  Psych: appropriate affect  Breast exam: The left breast shows just a 1 cm deep mass in the periphery at the 2 o'clock position, down significantly from 4 cm x 4 cm prior to starting chemotherapy.    The rest of the breast has a heterogeneous exam.  There are no nipple abnormalities.  The left axilla is negative.  The left breast is smaller than the right side.  The right breast has a very heterogeneous exam but there is no obvious palpable lesion of concern.  The right axilla is negative.  Data:      Latest Reference Range & Units 04/26/24 10:26   WBC 4.0 - 11.0 10e3/uL 6.7   Hemoglobin 11.7 - 15.7 g/dL 10.9 (L)   Hematocrit 35.0 - 47.0 % 33.3 (L)   Platelet Count 150 - 450 10e3/uL 287   (L): Data is abnormally low    No results found for this or any previous visit (from the past 24 hour(s)).    Other Data     Breast ultrasound from today, personally reviewed with Dr. Madison Conley    Findings:     Targeted left breast ultrasound  evaluation was performed by the  technologist and radiologist. No significant change in size of the  biopsied cancer at 2:00 position, 10 cm from the nipple, now measuring  2.3 x 2.5 x 1.4 cm (previously 2.5 cm on 2/9/2024). In the subareolar  region, there is only normal breast tissue.                                                                      IMPRESSION: BI-RADS CATEGORY: 6 - Known Biopsy-Proven Malignancy.      Labs, imaging and treatment plan reviewed with patient. All questions answered.        40 minutes spent on the date of the encounter doing chart review, review of outside records, review of test results, interpretation of tests, patient visit, documentation, discussion with other provider(s), and discussion with family  This is also included discussion with pharmacy noted above

## 2024-04-25 DIAGNOSIS — N64.4 BREAST PAIN: ICD-10-CM

## 2024-04-25 DIAGNOSIS — C50.412 MALIGNANT NEOPLASM OF UPPER-OUTER QUADRANT OF LEFT BREAST IN FEMALE, ESTROGEN RECEPTOR POSITIVE (H): ICD-10-CM

## 2024-04-25 DIAGNOSIS — Z17.0 MALIGNANT NEOPLASM OF UPPER-OUTER QUADRANT OF LEFT BREAST IN FEMALE, ESTROGEN RECEPTOR POSITIVE (H): ICD-10-CM

## 2024-04-25 DIAGNOSIS — G89.3 CANCER RELATED PAIN: ICD-10-CM

## 2024-04-25 RX ORDER — PREGABALIN 200 MG/1
200 CAPSULE ORAL 2 TIMES DAILY
COMMUNITY
Start: 2024-04-25 | End: 2024-07-23

## 2024-04-25 RX ORDER — METHOCARBAMOL 500 MG/1
1500 TABLET, FILM COATED ORAL 3 TIMES DAILY PRN
Qty: 270 TABLET | Refills: 1 | Status: SHIPPED | OUTPATIENT
Start: 2024-04-25 | End: 2024-06-24

## 2024-04-25 NOTE — TELEPHONE ENCOUNTER
Pt reports trying an increased dose of lyrica (200 mg TID) yesterday however it caused her double vision and nausea. She gone back to twice daily dosing today. She also increased fentanyl to 25 mcg/hr yesterday. She is asking to rotate from tramadol to hydrocodone.     Discussed with Dr. Rodriguez. Will hold off on any further opiate changes until early next week since Fentanyl was doubled yesterday. Advised pt to take robaxin 1500 mg TID PRN in the meantime. Offered cymbalta 20 mg, however pt states she has previously not tolerated taking that.    KEYONNA KulkarniN, RN  Palliative Care Nurse Clinician    [phone] (Direct)  [phone] (Main)  [phone] (Appointment Scheduling)

## 2024-04-26 ENCOUNTER — APPOINTMENT (OUTPATIENT)
Dept: LAB | Facility: CLINIC | Age: 39
End: 2024-04-26
Attending: INTERNAL MEDICINE
Payer: COMMERCIAL

## 2024-04-26 ENCOUNTER — INFUSION THERAPY VISIT (OUTPATIENT)
Dept: ONCOLOGY | Facility: CLINIC | Age: 39
End: 2024-04-26
Attending: INTERNAL MEDICINE
Payer: COMMERCIAL

## 2024-04-26 VITALS
SYSTOLIC BLOOD PRESSURE: 128 MMHG | BODY MASS INDEX: 43.98 KG/M2 | WEIGHT: 287.1 LBS | TEMPERATURE: 97.9 F | HEART RATE: 129 BPM | OXYGEN SATURATION: 96 % | RESPIRATION RATE: 16 BRPM | DIASTOLIC BLOOD PRESSURE: 78 MMHG

## 2024-04-26 VITALS — HEART RATE: 124 BPM

## 2024-04-26 DIAGNOSIS — C50.412 MALIGNANT NEOPLASM OF UPPER-OUTER QUADRANT OF LEFT BREAST IN FEMALE, ESTROGEN RECEPTOR POSITIVE (H): Primary | ICD-10-CM

## 2024-04-26 DIAGNOSIS — Z17.0 MALIGNANT NEOPLASM OF UPPER-OUTER QUADRANT OF LEFT BREAST IN FEMALE, ESTROGEN RECEPTOR POSITIVE (H): Primary | ICD-10-CM

## 2024-04-26 LAB
ALBUMIN SERPL BCG-MCNC: 3.7 G/DL (ref 3.5–5.2)
ALP SERPL-CCNC: 70 U/L (ref 40–150)
ALT SERPL W P-5'-P-CCNC: 24 U/L (ref 0–50)
ANION GAP SERPL CALCULATED.3IONS-SCNC: 14 MMOL/L (ref 7–15)
AST SERPL W P-5'-P-CCNC: 36 U/L (ref 0–45)
BASOPHILS # BLD AUTO: 0.1 10E3/UL (ref 0–0.2)
BASOPHILS NFR BLD AUTO: 1 %
BILIRUB SERPL-MCNC: 0.2 MG/DL
BUN SERPL-MCNC: 7.7 MG/DL (ref 6–20)
CALCIUM SERPL-MCNC: 9.2 MG/DL (ref 8.6–10)
CHLORIDE SERPL-SCNC: 98 MMOL/L (ref 98–107)
CREAT SERPL-MCNC: 0.49 MG/DL (ref 0.51–0.95)
DEPRECATED HCO3 PLAS-SCNC: 24 MMOL/L (ref 22–29)
EGFRCR SERPLBLD CKD-EPI 2021: >90 ML/MIN/1.73M2
EOSINOPHIL # BLD AUTO: 0 10E3/UL (ref 0–0.7)
EOSINOPHIL NFR BLD AUTO: 0 %
ERYTHROCYTE [DISTWIDTH] IN BLOOD BY AUTOMATED COUNT: 16.1 % (ref 10–15)
GLUCOSE SERPL-MCNC: 415 MG/DL (ref 70–99)
HCT VFR BLD AUTO: 33.3 % (ref 35–47)
HGB BLD-MCNC: 10.9 G/DL (ref 11.7–15.7)
IMM GRANULOCYTES # BLD: 0.1 10E3/UL
IMM GRANULOCYTES NFR BLD: 2 %
LYMPHOCYTES # BLD AUTO: 1.5 10E3/UL (ref 0.8–5.3)
LYMPHOCYTES NFR BLD AUTO: 22 %
MCH RBC QN AUTO: 31 PG (ref 26.5–33)
MCHC RBC AUTO-ENTMCNC: 32.7 G/DL (ref 31.5–36.5)
MCV RBC AUTO: 95 FL (ref 78–100)
MONOCYTES # BLD AUTO: 1.1 10E3/UL (ref 0–1.3)
MONOCYTES NFR BLD AUTO: 17 %
NEUTROPHILS # BLD AUTO: 3.9 10E3/UL (ref 1.6–8.3)
NEUTROPHILS NFR BLD AUTO: 58 %
NRBC # BLD AUTO: 0 10E3/UL
NRBC BLD AUTO-RTO: 1 /100
PLATELET # BLD AUTO: 287 10E3/UL (ref 150–450)
POTASSIUM SERPL-SCNC: 3.9 MMOL/L (ref 3.4–5.3)
PROT SERPL-MCNC: 6.7 G/DL (ref 6.4–8.3)
RBC # BLD AUTO: 3.52 10E6/UL (ref 3.8–5.2)
SODIUM SERPL-SCNC: 136 MMOL/L (ref 135–145)
WBC # BLD AUTO: 6.7 10E3/UL (ref 4–11)

## 2024-04-26 PROCEDURE — 96413 CHEMO IV INFUSION 1 HR: CPT

## 2024-04-26 PROCEDURE — 96372 THER/PROPH/DIAG INJ SC/IM: CPT | Performed by: INTERNAL MEDICINE

## 2024-04-26 PROCEDURE — 250N000011 HC RX IP 250 OP 636: Performed by: INTERNAL MEDICINE

## 2024-04-26 PROCEDURE — 99214 OFFICE O/P EST MOD 30 MIN: CPT | Performed by: INTERNAL MEDICINE

## 2024-04-26 PROCEDURE — 96367 TX/PROPH/DG ADDL SEQ IV INF: CPT

## 2024-04-26 PROCEDURE — G0463 HOSPITAL OUTPT CLINIC VISIT: HCPCS | Performed by: INTERNAL MEDICINE

## 2024-04-26 PROCEDURE — 36591 DRAW BLOOD OFF VENOUS DEVICE: CPT | Performed by: INTERNAL MEDICINE

## 2024-04-26 PROCEDURE — 36591 DRAW BLOOD OFF VENOUS DEVICE: CPT

## 2024-04-26 PROCEDURE — 96375 TX/PRO/DX INJ NEW DRUG ADDON: CPT

## 2024-04-26 PROCEDURE — 96377 APPLICATON ON-BODY INJECTOR: CPT

## 2024-04-26 PROCEDURE — 82040 ASSAY OF SERUM ALBUMIN: CPT

## 2024-04-26 PROCEDURE — 85025 COMPLETE CBC W/AUTO DIFF WBC: CPT | Performed by: INTERNAL MEDICINE

## 2024-04-26 PROCEDURE — 258N000003 HC RX IP 258 OP 636: Performed by: INTERNAL MEDICINE

## 2024-04-26 RX ORDER — DIPHENHYDRAMINE HCL 25 MG
50 CAPSULE ORAL ONCE
Status: CANCELLED
Start: 2024-05-10

## 2024-04-26 RX ORDER — EPINEPHRINE 1 MG/ML
0.3 INJECTION, SOLUTION INTRAMUSCULAR; SUBCUTANEOUS EVERY 5 MIN PRN
Status: CANCELLED | OUTPATIENT
Start: 2024-05-10

## 2024-04-26 RX ORDER — HEPARIN SODIUM (PORCINE) LOCK FLUSH IV SOLN 100 UNIT/ML 100 UNIT/ML
5 SOLUTION INTRAVENOUS
Status: DISCONTINUED | OUTPATIENT
Start: 2024-04-26 | End: 2024-04-26 | Stop reason: HOSPADM

## 2024-04-26 RX ORDER — LORAZEPAM 2 MG/ML
0.5 INJECTION INTRAMUSCULAR EVERY 4 HOURS PRN
Status: CANCELLED | OUTPATIENT
Start: 2024-04-26

## 2024-04-26 RX ORDER — DIPHENHYDRAMINE HYDROCHLORIDE 50 MG/ML
50 INJECTION INTRAMUSCULAR; INTRAVENOUS
Status: CANCELLED
Start: 2024-05-10

## 2024-04-26 RX ORDER — HEPARIN SODIUM (PORCINE) LOCK FLUSH IV SOLN 100 UNIT/ML 100 UNIT/ML
5 SOLUTION INTRAVENOUS
Status: CANCELLED | OUTPATIENT
Start: 2024-04-26

## 2024-04-26 RX ORDER — LORAZEPAM 2 MG/ML
0.5 INJECTION INTRAMUSCULAR EVERY 4 HOURS PRN
Status: CANCELLED | OUTPATIENT
Start: 2024-05-10

## 2024-04-26 RX ORDER — MEPERIDINE HYDROCHLORIDE 25 MG/ML
25 INJECTION INTRAMUSCULAR; INTRAVENOUS; SUBCUTANEOUS EVERY 30 MIN PRN
Status: CANCELLED | OUTPATIENT
Start: 2024-04-26

## 2024-04-26 RX ORDER — METHYLPREDNISOLONE SODIUM SUCCINATE 125 MG/2ML
125 INJECTION, POWDER, LYOPHILIZED, FOR SOLUTION INTRAMUSCULAR; INTRAVENOUS
Status: CANCELLED
Start: 2024-04-26

## 2024-04-26 RX ORDER — DOXORUBICIN HYDROCHLORIDE 2 MG/ML
60 INJECTION, SOLUTION INTRAVENOUS ONCE
Status: CANCELLED | OUTPATIENT
Start: 2024-04-26

## 2024-04-26 RX ORDER — HEPARIN SODIUM (PORCINE) LOCK FLUSH IV SOLN 100 UNIT/ML 100 UNIT/ML
5 SOLUTION INTRAVENOUS
Status: CANCELLED | OUTPATIENT
Start: 2024-05-10

## 2024-04-26 RX ORDER — METHYLPREDNISOLONE SODIUM SUCCINATE 125 MG/2ML
125 INJECTION, POWDER, LYOPHILIZED, FOR SOLUTION INTRAMUSCULAR; INTRAVENOUS
Status: CANCELLED
Start: 2024-05-10

## 2024-04-26 RX ORDER — EPINEPHRINE 1 MG/ML
0.3 INJECTION, SOLUTION INTRAMUSCULAR; SUBCUTANEOUS EVERY 5 MIN PRN
Status: CANCELLED | OUTPATIENT
Start: 2024-04-26

## 2024-04-26 RX ORDER — MEPERIDINE HYDROCHLORIDE 25 MG/ML
25 INJECTION INTRAMUSCULAR; INTRAVENOUS; SUBCUTANEOUS EVERY 30 MIN PRN
Status: CANCELLED | OUTPATIENT
Start: 2024-05-10

## 2024-04-26 RX ORDER — DOXORUBICIN HYDROCHLORIDE 2 MG/ML
60 INJECTION, SOLUTION INTRAVENOUS ONCE
Status: COMPLETED | OUTPATIENT
Start: 2024-04-26 | End: 2024-04-26

## 2024-04-26 RX ORDER — ALBUTEROL SULFATE 0.83 MG/ML
2.5 SOLUTION RESPIRATORY (INHALATION)
Status: CANCELLED | OUTPATIENT
Start: 2024-05-10

## 2024-04-26 RX ORDER — DIPHENHYDRAMINE HYDROCHLORIDE 50 MG/ML
50 INJECTION INTRAMUSCULAR; INTRAVENOUS
Status: CANCELLED
Start: 2024-04-26

## 2024-04-26 RX ORDER — ALBUTEROL SULFATE 0.83 MG/ML
2.5 SOLUTION RESPIRATORY (INHALATION)
Status: CANCELLED | OUTPATIENT
Start: 2024-04-26

## 2024-04-26 RX ORDER — ALBUTEROL SULFATE 90 UG/1
1-2 AEROSOL, METERED RESPIRATORY (INHALATION)
Status: CANCELLED
Start: 2024-04-26

## 2024-04-26 RX ORDER — ALBUTEROL SULFATE 90 UG/1
1-2 AEROSOL, METERED RESPIRATORY (INHALATION)
Status: CANCELLED
Start: 2024-05-10

## 2024-04-26 RX ORDER — LORAZEPAM 2 MG/ML
0.5 INJECTION INTRAMUSCULAR EVERY 4 HOURS PRN
Status: DISCONTINUED | OUTPATIENT
Start: 2024-04-26 | End: 2024-04-26 | Stop reason: HOSPADM

## 2024-04-26 RX ORDER — HEPARIN SODIUM,PORCINE 10 UNIT/ML
5-20 VIAL (ML) INTRAVENOUS DAILY PRN
Status: CANCELLED | OUTPATIENT
Start: 2024-05-10

## 2024-04-26 RX ORDER — HEPARIN SODIUM,PORCINE 10 UNIT/ML
5-20 VIAL (ML) INTRAVENOUS DAILY PRN
Status: CANCELLED | OUTPATIENT
Start: 2024-04-26

## 2024-04-26 RX ADMIN — DEXAMETHASONE SODIUM PHOSPHATE: 10 INJECTION, SOLUTION INTRAMUSCULAR; INTRAVENOUS at 11:48

## 2024-04-26 RX ADMIN — Medication 5 ML: at 14:26

## 2024-04-26 RX ADMIN — LORAZEPAM 0.5 MG: 2 INJECTION INTRAMUSCULAR; INTRAVENOUS at 11:44

## 2024-04-26 RX ADMIN — CYCLOPHOSPHAMIDE 1500 MG: 1 INJECTION, POWDER, FOR SOLUTION INTRAVENOUS; ORAL at 13:38

## 2024-04-26 RX ADMIN — SODIUM CHLORIDE 500 ML: 9 INJECTION, SOLUTION INTRAVENOUS at 13:06

## 2024-04-26 RX ADMIN — SODIUM CHLORIDE 500 ML: 9 INJECTION, SOLUTION INTRAVENOUS at 11:37

## 2024-04-26 RX ADMIN — FAMOTIDINE 20 MG: 10 INJECTION INTRAVENOUS at 11:45

## 2024-04-26 RX ADMIN — Medication 5 ML: at 10:27

## 2024-04-26 RX ADMIN — PEGFILGRASTIM 6 MG: KIT SUBCUTANEOUS at 13:40

## 2024-04-26 RX ADMIN — DOXORUBICIN HYDROCHLORIDE 150 MG: 2 INJECTION, SOLUTION INTRAVENOUS at 13:20

## 2024-04-26 ASSESSMENT — PAIN SCALES - GENERAL: PAINLEVEL: MODERATE PAIN (4)

## 2024-04-26 NOTE — NURSING NOTE
Chief Complaint   Patient presents with    Port Draw     Vitals taken, port accessed, labs drawn, heparin locked, checked into next appt     /78 (BP Location: Left arm, Patient Position: Sitting, Cuff Size: Adult Large)   Pulse (!) 129   Temp 97.9  F (36.6  C) (Oral)   Resp 16   Wt 130.2 kg (287 lb 1.6 oz)   SpO2 96%   BMI 43.98 kg/m    Bala Meyers RN on 4/26/2024 at 10:32 AM

## 2024-04-26 NOTE — LETTER
4/26/2024         RE: Eliezer Izquierdo  [address] [facility] [address]        Dear Colleague,    Thank you for referring your patient, Eliezer Izquierdo, to the [facility]. Please see a copy of my visit note below.      [facility] Medical Oncology Note  Date of visit: April 26, 2024  New Outpatient Clinic Note        Assessment:     Clinical T2N1 invasive breast cancer with a high risk Mammaprint in this 39 year old woman. As discussed previously with Dr. Ford and again today with me, the patient is appropriate for neoadjuvant treatment.    S/P three cycles of neoadjuvant ddAC with exam showing a dramatic response.  The mass has gone from 4 cm across to just 1 cm.  I cannot palpate anything in the axilla.  I could not be happier for this very nice person.  She certainly has had some side effects with treatment and I get that.  She is done pretty well with fluids and antiemetics given these 3 and 7 in [place] and so we will make sure that this happens again for her.  She is clearly a candidate for breast conservation now.  However we are going to give her all the chemotherapy upfront in the hope that we can get her down to an RCB 1 or possibly pathologic complete response at the time of surgery.  Issues with chronic pain.  She is being managed beautifully by palliative care and I defer all management of her pain to that group.  She is doing very well on the fentanyl patch.  Fatigue I think it is due to the fact that she is becoming a little anemic.  She is a heavy smoker and when I first met her her hemoglobin was 16, implying an EPO driven compensatory response to chronic hypoxia caused by the smoking.  Now that her hemoglobin is lower she really does feel fatigued and it is understandable.  I would like her to quit smoking.  It does not look like this will be happening soon    Plan:     Cycle 4 dose dense AC today  On body Neulasta will go off tomorrow  We will make  "sure she gets fluids and antiemetics Monday and Thursday of next week at [place]  Return to clinic in 2 weeks to begin Taxol.  I would like to get a left breast ultrasound just prior to document the clinical response we are seeing on exam.  Continue to follow-up with palliative care for management of her chronic pain issues that predated her breast cancer diagnosis        Zachary Nolan MD, MSc  Associate Professor of Medicine  [employer] [employer]  [address] [address]  [phone]    __________________________________________________________________    DIAGNOSES     Clinical T2N1 invasive breast cancer diagnosed at breast biopsy 2/9/2024. Alleyson palpated a left breast lump. Mammogram and US showed a 2.5 x 2.5 x 2.0 cm spiculated mass at 2:00 position.   Biopsy showed a [place] grade I IDC, 97% strongly positive for ER, 99% strongly positive for TN, and negative for HER2 by FISH (IHC 2+). KI-67 is 24%. Mammaprint was high risk Luminal B. Left axillary US that showed three abnormal axillary nodes, biopsy positive for IDC. Biopsy showed METASTATIC BREAST CARCINOMA, almost entirely effacing lymph node, at least 11 mm in linear extent.  My first exam prior to neoadjuvant therapy showed a palpable deep left breast mass in the 2 o'clock position measuring roughly 4 cm x 4 cm.   Chronic pain with history of opiate prescriptions from multiple providers. She is currently seeing palliative care (Dr. Kylie Rodriguez), and gets buprenorphine, started on 4/11/2024. 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  But it was negative for everything else.        History of Present Illness/Therapy to dte:       Neoadjuvant ddAC initiated 3/15/2024. She is back today prior to " "cycle 4. The plan will be to go weekly paclitaxel times 12 after this cycle.      Interval History   Got bilateral knee injections from ortho 4/15/2024.  Feels the mass is shrinking.  Does feel fatigued all of the time.  It is the major side effect here.  She also gets significant nausea in the first week after getting the AC.  This is improved with fluids and antiemetics given on days 4 and 7.  She would like this to happen again.  Pain is under excellent control on the fentanyl patch given to her by the palliative care people.  She is still smoking.  She knows it is bad for her.  She is ready for her last cycle of dose dense AC today.    Past Medical History:   Melanoma of right foot???     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  She thought about being a , but then got pregnant with her son Homero.  She had a lot of different for jobs.  She spent some time in [place] where her mom and other family member lives.  She then came back to [place].  She has a fiancé of 6 years named Saul and they currently live in [place].  She is an avid reader, and likes romance novels, but really anything.  She says she read 250 books last year.  She does smoke but is trying to quit.  She is currently on a low-carb diet.    Family History:     Family History   Problem Relation Age of Onset    Genitourinary Problems Mother         renal disease - minimal " change, sponge kidney            Medications:     Current Outpatient Medications   Medication Sig Dispense Refill    albuterol (PROAIR HFA/PROVENTIL HFA/VENTOLIN HFA) 108 (90 Base) MCG/ACT inhaler Inhale 2 puffs into the lungs      amLODIPine (NORVASC) 10 MG tablet Take 10 mg by mouth      atorvastatin (LIPITOR) 10 MG tablet Take 10 mg by mouth      Blood Glucose Monitoring Suppl (ONETOUCH VERIO FLEX SYSTEM) w/Device KIT       cetirizine (ZYRTEC) 10 MG tablet Take 5 mg by mouth      dexAMETHasone (DECADRON) 4 MG tablet Take 2 tablets (8 mg) by mouth daily Start on Day 2 of Cycles 1 through 4. 18 tablet 3    EPINEPHrine (ANY BX GENERIC EQUIV) 0.3 MG/0.3ML injection 2-pack       esomeprazole (NEXIUM) 20 MG DR capsule Take 20 mg by mouth daily before breakfast      fentaNYL (DURAGESIC) 25 mcg/hr 72 hr patch Place 1 patch onto the skin every 72 hours remove old patch. 10 patch 0    fluticasone-salmeterol (ADVAIR HFA) 115-21 MCG/ACT inhaler Inhale 2 puffs into the lungs      FREESTYLE TEST STRIPS test strip 1 each      hydrochlorothiazide (HYDRODIURIL) 25 MG tablet Take 25 mg by mouth      ibuprofen (ADVIL/MOTRIN) 200 MG tablet Take 200 mg by mouth      JANUVIA 50 MG tablet Take 50 mg by mouth      LORazepam (ATIVAN) 0.5 MG tablet Take 2 tablets (1 mg) by mouth every 6 hours as needed for nausea or anxiety 30 tablet 1    LORazepam (ATIVAN) 1 MG tablet Take 1 tablet (1 mg) by mouth every 6 hours as needed for anxiety, nausea or sleep 30 tablet 1    methocarbamol (ROBAXIN) 500 MG tablet Take 1,000 mg by mouth      naloxone (NARCAN) 4 MG/0.1ML nasal spray Spray 4 mg into one nostril alternating nostrils as needed for opioid reversal every 2-3 minutes until assistance arrives      OLANZapine (ZYPREXA) 2.5 MG tablet Take 1-2 tablets (2.5-5 mg) by mouth 3 times daily as needed (Nausea or anxiety) 150 tablet 2    Pregabalin (LYRICA) 200 MG capsule Take 1 capsule (200 mg) by mouth 3 times daily 90 capsule 0    prochlorperazine  (COMPAZINE) 10 MG tablet Take 1 tablet (10 mg) by mouth every 6 hours as needed for nausea or vomiting 30 tablet 2    Prochlorperazine Maleate (COMPAZINE PO) Take 10 mg by mouth 3 times daily as needed for nausea      promethazine (PHENERGAN) 25 MG suppository Place 1 suppository (25 mg) rectally every 6 hours as needed for nausea 60 suppository 3    PROMETHAZINE HCL RE Place 25 mg rectally 3 times daily as needed for nausea      traMADol (ULTRAM) 50 MG tablet Take 1-2 tablets ( mg) by mouth every 6 hours as needed for severe pain 180 tablet 0    VICTOZA PEN 18 MG/3ML soln Inject 1.8 mg Subcutaneous                Physical Exam:   There were no vitals taken for this visit.    ECOG PS: 0  Constitutional: WDWN female in NAD, pleasant and appropriate  HEENT:  NC/AT, no icterus, OP clear, MMM  Skin: No jaundice nor ecchymoses  Lungs: CTAB, no w/r/r, nonlabored breathing  Cardiovascular: RRR, S1, S2, no m/r/g  Abdomen: +BS, morbidly obese, minimal periumbilical tenderness, no right upper quadrant tenderness.  MSK/Extremities: Warm, well perfused. No edema  LN: no cervical, supraclavicular, axillary, nor inguinal lymphadenopathy  Neurologic: alert, answering questions appropriately, moving all extremities spontaneously. CN 2-12 grossly intact.  Psych: appropriate affect  Breast exam: The left breast shows just a 1 cm deep mass in the periphery at the 2 o'clock position, down significantly from 4 cm x 4 cm prior to starting chemotherapy.    The rest of the breast has a heterogeneous exam.  There are no nipple abnormalities.  The left axilla is negative.  The left breast is smaller than the right side.  The right breast has a very heterogeneous exam but there is no obvious palpable lesion of concern.  The right axilla is negative.  Data:      Latest Reference Range & Units 04/26/24 10:26   WBC 4.0 - 11.0 10e3/uL 6.7   Hemoglobin 11.7 - 15.7 g/dL 10.9 (L)   Hematocrit 35.0 - 47.0 % 33.3 (L)   Platelet Count 150 - 450  10e3/uL 287   (L): Data is abnormally low    No results found for this or any previous visit (from the past 24 hour(s)).    Other Data     Breast ultrasound from today, personally reviewed with Dr. Madison Conley    Findings:     Targeted left breast ultrasound evaluation was performed by the  technologist and radiologist. No significant change in size of the  biopsied cancer at 2:00 position, 10 cm from the nipple, now measuring  2.3 x 2.5 x 1.4 cm (previously 2.5 cm on 2/9/2024). In the subareolar  region, there is only normal breast tissue.                                                                      IMPRESSION: BI-RADS CATEGORY: 6 - Known Biopsy-Proven Malignancy.      Labs, imaging and treatment plan reviewed with patient. All questions answered.        40 minutes spent on the date of the encounter doing chart review, review of outside records, review of test results, interpretation of tests, patient visit, documentation, discussion with other provider(s), and discussion with family  This is also included discussion with pharmacy noted above

## 2024-04-26 NOTE — PROGRESS NOTES
Infusion Nursing Note:  Eliezer Izquierdo presents today for C4D1 Adriamycin/Cytoxan/OnBody Neulasta.    Patient seen by provider today: Yes: Dr. Nolan   present during visit today: Not Applicable.    Note: Instruction given to patient as per provider. Verbalized understanding.    IV Ativan and Pepcid give as per patient request.    Appointment is not made by the time patient left the facility. Instructed patient if unable to see next few days to call . Verbalized understanding.    Intravenous Access:  Implanted Port.    Treatment Conditions:  Lab Results   Component Value Date    HGB 10.9 (L) 04/26/2024    WBC 6.7 04/26/2024    ANEU 7.2 04/12/2024    ANEUTAUTO 3.9 04/26/2024     04/26/2024        Lab Results   Component Value Date     04/26/2024    POTASSIUM 3.9 04/26/2024    MAG 1.8 04/12/2024    CR 0.49 (L) 04/26/2024    REMI 9.2 04/26/2024    BILITOTAL 0.2 04/26/2024    ALBUMIN 3.7 04/26/2024    ALT 24 04/26/2024    AST 36 04/26/2024       Results reviewed, labs MET treatment parameters, ok to proceed with treatment.  ECHO/MUGA completed 3/11/24  EF 6065%.    TORB: 4/26/24/1130H/Dr. Nolan/Sofie Govea RN/ Please draw CMP. Give IV N/S 500ml as per patient request. , no intervention needed.    TORB: 4/26/24/1244H/Dr. Nolan/Sofie Govea RN/ Glucose 412, may proceed with treatment as planned. Please give additional IV N/S 500ml.     Neulasta Onpro On-Body injector applied to right lower abdomen at 4/29/24 at 1345 with light facing out.  Writer discussed Neulasta injection would start on 4/29/24 at 1645, approximately 27 hours after application applied today. Verbal instruction reviewed with patient.  Pt instructed when the dose delivery starts, it will take about 45 minutes to complete.  Pt aware Neulasta Onpro On-Body should have green flashing light and to call triage or on-call MD if injector flashes red or appears to be leaking. Pt aware to keep Onpro On-Body  Neulasta 4 inches away from electrical equipment and to avoid showering 4 hours prior to injection.   Neulasta Onpro Lot number: See MAR     Post Infusion Assessment:  Patient tolerated infusion without incident.  Patient tolerated injection without incident.  Blood return noted pre and post infusion.  Blood return noted during Doxorubicin administration every 2 cc.  Site patent and intact, free from redness, edema or discomfort.  No evidence of extravasations.  Access discontinued per protocol.       Discharge Plan:   Patient declined prescription refills.  Discharge instructions reviewed with: Patient.  Patient and/or family verbalized understanding of discharge instructions and all questions answered.  AVS to patient via Fishidy[employer].  Patient will return on 4/29 for fluids/Emend and  2 weeks for next appointment.   Patient discharged in stable condition accompanied by: self.  Departure Mode: Ambulatory.      MICHAELA KATZ RN

## 2024-04-26 NOTE — NURSING NOTE
"Oncology Rooming Note    April 26, 2024 10:41 AM   Eliezer Izquierdo is a 39 year old female who presents for:    Chief Complaint   Patient presents with    Port Draw     Vitals taken, port accessed, labs drawn, heparin locked, checked into next appt    Oncology Clinic Visit     Breast CA     Initial Vitals: /78 (BP Location: Left arm, Patient Position: Sitting, Cuff Size: Adult Large)   Pulse (!) 129   Temp 97.9  F (36.6  C) (Oral)   Resp 16   Wt 130.2 kg (287 lb 1.6 oz)   SpO2 96%   BMI 43.98 kg/m   Estimated body mass index is 43.98 kg/m  as calculated from the following:    Height as of 4/10/24: 1.721 m (5' 7.75\").    Weight as of this encounter: 130.2 kg (287 lb 1.6 oz). Body surface area is 2.49 meters squared.  Moderate Pain (4) Comment: knee right   No LMP recorded. (Menstrual status: UNKNOWN).  Allergies reviewed: Yes  Medications reviewed: Yes    Medications: Medication refills not needed today.  Pharmacy name entered into EPIC:    [facility] - [place], [place] - [address] [facility] [place], [place] - [address]  [facility] [place], [place] - [address] [place] [address]    Frailty Screening:   Is the patient here for a new oncology consult visit in cancer care? 2. No      Clinical concerns:        Leidy Arriaza              "

## 2024-04-29 DIAGNOSIS — Z17.0 MALIGNANT NEOPLASM OF UPPER-OUTER QUADRANT OF LEFT BREAST IN FEMALE, ESTROGEN RECEPTOR POSITIVE (H): Primary | ICD-10-CM

## 2024-04-29 DIAGNOSIS — C50.412 MALIGNANT NEOPLASM OF UPPER-OUTER QUADRANT OF LEFT BREAST IN FEMALE, ESTROGEN RECEPTOR POSITIVE (H): Primary | ICD-10-CM

## 2024-04-29 RX ORDER — MEPERIDINE HYDROCHLORIDE 25 MG/ML
25 INJECTION INTRAMUSCULAR; INTRAVENOUS; SUBCUTANEOUS EVERY 30 MIN PRN
Status: CANCELLED | OUTPATIENT
Start: 2024-05-24

## 2024-04-29 RX ORDER — ALBUTEROL SULFATE 0.83 MG/ML
2.5 SOLUTION RESPIRATORY (INHALATION)
Status: CANCELLED | OUTPATIENT
Start: 2024-05-31

## 2024-04-29 RX ORDER — DIPHENHYDRAMINE HCL 25 MG
50 CAPSULE ORAL
Status: CANCELLED
Start: 2024-05-24

## 2024-04-29 RX ORDER — EPINEPHRINE 1 MG/ML
0.3 INJECTION, SOLUTION INTRAMUSCULAR; SUBCUTANEOUS EVERY 5 MIN PRN
Status: CANCELLED | OUTPATIENT
Start: 2024-05-31

## 2024-04-29 RX ORDER — ALBUTEROL SULFATE 90 UG/1
1-2 AEROSOL, METERED RESPIRATORY (INHALATION)
Status: CANCELLED
Start: 2024-05-17

## 2024-04-29 RX ORDER — METHYLPREDNISOLONE SODIUM SUCCINATE 125 MG/2ML
125 INJECTION, POWDER, LYOPHILIZED, FOR SOLUTION INTRAMUSCULAR; INTRAVENOUS
Status: CANCELLED
Start: 2024-05-24

## 2024-04-29 RX ORDER — DIPHENHYDRAMINE HCL 25 MG
50 CAPSULE ORAL ONCE
Status: CANCELLED
Start: 2024-05-17

## 2024-04-29 RX ORDER — DIPHENHYDRAMINE HCL 25 MG
50 CAPSULE ORAL
Status: CANCELLED
Start: 2024-05-31

## 2024-04-29 RX ORDER — HEPARIN SODIUM,PORCINE 10 UNIT/ML
5-20 VIAL (ML) INTRAVENOUS DAILY PRN
Status: CANCELLED | OUTPATIENT
Start: 2024-05-24

## 2024-04-29 RX ORDER — ALBUTEROL SULFATE 0.83 MG/ML
2.5 SOLUTION RESPIRATORY (INHALATION)
Status: CANCELLED | OUTPATIENT
Start: 2024-05-24

## 2024-04-29 RX ORDER — ONDANSETRON 2 MG/ML
8 INJECTION INTRAMUSCULAR; INTRAVENOUS ONCE
Status: CANCELLED | OUTPATIENT
Start: 2024-05-31

## 2024-04-29 RX ORDER — DIPHENHYDRAMINE HYDROCHLORIDE 50 MG/ML
50 INJECTION INTRAMUSCULAR; INTRAVENOUS
Status: CANCELLED
Start: 2024-05-24

## 2024-04-29 RX ORDER — LORAZEPAM 2 MG/ML
0.5 INJECTION INTRAMUSCULAR EVERY 4 HOURS PRN
Status: CANCELLED | OUTPATIENT
Start: 2024-05-31

## 2024-04-29 RX ORDER — HEPARIN SODIUM (PORCINE) LOCK FLUSH IV SOLN 100 UNIT/ML 100 UNIT/ML
5 SOLUTION INTRAVENOUS
Status: CANCELLED | OUTPATIENT
Start: 2024-05-17

## 2024-04-29 RX ORDER — METHYLPREDNISOLONE SODIUM SUCCINATE 125 MG/2ML
125 INJECTION, POWDER, LYOPHILIZED, FOR SOLUTION INTRAMUSCULAR; INTRAVENOUS
Status: CANCELLED
Start: 2024-05-31

## 2024-04-29 RX ORDER — LORAZEPAM 2 MG/ML
0.5 INJECTION INTRAMUSCULAR EVERY 4 HOURS PRN
Status: CANCELLED | OUTPATIENT
Start: 2024-05-17

## 2024-04-29 RX ORDER — DIPHENHYDRAMINE HYDROCHLORIDE 50 MG/ML
50 INJECTION INTRAMUSCULAR; INTRAVENOUS
Status: CANCELLED
Start: 2024-05-31

## 2024-04-29 RX ORDER — MEPERIDINE HYDROCHLORIDE 25 MG/ML
25 INJECTION INTRAMUSCULAR; INTRAVENOUS; SUBCUTANEOUS EVERY 30 MIN PRN
Status: CANCELLED | OUTPATIENT
Start: 2024-05-31

## 2024-04-29 RX ORDER — ALBUTEROL SULFATE 90 UG/1
1-2 AEROSOL, METERED RESPIRATORY (INHALATION)
Status: CANCELLED
Start: 2024-05-31

## 2024-04-29 RX ORDER — ALBUTEROL SULFATE 0.83 MG/ML
2.5 SOLUTION RESPIRATORY (INHALATION)
Status: CANCELLED | OUTPATIENT
Start: 2024-05-17

## 2024-04-29 RX ORDER — HEPARIN SODIUM,PORCINE 10 UNIT/ML
5-20 VIAL (ML) INTRAVENOUS DAILY PRN
Status: CANCELLED | OUTPATIENT
Start: 2024-05-31

## 2024-04-29 RX ORDER — METHYLPREDNISOLONE SODIUM SUCCINATE 125 MG/2ML
125 INJECTION, POWDER, LYOPHILIZED, FOR SOLUTION INTRAMUSCULAR; INTRAVENOUS
Status: CANCELLED
Start: 2024-05-17

## 2024-04-29 RX ORDER — EPINEPHRINE 1 MG/ML
0.3 INJECTION, SOLUTION INTRAMUSCULAR; SUBCUTANEOUS EVERY 5 MIN PRN
Status: CANCELLED | OUTPATIENT
Start: 2024-05-17

## 2024-04-29 RX ORDER — ONDANSETRON 2 MG/ML
8 INJECTION INTRAMUSCULAR; INTRAVENOUS ONCE
Status: CANCELLED | OUTPATIENT
Start: 2024-05-24

## 2024-04-29 RX ORDER — LORAZEPAM 2 MG/ML
0.5 INJECTION INTRAMUSCULAR EVERY 4 HOURS PRN
Status: CANCELLED | OUTPATIENT
Start: 2024-05-24

## 2024-04-29 RX ORDER — ALBUTEROL SULFATE 90 UG/1
1-2 AEROSOL, METERED RESPIRATORY (INHALATION)
Status: CANCELLED
Start: 2024-05-24

## 2024-04-29 RX ORDER — DIPHENHYDRAMINE HYDROCHLORIDE 50 MG/ML
50 INJECTION INTRAMUSCULAR; INTRAVENOUS
Status: CANCELLED
Start: 2024-05-17

## 2024-04-29 RX ORDER — HEPARIN SODIUM (PORCINE) LOCK FLUSH IV SOLN 100 UNIT/ML 100 UNIT/ML
5 SOLUTION INTRAVENOUS
Status: CANCELLED | OUTPATIENT
Start: 2024-05-24

## 2024-04-29 RX ORDER — MEPERIDINE HYDROCHLORIDE 25 MG/ML
25 INJECTION INTRAMUSCULAR; INTRAVENOUS; SUBCUTANEOUS EVERY 30 MIN PRN
Status: CANCELLED | OUTPATIENT
Start: 2024-05-17

## 2024-04-29 RX ORDER — HEPARIN SODIUM (PORCINE) LOCK FLUSH IV SOLN 100 UNIT/ML 100 UNIT/ML
5 SOLUTION INTRAVENOUS
Status: CANCELLED | OUTPATIENT
Start: 2024-05-31

## 2024-04-29 RX ORDER — EPINEPHRINE 1 MG/ML
0.3 INJECTION, SOLUTION INTRAMUSCULAR; SUBCUTANEOUS EVERY 5 MIN PRN
Status: CANCELLED | OUTPATIENT
Start: 2024-05-24

## 2024-04-29 RX ORDER — HEPARIN SODIUM,PORCINE 10 UNIT/ML
5-20 VIAL (ML) INTRAVENOUS DAILY PRN
Status: CANCELLED | OUTPATIENT
Start: 2024-05-17

## 2024-04-30 DIAGNOSIS — N64.4 BREAST PAIN: ICD-10-CM

## 2024-04-30 DIAGNOSIS — C50.412 MALIGNANT NEOPLASM OF UPPER-OUTER QUADRANT OF LEFT BREAST IN FEMALE, ESTROGEN RECEPTOR POSITIVE (H): Primary | ICD-10-CM

## 2024-04-30 DIAGNOSIS — Z17.0 MALIGNANT NEOPLASM OF UPPER-OUTER QUADRANT OF LEFT BREAST IN FEMALE, ESTROGEN RECEPTOR POSITIVE (H): Primary | ICD-10-CM

## 2024-04-30 NOTE — TELEPHONE ENCOUNTER
Pt reporting tramadol is not helping with breakthrough pain. Dr. Rodriguez would like her to discontinue it and try hydrocodone 1-2 tabs Q 6 hr PRN. Will send in a week supply. Pt to continue fentanyl and robaxin.    KEYONNA KulkarniN, RN  Palliative Care Nurse Clinician    [phone] (Direct)  [phone] (Main)  [phone] (Appointment Scheduling)

## 2024-05-01 RX ORDER — HYDROCODONE BITARTRATE AND ACETAMINOPHEN 5; 325 MG/1; MG/1
1-2 TABLET ORAL EVERY 6 HOURS PRN
Qty: 56 TABLET | Refills: 0 | Status: SHIPPED | OUTPATIENT
Start: 2024-05-01 | End: 2024-05-07

## 2024-05-07 ENCOUNTER — VIRTUAL VISIT (OUTPATIENT)
Dept: PALLIATIVE CARE | Facility: CLINIC | Age: 39
End: 2024-05-07
Attending: STUDENT IN AN ORGANIZED HEALTH CARE EDUCATION/TRAINING PROGRAM
Payer: COMMERCIAL

## 2024-05-07 VITALS — HEIGHT: 67 IN | WEIGHT: 280 LBS | BODY MASS INDEX: 43.95 KG/M2

## 2024-05-07 DIAGNOSIS — F11.90 OPIOID USE DISORDER: ICD-10-CM

## 2024-05-07 DIAGNOSIS — F41.9 ANXIETY: ICD-10-CM

## 2024-05-07 DIAGNOSIS — G89.3 CANCER RELATED PAIN: ICD-10-CM

## 2024-05-07 DIAGNOSIS — Z51.5 ENCOUNTER FOR PALLIATIVE CARE: ICD-10-CM

## 2024-05-07 DIAGNOSIS — N64.4 BREAST PAIN: ICD-10-CM

## 2024-05-07 DIAGNOSIS — C50.412 MALIGNANT NEOPLASM OF UPPER-OUTER QUADRANT OF LEFT BREAST IN FEMALE, ESTROGEN RECEPTOR POSITIVE (H): Primary | ICD-10-CM

## 2024-05-07 DIAGNOSIS — Z17.0 MALIGNANT NEOPLASM OF UPPER-OUTER QUADRANT OF LEFT BREAST IN FEMALE, ESTROGEN RECEPTOR POSITIVE (H): Primary | ICD-10-CM

## 2024-05-07 PROCEDURE — 99215 OFFICE O/P EST HI 40 MIN: CPT | Mod: 95 | Performed by: STUDENT IN AN ORGANIZED HEALTH CARE EDUCATION/TRAINING PROGRAM

## 2024-05-07 PROCEDURE — 99417 PROLNG OP E/M EACH 15 MIN: CPT | Mod: 95 | Performed by: STUDENT IN AN ORGANIZED HEALTH CARE EDUCATION/TRAINING PROGRAM

## 2024-05-07 RX ORDER — HYDROCODONE BITARTRATE AND ACETAMINOPHEN 5; 325 MG/1; MG/1
1-2 TABLET ORAL EVERY 6 HOURS PRN
Qty: 56 TABLET | Refills: 0 | Status: SHIPPED | OUTPATIENT
Start: 2024-05-07 | End: 2024-05-13

## 2024-05-07 RX ORDER — BUPRENORPHINE AND NALOXONE 2; .5 MG/1; MG/1
FILM, SOLUBLE BUCCAL; SUBLINGUAL
Qty: 28 FILM | Refills: 0 | Status: SHIPPED | OUTPATIENT
Start: 2024-05-07 | End: 2024-06-02

## 2024-05-07 NOTE — LETTER
5/7/2024       RE: Eliezer Izquierdo  [address] [facility] [address]       Dear Colleague,    Thank you for referring your patient, [facility]iezer Izquierdo[facility]. Please see a copy of my visit note below.    Palliative Care Progress Note    Patient Name: Eliezer Izquierdo  Primary Provider: Madison Roach    Chief Complaint/Patient ID: Eliezer Izquierdo is a 39 year old female with PMHx of T2 N1 invasive breast cancer on the left side.  Initiated neoadjuvant ddAC 3/15/24.  Given response to chemo, will likely be a candidate for breast conservation surgery now.  Will complete 12 additional weeks of chemo, followed by presumed lumpectomy, radiation, and then adjuvant therapy.  -Per chart review, some history of chronic pain (knees, migraines).  Mention about being on opiates in the past but weaned herself off.     Pain Hx:    Reports that she had been on pain medications for many years.  Originally was thought to have endometriosis, however after having surgery, was found to have more of an IBS picture.  She is also dealt with chronic pain in both of her knees, as she needs knee replacements.  She gets injections in her knees regularly.  Additionally deals with daily migraines.  Currently working on appeals process for the treatment for the migraines.     Last Palliative care appointment: 4/9/24 with me.      Reviewed: Yes    Interim History:  Eliezer Izquierdo is a 39 year old female who is seen today for follow up with Palliative Care via billable video visit.      Reviewed Patient's case and pain management at Palliative care IDT meeting this morning.      Since our last visit, Suboxone was not covered for chronic pain. Started on Fentanyl patch. Had one dose increase to 25mcg. Now using Norco for BTP. Attempted to increase Lyrica but resulted in vomiting.    Reviewed oncology note from 4/26.  Has  "had 3 cycles of chemo showing a \"dramatic response\", including that the mass shrunk from 4 cm to 1 cm.  Planning to complete all of chemotherapy upfront, and she is now a candidate for breast conservation now.  Hoping for RCB 1 or possibly complete pathologic response at time of surgery.    She states that she was very pleased with the news from her last oncology visit.  Says that unfortunately she feels like the tumor is growing again.  Says it feels bigger with more tugging/tearing in her breast.    Has been using the hydrocodone 8 tablets/day (2 tablets every 6 hours).  She does think this is helpful.  Says she has not been sure if the fentanyl patch is doing anything.  Her last patch fell off last evening around 8 or 9 PM, and she has not noticed any changes with her pain.  She does not think she has had any withdrawal, though her house was really warm yesterday and last evening, and she did wake up sweating.  It is possible this mask some of that.  Notes in the past, she has required higher doses of opiates that time for pain relief, and had wondered if this was because she was redhead.    Continues taking the Robaxin 1500 mg 3 times daily.  Thinks it might be helping, however it also might be making her sick to her stomach.  Also stopped taking her Lyrica within the past couple weeks due to similar concerns with feeling sick to her stomach.     Social History:  Lives with clifton.  Has a son named Homero.  Has lived in [place], [place], and [place].  Enjoys reading.  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 Trying to quit.   Substance Use Topics    Alcohol use: No    Drug use: No     Advanced Care Planning: Has not completed " an [facility]. States her dad would be her HCA.     Family History- Reviewed in Epic.    Medications- Reviewed in Epic.    Past Medical History- Reviewed in [facility].    Past Surgical History- Reviewed in Epic.    Physical Exam:   Constitutional: Alert, pleasant, no apparent distress. Sitting up in chair.  Eyes: Sclera non-icteric, no eye discharge.  ENT: No nasal discharge. Ears grossly normal.  Respiratory: Unlabored respirations. Speaking in full sentences.  Musculoskeletal: Extremities appear normal- no gross deformities noted. No edema noted on upper body.   Skin: No suspicious lesions or rashes on visible skin.  Neurologic: Clear speech, no aphasia. No facial droop.  Psychiatric: Mentation appears normal, appropriate attention. Affect normal/bright. Does not appear anxious or depressed.    Key Data Reviewed:  LABS: 4/26/2024- Cr 0.49, GFR >90, Albumin 3.7, LFTs WNL. WBC 6.7, Hgb 10.9, Plts 287.     Impression & Recommendations & Counseling:  Eliezer Izquierdo is a 39 year old female with history of left-sided breast cancer.     Discussed the wonderful response it seems that she is having to her chemotherapy regimen and the disconnect with ongoing pain.  Despite increasing dosing of full opioid agonist over the last month, she really has not noticed much change in her pain.  We discussed that the 25 mcg of fentanyl is equal to 10 tablets of her hydrocodone, which surprised her - she did not realize that it was that much medication.  We discussed my concerns that there is a component of opioid use disorder, and that her brain is getting used to/liking the effects of the opioids, however she is not getting significant pain control at fairly substantial doses.    In terms of efficacy, Eliezer is not getting good pain relief as evidenced by dose escalations and physical tolerance.  Her function is not improved with the opioids. Based on these criteria, I believe she meets the diagnosis for Opioid Use Disorder by DSM 5  criteria.     Because the patient qualifies for a diagnosis of Opioid Use Disorder/Opioid dependence in addition to having chronic pain, I am recommending Buprenorphine. In addition to managing cravings for opioid use disorder, advantages to using Buprenorphine vs traditional opioids for chronic pain include: less physical tolerance, no risk of OIH, less potential for respiratory depression and unintentional overdose and less psychological addiction.  In addition, it can have both anti-depressant and anti-anxiety effects.  The patient will be continue to follow up on a 3-4 week basis with our team.  Discontinuation of this medication results in a long withdrawal syndrome that can be unpleasant.  Once started it is not uncommon for a patient to be on this for years or even life.  Depending on ongoing pain needs once treatment is completed, a transition to a chronic pain management specialist might be appropriate.  This was discussed and the patient wishes to start this medication. We plan to do a home induction and instructions for this have been given.      Recommendations:  -Given agreement and qualifications for opioid use disorder, we will start Suboxone at 0.5mg daily with plans to escalate to 1mg twice daily over a week.  Will plan to have check-in's via telephone and then plan a formal visit in 3 weeks.  We can make dosing changes at that time if necessary.  The patient has been informed that this is the only option for a long term opioid.    -For acute exacerbations with pain, we did discuss that there can still be roles for short acting opioids, particularly around surgery, even while on buprenorphine.  Refilled 1 week supply of hydrocodone today.  -Discussed it is safer to use lorazepam with buprenorphine as well if breakthrough anxiety is present.  -I am hopeful that she will also get some mood benefit from the buprenorphine.  -Trial of switching Robaxin to 1000 mg every 6 hours instead of 1500 mg 3 times  daily to see if this helps decrease some nausea.  -She does not feel that the Lyrica has done very much for her pain, so I suppose it is okay to stay off of it for now.  It is possible that stopping it did contribute to perception of worsening pain over the last week.  Will hold off on any neuropathic agents at this time.  -We also discussed that since I am not licensed in [place] and we are dealing with controlled substances that can only be sent to pharmacies in [place], once we get things stabilized from a management perspective, we will likely need to transition her to one of my colleagues who is licensed in [place].      Follow up: 3-4 weeks         Total time spent on day of encounter is 107 mins, including reviewing record, reviewing her case as a part of outpatient palliative IDT, review of above studies, above visit with patient, symptomatic discussion of pain management and opioid use disorder concerns, including medication adjustments/prescription management, and documentation.       Some chart documentation performed using Dragon Voice recognition Software. Although reviewed after completion, some words and grammatical errors may remain.        Again, thank you for allowing me to participate in the care of your patient.      Sincerely,    Kylie Rodriguez, DO

## 2024-05-07 NOTE — PROGRESS NOTES
"Palliative Care Progress Note    Patient Name: Eliezer Izquierdo  Primary Provider: Madison Roach    Chief Complaint/Patient ID: Eliezer Izquierdo is a 39 year old female with PMHx of T2 N1 invasive breast cancer on the left side.  Initiated neoadjuvant ddAC 3/15/24.  Given response to chemo, will likely be a candidate for breast conservation surgery now.  Will complete 12 additional weeks of chemo, followed by presumed lumpectomy, radiation, and then adjuvant therapy.  -Per chart review, some history of chronic pain (knees, migraines).  Mention about being on opiates in the past but weaned herself off.     Pain Hx:    Reports that she had been on pain medications for many years.  Originally was thought to have endometriosis, however after having surgery, was found to have more of an IBS picture.  She is also dealt with chronic pain in both of her knees, as she needs knee replacements.  She gets injections in her knees regularly.  Additionally deals with daily migraines.  Currently working on appeals process for the treatment for the migraines.     Last Palliative care appointment: 4/9/24 with me.      Reviewed: Yes    Interim History:  Eliezer Izquierdo is a 39 year old female who is seen today for follow up with Palliative Care via billable video visit.      Reviewed Patient's case and pain management at Palliative care IDT meeting this morning.      Since our last visit, Suboxone was not covered for chronic pain. Started on Fentanyl patch. Had one dose increase to 25mcg. Now using Norco for BTP. Attempted to increase Lyrica but resulted in vomiting.    Reviewed oncology note from 4/26.  Has had 3 cycles of chemo showing a \"dramatic response\", including that the mass shrunk from 4 cm to 1 cm.  Planning to complete all of chemotherapy upfront, and she is now a candidate for breast conservation now.  Hoping for RCB 1 or possibly complete pathologic response at time of surgery.    She states that she was " very pleased with the news from her last oncology visit.  Says that unfortunately she feels like the tumor is growing again.  Says it feels bigger with more tugging/tearing in her breast.    Has been using the hydrocodone 8 tablets/day (2 tablets every 6 hours).  She does think this is helpful.  Says she has not been sure if the fentanyl patch is doing anything.  Her last patch fell off last evening around 8 or 9 PM, and she has not noticed any changes with her pain.  She does not think she has had any withdrawal, though her house was really warm yesterday and last evening, and she did wake up sweating.  It is possible this mask some of that.  Notes in the past, she has required higher doses of opiates that time for pain relief, and had wondered if this was because she was redhead.    Continues taking the Robaxin 1500 mg 3 times daily.  Thinks it might be helping, however it also might be making her sick to her stomach.  Also stopped taking her Lyrica within the past couple weeks due to similar concerns with feeling sick to her stomach.     Social History:  Lives with clifton.  Has a son named Homero.  Has lived in [place], [place], and [place].  Enjoys reading.  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 Trying to quit.   Substance Use Topics    Alcohol use: No    Drug use: No     Advanced Care Planning: Has not completed an HCD. States her dad would be her HCA.     Family History- Reviewed in Epic.    Medications- Reviewed in Epic.    Past Medical History- Reviewed in [employer].    Past Surgical History- Reviewed in Epic.    Physical Exam:   Constitutional: Alert, pleasant, no apparent distress. Sitting up in chair.  Eyes: Sclera  non-icteric, no eye discharge.  ENT: No nasal discharge. Ears grossly normal.  Respiratory: Unlabored respirations. Speaking in full sentences.  Musculoskeletal: Extremities appear normal- no gross deformities noted. No edema noted on upper body.   Skin: No suspicious lesions or rashes on visible skin.  Neurologic: Clear speech, no aphasia. No facial droop.  Psychiatric: Mentation appears normal, appropriate attention. Affect normal/bright. Does not appear anxious or depressed.    Key Data Reviewed:  LABS: 4/26/2024- Cr 0.49, GFR >90, Albumin 3.7, LFTs WNL. WBC 6.7, Hgb 10.9, Plts 287.     Impression & Recommendations & Counseling:  Eliezer Izquierdo is a 39 year old female with history of left-sided breast cancer.     Discussed the wonderful response it seems that she is having to her chemotherapy regimen and the disconnect with ongoing pain.  Despite increasing dosing of full opioid agonist over the last month, she really has not noticed much change in her pain.  We discussed that the 25 mcg of fentanyl is equal to 10 tablets of her hydrocodone, which surprised her - she did not realize that it was that much medication.  We discussed my concerns that there is a component of opioid use disorder, and that her brain is getting used to/liking the effects of the opioids, however she is not getting significant pain control at fairly substantial doses.    In terms of efficacy, Eliezer is not getting good pain relief as evidenced by dose escalations and physical tolerance.  Her function is not improved with the opioids. Based on these criteria, I believe she meets the diagnosis for Opioid Use Disorder by DSM 5 criteria.     Because the patient qualifies for a diagnosis of Opioid Use Disorder/Opioid dependence in addition to having chronic pain, I am recommending Buprenorphine. In addition to managing cravings for opioid use disorder, advantages to using Buprenorphine vs traditional opioids for chronic pain include:  less physical tolerance, no risk of OIH, less potential for respiratory depression and unintentional overdose and less psychological addiction.  In addition, it can have both anti-depressant and anti-anxiety effects.  The patient will be continue to follow up on a 3-4 week basis with our team.  Discontinuation of this medication results in a long withdrawal syndrome that can be unpleasant.  Once started it is not uncommon for a patient to be on this for years or even life.  Depending on ongoing pain needs once treatment is completed, a transition to a chronic pain management specialist might be appropriate.  This was discussed and the patient wishes to start this medication. We plan to do a home induction and instructions for this have been given.      Recommendations:  -Given agreement and qualifications for opioid use disorder, we will start Suboxone at 0.5mg daily with plans to escalate to 1mg twice daily over a week.  Will plan to have check-in's via telephone and then plan a formal visit in 3 weeks.  We can make dosing changes at that time if necessary.  The patient has been informed that this is the only option for a long term opioid.    -For acute exacerbations with pain, we did discuss that there can still be roles for short acting opioids, particularly around surgery, even while on buprenorphine.  Refilled 1 week supply of hydrocodone today.  -Discussed it is safer to use lorazepam with buprenorphine as well if breakthrough anxiety is present.  -I am hopeful that she will also get some mood benefit from the buprenorphine.  -Trial of switching Robaxin to 1000 mg every 6 hours instead of 1500 mg 3 times daily to see if this helps decrease some nausea.  -She does not feel that the Lyrica has done very much for her pain, so I suppose it is okay to stay off of it for now.  It is possible that stopping it did contribute to perception of worsening pain over the last week.  Will hold off on any neuropathic agents  at this time.  -We also discussed that since I am not licensed in [place] and we are dealing with controlled substances that can only be sent to pharmacies in [place], once we get things stabilized from a management perspective, we will likely need to transition her to one of my colleagues who is licensed in [place].      Follow up: 3-4 weeks      Video-Visit Details  Video Start Time: 10:01AM  Video End Time: 10:31AM    Originating Location (pt. Location): Car (in [place])     Distant Location (provider location):  Offsite- Personal Home      Platform used for Video Visit: Steve     Total time spent on day of encounter is 107 mins, including reviewing record, reviewing her case as a part of outpatient palliative IDT, review of above studies, above visit with patient, symptomatic discussion of pain management and opioid use disorder concerns, including medication adjustments/prescription management, and documentation.     Kylie Rodriguez, DO  Palliative Medicine   [id number]    Some chart documentation performed using Dragon Voice recognition Software. Although reviewed after completion, some words and grammatical errors may remain.

## 2024-05-07 NOTE — NURSING NOTE
Is the patient currently in the state of [place]? YES per pt in the State of [place]     Visit mode:VIDEO    If the visit is dropped, the patient can be reconnected by: TELEPHONE VISIT: Phone number: [phone]    Will anyone else be joining the visit? NO  (If patient encounters technical issues they should call [phone]    How would you like to obtain your AVS? MyChart    Are changes needed to the allergy or medication list? No    Are refills needed on medications prescribed by this physician? NO    Reason for visit: RECHECK    Itzel VILLAGOMEZ

## 2024-05-07 NOTE — PATIENT INSTRUCTIONS
Recommendations:  -We discussed the concerns related to opioid use disorder.  -Will plan to resubmit Suboxone prescription.  -Place 1/4 Film under the tongue daily for 2 days, THEN 1/4 Film 2 times daily for 1 day, THEN 1/2 Film 2 times daily until we discussed further adjustments.  -Suggested trying 1000 mg (2 tablets) of the Robaxin every 6 hours instead of 3 pills 3 times daily.  This might be less impactful on her stomach.  -Okay to remain off of the Lyrica for now if you would like.    Follow up: We will plan on 3 to 4 weeks, however if we need to check in sooner than that we can.      Reasons to Call    If you are having worsening/uncontrolled symptoms we want you to call!    You or your other physicians make any changes to medications we have prescribed.  -Please call for refills 4-5 days before you will run out of medication.    Important Phone Numbers, including: Refills, scheduling, and general questions     Palliative Care RN: Cecy Ghotra - [phone]  *After hours or on weekends. Will connect you with on call MD. [phone]

## 2024-05-08 ENCOUNTER — PATIENT OUTREACH (OUTPATIENT)
Dept: ONCOLOGY | Facility: CLINIC | Age: 39
End: 2024-05-08
Payer: COMMERCIAL

## 2024-05-08 NOTE — PROGRESS NOTES
"[facility]: Cancer Care Plan of Care Education Note                                    Discussion with Patient:                                                    Met with patient via phone to discuss chemotherapy and resources available at the [facility]. Provided patient with \"My Cancer Guidebook\", and [employer] printouts on Paclitaxel. Reviewed side effect including neuropathy, diarrhea, nausea, mouth sores and hair loss. Provided phone numbers for triage and after hours care. Discussed that chemo treatment may be delayed a day or two due to blood counts, infusion schedule or patient's need to modify. Included a one page resource of symptoms and when to contact the Cancer Clinic with questions or concerns.  Patient was appreciative of information provided.   Patient mentioned that she has some peeling skin and slight rash under her breast. She feels this is heat rash.She will have SHERYL look at her upcoming appointment.                                                                                                                                                  Goals          General    Other     Notes - Note created  3/13/2024  2:17 PM by Ana Cotto RN    Goal Statement: I will use my clinic and care team resources as directed.  Date Goal set: 3/13/2024  Barriers: disease burden  Strengths: support, coping, motivation, health awareness, and involvement with care team  Date to Achieve By: ongoing  Patient expressed understanding of goal: Yes  Action steps to achieve this goal:  I will contact triage with new, worsening or uncontrolled symptoms.   I will contact triage with temperature over 100.4  I will call with difficulties of scheduling and/or transportation.   I will request needed refills when there are 7 days of medication remaining.   I will not send urgent or symptomatic messages through [employer].   I will contact scheduling to arrange or make changes in my appointments.     "            Assessment:                                                      Assessment completed with:: Patient    Plan of Care Education   Yearly learning assessment completed?: Yes (see Education tab)  Diagnosis:: breast cancer  Does patient understand diagnosis?: Yes  Tx plan/regimen:: Paclitaxel  Does patient understand treatment plan/regimen?: Yes  Preparing for treatment:: Reviewed treatment preparation information with patient (vascular access, day of chemo, visitor policy, what to bring, etc.)  Vascular access education provided for:: Port  Side effect education:: Diarrhea/Constipation;Fatigue;Hair loss;Infection;Skin changes;Sexual health;Neuropathy;Nausea/Vomiting;Lab value monitoring (anemia, neutropenia, thrombocytopenia);Mouth sores;Mylosuppression  Safety/self care at home reviewed with patient:: Yes  Coping - concerns/fears reviewed with patient:: Yes  Plan of Care:: SHERYL follow-up appointment;Lab appointment;MD follow-up appointment;Treatment schedule  When to call provider:: Bleeding;Increased shortness of breath;New/worsening pain;Shaking chills;Temperature >100.4F;Uncontrolled diarrhea/constipation;Uncontrolled nausea/vomiting  Reasons for deferring treatment reviewed with patient:: Yes    Evaluation of Learning  Patient Education Provided: Yes  Readiness:: Acceptance  Method:: Literature;Explanation  Response:: Verbalizes understanding      Intervention/Education provided during outreach:                                                       See above note     Ana NEWBYN, RN, OCN  Care Coordinator  [facility]

## 2024-05-09 ENCOUNTER — MYC REFILL (OUTPATIENT)
Dept: ONCOLOGY | Facility: CLINIC | Age: 39
End: 2024-05-09
Payer: COMMERCIAL

## 2024-05-09 DIAGNOSIS — C50.412 MALIGNANT NEOPLASM OF UPPER-OUTER QUADRANT OF LEFT BREAST IN FEMALE, ESTROGEN RECEPTOR POSITIVE (H): ICD-10-CM

## 2024-05-09 DIAGNOSIS — Z17.0 MALIGNANT NEOPLASM OF UPPER-OUTER QUADRANT OF LEFT BREAST IN FEMALE, ESTROGEN RECEPTOR POSITIVE (H): ICD-10-CM

## 2024-05-09 RX ORDER — LORAZEPAM 1 MG/1
1 TABLET ORAL EVERY 6 HOURS PRN
Qty: 30 TABLET | Refills: 1 | Status: SHIPPED | OUTPATIENT
Start: 2024-05-09 | End: 2024-05-29

## 2024-05-09 NOTE — TELEPHONE ENCOUNTER
Pending Prescriptions:                       Disp   Refills    LORazepam (ATIVAN) 1 MG tablet            30 tab*1            Sig: Take 1 tablet (1 mg) by mouth every 6 hours as           needed for anxiety, nausea or sleep    Last prescribing provider: Erica Kellogg    Last clinic visit date: 04/26/24 w/    Recommendations for requested medication (if none, N/A): N/A    Any other pertinent information (if none, N/A): N/A    Refilled: Y/N, if NO, why?

## 2024-05-10 ENCOUNTER — ONCOLOGY VISIT (OUTPATIENT)
Dept: ONCOLOGY | Facility: CLINIC | Age: 39
End: 2024-05-10
Attending: PHYSICIAN ASSISTANT
Payer: COMMERCIAL

## 2024-05-10 ENCOUNTER — APPOINTMENT (OUTPATIENT)
Dept: LAB | Facility: CLINIC | Age: 39
End: 2024-05-10
Payer: COMMERCIAL

## 2024-05-10 ENCOUNTER — ANCILLARY PROCEDURE (OUTPATIENT)
Dept: MAMMOGRAPHY | Facility: CLINIC | Age: 39
End: 2024-05-10
Attending: INTERNAL MEDICINE
Payer: COMMERCIAL

## 2024-05-10 ENCOUNTER — INFUSION THERAPY VISIT (OUTPATIENT)
Dept: ONCOLOGY | Facility: CLINIC | Age: 39
End: 2024-05-10
Payer: COMMERCIAL

## 2024-05-10 VITALS
SYSTOLIC BLOOD PRESSURE: 134 MMHG | TEMPERATURE: 98.5 F | WEIGHT: 280.2 LBS | OXYGEN SATURATION: 96 % | HEIGHT: 67 IN | DIASTOLIC BLOOD PRESSURE: 84 MMHG | HEART RATE: 110 BPM | BODY MASS INDEX: 43.98 KG/M2 | RESPIRATION RATE: 18 BRPM

## 2024-05-10 DIAGNOSIS — R11.2 CHEMOTHERAPY INDUCED NAUSEA AND VOMITING: ICD-10-CM

## 2024-05-10 DIAGNOSIS — Z17.0 MALIGNANT NEOPLASM OF UPPER-OUTER QUADRANT OF LEFT BREAST IN FEMALE, ESTROGEN RECEPTOR POSITIVE (H): ICD-10-CM

## 2024-05-10 DIAGNOSIS — T45.1X5A CHEMOTHERAPY INDUCED NAUSEA AND VOMITING: ICD-10-CM

## 2024-05-10 DIAGNOSIS — C50.412 MALIGNANT NEOPLASM OF UPPER-OUTER QUADRANT OF LEFT BREAST IN FEMALE, ESTROGEN RECEPTOR POSITIVE (H): Primary | ICD-10-CM

## 2024-05-10 DIAGNOSIS — Z17.0 MALIGNANT NEOPLASM OF UPPER-OUTER QUADRANT OF LEFT BREAST IN FEMALE, ESTROGEN RECEPTOR POSITIVE (H): Primary | ICD-10-CM

## 2024-05-10 DIAGNOSIS — C50.412 MALIGNANT NEOPLASM OF UPPER-OUTER QUADRANT OF LEFT BREAST IN FEMALE, ESTROGEN RECEPTOR POSITIVE (H): ICD-10-CM

## 2024-05-10 DIAGNOSIS — R19.7 DIARRHEA, UNSPECIFIED TYPE: ICD-10-CM

## 2024-05-10 LAB
ALBUMIN SERPL BCG-MCNC: 3.9 G/DL (ref 3.5–5.2)
ALP SERPL-CCNC: 67 U/L (ref 40–150)
ALT SERPL W P-5'-P-CCNC: 23 U/L (ref 0–50)
AST SERPL W P-5'-P-CCNC: 28 U/L (ref 0–45)
BASOPHILS # BLD AUTO: 0.1 10E3/UL (ref 0–0.2)
BASOPHILS NFR BLD AUTO: 1 %
BILIRUB DIRECT SERPL-MCNC: <0.2 MG/DL (ref 0–0.3)
BILIRUB SERPL-MCNC: 0.2 MG/DL
EOSINOPHIL # BLD AUTO: 0 10E3/UL (ref 0–0.7)
EOSINOPHIL NFR BLD AUTO: 0 %
ERYTHROCYTE [DISTWIDTH] IN BLOOD BY AUTOMATED COUNT: 17.6 % (ref 10–15)
HCT VFR BLD AUTO: 35.1 % (ref 35–47)
HGB BLD-MCNC: 11.9 G/DL (ref 11.7–15.7)
IMM GRANULOCYTES # BLD: 0.1 10E3/UL
IMM GRANULOCYTES NFR BLD: 1 %
LYMPHOCYTES # BLD AUTO: 1.2 10E3/UL (ref 0.8–5.3)
LYMPHOCYTES NFR BLD AUTO: 20 %
MCH RBC QN AUTO: 31.6 PG (ref 26.5–33)
MCHC RBC AUTO-ENTMCNC: 33.9 G/DL (ref 31.5–36.5)
MCV RBC AUTO: 93 FL (ref 78–100)
MONOCYTES # BLD AUTO: 1.1 10E3/UL (ref 0–1.3)
MONOCYTES NFR BLD AUTO: 18 %
NEUTROPHILS # BLD AUTO: 3.7 10E3/UL (ref 1.6–8.3)
NEUTROPHILS NFR BLD AUTO: 60 %
NRBC # BLD AUTO: 0 10E3/UL
NRBC BLD AUTO-RTO: 0 /100
PLATELET # BLD AUTO: 350 10E3/UL (ref 150–450)
PROT SERPL-MCNC: 6.6 G/DL (ref 6.4–8.3)
RBC # BLD AUTO: 3.76 10E6/UL (ref 3.8–5.2)
WBC # BLD AUTO: 6.1 10E3/UL (ref 4–11)

## 2024-05-10 PROCEDURE — 96367 TX/PROPH/DG ADDL SEQ IV INF: CPT

## 2024-05-10 PROCEDURE — 99215 OFFICE O/P EST HI 40 MIN: CPT | Performed by: PHYSICIAN ASSISTANT

## 2024-05-10 PROCEDURE — 258N000003 HC RX IP 258 OP 636: Performed by: PHYSICIAN ASSISTANT

## 2024-05-10 PROCEDURE — 250N000011 HC RX IP 250 OP 636: Performed by: PHYSICIAN ASSISTANT

## 2024-05-10 PROCEDURE — G0463 HOSPITAL OUTPT CLINIC VISIT: HCPCS | Mod: 25 | Performed by: PHYSICIAN ASSISTANT

## 2024-05-10 PROCEDURE — 85025 COMPLETE CBC W/AUTO DIFF WBC: CPT | Performed by: INTERNAL MEDICINE

## 2024-05-10 PROCEDURE — 76642 ULTRASOUND BREAST LIMITED: CPT | Mod: LT | Performed by: RADIOLOGY

## 2024-05-10 PROCEDURE — 250N000013 HC RX MED GY IP 250 OP 250 PS 637

## 2024-05-10 PROCEDURE — 96413 CHEMO IV INFUSION 1 HR: CPT

## 2024-05-10 PROCEDURE — 82040 ASSAY OF SERUM ALBUMIN: CPT | Performed by: INTERNAL MEDICINE

## 2024-05-10 PROCEDURE — 250N000011 HC RX IP 250 OP 636: Performed by: INTERNAL MEDICINE

## 2024-05-10 PROCEDURE — 258N000003 HC RX IP 258 OP 636: Performed by: INTERNAL MEDICINE

## 2024-05-10 PROCEDURE — 36591 DRAW BLOOD OFF VENOUS DEVICE: CPT | Performed by: INTERNAL MEDICINE

## 2024-05-10 PROCEDURE — 250N000013 HC RX MED GY IP 250 OP 250 PS 637: Performed by: INTERNAL MEDICINE

## 2024-05-10 PROCEDURE — 96375 TX/PRO/DX INJ NEW DRUG ADDON: CPT

## 2024-05-10 RX ORDER — HEPARIN SODIUM (PORCINE) LOCK FLUSH IV SOLN 100 UNIT/ML 100 UNIT/ML
5 SOLUTION INTRAVENOUS
Status: DISCONTINUED | OUTPATIENT
Start: 2024-05-10 | End: 2024-05-10 | Stop reason: HOSPADM

## 2024-05-10 RX ORDER — HEPARIN SODIUM (PORCINE) LOCK FLUSH IV SOLN 100 UNIT/ML 100 UNIT/ML
5 SOLUTION INTRAVENOUS ONCE
Status: COMPLETED | OUTPATIENT
Start: 2024-05-10 | End: 2024-05-10

## 2024-05-10 RX ORDER — LORAZEPAM 2 MG/ML
0.5 INJECTION INTRAMUSCULAR EVERY 4 HOURS PRN
Status: DISCONTINUED | OUTPATIENT
Start: 2024-05-10 | End: 2024-05-10

## 2024-05-10 RX ORDER — LORAZEPAM 0.5 MG/1
0.5 TABLET ORAL EVERY 4 HOURS PRN
Status: DISCONTINUED | OUTPATIENT
Start: 2024-05-10 | End: 2024-05-10 | Stop reason: HOSPADM

## 2024-05-10 RX ORDER — DIPHENHYDRAMINE HCL 25 MG
25 CAPSULE ORAL ONCE
Status: CANCELLED
Start: 2024-05-17

## 2024-05-10 RX ORDER — DIPHENHYDRAMINE HCL 25 MG
50 CAPSULE ORAL ONCE
Status: COMPLETED | OUTPATIENT
Start: 2024-05-10 | End: 2024-05-10

## 2024-05-10 RX ADMIN — Medication 5 ML: at 09:59

## 2024-05-10 RX ADMIN — FAMOTIDINE 20 MG: 10 INJECTION INTRAVENOUS at 12:29

## 2024-05-10 RX ADMIN — SODIUM CHLORIDE 250 ML: 9 INJECTION, SOLUTION INTRAVENOUS at 12:39

## 2024-05-10 RX ADMIN — Medication 5 ML: at 14:18

## 2024-05-10 RX ADMIN — LORAZEPAM 0.5 MG: 0.5 TABLET ORAL at 12:29

## 2024-05-10 RX ADMIN — DEXAMETHASONE SODIUM PHOSPHATE: 10 INJECTION, SOLUTION INTRAMUSCULAR; INTRAVENOUS at 12:39

## 2024-05-10 RX ADMIN — PACLITAXEL 198 MG: 6 INJECTION, SOLUTION INTRAVENOUS at 13:18

## 2024-05-10 RX ADMIN — DIPHENHYDRAMINE HYDROCHLORIDE 50 MG: 25 CAPSULE ORAL at 12:28

## 2024-05-10 ASSESSMENT — PAIN SCALES - GENERAL: PAINLEVEL: MILD PAIN (3)

## 2024-05-10 NOTE — PROGRESS NOTES
Infusion Nursing Note:  Eliezer Izquierdo presents today for C5D1 Taxol.    Patient seen by provider today: Yes: Martha Kellogg PA-C prior to infusion   present during visit today: Not Applicable.    Note: No concerns after provider visit.    First dose of taxol today. Patient requested and received dose of ativan prior d/t anxiety about potential reaction.    Intravenous Access:  Implanted Port.    Treatment Conditions:   Latest Reference Range & Units 05/10/24 10:06   Albumin 3.5 - 5.2 g/dL 3.9   Protein Total 6.4 - 8.3 g/dL 6.6   Alkaline Phosphatase 40 - 150 U/L 67   ALT 0 - 50 U/L 23   AST 0 - 45 U/L 28   Bilirubin Direct 0.00 - 0.30 mg/dL <0.20   Bilirubin Total <=1.2 mg/dL 0.2   WBC 4.0 - 11.0 10e3/uL 6.1   Hemoglobin 11.7 - 15.7 g/dL 11.9   Hematocrit 35.0 - 47.0 % 35.1   Platelet Count 150 - 450 10e3/uL 350   RBC Count 3.80 - 5.20 10e6/uL 3.76 (L)   MCV 78 - 100 fL 93   MCH 26.5 - 33.0 pg 31.6   MCHC 31.5 - 36.5 g/dL 33.9   RDW 10.0 - 15.0 % 17.6 (H)   % Neutrophils % 60   % Lymphocytes % 20   % Monocytes % 18   % Eosinophils % 0   % Basophils % 1   Absolute Basophils 0.0 - 0.2 10e3/uL 0.1   Absolute Eosinophils 0.0 - 0.7 10e3/uL 0.0   Absolute Immature Granulocytes <=0.4 10e3/uL 0.1   Absolute Lymphocytes 0.8 - 5.3 10e3/uL 1.2   Absolute Monocytes 0.0 - 1.3 10e3/uL 1.1   % Immature Granulocytes % 1   Absolute Neutrophils 1.6 - 8.3 10e3/uL 3.7   Absolute NRBCs 10e3/uL 0.0   NRBCs per 100 WBC <1 /100 0     Results reviewed, labs MET treatment parameters, ok to proceed with treatment.      Post Infusion Assessment:  Patient tolerated infusion without incident.  Blood return noted pre and post infusion.  Site patent and intact, free from redness, edema or discomfort.  No evidence of extravasations.  Access discontinued per protocol.       Discharge Plan:   Prescription refills given for ativan.  Discharge instructions reviewed with: Patient and Family.  Patient and/or family verbalized  understanding of discharge instructions and all questions answered.  AVS to patient via i[employer] Reminder.  Patient will return 5/17 for next appointment.   Patient discharged in stable condition accompanied by: self and father.  Departure Mode: Ambulatory.      Cherrie Gomez RN

## 2024-05-10 NOTE — LETTER
5/10/2024         RE: Eliezer Izquierdo  [id number] [facility] [address]        Dear Colleague,    Thank you for referring your patient, Eliezer Izquierdo, to the [facility]. Please see a copy of my visit note below.      [facility] Medical Oncology Note  Date of visit: May 10, 2024    New Outpatient Clinic Note        Assessment:     Clinical T2N1 invasive breast cancer with a high risk Mammaprint in this 39 year old woman. As discussed previously with Dr. Ford., the goal is neoadjuvant treatment and then surgery.    S/P 4 cycles of neoadjuvant ddAC with exam showing a dramatic response.  The mass has gone from 4 cm across to just 1 cm.  She had an US today that showed improved disease, final report pending  She is clearly a candidate for breast conservation now.  However we are going to give her all the chemotherapy upfront in the hope that we can get her down to an RCB 1 or possibly pathologic complete response at the time of surgery.  Issues with chronic pain.  She is being managed beautifully by palliative care and is now on suboxone.   Ongoing fatigue from AC, but she is feeling well today.  As we reviewed all the side effects from the Taxol planned for today, I don't think she will need IVF or as aggressive anti emetics with this next phase.     Plan:     Start Taxol today, reviewed side effects including infusion reaction, n/v, fatigue, neuropathy, low blood counts.   She will come to [facility] every 3 weeks for labs/provider and Taxol.  The two weeks between these visits she will get therapy up at [place]  She is allergic to Zofran.  She has a history of nausea/vomiting.  Will start with Emend/Dex.  Will taper Dex if no infusion reactions.  Could drop Emend if she does well.   Continue to follow-up with palliative care for management of her chronic pain issues that predated her breast cancer diagnosis      Martha Kellogg  "ISABELLE    __________________________________________________________________    DIAGNOSES     Clinical T2N1 invasive breast cancer diagnosed at breast biopsy 2/9/2024. Alleyson palpated a left breast lump. Mammogram and US showed a 2.5 x 2.5 x 2.0 cm spiculated mass at 2:00 position.   Biopsy showed a Jeniffer grade I IDC, 97% strongly positive for ER, 99% strongly positive for OK, and negative for HER2 by FISH (IHC 2+). KI-67 is 24%. Mammaprint was high risk Luminal B. Left axillary US that showed three abnormal axillary nodes, biopsy positive for IDC. Biopsy showed METASTATIC BREAST CARCINOMA, almost entirely effacing lymph node, at least 11 mm in linear extent.  My first exam prior to neoadjuvant therapy showed a palpable deep left breast mass in the 2 o'clock position measuring roughly 4 cm x 4 cm.   Chronic pain with history of opiate prescriptions from multiple providers. She is currently seeing palliative care (Dr. Kylie Rodriguez), and tried buprenorphine, started on 4/11/2024, but reacted to the adhesive.  Then didn't tolerate fentanyl.  Current recommendation is suboxone.   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  But it was negative for everything else.  History of N/V from migraines, thus managing nausea during therapy has been challenging.         History of Present Illness/Therapy to dte:       Neoadjuvant ddAC initiated 3/15/2024. She is back today prior to cycle 4.  US today shows improved disease.  The plan is to start weekly paclitaxel times 12.      Interval History     Feels the mass is shrinking. But then felt it was larger, in retrospect she was much more edematous last week and that may have played a role.  She took hydrochlorothiazide which helped the fluid retention, but she took with her amlodipine and felt lightheaded.    Does feel fatigued all of the time.  It is the major side effect here.  Feels she is eating/drinking well now, " "although with AC had been spotty  Now switched to suboxone by [facility].    She is still smoking.  She knows it is bad for her.  She is ready to start her taxol today  Glucose was 189 today at home, discussed the Dex may cause things to go higher today.   Has been using zyprexa at night for sleep and anxiety  Continues on protonix for GERD    Past Medical History:   Melanoma of right foot???     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  She thought about being a , but then got pregnant with her son Homero.  She had a lot of different for jobs.  She spent some time in [place] where her mom and other family member lives.  She then came back to [place].  She has a fiancé of 6 years named Saul and they currently live in [place].  She is an avid reader, and likes romance novels, but really anything.  She says she read 250 books last year.  She does smoke but is trying to quit.  She is currently on a low-carb diet.    Family History:     Family History   Problem Relation Age of Onset    Genitourinary Problems Mother         renal disease - minimal change, sponge kidney            Medications:     Current Outpatient Medications   Medication Sig Dispense Refill    albuterol (PROAIR HFA/PROVENTIL HFA/VENTOLIN HFA) 108 (90 Base) MCG/ACT inhaler Inhale 2 puffs into the lungs      amLODIPine (NORVASC) 10 MG tablet Take 10 mg by mouth      atorvastatin " (LIPITOR) 10 MG tablet Take 10 mg by mouth      Blood Glucose Monitoring Suppl (ONETOUCH VERIO FLEX SYSTEM) w/Device KIT       buprenorphine HCl-naloxone HCl (SUBOXONE) 2-0.5 MG per film Place 0.25 Film under the tongue daily for 2 days, THEN 0.25 Film 2 times daily for 1 day, THEN 0.5 Film 2 times daily for 27 days 28 Film 0    cetirizine (ZYRTEC) 10 MG tablet Take 5 mg by mouth      esomeprazole (NEXIUM) 20 MG DR capsule Take 20 mg by mouth daily before breakfast      fluticasone-salmeterol (ADVAIR HFA) 115-21 MCG/ACT inhaler Inhale 2 puffs into the lungs      hydrochlorothiazide (HYDRODIURIL) 25 MG tablet Take 25 mg by mouth      HYDROcodone-acetaminophen (NORCO) 5-325 MG tablet Take 1-2 tablets by mouth every 6 hours as needed for severe pain 56 tablet 0    ibuprofen (ADVIL/MOTRIN) 200 MG tablet Take 200 mg by mouth      JANUVIA 50 MG tablet Take 50 mg by mouth      LORazepam (ATIVAN) 1 MG tablet Take 1 tablet (1 mg) by mouth every 6 hours as needed for anxiety, nausea or sleep 30 tablet 1    methocarbamol (ROBAXIN) 500 MG tablet Take 3 tablets (1,500 mg) by mouth 3 times daily as needed for muscle spasms 270 tablet 1    OLANZapine (ZYPREXA) 2.5 MG tablet Take 1-2 tablets (2.5-5 mg) by mouth 3 times daily as needed (Nausea or anxiety) 150 tablet 2    Pregabalin (LYRICA) 200 MG capsule Take 1 capsule (200 mg) by mouth 2 times daily      prochlorperazine (COMPAZINE) 10 MG tablet Take 1 tablet (10 mg) by mouth every 6 hours as needed for nausea or vomiting 30 tablet 2    Prochlorperazine Maleate (COMPAZINE PO) Take 10 mg by mouth 3 times daily as needed for nausea      VICTOZA PEN 18 MG/3ML soln Inject 1.8 mg Subcutaneous Per patient she is still using      EPINEPHrine (ANY BX GENERIC EQUIV) 0.3 MG/0.3ML injection 2-pack  (Patient not taking: Reported on 5/10/2024)      naloxone (NARCAN) 4 MG/0.1ML nasal spray Spray 4 mg into one nostril alternating nostrils as needed for opioid reversal every 2-3 minutes until  "assistance arrives (Patient not taking: Reported on 5/10/2024)                Physical Exam:   Blood pressure 134/84, pulse 110, temperature 98.5  F (36.9  C), temperature source Oral, resp. rate 18, height 1.702 m (5' 7.01\"), weight 127.1 kg (280 lb 3.2 oz), SpO2 96%.    ECOG PS: 0  Constitutional: WDWN female in NAD, pleasant and appropriate  HEENT:  NC/AT, no icterus, OP clear, MMM  Skin: No jaundice nor ecchymoses  Lungs: CTAB, no w/r/r, nonlabored breathing  Cardiovascular: RRR, S1, S2, no m/r/g  Abdomen: +BS, morbidly obese, minimal periumbilical tenderness, no right upper quadrant tenderness.  MSK/Extremities: Warm, well perfused. No edema  LN: no cervical, supraclavicular, axillary, nor inguinal lymphadenopathy  Neurologic: alert, answering questions appropriately, moving all extremities spontaneously. CN 2-12 grossly intact.  Psych: appropriate affect  Mild non pitting MALGORZATA R>L chronic for her  Data:     Most Recent 3 CBC's:  Recent Labs   Lab Test 05/10/24  1006 04/26/24  1026 04/12/24  0734 03/29/24  0635   WBC 6.1 6.7 10.6 6.2   HGB 11.9 10.9* 12.8 12.6   MCV 93 95 91 92    287 275 293   ANEUTAUTO 3.7 3.9  --  4.1     Most Recent 3 BMP's:  Recent Labs   Lab Test 05/10/24  1006 04/26/24  1134 04/12/24  0734 03/15/24  0836   NA  --  136 138 135   POTASSIUM  --  3.9 3.5 3.3*   CHLORIDE  --  98 102 98   CO2  --  24 23 25   BUN  --  7.7 7.0 6.4   CR  --  0.49* 0.44* 0.43*   ANIONGAP  --  14 13 12   REIM  --  9.2 9.3 9.4   GLC  --  415* 206* 189*   PROTTOTAL 6.6 6.7 7.0 7.2   ALBUMIN 3.9 3.7 4.0 4.1    Most Recent 3 LFT's:  Recent Labs   Lab Test 05/10/24  1006 04/26/24  1134 04/12/24  0734   AST 28 36 21   ALT 23 24 20   ALKPHOS 67 70 66   BILITOTAL 0.2 0.2 0.2    Most Recent 2 TSH and T4:No lab results found.   I reviewed the above labs today.     Other Data     US reviewed by me and radiology-- verbal report, its getting smaller, waiting final report today.       Labs, imaging and treatment plan " reviewed with patient. All questions answered.        40 minutes spent on the date of the encounter doing chart review, review of outside records, review of test results, interpretation of tests, patient visit, documentation, discussion with other provider(s), and discussion with family  This is also included discussion with pharmacy noted above        Erica Kellogg PA-C

## 2024-05-10 NOTE — PATIENT INSTRUCTIONS
[facility] Triage and after hours / weekends / holidays:  [phone] option 5, option 2    Please call the triage or after hours line if you experience a temperature greater than or equal to 100.4, shaking chills, have uncontrolled nausea, vomiting and/or diarrhea, dizziness, shortness of breath, chest pain, bleeding, unexplained bruising, or if you have any other new/concerning symptoms, questions or concerns.      If you are having any concerning symptoms or wish to speak to a provider before your next infusion visit, please call triage to notify your care team so we can adequately serve you.     If you need a refill on a narcotic prescription or other medication, please call before your infusion appointment.

## 2024-05-10 NOTE — PROGRESS NOTES
[facility] Medical Oncology Note  Date of visit: May 10, 2024    New Outpatient Clinic Note        Assessment:     Clinical T2N1 invasive breast cancer with a high risk Mammaprint in this 39 year old woman. As discussed previously with Dr. Ford., the goal is neoadjuvant treatment and then surgery.    S/P 4 cycles of neoadjuvant ddAC with exam showing a dramatic response.  The mass has gone from 4 cm across to just 1 cm.  She had an US today that showed improved disease, final report pending  She is clearly a candidate for breast conservation now.  However we are going to give her all the chemotherapy upfront in the hope that we can get her down to an RCB 1 or possibly pathologic complete response at the time of surgery.  Issues with chronic pain.  She is being managed beautifully by palliative care and is now on suboxone.   Ongoing fatigue from AC, but she is feeling well today.  As we reviewed all the side effects from the Taxol planned for today, I don't think she will need IVF or as aggressive anti emetics with this next phase.     Plan:     Start Taxol today, reviewed side effects including infusion reaction, n/v, fatigue, neuropathy, low blood counts.   She will come to [facility] every 3 weeks for labs/provider and Taxol.  The two weeks between these visits she will get therapy up at [place]  She is allergic to Zofran.  She has a history of nausea/vomiting.  Will start with Emend/Dex.  Will taper Dex if no infusion reactions.  Could drop Emend if she does well.   Continue to follow-up with palliative care for management of her chronic pain issues that predated her breast cancer diagnosis      Martha Kellogg PA-C    __________________________________________________________________    DIAGNOSES     Clinical T2N1 invasive breast cancer diagnosed at breast biopsy 2/9/2024. Alleyson palpated a left breast lump. Mammogram and US showed a 2.5 x 2.5 x 2.0 cm spiculated mass at 2:00 position.   Biopsy showed a [place]on  "grade I IDC, 97% strongly positive for ER, 99% strongly positive for UT, and negative for HER2 by FISH (IHC 2+). KI-67 is 24%. Mammaprint was high risk Luminal B. Left axillary US that showed three abnormal axillary nodes, biopsy positive for IDC. Biopsy showed METASTATIC BREAST CARCINOMA, almost entirely effacing lymph node, at least 11 mm in linear extent.  My first exam prior to neoadjuvant therapy showed a palpable deep left breast mass in the 2 o'clock position measuring roughly 4 cm x 4 cm.   Chronic pain with history of opiate prescriptions from multiple providers. She is currently seeing palliative care (Dr. Kylie Rodriguez), and tried buprenorphine, started on 4/11/2024, but reacted to the adhesive.  Then didn't tolerate fentanyl.  Current recommendation is suboxone.   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  But it was negative for everything else.  History of N/V from migraines, thus managing nausea during therapy has been challenging.         History of Present Illness/Therapy to dte:       Neoadjuvant ddAC initiated 3/15/2024. She is back today prior to cycle 4.  US today shows improved disease.  The plan is to start weekly paclitaxel times 12.      Interval History     Feels the mass is shrinking. But then felt it was larger, in retrospect she was much more edematous last week and that may have played a role.  She took hydrochlorothiazide which helped the fluid retention, but she took with her amlodipine and felt lightheaded.    Does feel fatigued all of the time.  It is the major side effect here.  Feels she is eating/drinking well now, although with AC had been spotty  Now switched to suboxone by [facility].    She is still smoking.  She knows it is bad for her.  She is ready to start her taxol today  Glucose was 189 today at home, discussed the Dex may cause things to go higher today.   Has been using zyprexa at night for sleep and " "anxiety  Continues on protonix for GERD    Past Medical History:   Melanoma of right foot???     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  She thought about being a , but then got pregnant with her son Homero.  She had a lot of different for jobs.  She spent some time in [place] where her mom and other family member lives.  She then came back to [place].  She has a fiancé of 6 years named Saul and they currently live in [place].  She is an avid reader, and likes romance novels, but really anything.  She says she read 250 books last year.  She does smoke but is trying to quit.  She is currently on a low-carb diet.    Family History:     Family History   Problem Relation Age of Onset    Genitourinary Problems Mother         renal disease - minimal change, sponge kidney            Medications:     Current Outpatient Medications   Medication Sig Dispense Refill    albuterol (PROAIR HFA/PROVENTIL HFA/VENTOLIN HFA) 108 (90 Base) MCG/ACT inhaler Inhale 2 puffs into the lungs      amLODIPine (NORVASC) 10 MG tablet Take 10 mg by mouth      atorvastatin (LIPITOR) 10 MG tablet Take 10 mg by mouth      Blood Glucose Monitoring Suppl (ONETOUCH VERIO FLEX SYSTEM) w/Device KIT       buprenorphine HCl-naloxone HCl (SUBOXONE) 2-0.5 MG per film Place 0.25 Film under the tongue daily for 2 days, THEN 0.25 Film 2 times daily for 1 day, THEN 0.5 Film 2 times daily " "for 27 days 28 Film 0    cetirizine (ZYRTEC) 10 MG tablet Take 5 mg by mouth      esomeprazole (NEXIUM) 20 MG DR capsule Take 20 mg by mouth daily before breakfast      fluticasone-salmeterol (ADVAIR HFA) 115-21 MCG/ACT inhaler Inhale 2 puffs into the lungs      hydrochlorothiazide (HYDRODIURIL) 25 MG tablet Take 25 mg by mouth      HYDROcodone-acetaminophen (NORCO) 5-325 MG tablet Take 1-2 tablets by mouth every 6 hours as needed for severe pain 56 tablet 0    ibuprofen (ADVIL/MOTRIN) 200 MG tablet Take 200 mg by mouth      JANUVIA 50 MG tablet Take 50 mg by mouth      LORazepam (ATIVAN) 1 MG tablet Take 1 tablet (1 mg) by mouth every 6 hours as needed for anxiety, nausea or sleep 30 tablet 1    methocarbamol (ROBAXIN) 500 MG tablet Take 3 tablets (1,500 mg) by mouth 3 times daily as needed for muscle spasms 270 tablet 1    OLANZapine (ZYPREXA) 2.5 MG tablet Take 1-2 tablets (2.5-5 mg) by mouth 3 times daily as needed (Nausea or anxiety) 150 tablet 2    Pregabalin (LYRICA) 200 MG capsule Take 1 capsule (200 mg) by mouth 2 times daily      prochlorperazine (COMPAZINE) 10 MG tablet Take 1 tablet (10 mg) by mouth every 6 hours as needed for nausea or vomiting 30 tablet 2    Prochlorperazine Maleate (COMPAZINE PO) Take 10 mg by mouth 3 times daily as needed for nausea      VICTOZA PEN 18 MG/3ML soln Inject 1.8 mg Subcutaneous Per patient she is still using      EPINEPHrine (ANY BX GENERIC EQUIV) 0.3 MG/0.3ML injection 2-pack  (Patient not taking: Reported on 5/10/2024)      naloxone (NARCAN) 4 MG/0.1ML nasal spray Spray 4 mg into one nostril alternating nostrils as needed for opioid reversal every 2-3 minutes until assistance arrives (Patient not taking: Reported on 5/10/2024)                Physical Exam:   Blood pressure 134/84, pulse 110, temperature 98.5  F (36.9  C), temperature source Oral, resp. rate 18, height 1.702 m (5' 7.01\"), weight 127.1 kg (280 lb 3.2 oz), SpO2 96%.    ECOG PS: 0  Constitutional: WDWN " female in NAD, pleasant and appropriate  HEENT:  NC/AT, no icterus, OP clear, MMM  Skin: No jaundice nor ecchymoses  Lungs: CTAB, no w/r/r, nonlabored breathing  Cardiovascular: RRR, S1, S2, no m/r/g  Abdomen: +BS, morbidly obese, minimal periumbilical tenderness, no right upper quadrant tenderness.  MSK/Extremities: Warm, well perfused. No edema  LN: no cervical, supraclavicular, axillary, nor inguinal lymphadenopathy  Neurologic: alert, answering questions appropriately, moving all extremities spontaneously. CN 2-12 grossly intact.  Psych: appropriate affect  Mild non pitting MALGORZATA R>L chronic for her  Data:     Most Recent 3 CBC's:  Recent Labs   Lab Test 05/10/24  1006 04/26/24  1026 04/12/24  0734 03/29/24  0635   WBC 6.1 6.7 10.6 6.2   HGB 11.9 10.9* 12.8 12.6   MCV 93 95 91 92    287 275 293   ANEUTAUTO 3.7 3.9  --  4.1     Most Recent 3 BMP's:  Recent Labs   Lab Test 05/10/24  1006 04/26/24  1134 04/12/24  0734 03/15/24  0836   NA  --  136 138 135   POTASSIUM  --  3.9 3.5 3.3*   CHLORIDE  --  98 102 98   CO2  --  24 23 25   BUN  --  7.7 7.0 6.4   CR  --  0.49* 0.44* 0.43*   ANIONGAP  --  14 13 12   REMI  --  9.2 9.3 9.4   GLC  --  415* 206* 189*   PROTTOTAL 6.6 6.7 7.0 7.2   ALBUMIN 3.9 3.7 4.0 4.1    Most Recent 3 LFT's:  Recent Labs   Lab Test 05/10/24  1006 04/26/24  1134 04/12/24  0734   AST 28 36 21   ALT 23 24 20   ALKPHOS 67 70 66   BILITOTAL 0.2 0.2 0.2    Most Recent 2 TSH and T4:No lab results found.   I reviewed the above labs today.     Other Data     US reviewed by me and radiology-- verbal report, its getting smaller, waiting final report today.       Labs, imaging and treatment plan reviewed with patient. All questions answered.        40 minutes spent on the date of the encounter doing chart review, review of outside records, review of test results, interpretation of tests, patient visit, documentation, discussion with other provider(s), and discussion with family  This is also included  discussion with pharmacy noted above

## 2024-05-10 NOTE — NURSING NOTE
"Oncology Rooming Note    May 10, 2024 11:04 AM   Eliezer Izquierdo is a 39 year old female who presents for:    Chief Complaint   Patient presents with    Port Draw     Labs drawn via port by RN in lab. VS taken.     Oncology Clinic Visit     [facility] RETURN - BREAST CANCER     Initial Vitals: /84 (BP Location: Right arm, Patient Position: Sitting, Cuff Size: Adult Large)   Pulse 110   Temp 98.5  F (36.9  C) (Oral)   Resp 18   Ht 1.702 m (5' 7.01\")   Wt 127.1 kg (280 lb 3.2 oz)   SpO2 96%   BMI 43.88 kg/m   Estimated body mass index is 43.88 kg/m  as calculated from the following:    Height as of this encounter: 1.702 m (5' 7.01\").    Weight as of this encounter: 127.1 kg (280 lb 3.2 oz). Body surface area is 2.45 meters squared.  Mild Pain (3) Comment: Data Unavailable   No LMP recorded. (Menstrual status: UNKNOWN).  Allergies reviewed: Yes  Medications reviewed: Yes    Medications: Medication refills not needed today.  Pharmacy name entered into EPIC:    [facility] - [place], [place] - [address] [facility] [place], [place] - [phone]  [facility] [place], [place] - [address] [place] [address]    Frailty Screening:   Is the patient here for a new oncology consult visit in cancer care? 2. No    Alfonzo Lynn LPN              "

## 2024-05-13 ENCOUNTER — MYC REFILL (OUTPATIENT)
Dept: PALLIATIVE CARE | Facility: CLINIC | Age: 39
End: 2024-05-13
Payer: COMMERCIAL

## 2024-05-13 DIAGNOSIS — Z17.0 MALIGNANT NEOPLASM OF UPPER-OUTER QUADRANT OF LEFT BREAST IN FEMALE, ESTROGEN RECEPTOR POSITIVE (H): ICD-10-CM

## 2024-05-13 DIAGNOSIS — L64.0 DRUG-INDUCED ANDROGENIC ALOPECIA: Primary | ICD-10-CM

## 2024-05-13 DIAGNOSIS — N64.4 BREAST PAIN: ICD-10-CM

## 2024-05-13 DIAGNOSIS — G89.3 CANCER RELATED PAIN: ICD-10-CM

## 2024-05-13 DIAGNOSIS — C50.412 MALIGNANT NEOPLASM OF UPPER-OUTER QUADRANT OF LEFT BREAST IN FEMALE, ESTROGEN RECEPTOR POSITIVE (H): ICD-10-CM

## 2024-05-13 RX ORDER — HYDROCODONE BITARTRATE AND ACETAMINOPHEN 5; 325 MG/1; MG/1
1-2 TABLET ORAL EVERY 6 HOURS PRN
Qty: 112 TABLET | Refills: 0 | Status: SHIPPED | OUTPATIENT
Start: 2024-05-14 | End: 2024-05-25

## 2024-05-13 NOTE — TELEPHONE ENCOUNTER
Received [employer] message from patient requesting refill of Norco.     Last refill: 5/7/24  Last office visit: 5/7/24  Scheduled for follow up per check out request.     Will route request to MD for review.     Reviewed [place]  Report.

## 2024-05-14 DIAGNOSIS — C50.412 MALIGNANT NEOPLASM OF UPPER-OUTER QUADRANT OF LEFT BREAST IN FEMALE, ESTROGEN RECEPTOR POSITIVE (H): Primary | ICD-10-CM

## 2024-05-14 DIAGNOSIS — Z17.0 MALIGNANT NEOPLASM OF UPPER-OUTER QUADRANT OF LEFT BREAST IN FEMALE, ESTROGEN RECEPTOR POSITIVE (H): Primary | ICD-10-CM

## 2024-05-17 ENCOUNTER — LAB (OUTPATIENT)
Dept: INFUSION THERAPY | Facility: CLINIC | Age: 39
End: 2024-05-17
Attending: INTERNAL MEDICINE
Payer: COMMERCIAL

## 2024-05-17 VITALS
TEMPERATURE: 98.3 F | SYSTOLIC BLOOD PRESSURE: 113 MMHG | RESPIRATION RATE: 18 BRPM | HEART RATE: 101 BPM | DIASTOLIC BLOOD PRESSURE: 75 MMHG

## 2024-05-17 VITALS — BODY MASS INDEX: 42.62 KG/M2 | WEIGHT: 272.2 LBS

## 2024-05-17 DIAGNOSIS — Z17.0 MALIGNANT NEOPLASM OF UPPER-OUTER QUADRANT OF LEFT BREAST IN FEMALE, ESTROGEN RECEPTOR POSITIVE (H): Primary | ICD-10-CM

## 2024-05-17 DIAGNOSIS — C50.412 MALIGNANT NEOPLASM OF UPPER-OUTER QUADRANT OF LEFT BREAST IN FEMALE, ESTROGEN RECEPTOR POSITIVE (H): Primary | ICD-10-CM

## 2024-05-17 LAB
BASOPHILS # BLD MANUAL: 0 10E3/UL (ref 0–0.2)
BASOPHILS NFR BLD MANUAL: 0 %
EOSINOPHIL # BLD MANUAL: 0 10E3/UL (ref 0–0.7)
EOSINOPHIL NFR BLD MANUAL: 0 %
ERYTHROCYTE [DISTWIDTH] IN BLOOD BY AUTOMATED COUNT: 16.9 % (ref 10–15)
HCT VFR BLD AUTO: 39.7 % (ref 35–47)
HGB BLD-MCNC: 13.7 G/DL (ref 11.7–15.7)
LYMPHOCYTES # BLD MANUAL: 1.9 10E3/UL (ref 0.8–5.3)
LYMPHOCYTES NFR BLD MANUAL: 14 %
MCH RBC QN AUTO: 33.3 PG (ref 26.5–33)
MCHC RBC AUTO-ENTMCNC: 34.5 G/DL (ref 31.5–36.5)
MCV RBC AUTO: 96 FL (ref 78–100)
MONOCYTES # BLD MANUAL: 1.3 10E3/UL (ref 0–1.3)
MONOCYTES NFR BLD MANUAL: 9 %
NEUTROPHILS # BLD MANUAL: 10.7 10E3/UL (ref 1.6–8.3)
NEUTROPHILS NFR BLD MANUAL: 77 %
NRBC # BLD AUTO: 0.1 10E3/UL
NRBC BLD AUTO-RTO: 1 /100
PLAT MORPH BLD: ABNORMAL
PLATELET # BLD AUTO: 582 10E3/UL (ref 150–450)
RBC # BLD AUTO: 4.12 10E6/UL (ref 3.8–5.2)
RBC MORPH BLD: ABNORMAL
WBC # BLD AUTO: 13.9 10E3/UL (ref 4–11)

## 2024-05-17 PROCEDURE — 85041 AUTOMATED RBC COUNT: CPT | Performed by: INTERNAL MEDICINE

## 2024-05-17 PROCEDURE — 96367 TX/PROPH/DG ADDL SEQ IV INF: CPT

## 2024-05-17 PROCEDURE — 96375 TX/PRO/DX INJ NEW DRUG ADDON: CPT

## 2024-05-17 PROCEDURE — 250N000011 HC RX IP 250 OP 636: Performed by: PHYSICIAN ASSISTANT

## 2024-05-17 PROCEDURE — 96413 CHEMO IV INFUSION 1 HR: CPT

## 2024-05-17 PROCEDURE — 258N000003 HC RX IP 258 OP 636: Performed by: INTERNAL MEDICINE

## 2024-05-17 PROCEDURE — 36591 DRAW BLOOD OFF VENOUS DEVICE: CPT | Performed by: INTERNAL MEDICINE

## 2024-05-17 PROCEDURE — 258N000003 HC RX IP 258 OP 636: Performed by: PHYSICIAN ASSISTANT

## 2024-05-17 PROCEDURE — 250N000011 HC RX IP 250 OP 636: Performed by: INTERNAL MEDICINE

## 2024-05-17 PROCEDURE — 85007 BL SMEAR W/DIFF WBC COUNT: CPT | Performed by: INTERNAL MEDICINE

## 2024-05-17 PROCEDURE — 250N000013 HC RX MED GY IP 250 OP 250 PS 637: Performed by: PHYSICIAN ASSISTANT

## 2024-05-17 RX ORDER — LORAZEPAM 2 MG/ML
0.5 INJECTION INTRAMUSCULAR EVERY 4 HOURS PRN
Status: DISCONTINUED | OUTPATIENT
Start: 2024-05-17 | End: 2024-05-17 | Stop reason: HOSPADM

## 2024-05-17 RX ORDER — HEPARIN SODIUM (PORCINE) LOCK FLUSH IV SOLN 100 UNIT/ML 100 UNIT/ML
5 SOLUTION INTRAVENOUS
Status: DISCONTINUED | OUTPATIENT
Start: 2024-05-17 | End: 2024-05-17 | Stop reason: HOSPADM

## 2024-05-17 RX ORDER — DIPHENHYDRAMINE HCL 25 MG
25 CAPSULE ORAL ONCE
Status: COMPLETED | OUTPATIENT
Start: 2024-05-17 | End: 2024-05-17

## 2024-05-17 RX ADMIN — PACLITAXEL 198 MG: 6 INJECTION, SOLUTION INTRAVENOUS at 10:54

## 2024-05-17 RX ADMIN — DEXAMETHASONE SODIUM PHOSPHATE: 10 INJECTION, SOLUTION INTRAMUSCULAR; INTRAVENOUS at 10:24

## 2024-05-17 RX ADMIN — Medication 5 ML: at 11:57

## 2024-05-17 RX ADMIN — LORAZEPAM 0.5 MG: 2 INJECTION INTRAMUSCULAR; INTRAVENOUS at 10:12

## 2024-05-17 RX ADMIN — DIPHENHYDRAMINE HYDROCHLORIDE 25 MG: 25 CAPSULE ORAL at 10:13

## 2024-05-17 RX ADMIN — SODIUM CHLORIDE 250 ML: 9 INJECTION, SOLUTION INTRAVENOUS at 10:12

## 2024-05-17 RX ADMIN — FAMOTIDINE 20 MG: 10 INJECTION, SOLUTION INTRAVENOUS at 10:51

## 2024-05-17 NOTE — PROGRESS NOTES
Infusion Nursing Note:  Eliezer Izquierdo presents today for Taxol C6D1.    Patient seen by provider today: No   present during visit today: Not Applicable.    Note: Ativan given per patient request.    Intravenous Access:  Implanted Port.    Treatment Conditions:  Lab Results   Component Value Date    HGB 13.7 05/17/2024    WBC 13.9 (H) 05/17/2024    ANEU 10.7 (H) 05/17/2024    ANEUTAUTO 3.7 05/10/2024     (H) 05/17/2024   Results reviewed, labs MET treatment parameters, ok to proceed with treatment.    Post Infusion Assessment:  Patient tolerated infusion without incident.  Blood return noted pre and post infusion.  Site patent and intact, free from redness, edema or discomfort.  No evidence of extravasations.  Access discontinued per protocol.     Discharge Plan:   Discharge instructions reviewed with: Patient.  Patient and/or family verbalized understanding of discharge instructions and all questions answered.  AVS to patient via [employer].  Patient will return 5/24/2024 for next appointment.   Patient discharged in stable condition accompanied by: self and DadNathan  Departure Mode: Ambulatory.    Argelia Horton RN

## 2024-05-24 ENCOUNTER — INFUSION THERAPY VISIT (OUTPATIENT)
Dept: INFUSION THERAPY | Facility: CLINIC | Age: 39
End: 2024-05-24
Attending: INTERNAL MEDICINE
Payer: COMMERCIAL

## 2024-05-24 VITALS
TEMPERATURE: 98.9 F | SYSTOLIC BLOOD PRESSURE: 122 MMHG | DIASTOLIC BLOOD PRESSURE: 78 MMHG | RESPIRATION RATE: 16 BRPM | OXYGEN SATURATION: 96 % | HEART RATE: 111 BPM

## 2024-05-24 VITALS
HEART RATE: 112 BPM | WEIGHT: 272.2 LBS | BODY MASS INDEX: 42.62 KG/M2 | SYSTOLIC BLOOD PRESSURE: 126 MMHG | RESPIRATION RATE: 16 BRPM | DIASTOLIC BLOOD PRESSURE: 72 MMHG

## 2024-05-24 DIAGNOSIS — C50.412 MALIGNANT NEOPLASM OF UPPER-OUTER QUADRANT OF LEFT BREAST IN FEMALE, ESTROGEN RECEPTOR POSITIVE (H): Primary | ICD-10-CM

## 2024-05-24 DIAGNOSIS — Z17.0 MALIGNANT NEOPLASM OF UPPER-OUTER QUADRANT OF LEFT BREAST IN FEMALE, ESTROGEN RECEPTOR POSITIVE (H): Primary | ICD-10-CM

## 2024-05-24 LAB
BASOPHILS # BLD AUTO: 0 10E3/UL (ref 0–0.2)
BASOPHILS NFR BLD AUTO: 1 %
EOSINOPHIL # BLD AUTO: 0.1 10E3/UL (ref 0–0.7)
EOSINOPHIL NFR BLD AUTO: 1 %
ERYTHROCYTE [DISTWIDTH] IN BLOOD BY AUTOMATED COUNT: 16.7 % (ref 10–15)
HCT VFR BLD AUTO: 38.8 % (ref 35–47)
HGB BLD-MCNC: 13 G/DL (ref 11.7–15.7)
HOLD SPECIMEN: NORMAL
IMM GRANULOCYTES # BLD: 0.1 10E3/UL
IMM GRANULOCYTES NFR BLD: 1 %
LYMPHOCYTES # BLD AUTO: 1.2 10E3/UL (ref 0.8–5.3)
LYMPHOCYTES NFR BLD AUTO: 21 %
MCH RBC QN AUTO: 33 PG (ref 26.5–33)
MCHC RBC AUTO-ENTMCNC: 33.5 G/DL (ref 31.5–36.5)
MCV RBC AUTO: 99 FL (ref 78–100)
MONOCYTES # BLD AUTO: 0.8 10E3/UL (ref 0–1.3)
MONOCYTES NFR BLD AUTO: 14 %
NEUTROPHILS # BLD AUTO: 3.5 10E3/UL (ref 1.6–8.3)
NEUTROPHILS NFR BLD AUTO: 62 %
NRBC # BLD AUTO: 0 10E3/UL
NRBC BLD AUTO-RTO: 0 /100
PLATELET # BLD AUTO: 255 10E3/UL (ref 150–450)
RBC # BLD AUTO: 3.94 10E6/UL (ref 3.8–5.2)
WBC # BLD AUTO: 5.7 10E3/UL (ref 4–11)

## 2024-05-24 PROCEDURE — 85025 COMPLETE CBC W/AUTO DIFF WBC: CPT | Performed by: INTERNAL MEDICINE

## 2024-05-24 PROCEDURE — 250N000013 HC RX MED GY IP 250 OP 250 PS 637: Performed by: INTERNAL MEDICINE

## 2024-05-24 PROCEDURE — 96367 TX/PROPH/DG ADDL SEQ IV INF: CPT

## 2024-05-24 PROCEDURE — 250N000011 HC RX IP 250 OP 636: Performed by: INTERNAL MEDICINE

## 2024-05-24 PROCEDURE — 258N000003 HC RX IP 258 OP 636: Performed by: PHYSICIAN ASSISTANT

## 2024-05-24 PROCEDURE — 36591 DRAW BLOOD OFF VENOUS DEVICE: CPT | Performed by: INTERNAL MEDICINE

## 2024-05-24 PROCEDURE — 96375 TX/PRO/DX INJ NEW DRUG ADDON: CPT

## 2024-05-24 PROCEDURE — 250N000011 HC RX IP 250 OP 636: Performed by: PHYSICIAN ASSISTANT

## 2024-05-24 PROCEDURE — 96413 CHEMO IV INFUSION 1 HR: CPT

## 2024-05-24 PROCEDURE — 258N000003 HC RX IP 258 OP 636: Performed by: INTERNAL MEDICINE

## 2024-05-24 RX ORDER — LORAZEPAM 2 MG/ML
0.5 INJECTION INTRAMUSCULAR EVERY 4 HOURS PRN
Status: DISCONTINUED | OUTPATIENT
Start: 2024-05-24 | End: 2024-05-24 | Stop reason: HOSPADM

## 2024-05-24 RX ORDER — HEPARIN SODIUM (PORCINE) LOCK FLUSH IV SOLN 100 UNIT/ML 100 UNIT/ML
5 SOLUTION INTRAVENOUS
Status: DISCONTINUED | OUTPATIENT
Start: 2024-05-24 | End: 2024-05-24 | Stop reason: HOSPADM

## 2024-05-24 RX ORDER — DIPHENHYDRAMINE HCL 25 MG
50 CAPSULE ORAL
Status: DISCONTINUED | OUTPATIENT
Start: 2024-05-24 | End: 2024-05-24 | Stop reason: HOSPADM

## 2024-05-24 RX ADMIN — DEXAMETHASONE SODIUM PHOSPHATE: 10 INJECTION, SOLUTION INTRAMUSCULAR; INTRAVENOUS at 10:52

## 2024-05-24 RX ADMIN — Medication 5 ML: at 12:35

## 2024-05-24 RX ADMIN — SODIUM CHLORIDE 250 ML: 9 INJECTION, SOLUTION INTRAVENOUS at 10:29

## 2024-05-24 RX ADMIN — DIPHENHYDRAMINE HYDROCHLORIDE 50 MG: 25 CAPSULE ORAL at 10:34

## 2024-05-24 RX ADMIN — LORAZEPAM 0.5 MG: 2 INJECTION INTRAMUSCULAR; INTRAVENOUS at 10:33

## 2024-05-24 RX ADMIN — PACLITAXEL 198 MG: 6 INJECTION, SOLUTION INTRAVENOUS at 11:28

## 2024-05-24 RX ADMIN — FAMOTIDINE 20 MG: 10 INJECTION, SOLUTION INTRAVENOUS at 10:29

## 2024-05-24 NOTE — PROGRESS NOTES
Infusion Nursing Note:  Eliezer Izquierdo presents today for C3D1 PAC Taxol.    Patient seen by provider today: No   present during visit today: Not Applicable.    Note: Pt uses Ice packs to hands and feet during infusion.       Intravenous Access:  Implanted Port.    Treatment Conditions:  Lab Results   Component Value Date    HGB 13.0 05/24/2024    WBC 5.7 05/24/2024    ANEU 10.7 (H) 05/17/2024    ANEUTAUTO 3.5 05/24/2024     05/24/2024        Results reviewed, labs MET treatment parameters, ok to proceed with treatment.      Post Infusion Assessment:  Patient tolerated infusion without incident.  Blood return noted pre and post infusion.  Site patent and intact, free from redness, edema or discomfort.  No evidence of extravasations.  Access discontinued per protocol.       Discharge Plan:   Discharge instructions reviewed with: Patient and Family.  Patient and/or family verbalized understanding of discharge instructions and all questions answered.  Patient discharged in stable condition accompanied by: self and friend.  Departure Mode: Ambulatory.      Phylicia Vázquez RN

## 2024-05-24 NOTE — PROGRESS NOTES
Port accessed with ease  Flushed, bravo blood return, labs taken and sent. Left saline TKO until lab values return.

## 2024-05-25 ENCOUNTER — MYC REFILL (OUTPATIENT)
Dept: PALLIATIVE CARE | Facility: CLINIC | Age: 39
End: 2024-05-25
Payer: COMMERCIAL

## 2024-05-25 DIAGNOSIS — C50.412 MALIGNANT NEOPLASM OF UPPER-OUTER QUADRANT OF LEFT BREAST IN FEMALE, ESTROGEN RECEPTOR POSITIVE (H): ICD-10-CM

## 2024-05-25 DIAGNOSIS — G89.3 CANCER RELATED PAIN: ICD-10-CM

## 2024-05-25 DIAGNOSIS — Z17.0 MALIGNANT NEOPLASM OF UPPER-OUTER QUADRANT OF LEFT BREAST IN FEMALE, ESTROGEN RECEPTOR POSITIVE (H): ICD-10-CM

## 2024-05-25 DIAGNOSIS — N64.4 BREAST PAIN: ICD-10-CM

## 2024-05-28 RX ORDER — HYDROCODONE BITARTRATE AND ACETAMINOPHEN 5; 325 MG/1; MG/1
1-2 TABLET ORAL EVERY 6 HOURS PRN
Qty: 112 TABLET | Refills: 0 | Status: SHIPPED | OUTPATIENT
Start: 2024-05-28 | End: 2024-06-04

## 2024-05-28 NOTE — TELEPHONE ENCOUNTER
Received [employer] message from patient requesting refill of Norco.     Last refill: 5/14/24  Last office visit: 5/7/24  Scheduled for follow up 6/4/24     Will route request to NP for review.     Reviewed [place]  Report.

## 2024-05-29 DIAGNOSIS — Z17.0 MALIGNANT NEOPLASM OF UPPER-OUTER QUADRANT OF LEFT BREAST IN FEMALE, ESTROGEN RECEPTOR POSITIVE (H): ICD-10-CM

## 2024-05-29 DIAGNOSIS — C50.412 MALIGNANT NEOPLASM OF UPPER-OUTER QUADRANT OF LEFT BREAST IN FEMALE, ESTROGEN RECEPTOR POSITIVE (H): ICD-10-CM

## 2024-05-30 RX ORDER — LORAZEPAM 1 MG/1
1 TABLET ORAL EVERY 6 HOURS PRN
Qty: 30 TABLET | Refills: 1 | Status: SHIPPED | OUTPATIENT
Start: 2024-05-30 | End: 2024-06-13

## 2024-05-31 ENCOUNTER — ONCOLOGY VISIT (OUTPATIENT)
Dept: ONCOLOGY | Facility: CLINIC | Age: 39
End: 2024-05-31
Payer: COMMERCIAL

## 2024-05-31 ENCOUNTER — APPOINTMENT (OUTPATIENT)
Dept: LAB | Facility: CLINIC | Age: 39
End: 2024-05-31
Payer: COMMERCIAL

## 2024-05-31 ENCOUNTER — INFUSION THERAPY VISIT (OUTPATIENT)
Dept: ONCOLOGY | Facility: CLINIC | Age: 39
End: 2024-05-31
Payer: COMMERCIAL

## 2024-05-31 VITALS
BODY MASS INDEX: 43.06 KG/M2 | HEART RATE: 117 BPM | DIASTOLIC BLOOD PRESSURE: 90 MMHG | SYSTOLIC BLOOD PRESSURE: 143 MMHG | RESPIRATION RATE: 16 BRPM | WEIGHT: 275 LBS | OXYGEN SATURATION: 96 % | TEMPERATURE: 98.7 F

## 2024-05-31 DIAGNOSIS — T45.1X5A CHEMOTHERAPY INDUCED NAUSEA AND VOMITING: ICD-10-CM

## 2024-05-31 DIAGNOSIS — R11.2 CHEMOTHERAPY INDUCED NAUSEA AND VOMITING: ICD-10-CM

## 2024-05-31 DIAGNOSIS — C50.412 MALIGNANT NEOPLASM OF UPPER-OUTER QUADRANT OF LEFT BREAST IN FEMALE, ESTROGEN RECEPTOR POSITIVE (H): Primary | ICD-10-CM

## 2024-05-31 DIAGNOSIS — Z17.0 MALIGNANT NEOPLASM OF UPPER-OUTER QUADRANT OF LEFT BREAST IN FEMALE, ESTROGEN RECEPTOR POSITIVE (H): Primary | ICD-10-CM

## 2024-05-31 LAB
BASOPHILS # BLD AUTO: 0.1 10E3/UL (ref 0–0.2)
BASOPHILS NFR BLD AUTO: 1 %
EOSINOPHIL # BLD AUTO: 0.2 10E3/UL (ref 0–0.7)
EOSINOPHIL NFR BLD AUTO: 3 %
ERYTHROCYTE [DISTWIDTH] IN BLOOD BY AUTOMATED COUNT: 15.9 % (ref 10–15)
HCT VFR BLD AUTO: 37.8 % (ref 35–47)
HGB BLD-MCNC: 12.7 G/DL (ref 11.7–15.7)
IMM GRANULOCYTES # BLD: 0 10E3/UL
IMM GRANULOCYTES NFR BLD: 1 %
LYMPHOCYTES # BLD AUTO: 1.3 10E3/UL (ref 0.8–5.3)
LYMPHOCYTES NFR BLD AUTO: 18 %
MCH RBC QN AUTO: 32.6 PG (ref 26.5–33)
MCHC RBC AUTO-ENTMCNC: 33.6 G/DL (ref 31.5–36.5)
MCV RBC AUTO: 97 FL (ref 78–100)
MONOCYTES # BLD AUTO: 0.6 10E3/UL (ref 0–1.3)
MONOCYTES NFR BLD AUTO: 9 %
NEUTROPHILS # BLD AUTO: 5.1 10E3/UL (ref 1.6–8.3)
NEUTROPHILS NFR BLD AUTO: 68 %
NRBC # BLD AUTO: 0 10E3/UL
NRBC BLD AUTO-RTO: 0 /100
PLATELET # BLD AUTO: 290 10E3/UL (ref 150–450)
RBC # BLD AUTO: 3.9 10E6/UL (ref 3.8–5.2)
WBC # BLD AUTO: 7.3 10E3/UL (ref 4–11)

## 2024-05-31 PROCEDURE — 96413 CHEMO IV INFUSION 1 HR: CPT

## 2024-05-31 PROCEDURE — 258N000003 HC RX IP 258 OP 636: Performed by: INTERNAL MEDICINE

## 2024-05-31 PROCEDURE — 85025 COMPLETE CBC W/AUTO DIFF WBC: CPT | Performed by: INTERNAL MEDICINE

## 2024-05-31 PROCEDURE — 99214 OFFICE O/P EST MOD 30 MIN: CPT

## 2024-05-31 PROCEDURE — 258N000003 HC RX IP 258 OP 636

## 2024-05-31 PROCEDURE — 250N000011 HC RX IP 250 OP 636

## 2024-05-31 PROCEDURE — 36591 DRAW BLOOD OFF VENOUS DEVICE: CPT | Performed by: INTERNAL MEDICINE

## 2024-05-31 PROCEDURE — G0463 HOSPITAL OUTPT CLINIC VISIT: HCPCS

## 2024-05-31 PROCEDURE — 250N000011 HC RX IP 250 OP 636: Performed by: INTERNAL MEDICINE

## 2024-05-31 PROCEDURE — 96375 TX/PRO/DX INJ NEW DRUG ADDON: CPT

## 2024-05-31 PROCEDURE — 96367 TX/PROPH/DG ADDL SEQ IV INF: CPT

## 2024-05-31 RX ORDER — HEPARIN SODIUM (PORCINE) LOCK FLUSH IV SOLN 100 UNIT/ML 100 UNIT/ML
5 SOLUTION INTRAVENOUS
Status: DISCONTINUED | OUTPATIENT
Start: 2024-05-31 | End: 2024-05-31 | Stop reason: HOSPADM

## 2024-05-31 RX ORDER — HEPARIN SODIUM,PORCINE 10 UNIT/ML
5-20 VIAL (ML) INTRAVENOUS DAILY PRN
Status: DISCONTINUED | OUTPATIENT
Start: 2024-05-31 | End: 2024-05-31 | Stop reason: HOSPADM

## 2024-05-31 RX ORDER — LORAZEPAM 2 MG/ML
0.5 INJECTION INTRAMUSCULAR EVERY 4 HOURS PRN
Status: DISCONTINUED | OUTPATIENT
Start: 2024-05-31 | End: 2024-05-31 | Stop reason: HOSPADM

## 2024-05-31 RX ORDER — HEPARIN SODIUM (PORCINE) LOCK FLUSH IV SOLN 100 UNIT/ML 100 UNIT/ML
5 SOLUTION INTRAVENOUS ONCE
Status: COMPLETED | OUTPATIENT
Start: 2024-05-31 | End: 2024-05-31

## 2024-05-31 RX ADMIN — DEXAMETHASONE SODIUM PHOSPHATE: 10 INJECTION, SOLUTION INTRAMUSCULAR; INTRAVENOUS at 11:19

## 2024-05-31 RX ADMIN — SODIUM CHLORIDE 250 ML: 9 INJECTION, SOLUTION INTRAVENOUS at 11:13

## 2024-05-31 RX ADMIN — Medication 5 ML: at 11:50

## 2024-05-31 RX ADMIN — PACLITAXEL 198 MG: 6 INJECTION, SOLUTION INTRAVENOUS at 11:46

## 2024-05-31 RX ADMIN — Medication 5 ML: at 09:55

## 2024-05-31 RX ADMIN — LORAZEPAM 0.5 MG: 2 INJECTION INTRAMUSCULAR; INTRAVENOUS at 11:14

## 2024-05-31 RX ADMIN — FAMOTIDINE 20 MG: 10 INJECTION INTRAVENOUS at 11:14

## 2024-05-31 ASSESSMENT — PAIN SCALES - GENERAL: PAINLEVEL: MODERATE PAIN (4)

## 2024-05-31 NOTE — NURSING NOTE
Chief Complaint   Patient presents with    Breast Cancer    Port Draw     Labs collected from port by RN. Vitals taken. Checked in for appointment(s).       Port accessed with 20 gauge 3/4 inch flat needle by RN, labs collected, line flushed with saline and heparin.  Vitals taken. Pt checked in for appointment(s).    Tamar Storey RN

## 2024-05-31 NOTE — PROGRESS NOTES
[facility] Medical Oncology Note  Date of visit: May 31, 2024    New Outpatient Clinic Note        Assessment:     Clinical T2N1 invasive breast cancer with a high risk Mammaprint in this 39 year old woman. As discussed previously with Dr. Ford., the goal is neoadjuvant treatment and then surgery.    S/P 4 cycles of neoadjuvant ddAC with exam showing a dramatic response.  The mass has gone from 4 cm across to just 1 cm.  She had an US on 5/10/24 that showed improved disease mass measuring 1.8x2.0x1.9 cm (previously 2.5 cm). Clinically, the mass is measuring similar to past exams. Will continue to monitor closely.   She a candidate for breast conservation now following neoadjuvant chemotherapy.   Issues with chronic pain.  She is being managed beautifully by palliative care and is now on suboxone.   She is tolerating taxol with mild but manageable nausea. We will keep emend/decreased dex in her premeds at this time.     Plan:     Proceed with week 4 taxol today 5/31/24.   She will come [place] [facility] [place] for labs/provider and Taxol.  The two weeks between these visits she will get therapy [place].   She is allergic to Zofran.  She has a history of nausea/vomiting.  Continue with emend/dex.   Continue to follow-up with palliative care for management of her chronic pain issues that predated her breast cancer diagnosis    30 minutes spent on the date of the encounter doing chart review, review of test results, interpretation of tests, patient visit, and documentation     Ene Mary PA-C    __________________________________________________________________    DIAGNOSES     Clinical T2N1 invasive breast cancer diagnosed at breast biopsy 2/9/2024. Alleyson palpated a left breast lump. Mammogram and US showed a 2.5 x 2.5 x 2.0 cm spiculated mass at 2:00 position.   Biopsy showed a [place] grade I IDC, 97% strongly positive for ER, 99% strongly positive for [place], and negative for HER2 by FISH (IHC 2+). KI-67 is  "24%. Mammaprint was high risk Luminal B. Left axillary US that showed three abnormal axillary nodes, biopsy positive for IDC. Biopsy showed METASTATIC BREAST CARCINOMA, almost entirely effacing lymph node, at least 11 mm in linear extent.  My first exam prior to neoadjuvant therapy showed a palpable deep left breast mass in the 2 o'clock position measuring roughly 4 cm x 4 cm.   Chronic pain with history of opiate prescriptions from multiple providers. She is currently seeing palliative care (Dr. Kylie Rodriguez), and tried buprenorphine, started on 4/11/2024, but reacted to the adhesive.  Then didn't tolerate fentanyl.  Current recommendation is suboxone.   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  But it was negative for everything else.  History of N/V from migraines, thus managing nausea during therapy has been challenging.         History of Present Illness/Therapy to dte:       Neoadjuvant ddAC initiated 3/15/2024. She is back today prior to cycle 4.  US today shows improved disease.  The plan is to start weekly paclitaxel times 12.      Interval History     Eliezer is feeling well today. She had a little bit more nausea last week with paclitaxel. She has compazine, olanzapine, and ativan scheduled with improvement.   She feels the mass may be a little bit larger now when she feels in certain positions.   She is eating and drinking well. No mucositis or thrush.   No chest pain, shortness of breath, or cough.   Continues to work with palliative care for pain management.   She is icing her hands and toes with infusion along with when she get's home. She does have intermittent neuropathy in her fingers >toes. No interruption in ADLS or balance. She did have a fall last week but tripped over her fiances shoes that he left out. Not due to neuropathy or inability to feel her feet.   She has migraines, unchanged from prior. No vision changes.  She endorses dry skin " "on her feet. She has had dry skin before but this appears to be worsened with taxol.   No vomiting. No diarrhea or constipation. Having daily BM's.     Past Medical History:   Melanoma of right foot???     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  She thought about being a , but then got pregnant with her son Homero.  She had a lot of different for jobs.  She spent some time in [place] where her mom and other family member lives.  She then came back to [place].  She has a fiancé of 6 years named Saul and they currently live in [place].  She is an avid reader, and likes romance novels, but really anything.  She says she read 250 books last year.  She does smoke but is trying to quit.  She is currently on a low-carb diet.    Family History:     Family History   Problem Relation Age of Onset    Genitourinary Problems Mother         renal disease - minimal change, sponge kidney            Medications:     Current Outpatient Medications   Medication Sig Dispense Refill    albuterol (PROAIR HFA/PROVENTIL HFA/VENTOLIN HFA) 108 (90 Base) MCG/ACT inhaler Inhale 2 puffs into the lungs      amLODIPine (NORVASC) 10 MG tablet Take 10 mg by mouth      atorvastatin (LIPITOR) 10 MG tablet Take 10 mg by mouth      Blood Glucose Monitoring Suppl (ONETOUCH VERIO FLEX SYSTEM) w/Device KIT       buprenorphine HCl-naloxone HCl (SUBOXONE) 2-0.5 MG per film Place 0.25 " Film under the tongue daily for 2 days, THEN 0.25 Film 2 times daily for 1 day, THEN 0.5 Film 2 times daily for 27 days 28 Film 0    cetirizine (ZYRTEC) 10 MG tablet Take 5 mg by mouth      EPINEPHrine (ANY BX GENERIC EQUIV) 0.3 MG/0.3ML injection 2-pack  (Patient not taking: Reported on 5/10/2024)      esomeprazole (NEXIUM) 20 MG DR capsule Take 20 mg by mouth daily before breakfast      fluticasone-salmeterol (ADVAIR HFA) 115-21 MCG/ACT inhaler Inhale 2 puffs into the lungs      hydrochlorothiazide (HYDRODIURIL) 25 MG tablet Take 25 mg by mouth      HYDROcodone-acetaminophen (NORCO) 5-325 MG tablet Take 1-2 tablets by mouth every 6 hours as needed for severe pain 112 tablet 0    ibuprofen (ADVIL/MOTRIN) 200 MG tablet Take 200 mg by mouth      JANUVIA 50 MG tablet Take 50 mg by mouth      LORazepam (ATIVAN) 1 MG tablet Take 1 tablet (1 mg) by mouth every 6 hours as needed for anxiety, nausea or sleep 30 tablet 1    methocarbamol (ROBAXIN) 500 MG tablet Take 3 tablets (1,500 mg) by mouth 3 times daily as needed for muscle spasms 270 tablet 1    naloxone (NARCAN) 4 MG/0.1ML nasal spray Spray 4 mg into one nostril alternating nostrils as needed for opioid reversal every 2-3 minutes until assistance arrives (Patient not taking: Reported on 5/10/2024)      OLANZapine (ZYPREXA) 2.5 MG tablet Take 1-2 tablets (2.5-5 mg) by mouth 3 times daily as needed (Nausea or anxiety) 150 tablet 2    Pregabalin (LYRICA) 200 MG capsule Take 1 capsule (200 mg) by mouth 2 times daily      prochlorperazine (COMPAZINE) 10 MG tablet Take 1 tablet (10 mg) by mouth every 6 hours as needed for nausea or vomiting 30 tablet 2    Prochlorperazine Maleate (COMPAZINE PO) Take 10 mg by mouth 3 times daily as needed for nausea      VICTOZA PEN 18 MG/3ML soln Inject 1.8 mg Subcutaneous Per patient she is still using                Physical Exam:   Blood pressure (!) 143/90, pulse 117, temperature 98.7  F (37.1  C), resp. rate 16, weight 124.7 kg (275  lb), SpO2 96%.  ECOG PS: 0  Constitutional: WDWN female in NAD, pleasant and appropriate  HEENT:  NC/AT, no icterus, OP clear, MMM  Skin: No jaundice nor ecchymoses  Lungs: CTAB, no w/r/r, nonlabored breathing  Cardiovascular: RRR, S1, S2, no m/r/g  MSK/Extremities: Warm, well perfused. Trace bilateral pitting edema.   LN: no cervical, supraclavicular, or axillary lymphadenopathy  Breast Exam: in the right breast at the 2 o'clock position near the periphery there is a palpable ~1 cm mass. This appears similar in size compared to past exams.   Neurologic: alert, answering questions appropriately, moving all extremities spontaneously. CN 2-12 grossly intact.  Psych: appropriate affect  Data:     Most Recent 3 CBC's:  Recent Labs   Lab Test 05/31/24  0953 05/24/24  0955 05/17/24  0936 05/10/24  1006   WBC 7.3 5.7 13.9* 6.1   HGB 12.7 13.0 13.7 11.9   MCV 97 99 96 93    255 582* 350   ANEUTAUTO 5.1 3.5  --  3.7     Most Recent 3 BMP's:  Recent Labs   Lab Test 05/10/24  1006 04/26/24  1134 04/12/24  0734 03/15/24  0836   NA  --  136 138 135   POTASSIUM  --  3.9 3.5 3.3*   CHLORIDE  --  98 102 98   CO2  --  24 23 25   BUN  --  7.7 7.0 6.4   CR  --  0.49* 0.44* 0.43*   ANIONGAP  --  14 13 12   REMI  --  9.2 9.3 9.4   GLC  --  415* 206* 189*   PROTTOTAL 6.6 6.7 7.0 7.2   ALBUMIN 3.9 3.7 4.0 4.1    Most Recent 3 LFT's:  Recent Labs   Lab Test 05/10/24  1006 04/26/24  1134 04/12/24  0734   AST 28 36 21   ALT 23 24 20   ALKPHOS 67 70 66   BILITOTAL 0.2 0.2 0.2    Most Recent 2 TSH and T4:No lab results found.   I reviewed the above labs today.     Other Data     Most Recent 3 CBC's:  Recent Labs   Lab Test 05/31/24  0953 05/24/24  0955 05/17/24  0936 05/10/24  1006   WBC 7.3 5.7 13.9* 6.1   HGB 12.7 13.0 13.7 11.9   MCV 97 99 96 93    255 582* 350   ANEUTAUTO 5.1 3.5  --  3.7     Most Recent 3 BMP's:  Recent Labs   Lab Test 05/10/24  1006 04/26/24  1134 04/12/24  0734 03/15/24  0836   NA  --  136 138 135    POTASSIUM  --  3.9 3.5 3.3*   CHLORIDE  --  98 102 98   CO2  --  24 23 25   BUN  --  7.7 7.0 6.4   CR  --  0.49* 0.44* 0.43*   ANIONGAP  --  14 13 12   REMI  --  9.2 9.3 9.4   GLC  --  415* 206* 189*   PROTTOTAL 6.6 6.7 7.0 7.2   ALBUMIN 3.9 3.7 4.0 4.1    Most Recent 3 LFT's:  Recent Labs   Lab Test 05/10/24  1006 04/26/24  1134 04/12/24  0734   AST 28 36 21   ALT 23 24 20   ALKPHOS 67 70 66   BILITOTAL 0.2 0.2 0.2    Most Recent 2 TSH and T4:No lab results found.  I reviewed the above labs today.

## 2024-05-31 NOTE — NURSING NOTE
"Oncology Rooming Note    May 31, 2024 9:58 AM   Eliezer Izquierdo is a 39 year old female who presents for:    Chief Complaint   Patient presents with    Breast Cancer    Port Draw     Labs collected from port by RN. Vitals taken. Checked in for appointment(s).       Initial Vitals: BP (!) 143/90   Pulse 117   Temp 98.7  F (37.1  C)   Resp 16   Wt 124.7 kg (275 lb)   SpO2 96%   BMI 43.06 kg/m   Estimated body mass index is 43.06 kg/m  as calculated from the following:    Height as of 5/10/24: 1.702 m (5' 7.01\").    Weight as of this encounter: 124.7 kg (275 lb). Body surface area is 2.43 meters squared.  Moderate Pain (4) Comment: Data Unavailable   No LMP recorded. Patient has had a hysterectomy.  Allergies reviewed: Yes  Medications reviewed: Yes    Medications: Medication refills not needed today.  Pharmacy name entered into EPIC:    [facility] - [place], [place] - [address] [facility] [place], [place] - [address] [facility] [phone]  [facility] [place], [place] - [address] [place] [address]    Frailty Screening:   Is the patient here for a new oncology consult visit in cancer care? 2. No      Clinical concerns:  Patient states there are no new concerns to discuss with provider.  Ene was not notified.        Kristy Rachel CMA              "

## 2024-05-31 NOTE — Clinical Note
5/31/2024         RE: Eliezer Izquierdo  [address] [facility] [address]        Dear Colleague,    Thank you for referring your patient, Eliezer Izquierdo, to the [facility]. Please see a copy of my visit note below.      [facility] Medical Oncology Note  Date of visit: May 31, 2024    New Outpatient Clinic Note        Assessment:     Clinical T2N1 invasive breast cancer with a high risk Mammaprint in this 39 year old woman. As discussed previously with Dr. Ford., the goal is neoadjuvant treatment and then surgery.    S/P 4 cycles of neoadjuvant ddAC with exam showing a dramatic response.  The mass has gone from 4 cm across to just 1 cm.  She had an US on 5/10/24 that showed improved disease mass measuring 1.8x2.0x1.9 cm (previously 2.5 cm). Clinically, the mass is measuring similar to past exams. Will continue to monitor closely.   She a candidate for breast conservation now following neoadjuvant chemotherapy.   Issues with chronic pain.  She is being managed beautifully by palliative care and is now on suboxone.   She is tolerating taxol with mild but manageable nausea. We will keep emend/decreased dex in her premeds at this time.     Plan:     Proceed with week 4 taxol today 5/31/24.   She will come to [facility] [place] 3 weeks for labs/provider and Taxol.  The two weeks between these visits she will get therapy [place].   She is allergic to Zofran.  She has a history of nausea/vomiting.  Continue with emend/dex.   Continue to follow-up with palliative care for management of her chronic pain issues that predated her breast cancer diagnosis    30 minutes spent on the date of the encounter doing chart review, review of test results, interpretation of tests, patient visit, and documentation     Ene Mary PA-C    __________________________________________________________________    DIAGNOSES     Clinical T2N1 invasive breast cancer diagnosed at breast biopsy 2/9/2024.  "Eliezer palpated a left breast lump. Mammogram and US showed a 2.5 x 2.5 x 2.0 cm spiculated mass at 2:00 position.   Biopsy showed a [place] grade I IDC, 97% strongly positive for ER, 99% strongly positive for [place], and negative for HER2 by FISH (IHC 2+). KI-67 is 24%. Mammaprint was high risk Luminal B. Left axillary US that showed three abnormal axillary nodes, biopsy positive for IDC. Biopsy showed METASTATIC BREAST CARCINOMA, almost entirely effacing lymph node, at least 11 mm in linear extent.  My first exam prior to neoadjuvant therapy showed a palpable deep left breast mass in the 2 o'clock position measuring roughly 4 cm x 4 cm.   Chronic pain with history of opiate prescriptions from multiple providers. She is currently seeing palliative care (Dr. Kylie Rodriguez), and tried buprenorphine, started on 4/11/2024, but reacted to the adhesive.  Then didn't tolerate fentanyl.  Current recommendation is suboxone.   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  But it was negative for everything else.  History of N/V from migraines, thus managing nausea during therapy has been challenging.         History of Present Illness/Therapy to dte:       Neoadjuvant ddAC initiated 3/15/2024. She is back today prior to cycle 4.  US today shows improved disease.  The plan is to start weekly paclitaxel times 12.      Interval History     Eliezer is feeling well today. She had a little bit more nausea last week with paclitaxel. She has compazine, olanzapine, and ativan scheduled with improvement.   She feels the mass may be a little bit larger now when she feels in certain positions.   She is eating and drinking well. No mucositis or thrush.   No chest pain, shortness of breath, or cough.   Continues to work with palliative care for pain management.   She is icing her hands and toes with infusion along with when she get's home. She does have intermittent neuropathy in her " "fingers >toes. No interruption in ADLS or balance. She did have a fall last week but tripped over her fiances shoes that he left out. Not due to neuropathy or inability to feel her feet.   She has migraines, unchanged from prior. No vision changes.  She endorses dry skin on her feet. She has had dry skin before but this appears to be worsened with taxol.   No vomiting. No diarrhea or constipation. Having daily BM's.     Past Medical History:   Melanoma of right foot???     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  She thought about being a , but then got pregnant with her son Homero.  She had a lot of different for jobs.  She spent some time in [place] where her mom and other family member lives.  She then came back to [place].  She has a fiancé of 6 years named Saul and they currently live in [place].  She is an avid reader, and likes romance novels, but really anything.  She says she read 250 books last year.  She does smoke but is trying to quit.  She is currently on a low-carb diet.    Family History:     Family History   Problem Relation Age of Onset    Genitourinary Problems Mother         renal disease - minimal change, sponge kidney            Medications:     Current Outpatient Medications   Medication Sig Dispense Refill    albuterol (PROAIR HFA/PROVENTIL HFA/VENTOLIN HFA) 108 (90 Base) MCG/ACT inhaler Inhale 2 puffs into the lungs   "    amLODIPine (NORVASC) 10 MG tablet Take 10 mg by mouth      atorvastatin (LIPITOR) 10 MG tablet Take 10 mg by mouth      Blood Glucose Monitoring Suppl (ONETOUCH VERIO FLEX SYSTEM) w/Device KIT       buprenorphine HCl-naloxone HCl (SUBOXONE) 2-0.5 MG per film Place 0.25 Film under the tongue daily for 2 days, THEN 0.25 Film 2 times daily for 1 day, THEN 0.5 Film 2 times daily for 27 days 28 Film 0    cetirizine (ZYRTEC) 10 MG tablet Take 5 mg by mouth      EPINEPHrine (ANY BX GENERIC EQUIV) 0.3 MG/0.3ML injection 2-pack  (Patient not taking: Reported on 5/10/2024)      esomeprazole (NEXIUM) 20 MG DR capsule Take 20 mg by mouth daily before breakfast      fluticasone-salmeterol (ADVAIR HFA) 115-21 MCG/ACT inhaler Inhale 2 puffs into the lungs      hydrochlorothiazide (HYDRODIURIL) 25 MG tablet Take 25 mg by mouth      HYDROcodone-acetaminophen (NORCO) 5-325 MG tablet Take 1-2 tablets by mouth every 6 hours as needed for severe pain 112 tablet 0    ibuprofen (ADVIL/MOTRIN) 200 MG tablet Take 200 mg by mouth      JANUVIA 50 MG tablet Take 50 mg by mouth      LORazepam (ATIVAN) 1 MG tablet Take 1 tablet (1 mg) by mouth every 6 hours as needed for anxiety, nausea or sleep 30 tablet 1    methocarbamol (ROBAXIN) 500 MG tablet Take 3 tablets (1,500 mg) by mouth 3 times daily as needed for muscle spasms 270 tablet 1    naloxone (NARCAN) 4 MG/0.1ML nasal spray Spray 4 mg into one nostril alternating nostrils as needed for opioid reversal every 2-3 minutes until assistance arrives (Patient not taking: Reported on 5/10/2024)      OLANZapine (ZYPREXA) 2.5 MG tablet Take 1-2 tablets (2.5-5 mg) by mouth 3 times daily as needed (Nausea or anxiety) 150 tablet 2    Pregabalin (LYRICA) 200 MG capsule Take 1 capsule (200 mg) by mouth 2 times daily      prochlorperazine (COMPAZINE) 10 MG tablet Take 1 tablet (10 mg) by mouth every 6 hours as needed for nausea or vomiting 30 tablet 2    Prochlorperazine Maleate (COMPAZINE PO) Take 10  mg by mouth 3 times daily as needed for nausea      VICTOZA PEN 18 MG/3ML soln Inject 1.8 mg Subcutaneous Per patient she is still using                Physical Exam:   Blood pressure (!) 143/90, pulse 117, temperature 98.7  F (37.1  C), resp. rate 16, weight 124.7 kg (275 lb), SpO2 96%.  ECOG PS: 0  Constitutional: WDWN female in NAD, pleasant and appropriate  HEENT:  NC/AT, no icterus, OP clear, MMM  Skin: No jaundice nor ecchymoses  Lungs: CTAB, no w/r/r, nonlabored breathing  Cardiovascular: RRR, S1, S2, no m/r/g  MSK/Extremities: Warm, well perfused. Trace bilateral pitting edema.   LN: no cervical, supraclavicular, or axillary lymphadenopathy  Breast Exam: in the right breast at the 2 o'clock position near the periphery there is a palpable ~1 cm mass. This appears similar in size compared to past exams.   Neurologic: alert, answering questions appropriately, moving all extremities spontaneously. CN 2-12 grossly intact.  Psych: appropriate affect  Data:     Most Recent 3 CBC's:  Recent Labs   Lab Test 05/31/24  0953 05/24/24  0955 05/17/24  0936 05/10/24  1006   WBC 7.3 5.7 13.9* 6.1   HGB 12.7 13.0 13.7 11.9   MCV 97 99 96 93    255 582* 350   ANEUTAUTO 5.1 3.5  --  3.7     Most Recent 3 BMP's:  Recent Labs   Lab Test 05/10/24  1006 04/26/24  1134 04/12/24  0734 03/15/24  0836   NA  --  136 138 135   POTASSIUM  --  3.9 3.5 3.3*   CHLORIDE  --  98 102 98   CO2  --  24 23 25   BUN  --  7.7 7.0 6.4   CR  --  0.49* 0.44* 0.43*   ANIONGAP  --  14 13 12   REMI  --  9.2 9.3 9.4   GLC  --  415* 206* 189*   PROTTOTAL 6.6 6.7 7.0 7.2   ALBUMIN 3.9 3.7 4.0 4.1    Most Recent 3 LFT's:  Recent Labs   Lab Test 05/10/24  1006 04/26/24  1134 04/12/24  0734   AST 28 36 21   ALT 23 24 20   ALKPHOS 67 70 66   BILITOTAL 0.2 0.2 0.2    Most Recent 2 TSH and T4:No lab results found.   I reviewed the above labs today.     Other Data     Most Recent 3 CBC's:  Recent Labs   Lab Test 05/31/24  0953 05/24/24  0955 05/17/24  0936  05/10/24  1006   WBC 7.3 5.7 13.9* 6.1   HGB 12.7 13.0 13.7 11.9   MCV 97 99 96 93    255 582* 350   ANEUTAUTO 5.1 3.5  --  3.7     Most Recent 3 BMP's:  Recent Labs   Lab Test 05/10/24  1006 04/26/24  1134 04/12/24  0734 03/15/24  0836   NA  --  136 138 135   POTASSIUM  --  3.9 3.5 3.3*   CHLORIDE  --  98 102 98   CO2  --  24 23 25   BUN  --  7.7 7.0 6.4   CR  --  0.49* 0.44* 0.43*   ANIONGAP  --  14 13 12   REMI  --  9.2 9.3 9.4   GLC  --  415* 206* 189*   PROTTOTAL 6.6 6.7 7.0 7.2   ALBUMIN 3.9 3.7 4.0 4.1    Most Recent 3 LFT's:  Recent Labs   Lab Test 05/10/24  1006 04/26/24  1134 04/12/24  0734   AST 28 36 21   ALT 23 24 20   ALKPHOS 67 70 66   BILITOTAL 0.2 0.2 0.2    Most Recent 2 TSH and T4:No lab results found.  I reviewed the above labs today.                Again, thank you for allowing me to participate in the care of your patient.        Sincerely,        Ene Mary PA-C

## 2024-05-31 NOTE — PROGRESS NOTES
Infusion Nursing Note:  Eliezer Izquierdo presents today for C8D1 Taxol.    Patient seen by provider today: Yes: DESEAN Hernandez   present during visit today: Not Applicable.    Note: Pt saw provider prior to infusion.  Eliezer agrees to treatment today.    -pt ices hands and feet during infusion  -PRN Pepcid IV and Ativan IV per pt request prior to infusion    Intravenous Access:  Implanted Port.    Treatment Conditions:   Latest Reference Range & Units 05/31/24 09:53   WBC 4.0 - 11.0 10e3/uL 7.3   Hemoglobin 11.7 - 15.7 g/dL 12.7   Hematocrit 35.0 - 47.0 % 37.8   Platelet Count 150 - 450 10e3/uL 290   RBC Count 3.80 - 5.20 10e6/uL 3.90   MCV 78 - 100 fL 97   MCH 26.5 - 33.0 pg 32.6   MCHC 31.5 - 36.5 g/dL 33.6   RDW 10.0 - 15.0 % 15.9 (H)   % Neutrophils % 68   % Lymphocytes % 18   % Monocytes % 9   % Eosinophils % 3   % Basophils % 1   Absolute Basophils 0.0 - 0.2 10e3/uL 0.1   Absolute Eosinophils 0.0 - 0.7 10e3/uL 0.2   Absolute Immature Granulocytes <=0.4 10e3/uL 0.0   Absolute Lymphocytes 0.8 - 5.3 10e3/uL 1.3   Absolute Monocytes 0.0 - 1.3 10e3/uL 0.6   % Immature Granulocytes % 1   Absolute Neutrophils 1.6 - 8.3 10e3/uL 5.1   Absolute NRBCs 10e3/uL 0.0   NRBCs per 100 WBC <1 /100 0       Post Infusion Assessment:  Patient tolerated infusion without incident.  Blood return noted pre and post infusion.  Site patent and intact, free from redness, edema or discomfort.  No evidence of extravasations.  Access discontinued per protocol.       Discharge Plan:   Patient declined prescription refills.  Discharge instructions reviewed with: Patient and Family.  Patient and/or family verbalized understanding of discharge instructions and all questions answered.  AVS to patient via urturnT.  Patient will return 6/7 to [facility] for next appointment.   Patient discharged in stable condition accompanied by: self and father.  Departure Mode: Ambulatory.    Solange De Souza RN

## 2024-06-02 ENCOUNTER — MYC REFILL (OUTPATIENT)
Dept: PALLIATIVE CARE | Facility: CLINIC | Age: 39
End: 2024-06-02
Payer: COMMERCIAL

## 2024-06-02 DIAGNOSIS — F11.90 OPIOID USE DISORDER: ICD-10-CM

## 2024-06-03 RX ORDER — BUPRENORPHINE AND NALOXONE 2; .5 MG/1; MG/1
0.5 FILM, SOLUBLE BUCCAL; SUBLINGUAL 2 TIMES DAILY
Qty: 30 FILM | Refills: 0 | Status: SHIPPED | OUTPATIENT
Start: 2024-06-03 | End: 2024-06-04

## 2024-06-03 RX ORDER — GABAPENTIN 300 MG/1
CAPSULE ORAL
Qty: 90 CAPSULE | Refills: 1 | Status: SHIPPED | OUTPATIENT
Start: 2024-06-03 | End: 2024-06-13

## 2024-06-03 NOTE — TELEPHONE ENCOUNTER
Received [employer] message and telephone call from patient requesting refill of suboxone. She reports worsening neuropathy in her fingers. Previously it was just the tips, now it's extending further along her fingers. She is requesting a prescription for gabapentin and is hopeful she could start today instead of waiting to talk to Dr. Rodriguez tomorrow during her scheduled visit.    Last refill of suboxone:   Last office visit: 5/7/24  Scheduled for follow up 6/4/24     Will route request to MD for review.     Reviewed [place]  Report.

## 2024-06-03 NOTE — PROGRESS NOTES
"Palliative Care Progress Note    Patient Name: Eliezer Izquierdo  Primary Provider: Madison Roach    Chief Complaint/Patient ID: Eliezer Izquierdo is a 39 year old female with PMHx of T2 N1 invasive breast cancer on the left side.  Initiated neoadjuvant ddAC 3/15/24.  Given response to chemo, will likely be a candidate for breast conservation surgery now.  Will complete 12 additional weeks of chemo, followed by presumed lumpectomy, radiation, and then adjuvant therapy.  -Per chart review, some history of chronic pain (knees, migraines).  Mention about being on opiates in the past but weaned herself off.     Pain Hx:    Reports that she had been on pain medications for many years.  Originally was thought to have endometriosis, however after having surgery, was found to have more of an IBS picture.  She is also dealt with chronic pain in both of her knees, as she needs knee replacements.  She gets injections in her knees regularly.  Additionally deals with daily migraines.  Currently working on appeals process for the treatment for the migraines.     Last Palliative care appointment: 5/7/2024 with me.  Conversation about opiate use disorder and started on Suboxone.     Reviewed: Yes    Interim History:  Eliezer Izquierdo is a 39 year old female who is seen today for follow up with Palliative Care via billable video visit.      Reviewed oncology note from 5/31.  Continues to have good response to treatment.  Planning to continue with Taxol for total of 12 weeks.    Really nauseous today, states this happens when she does not sleep well.  Took a Compazine earlier, and is taking an Ativan right now.    Has the following concerns today:    Pain - Does feel the Suboxone has been a good fit.  Denies any \"icky side effects\" from it and also feels she is getting some better pain relief.  Still needing 10 mg of hydrocodone every 6 hours.  Wondering about increasing the Suboxone as well as considering a change in " "the hydrocodone to 10 mg tablets.  That way, if she is able to take less, she can do 1/2 tablet of the hydrocodone, however she is really tired of all of the pills she is having to take.    Neuropathy - Has been worsening with chemo.  Involves her fingers, right hand greater than left hand, as well as her toes.  The pharmacist told her that he did not think gabapentin would help her since Lyrica did not really help (he said this after she had already picked up the prescription that we sent yesterday).  She states her mom is on Mirapex and has found it to be helpful for neuropathy and is wondering if this could be an option for her.  She is also wondering about a prescription for compression gloves and stockings.  She says that the neuropathy does feel better when her son \"squeezes\" her hands.    Smoking - She wants to quit smoking.  States that she has been on Chantix in the past and found it to be helpful.  Has wondered if this could be restarted.  Says Wellbutrin did not help her at all.  She has used nicotine patches as well and is wondering if these could be an option.    Peeling skin - Skin on her feet are peeling significantly.  She says she is applying Kiara moisturizing lotion once daily.  Wondering about any other suggestions.       Social History:  Lives with clifton.  Has a son named Homero.  Has lived in [place], [place], and [place].  Enjoys reading.  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   Social History     Tobacco Use    Smoking status: Some Days     Types: Cigarettes     Passive exposure: Current    Smokeless tobacco: Never    Tobacco comments:     Smokes about 4 or less cigarette's a day. Trying to quit.   Substance Use Topics    Alcohol use: No    Drug use: No     Advanced Care Planning: Has not completed an HCD. States her dad would be her HCA.     Family History- Reviewed in Epic.    Medications- Reviewed " in Epic.    Past Medical History- Reviewed in [facility].    Past Surgical History- Reviewed in Epic.    Physical Exam:   Constitutional: Alert, pleasant, no apparent distress. Sitting up in chair.  Eyes: Sclera non-icteric, no eye discharge.  ENT: No nasal discharge. Ears grossly normal.  Respiratory: Unlabored respirations. Speaking in full sentences.  Musculoskeletal: Extremities appear normal- no gross deformities noted. No edema noted on upper body.   Skin: No suspicious lesions or rashes on visible skin.  Neurologic: Clear speech, no aphasia. No facial droop.  Psychiatric: Mentation appears normal, appropriate attention. Affect normal/bright. Does not appear anxious or depressed.    Key Data Reviewed:  LABS: 5/31/2024- WBC 7.3, Hgb 12.7, Plts 290.     IMAGING: Left breast ultrasound 5/10/2024- FINDINGS:     Targeted ultrasound by technologist and radiologist. Decreased size of the biopsy-proven left breast cancer at the 2:00 position 10 cm from nipple measuring approximately 1.8 x 2.0 x 1.9 cm (previously 2.5 cm maximally).      Impression & Recommendations & Counseling:  Eliezer Izquierdo is a 39 year old female with history of left-sided breast cancer.     Pain   OUD - See my note from 5/7/2024 regarding full conversation about opioid use disorder/dependence and chronic pain in.  It was at this visit that we initiated Suboxone.  This seems to have been a good fit for her, as she is tolerating it well and has noticed some improvement in pain.  She is interested in increasing the dose today, which is appropriate.  -Plan increase Suboxone to a full film (2mg-0.5mg strength) BID today.  -She will call palliative care RN in 3 days to check in on how this is going.  At that time, can consider increasing to 2mg TID or changing film strength to 4 mg for further dose increase.  -Will historically update hydrocodone to 10 mg tablets.  She will use up her current 5 mg tablets first.  With the increase in Suboxone,  discussed I am hopeful she will not need as much of the hydrocodone, which she would also prefer.  Discussed assessing her pain every 6 hours but not taking the hydrocodone scheduled.    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  Encouraged her to talk to oncology team if symptoms become significantly pronounced, as this can be a dose-limiting toxicity.  -She is open to trialing the gabapentin for a week.  I sent 300 mg capsules, and she will start with once daily and can increase up to 3/day.  -If no benefit from the gabapentin, I be okay with trial of Mirapex.  Would start with 0.125 mg daily before bed and then could increase up to 0.5 mg before bed prior to further discussion with me.  -I filled out a prescription for compression stockings and gloves for her, which we will fax to medical supply company.    Smoking cessation - Applauded her desire to want to quit smoking. Spoke with patient regarding options for smoking cessation.  Discussed the use of : Chantix and Nicotine patch.  We did discuss possibly resuming Chantix, however given that we are increasing the Suboxone as well as making adjustments to her neuropathy medications, discussed holding off on starting another oral medication.  We can consider this in the future, however we will start with nicotine patches.  -Prescription sent for 7 mg nicotine patches.  -Trial of distraction to extend time in between cigarettes.    Skin changes - Peeling skin on her feet.  Really bothersome.  -Encouraged applying moisturizing lotion 3 times daily, particularly after soaking the feet to increase hydration to the skin.  -Prescription for 20% urea cream also sent that she can use 1-2 times daily.    Follow up: 1 month      Video-Visit Details  Video Start Time: 10:01 AM  Video End Time: 10:46 AM    Originating Location (pt. Location): Home     Distant Location (provider location):  Offsite- Personal Home      Platform used for Video Visit: [employer]     Total time spent on day of  encounter is 57 mins, including reviewing record, review of above studies, above visit with patient, symptomatic discussion as above, including medication adjustments/prescription management, 5 minutes spent on discussion related to smoking cessation including options to help quit smoking, and documentation.     Kylie Rodriguez,   Palliative Medicine   [id number]    Some chart documentation performed using Dragon Voice recognition Software. Although reviewed after completion, some words and grammatical errors may remain.

## 2024-06-04 ENCOUNTER — VIRTUAL VISIT (OUTPATIENT)
Dept: PALLIATIVE CARE | Facility: CLINIC | Age: 39
End: 2024-06-04
Attending: STUDENT IN AN ORGANIZED HEALTH CARE EDUCATION/TRAINING PROGRAM
Payer: COMMERCIAL

## 2024-06-04 VITALS — BODY MASS INDEX: 42.59 KG/M2 | WEIGHT: 272 LBS

## 2024-06-04 DIAGNOSIS — G89.3 CANCER RELATED PAIN: ICD-10-CM

## 2024-06-04 DIAGNOSIS — T45.1X5A PERIPHERAL NEUROPATHY DUE TO CHEMOTHERAPY (H): ICD-10-CM

## 2024-06-04 DIAGNOSIS — C50.412 MALIGNANT NEOPLASM OF UPPER-OUTER QUADRANT OF LEFT BREAST IN FEMALE, ESTROGEN RECEPTOR POSITIVE (H): Primary | ICD-10-CM

## 2024-06-04 DIAGNOSIS — G62.0 PERIPHERAL NEUROPATHY DUE TO CHEMOTHERAPY (H): ICD-10-CM

## 2024-06-04 DIAGNOSIS — Q80.8 PEELING SKIN SYNDROME: ICD-10-CM

## 2024-06-04 DIAGNOSIS — F17.200 TOBACCO DEPENDENCE SYNDROME: ICD-10-CM

## 2024-06-04 DIAGNOSIS — N64.4 BREAST PAIN: ICD-10-CM

## 2024-06-04 DIAGNOSIS — Z71.6 ENCOUNTER FOR SMOKING CESSATION COUNSELING: ICD-10-CM

## 2024-06-04 DIAGNOSIS — Z17.0 MALIGNANT NEOPLASM OF UPPER-OUTER QUADRANT OF LEFT BREAST IN FEMALE, ESTROGEN RECEPTOR POSITIVE (H): Primary | ICD-10-CM

## 2024-06-04 DIAGNOSIS — F11.90 OPIOID USE DISORDER: ICD-10-CM

## 2024-06-04 PROCEDURE — 99215 OFFICE O/P EST HI 40 MIN: CPT | Mod: 95 | Performed by: STUDENT IN AN ORGANIZED HEALTH CARE EDUCATION/TRAINING PROGRAM

## 2024-06-04 PROCEDURE — 99406 BEHAV CHNG SMOKING 3-10 MIN: CPT | Mod: 95 | Performed by: STUDENT IN AN ORGANIZED HEALTH CARE EDUCATION/TRAINING PROGRAM

## 2024-06-04 RX ORDER — HEPARIN SODIUM (PORCINE) LOCK FLUSH IV SOLN 100 UNIT/ML 100 UNIT/ML
5 SOLUTION INTRAVENOUS
Status: CANCELLED | OUTPATIENT
Start: 2024-06-07

## 2024-06-04 RX ORDER — EPINEPHRINE 1 MG/ML
0.3 INJECTION, SOLUTION INTRAMUSCULAR; SUBCUTANEOUS EVERY 5 MIN PRN
Status: CANCELLED | OUTPATIENT
Start: 2024-06-07

## 2024-06-04 RX ORDER — ALBUTEROL SULFATE 0.83 MG/ML
2.5 SOLUTION RESPIRATORY (INHALATION)
Status: CANCELLED | OUTPATIENT
Start: 2024-06-07

## 2024-06-04 RX ORDER — LORAZEPAM 2 MG/ML
0.5 INJECTION INTRAMUSCULAR EVERY 4 HOURS PRN
Status: CANCELLED | OUTPATIENT
Start: 2024-06-07

## 2024-06-04 RX ORDER — DIPHENHYDRAMINE HCL 25 MG
50 CAPSULE ORAL
Status: CANCELLED
Start: 2024-06-07

## 2024-06-04 RX ORDER — HEPARIN SODIUM,PORCINE 10 UNIT/ML
5-20 VIAL (ML) INTRAVENOUS DAILY PRN
Status: CANCELLED | OUTPATIENT
Start: 2024-06-07

## 2024-06-04 RX ORDER — METHYLPREDNISOLONE SODIUM SUCCINATE 125 MG/2ML
125 INJECTION, POWDER, LYOPHILIZED, FOR SOLUTION INTRAMUSCULAR; INTRAVENOUS
Status: CANCELLED
Start: 2024-06-07

## 2024-06-04 RX ORDER — HYDROCODONE BITARTRATE AND ACETAMINOPHEN 10; 325 MG/1; MG/1
.5-1 TABLET ORAL EVERY 6 HOURS PRN
COMMUNITY
Start: 2024-06-04 | End: 2024-06-10

## 2024-06-04 RX ORDER — DIPHENHYDRAMINE HYDROCHLORIDE 50 MG/ML
50 INJECTION INTRAMUSCULAR; INTRAVENOUS
Status: CANCELLED
Start: 2024-06-07

## 2024-06-04 RX ORDER — UREA 200 MG/G
CREAM TOPICAL PRN
Qty: 480 G | Refills: 1 | Status: SHIPPED | OUTPATIENT
Start: 2024-06-04

## 2024-06-04 RX ORDER — MEPERIDINE HYDROCHLORIDE 25 MG/ML
25 INJECTION INTRAMUSCULAR; INTRAVENOUS; SUBCUTANEOUS EVERY 30 MIN PRN
Status: CANCELLED | OUTPATIENT
Start: 2024-06-07

## 2024-06-04 RX ORDER — BUPRENORPHINE AND NALOXONE 2; .5 MG/1; MG/1
1 FILM, SOLUBLE BUCCAL; SUBLINGUAL 2 TIMES DAILY
COMMUNITY
Start: 2024-06-04 | End: 2024-06-07

## 2024-06-04 RX ORDER — ALBUTEROL SULFATE 90 UG/1
1-2 AEROSOL, METERED RESPIRATORY (INHALATION)
Status: CANCELLED
Start: 2024-06-07

## 2024-06-04 ASSESSMENT — PAIN SCALES - GENERAL: PAINLEVEL: SEVERE PAIN (6)

## 2024-06-04 NOTE — NURSING NOTE
Is the patient currently in the state of [place]? YES    Visit mode:VIDEO    If the visit is dropped, the patient can be reconnected by: VIDEO VISIT: Send to e-mail at: [email or web address]    Will anyone else be joining the visit? NO  (If patient encounters technical issues they should call [phone]    How would you like to obtain your AVS? MyChart    Are changes needed to the allergy or medication list? Pt stated no changes to allergies and Pt stated no med changes    Are refills needed on medications prescribed by this physician? NO    Reason for visit: WILI Devine LPN

## 2024-06-04 NOTE — LETTER
6/4/2024       RE: Eliezer Izquierdo  [address] [facility] [address]       Dear Colleague,    Thank you for referring your patient, [facility]quierdo[facility]. Please see a copy of my visit note below.    Palliative Care Progress Note    Patient Name: Eliezer Izquierdo  Primary Provider: Madison Roach    Chief Complaint/Patient ID: Eliezer Izquierdo is a 39 year old female with PMHx of T2 N1 invasive breast cancer on the left side.  Initiated neoadjuvant ddAC 3/15/24.  Given response to chemo, will likely be a candidate for breast conservation surgery now.  Will complete 12 additional weeks of chemo, followed by presumed lumpectomy, radiation, and then adjuvant therapy.  -Per chart review, some history of chronic pain (knees, migraines).  Mention about being on opiates in the past but weaned herself off.     Pain Hx:    Reports that she had been on pain medications for many years.  Originally was thought to have endometriosis, however after having surgery, was found to have more of an IBS picture.  She is also dealt with chronic pain in both of her knees, as she needs knee replacements.  She gets injections in her knees regularly.  Additionally deals with daily migraines.  Currently working on appeals process for the treatment for the migraines.     Last Palliative care appointment: 5/7/2024 with me.  Conversation about opiate use disorder and started on Suboxone.     Reviewed: Yes    Interim History:  Eliezer Izquierdo is a 39 year old female who is seen today for follow up with Palliative Care via billable video visit.      Reviewed oncology note from 5/31.  Continues to have good response to treatment.  Planning to continue with Taxol for total of 12 weeks.    Really nauseous today, states this happens when she does not sleep well.  Took a Compazine earlier, and is taking an Ativan right  "now.    Has the following concerns today:    Pain - Does feel the Suboxone has been a good fit.  Denies any \"icky side effects\" from it and also feels she is getting some better pain relief.  Still needing 10 mg of hydrocodone every 6 hours.  Wondering about increasing the Suboxone as well as considering a change in the hydrocodone to 10 mg tablets.  That way, if she is able to take less, she can do 1/2 tablet of the hydrocodone, however she is really tired of all of the pills she is having to take.    Neuropathy - Has been worsening with chemo.  Involves her fingers, right hand greater than left hand, as well as her toes.  The pharmacist told her that he did not think gabapentin would help her since Lyrica did not really help (he said this after she had already picked up the prescription that we sent yesterday).  She states her mom is on Mirapex and has found it to be helpful for neuropathy and is wondering if this could be an option for her.  She is also wondering about a prescription for compression gloves and stockings.  She says that the neuropathy does feel better when her son \"squeezes\" her hands.    Smoking - She wants to quit smoking.  States that she has been on Chantix in the past and found it to be helpful.  Has wondered if this could be restarted.  Says Wellbutrin did not help her at all.  She has used nicotine patches as well and is wondering if these could be an option.    Peeling skin - Skin on her feet are peeling significantly.  She says she is applying Kiara moisturizing lotion once daily.  Wondering about any other suggestions.       Social History:  Lives with clifton.  Has a son named Homero.  Has lived in [place], [place], and [place].  Enjoys reading.  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   Social History     Tobacco Use    Smoking status: Some Days     Types: Cigarettes     Passive exposure: Current    " Smokeless tobacco: Never    Tobacco comments:     Smokes about 4 or less cigarette's a day. Trying to quit.   Substance Use Topics    Alcohol use: No    Drug use: No     Advanced Care Planning: Has not completed an HCD. States her dad would be her HCA.     Family History- Reviewed in Epic.    Medications- Reviewed in Epic.    Past Medical History- Reviewed in [facility].    Past Surgical History- Reviewed in Epic.    Physical Exam:   Constitutional: Alert, pleasant, no apparent distress. Sitting up in chair.  Eyes: Sclera non-icteric, no eye discharge.  ENT: No nasal discharge. Ears grossly normal.  Respiratory: Unlabored respirations. Speaking in full sentences.  Musculoskeletal: Extremities appear normal- no gross deformities noted. No edema noted on upper body.   Skin: No suspicious lesions or rashes on visible skin.  Neurologic: Clear speech, no aphasia. No facial droop.  Psychiatric: Mentation appears normal, appropriate attention. Affect normal/bright. Does not appear anxious or depressed.    Key Data Reviewed:  LABS: 5/31/2024- WBC 7.3, Hgb 12.7, Plts 290.     IMAGING: Left breast ultrasound 5/10/2024- FINDINGS:     Targeted ultrasound by technologist and radiologist. Decreased size of the biopsy-proven left breast cancer at the 2:00 position 10 cm from nipple measuring approximately 1.8 x 2.0 x 1.9 cm (previously 2.5 cm maximally).      Impression & Recommendations & Counseling:  Eliezer Izquierdo is a 39 year old female with history of left-sided breast cancer.     Pain   OUD - See my note from 5/7/2024 regarding full conversation about opioid use disorder/dependence and chronic pain in.  It was at this visit that we initiated Suboxone.  This seems to have been a good fit for her, as she is tolerating it well and has noticed some improvement in pain.  She is interested in increasing the dose today, which is appropriate.  -Plan increase Suboxone to a full film (2mg-0.5mg strength) BID today.  -She will call  palliative care RN in 3 days to check in on how this is going.  At that time, can consider increasing to 2mg TID or changing film strength to 4 mg for further dose increase.  -Will historically update hydrocodone to 10 mg tablets.  She will use up her current 5 mg tablets first.  With the increase in Suboxone, discussed I am hopeful she will not need as much of the hydrocodone, which she would also prefer.  Discussed assessing her pain every 6 hours but not taking the hydrocodone scheduled.    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  Encouraged her to talk to oncology team if symptoms become significantly pronounced, as this can be a dose-limiting toxicity.  -She is open to trialing the gabapentin for a week.  I sent 300 mg capsules, and she will start with once daily and can increase up to 3/day.  -If no benefit from the gabapentin, I be okay with trial of Mirapex.  Would start with 0.125 mg daily before bed and then could increase up to 0.5 mg before bed prior to further discussion with me.  -I filled out a prescription for compression stockings and gloves for her, which we will fax to medical supply company.    Smoking cessation - Applauded her desire to want to quit smoking. Spoke with patient regarding options for smoking cessation.  Discussed the use of : Chantix and Nicotine patch.  We did discuss possibly resuming Chantix, however given that we are increasing the Suboxone as well as making adjustments to her neuropathy medications, discussed holding off on starting another oral medication.  We can consider this in the future, however we will start with nicotine patches.  -Prescription sent for 7 mg nicotine patches.  -Trial of distraction to extend time in between cigarettes.    Skin changes - Peeling skin on her feet.  Really bothersome.  -Encouraged applying moisturizing lotion 3 times daily, particularly after soaking the feet to increase hydration to the skin.  -Prescription for 20% urea cream also sent that she can use  1-2 times daily.    Follow up: 1 month         Total time spent on day of encounter is 57 mins, including reviewing record, review of above studies, above visit with patient, symptomatic discussion as above, including medication adjustments/prescription management, 5 minutes spent on discussion related to smoking cessation including options to help quit smoking, and documentation.       Some chart documentation performed using Dragon Voice recognition Software. Although reviewed after completion, some words and grammatical errors may remain.      Again, thank you for allowing me to participate in the care of your patient.      Sincerely,    Kylie Rodriguez, DO

## 2024-06-04 NOTE — PATIENT INSTRUCTIONS
Recommendations:  -Increase Suboxone to 1 full film twice daily.  Call Cecy on Friday for an update.  At that point, we can consider either increasing the frequency to 3 times a day or increasing the dose with the 4 mg films.  -Give the gabapentin a try for the next week to see if there is any impact on your neuropathy.  If not, next week, we can trial a prescription for pramipexole.  -We will fax the prescription for the compression stockings and gloves to the [place] location once we have the number.  -I sent a prescription for the 7 mg nicotine patches.  We did not discuss it very much, but I would also recommend when you are craving a cigarette to try distracting yourself with some other task.  Even if you delay having a cigarette by 10 to 15 minutes, over the course of the day, this can result in a cigarette deficit.  -Recommend applying moisturizing lotion to your feet at least 3 times daily.  The best time to apply lotion is after your feet have been soaking.  Pat them dry and then apply the lotion to lock in the moisture.  -I also sent a prescription for urea cream, which can be applied 1-2 times daily to your feet to try to help with the severe dry cracking.    Follow up: Plan on July 2 at 1 PM.  I will have our schedulers put this on the schedule.      Reasons to Call    If you are having worsening/uncontrolled symptoms we want you to call!    You or your other physicians make any changes to medications we have prescribed.  -Please call for refills 4-5 days before you will run out of medication.    Important Phone Numbers, including: Refills, scheduling, and general questions     Palliative Care RN: Cecy Ghotra - [phone]  *After hours or on weekends. Will connect you with on call MD. [phone]

## 2024-06-06 ENCOUNTER — TELEPHONE (OUTPATIENT)
Dept: PALLIATIVE CARE | Facility: CLINIC | Age: 39
End: 2024-06-06
Payer: COMMERCIAL

## 2024-06-06 NOTE — TELEPHONE ENCOUNTER
Pt called to report that she made a mistake with the suboxone increased discussed earlier this week during her visit with Dr. Rodriguez. Instead of increasing to a full film twice daily, she increased to one film three times daily. She has tolerated it well and the feeling has returned in her hand. She will call again tomorrow because she anticipates she will want to increase further to the 4 mg film.    KATARINA Kulkarni, RN  Palliative Care Nurse Clinician    [phone] (Direct)  [phone] (Main)  [phone] (Appointment Scheduling)

## 2024-06-07 ENCOUNTER — LAB (OUTPATIENT)
Dept: INFUSION THERAPY | Facility: CLINIC | Age: 39
End: 2024-06-07
Attending: INTERNAL MEDICINE
Payer: COMMERCIAL

## 2024-06-07 VITALS
TEMPERATURE: 98.3 F | WEIGHT: 277.6 LBS | DIASTOLIC BLOOD PRESSURE: 79 MMHG | SYSTOLIC BLOOD PRESSURE: 130 MMHG | BODY MASS INDEX: 43.47 KG/M2

## 2024-06-07 VITALS — HEART RATE: 119 BPM | DIASTOLIC BLOOD PRESSURE: 78 MMHG | SYSTOLIC BLOOD PRESSURE: 123 MMHG

## 2024-06-07 DIAGNOSIS — Z17.0 MALIGNANT NEOPLASM OF UPPER-OUTER QUADRANT OF LEFT BREAST IN FEMALE, ESTROGEN RECEPTOR POSITIVE (H): Primary | ICD-10-CM

## 2024-06-07 DIAGNOSIS — C50.412 MALIGNANT NEOPLASM OF UPPER-OUTER QUADRANT OF LEFT BREAST IN FEMALE, ESTROGEN RECEPTOR POSITIVE (H): Primary | ICD-10-CM

## 2024-06-07 DIAGNOSIS — Z17.0 MALIGNANT NEOPLASM OF UPPER-OUTER QUADRANT OF LEFT BREAST IN FEMALE, ESTROGEN RECEPTOR POSITIVE (H): ICD-10-CM

## 2024-06-07 DIAGNOSIS — C50.412 MALIGNANT NEOPLASM OF UPPER-OUTER QUADRANT OF LEFT BREAST IN FEMALE, ESTROGEN RECEPTOR POSITIVE (H): ICD-10-CM

## 2024-06-07 DIAGNOSIS — F11.90 OPIOID USE DISORDER: Primary | ICD-10-CM

## 2024-06-07 LAB
ALBUMIN SERPL BCG-MCNC: 4.2 G/DL (ref 3.5–5.2)
ALP SERPL-CCNC: 70 U/L (ref 40–150)
ALT SERPL W P-5'-P-CCNC: 34 U/L (ref 0–50)
AST SERPL W P-5'-P-CCNC: 39 U/L (ref 0–45)
BASOPHILS # BLD AUTO: 0.1 10E3/UL (ref 0–0.2)
BASOPHILS NFR BLD AUTO: 1 %
BILIRUB DIRECT SERPL-MCNC: <0.2 MG/DL (ref 0–0.3)
BILIRUB SERPL-MCNC: 0.3 MG/DL
EOSINOPHIL # BLD AUTO: 0.2 10E3/UL (ref 0–0.7)
EOSINOPHIL NFR BLD AUTO: 3 %
ERYTHROCYTE [DISTWIDTH] IN BLOOD BY AUTOMATED COUNT: 15.4 % (ref 10–15)
HCT VFR BLD AUTO: 37.7 % (ref 35–47)
HGB BLD-MCNC: 12.9 G/DL (ref 11.7–15.7)
IMM GRANULOCYTES # BLD: 0 10E3/UL
IMM GRANULOCYTES NFR BLD: 1 %
LYMPHOCYTES # BLD AUTO: 1.1 10E3/UL (ref 0.8–5.3)
LYMPHOCYTES NFR BLD AUTO: 17 %
MCH RBC QN AUTO: 34.2 PG (ref 26.5–33)
MCHC RBC AUTO-ENTMCNC: 34.2 G/DL (ref 31.5–36.5)
MCV RBC AUTO: 100 FL (ref 78–100)
MONOCYTES # BLD AUTO: 0.6 10E3/UL (ref 0–1.3)
MONOCYTES NFR BLD AUTO: 10 %
NEUTROPHILS # BLD AUTO: 4.5 10E3/UL (ref 1.6–8.3)
NEUTROPHILS NFR BLD AUTO: 69 %
NRBC # BLD AUTO: 0 10E3/UL
NRBC BLD AUTO-RTO: 0 /100
PLATELET # BLD AUTO: 367 10E3/UL (ref 150–450)
PROT SERPL-MCNC: 7.1 G/DL (ref 6.4–8.3)
RBC # BLD AUTO: 3.77 10E6/UL (ref 3.8–5.2)
WBC # BLD AUTO: 6.6 10E3/UL (ref 4–11)

## 2024-06-07 PROCEDURE — 85025 COMPLETE CBC W/AUTO DIFF WBC: CPT | Performed by: INTERNAL MEDICINE

## 2024-06-07 PROCEDURE — 96375 TX/PRO/DX INJ NEW DRUG ADDON: CPT

## 2024-06-07 PROCEDURE — 96413 CHEMO IV INFUSION 1 HR: CPT

## 2024-06-07 PROCEDURE — 80076 HEPATIC FUNCTION PANEL: CPT | Performed by: INTERNAL MEDICINE

## 2024-06-07 PROCEDURE — 250N000011 HC RX IP 250 OP 636: Mod: JZ | Performed by: INTERNAL MEDICINE

## 2024-06-07 PROCEDURE — 96367 TX/PROPH/DG ADDL SEQ IV INF: CPT

## 2024-06-07 PROCEDURE — 258N000003 HC RX IP 258 OP 636: Mod: JZ | Performed by: INTERNAL MEDICINE

## 2024-06-07 PROCEDURE — 36591 DRAW BLOOD OFF VENOUS DEVICE: CPT | Performed by: INTERNAL MEDICINE

## 2024-06-07 RX ORDER — LORAZEPAM 2 MG/ML
0.5 INJECTION INTRAMUSCULAR EVERY 4 HOURS PRN
Status: DISCONTINUED | OUTPATIENT
Start: 2024-06-07 | End: 2024-06-07 | Stop reason: HOSPADM

## 2024-06-07 RX ORDER — BUPRENORPHINE AND NALOXONE 4; 1 MG/1; MG/1
FILM, SOLUBLE BUCCAL; SUBLINGUAL
Qty: 75 FILM | Refills: 0 | Status: SHIPPED | OUTPATIENT
Start: 2024-06-07 | End: 2024-07-07

## 2024-06-07 RX ORDER — HEPARIN SODIUM (PORCINE) LOCK FLUSH IV SOLN 100 UNIT/ML 100 UNIT/ML
5 SOLUTION INTRAVENOUS
Status: DISCONTINUED | OUTPATIENT
Start: 2024-06-07 | End: 2024-06-07 | Stop reason: HOSPADM

## 2024-06-07 RX ADMIN — PACLITAXEL 198 MG: 6 INJECTION, SOLUTION INTRAVENOUS at 11:42

## 2024-06-07 RX ADMIN — FAMOTIDINE 20 MG: 10 INJECTION, SOLUTION INTRAVENOUS at 11:13

## 2024-06-07 RX ADMIN — LORAZEPAM 0.5 MG: 2 INJECTION INTRAMUSCULAR; INTRAVENOUS at 11:13

## 2024-06-07 RX ADMIN — FOSAPREPITANT: 150 INJECTION, POWDER, LYOPHILIZED, FOR SOLUTION INTRAVENOUS at 11:19

## 2024-06-07 RX ADMIN — Medication 5 ML: at 12:45

## 2024-06-07 RX ADMIN — SODIUM CHLORIDE 250 ML: 9 INJECTION, SOLUTION INTRAVENOUS at 11:13

## 2024-06-07 ASSESSMENT — PAIN SCALES - GENERAL: PAINLEVEL: MODERATE PAIN (5)

## 2024-06-07 NOTE — TELEPHONE ENCOUNTER
Received telephone call from patient requesting refill of suboxone. She is requesting an increased dose. Dr. Rodriguez would like her to take 2 mg in the morning and afternoon and 4 mg at night through Sunday, then increase to 4 mg in the morning, 2 mg in the afternoon and 4 mg at bedtime.     Last refill: 6/3/24 (Doses adjusted since last fill)  Last office visit: 6/4/24  Scheduled for follow up 7/2/24     Will route request to MD for review.     Reviewed [place]  Report.

## 2024-06-07 NOTE — PROGRESS NOTES
Infusion Nursing Note:  Eliezer Izquierdo presents today for Taxol.    Patient seen by provider today: No   present during visit today: Not Applicable.    Note: Pt requested ativan and pepcid today and was due for her dose of suboxone. Per pharmacy pt should wait to take dose of suboxone until later today as there are interactions between suboxone/taxol/ativan causing increased effects of suboxone and possibility of CNS depression. Advised pt to wait to take suboxone, she verbalized understanding.      Intravenous Access:  Implanted Port.    Treatment Conditions:  Lab Results   Component Value Date    HGB 12.9 06/07/2024    WBC 6.6 06/07/2024    ANEU 10.7 (H) 05/17/2024    ANEUTAUTO 4.5 06/07/2024     06/07/2024        Results reviewed, labs MET treatment parameters, ok to proceed with treatment.      Post Infusion Assessment:  Patient tolerated infusion without incident.  Blood return noted pre and post infusion.  Site patent and intact, free from redness, edema or discomfort.  No evidence of extravasations.  Access discontinued per protocol.       Discharge Plan:   Copy of AVS reviewed with patient and/or family.  Patient will return 6/14/24 for next appointment.  Patient discharged in stable condition accompanied by: self and dad.  Departure Mode: Ambulatory.      MADELEINE NAJERA RN

## 2024-06-07 NOTE — PROGRESS NOTES
PAC labs drawn without difficulty via site protocol. Patient tolerated well.    MADELEINE NAJERA RN on 6/7/2024 at 10:50 AM

## 2024-06-10 DIAGNOSIS — Z17.0 MALIGNANT NEOPLASM OF UPPER-OUTER QUADRANT OF LEFT BREAST IN FEMALE, ESTROGEN RECEPTOR POSITIVE (H): Primary | ICD-10-CM

## 2024-06-10 DIAGNOSIS — C50.412 MALIGNANT NEOPLASM OF UPPER-OUTER QUADRANT OF LEFT BREAST IN FEMALE, ESTROGEN RECEPTOR POSITIVE (H): Primary | ICD-10-CM

## 2024-06-10 RX ORDER — HYDROCODONE BITARTRATE AND ACETAMINOPHEN 10; 325 MG/1; MG/1
.5-1 TABLET ORAL EVERY 6 HOURS PRN
Qty: 56 TABLET | Refills: 0 | Status: SHIPPED | OUTPATIENT
Start: 2024-06-10 | End: 2024-06-24

## 2024-06-10 NOTE — TELEPHONE ENCOUNTER
Received telephone call from patient requesting refill of Norco.     Last refill: 5/28/24  Last office visit: 6/4/24  Scheduled for follow up 7/2/24     Will route request to MD for review.     Reviewed [place]  Report.

## 2024-06-12 RX ORDER — ALBUTEROL SULFATE 0.83 MG/ML
2.5 SOLUTION RESPIRATORY (INHALATION)
Status: CANCELLED | OUTPATIENT
Start: 2024-06-14

## 2024-06-12 RX ORDER — MEPERIDINE HYDROCHLORIDE 25 MG/ML
25 INJECTION INTRAMUSCULAR; INTRAVENOUS; SUBCUTANEOUS EVERY 30 MIN PRN
Status: CANCELLED | OUTPATIENT
Start: 2024-06-14

## 2024-06-12 RX ORDER — HEPARIN SODIUM (PORCINE) LOCK FLUSH IV SOLN 100 UNIT/ML 100 UNIT/ML
5 SOLUTION INTRAVENOUS
Status: CANCELLED | OUTPATIENT
Start: 2024-06-14

## 2024-06-12 RX ORDER — HEPARIN SODIUM,PORCINE 10 UNIT/ML
5-20 VIAL (ML) INTRAVENOUS DAILY PRN
Status: CANCELLED | OUTPATIENT
Start: 2024-06-14

## 2024-06-12 RX ORDER — DIPHENHYDRAMINE HYDROCHLORIDE 50 MG/ML
50 INJECTION INTRAMUSCULAR; INTRAVENOUS
Status: CANCELLED
Start: 2024-06-14

## 2024-06-12 RX ORDER — ALBUTEROL SULFATE 90 UG/1
1-2 AEROSOL, METERED RESPIRATORY (INHALATION)
Status: CANCELLED
Start: 2024-06-14

## 2024-06-12 RX ORDER — EPINEPHRINE 1 MG/ML
0.3 INJECTION, SOLUTION INTRAMUSCULAR; SUBCUTANEOUS EVERY 5 MIN PRN
Status: CANCELLED | OUTPATIENT
Start: 2024-06-14

## 2024-06-12 RX ORDER — LORAZEPAM 2 MG/ML
0.5 INJECTION INTRAMUSCULAR EVERY 4 HOURS PRN
Status: CANCELLED | OUTPATIENT
Start: 2024-06-14

## 2024-06-12 RX ORDER — METHYLPREDNISOLONE SODIUM SUCCINATE 125 MG/2ML
125 INJECTION, POWDER, LYOPHILIZED, FOR SOLUTION INTRAMUSCULAR; INTRAVENOUS
Status: CANCELLED
Start: 2024-06-14

## 2024-06-12 RX ORDER — DIPHENHYDRAMINE HCL 25 MG
50 CAPSULE ORAL
Status: CANCELLED
Start: 2024-06-14

## 2024-06-13 DIAGNOSIS — C50.412 MALIGNANT NEOPLASM OF UPPER-OUTER QUADRANT OF LEFT BREAST IN FEMALE, ESTROGEN RECEPTOR POSITIVE (H): ICD-10-CM

## 2024-06-13 DIAGNOSIS — G62.0 PERIPHERAL NEUROPATHY DUE TO CHEMOTHERAPY (H): Primary | ICD-10-CM

## 2024-06-13 DIAGNOSIS — Z17.0 MALIGNANT NEOPLASM OF UPPER-OUTER QUADRANT OF LEFT BREAST IN FEMALE, ESTROGEN RECEPTOR POSITIVE (H): ICD-10-CM

## 2024-06-13 DIAGNOSIS — T45.1X5A PERIPHERAL NEUROPATHY DUE TO CHEMOTHERAPY (H): Primary | ICD-10-CM

## 2024-06-13 RX ORDER — LORAZEPAM 1 MG/1
1 TABLET ORAL EVERY 6 HOURS PRN
Qty: 30 TABLET | Refills: 1 | Status: SHIPPED | OUTPATIENT
Start: 2024-06-13 | End: 2024-06-29

## 2024-06-13 RX ORDER — PRAMIPEXOLE DIHYDROCHLORIDE 0.12 MG/1
TABLET ORAL
Qty: 102 TABLET | Refills: 0 | Status: SHIPPED | OUTPATIENT
Start: 2024-06-13 | End: 2024-07-02

## 2024-06-13 NOTE — TELEPHONE ENCOUNTER
Pt reporting gabapentin has not been helping with neuropathy and she'd like to try mirapex, which was discussed during her last clinic visit with Dr. Rodriguez. She is also     Last refill of lorazepam: 6/6/24  Last office visit: 6/4/24  Scheduled for follow up 7/2/24     Will route request to MD for review.     Reviewed [place]  Report.

## 2024-06-14 ENCOUNTER — INFUSION THERAPY VISIT (OUTPATIENT)
Dept: INFUSION THERAPY | Facility: CLINIC | Age: 39
End: 2024-06-14
Attending: INTERNAL MEDICINE
Payer: COMMERCIAL

## 2024-06-14 VITALS — BODY MASS INDEX: 42.62 KG/M2 | WEIGHT: 272.2 LBS

## 2024-06-14 VITALS
SYSTOLIC BLOOD PRESSURE: 121 MMHG | DIASTOLIC BLOOD PRESSURE: 76 MMHG | HEART RATE: 106 BPM | TEMPERATURE: 99.1 F | RESPIRATION RATE: 18 BRPM

## 2024-06-14 DIAGNOSIS — C50.412 MALIGNANT NEOPLASM OF UPPER-OUTER QUADRANT OF LEFT BREAST IN FEMALE, ESTROGEN RECEPTOR POSITIVE (H): Primary | ICD-10-CM

## 2024-06-14 DIAGNOSIS — Z17.0 MALIGNANT NEOPLASM OF UPPER-OUTER QUADRANT OF LEFT BREAST IN FEMALE, ESTROGEN RECEPTOR POSITIVE (H): Primary | ICD-10-CM

## 2024-06-14 LAB
BASOPHILS # BLD AUTO: 0.1 10E3/UL (ref 0–0.2)
BASOPHILS NFR BLD AUTO: 1 %
EOSINOPHIL # BLD AUTO: 0.2 10E3/UL (ref 0–0.7)
EOSINOPHIL NFR BLD AUTO: 2 %
ERYTHROCYTE [DISTWIDTH] IN BLOOD BY AUTOMATED COUNT: 15.2 % (ref 10–15)
HCT VFR BLD AUTO: 38.5 % (ref 35–47)
HGB BLD-MCNC: 13 G/DL (ref 11.7–15.7)
IMM GRANULOCYTES # BLD: 0.1 10E3/UL
IMM GRANULOCYTES NFR BLD: 1 %
LYMPHOCYTES # BLD AUTO: 1.5 10E3/UL (ref 0.8–5.3)
LYMPHOCYTES NFR BLD AUTO: 17 %
MCH RBC QN AUTO: 33.3 PG (ref 26.5–33)
MCHC RBC AUTO-ENTMCNC: 33.8 G/DL (ref 31.5–36.5)
MCV RBC AUTO: 99 FL (ref 78–100)
MONOCYTES # BLD AUTO: 0.7 10E3/UL (ref 0–1.3)
MONOCYTES NFR BLD AUTO: 8 %
NEUTROPHILS # BLD AUTO: 6.2 10E3/UL (ref 1.6–8.3)
NEUTROPHILS NFR BLD AUTO: 72 %
NRBC # BLD AUTO: 0 10E3/UL
NRBC BLD AUTO-RTO: 0 /100
PLATELET # BLD AUTO: 336 10E3/UL (ref 150–450)
RBC # BLD AUTO: 3.9 10E6/UL (ref 3.8–5.2)
WBC # BLD AUTO: 8.6 10E3/UL (ref 4–11)

## 2024-06-14 PROCEDURE — 250N000011 HC RX IP 250 OP 636: Mod: JZ

## 2024-06-14 PROCEDURE — 85025 COMPLETE CBC W/AUTO DIFF WBC: CPT | Performed by: INTERNAL MEDICINE

## 2024-06-14 PROCEDURE — 96375 TX/PRO/DX INJ NEW DRUG ADDON: CPT

## 2024-06-14 PROCEDURE — 96413 CHEMO IV INFUSION 1 HR: CPT

## 2024-06-14 PROCEDURE — 250N000011 HC RX IP 250 OP 636: Mod: JZ | Performed by: INTERNAL MEDICINE

## 2024-06-14 PROCEDURE — 36591 DRAW BLOOD OFF VENOUS DEVICE: CPT | Performed by: INTERNAL MEDICINE

## 2024-06-14 PROCEDURE — 96367 TX/PROPH/DG ADDL SEQ IV INF: CPT

## 2024-06-14 PROCEDURE — 258N000003 HC RX IP 258 OP 636: Mod: JZ | Performed by: INTERNAL MEDICINE

## 2024-06-14 PROCEDURE — 258N000003 HC RX IP 258 OP 636: Mod: JZ

## 2024-06-14 RX ORDER — HEPARIN SODIUM (PORCINE) LOCK FLUSH IV SOLN 100 UNIT/ML 100 UNIT/ML
5 SOLUTION INTRAVENOUS
Status: DISCONTINUED | OUTPATIENT
Start: 2024-06-14 | End: 2024-06-14 | Stop reason: HOSPADM

## 2024-06-14 RX ORDER — LORAZEPAM 2 MG/ML
0.5 INJECTION INTRAMUSCULAR EVERY 4 HOURS PRN
Status: DISCONTINUED | OUTPATIENT
Start: 2024-06-14 | End: 2024-06-14 | Stop reason: HOSPADM

## 2024-06-14 RX ADMIN — DEXAMETHASONE SODIUM PHOSPHATE: 10 INJECTION, SOLUTION INTRAMUSCULAR; INTRAVENOUS at 11:06

## 2024-06-14 RX ADMIN — SODIUM CHLORIDE 250 ML: 9 INJECTION, SOLUTION INTRAVENOUS at 10:54

## 2024-06-14 RX ADMIN — LORAZEPAM 0.5 MG: 2 INJECTION INTRAMUSCULAR; INTRAVENOUS at 10:52

## 2024-06-14 RX ADMIN — FAMOTIDINE 20 MG: 10 INJECTION, SOLUTION INTRAVENOUS at 10:52

## 2024-06-14 RX ADMIN — Medication 5 ML: at 13:29

## 2024-06-14 RX ADMIN — FOSAPREPITANT DIMEGLUMINE: 150 INJECTION, POWDER, LYOPHILIZED, FOR SOLUTION INTRAVENOUS at 11:57

## 2024-06-14 RX ADMIN — PACLITAXEL 198 MG: 6 INJECTION, SOLUTION INTRAVENOUS at 12:19

## 2024-06-19 NOTE — PROGRESS NOTES
[facility] Medical Oncology Note  Date of visit: Jun 21, 2024    New Outpatient Clinic Note    Assessment:     Clinical T2N1 invasive breast cancer with a high risk Mammaprint in this 39 year old woman. As discussed previously with Dr. Ford., the goal is neoadjuvant treatment and then surgery.    S/P 4 cycles of neoadjuvant ddAC with exam showing a dramatic response.  The mass has gone from 4 cm across to just 1 cm.  She had an US on 5/10/24 that showed improved disease mass measuring 1.8x2.0x1.9 cm (previously 2.5 cm). Clinically, the mass is measuring similar to past exams. Will continue to monitor closely.  However, she is a candidate even now for breast conservation now following neoadjuvant chemotherapy.   Status post 6 weeks of Taxol status post 5 weeks of Taxol with very severe side effects.  She can feel her hands.  She is dropping things.  She is in chronic pain.  This is true of her feet as well.  This begs the question of what is the benefit of continuing on with her chemotherapy?  The chance of pathologic complete response in this setting really is only in the 10-20% range and she is already that manifested enough of response that she can go for breast conservation.  Therefore I think the benefit of more chemotherapy is small, though not 0.  However if it comes at the cost of permanent debility, and this patient already has significant chronic pain issues, I am not sure we are doing her any favors.  Therefore the plan today is that we are going to hold today's chemotherapy and see if the side effects do not improve over the next week or so.  Will also get a breast ultrasound to see if there is been more of a response since the ultrasound after the AC.  If her symptoms do not improve and there really is no more response, then I would just abort the rest of the Taxol and go right to surgery.  Chronic pain issues managed by palliative care.  Unfortunately her neuropathy is refractory to gabapentin and  "Lyrica.       Plan:     Hold chemotherapy today  Ultrasound of the left breast over the next week  Return to clinic with me in 1 week for a virtual visit.  At that point, if there has been no further response and her symptoms have not improved, we will discontinue the Taxol and send her back to Dr. Ford for definitive surgical management.    30 minutes spent on the date of the encounter doing chart review, review of test results, interpretation of tests, patient visit, and documentation     Zachary Nolan MD, MSc  Associate Professor of Medicine  [employer] [facility] [employer]  [facility] [address] [address]  [phone]    __________________________________________________________________    DIAGNOSES     Clinical T2N1 invasive breast cancer diagnosed at breast biopsy 2/9/2024. Alleyson palpated a left breast lump. Mammogram and US showed a 2.5 x 2.5 x 2.0 cm spiculated mass at 2:00 position.   Biopsy showed a Jeniffer grade I IDC, 97% strongly positive for ER, 99% strongly positive for UT, and negative for HER2 by FISH (IHC 2+). KI-67 is 24%. Mammaprint was high risk Luminal B. Left axillary US that showed three abnormal axillary nodes, biopsy positive for IDC. Biopsy showed METASTATIC BREAST CARCINOMA, almost entirely effacing lymph node, at least 11 mm in linear extent.  My first exam prior to neoadjuvant therapy showed a palpable deep left breast mass in the 2 o'clock position measuring roughly 4 cm x 4 cm.   Chronic pain with history of opiate prescriptions from multiple providers. She is currently seeing palliative care (Dr. Kylie Rodriguez), and tried buprenorphine, started on 4/11/2024, but reacted to the adhesive.  Then didn't tolerate fentanyl.  Current recommendation is suboxone.   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  But it was negative for everything else.  History of N/V from migraines, thus " "managing nausea during therapy has been challenging.         History of Present Illness/Therapy to dte:       Neoadjuvant ddAC initiated 3/15/2024 through 4/26/2024.   Weekly paclitaxel since 5/10/2026. She is back today for week 6 of a planned 12.      Interval History     Eliezer is back for week 6 Taxol.  Really doing poorly. Taxol is no easier than the AC  Severe neuropathy. Can't feel her hands. Feet are numb and painful.  Really suffering.  Life has been altered.    Does not feel there is been much change in her mass   Still smoking  She feels the mass may be a little bit larger now when she feels in certain positions.   She is eating and drinking well. No mucositis or thrush.   No chest pain, shortness of breath, or cough.   Continues to work with palliative care for pain management.   She has migraines, unchanged from prior. No vision changes.  She endorses dry skin on her feet. She has had dry skin before but this appears to be worsened with taxol.   No vomiting. No diarrhea or constipation. Having daily BM's.     Past Medical History:   Melanoma of right foot???     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  She thought about being a , but then got pregnant with her son Homero.  She had a lot of different for jobs.  She spent some time in [place] where her mom and other family member lives.  She then came back to [place].  She has a " hardyancé of 6 years named Saul and they currently live in [place].  She is an avid reader, and likes romance novels, but really anything.  She says she read 250 books last year.  She does smoke but is trying to quit.  She is currently on a low-carb diet.    Family History:     Family History   Problem Relation Age of Onset    Genitourinary Problems Mother         renal disease - minimal change, sponge kidney            Medications:     Current Outpatient Medications   Medication Sig Dispense Refill    albuterol (PROAIR HFA/PROVENTIL HFA/VENTOLIN HFA) 108 (90 Base) MCG/ACT inhaler Inhale 2 puffs into the lungs      amLODIPine (NORVASC) 10 MG tablet Take 10 mg by mouth      atorvastatin (LIPITOR) 10 MG tablet Take 10 mg by mouth      Blood Glucose Monitoring Suppl (ONETOUCH VERIO FLEX SYSTEM) w/Device KIT       buprenorphine HCl-naloxone HCl (SUBOXONE) 4-1 MG per film Place 1 Film under the tongue every morning AND 0.5 Film daily at 2 pm AND 1 Film at bedtime. Do all this for 30 days. 75 Film 0    cetirizine (ZYRTEC) 10 MG tablet Take 5 mg by mouth      EPINEPHrine (ANY BX GENERIC EQUIV) 0.3 MG/0.3ML injection 2-pack  (Patient not taking: Reported on 5/10/2024)      esomeprazole (NEXIUM) 20 MG DR capsule Take 20 mg by mouth daily before breakfast      fluticasone-salmeterol (ADVAIR HFA) 115-21 MCG/ACT inhaler Inhale 2 puffs into the lungs      hydrochlorothiazide (HYDRODIURIL) 25 MG tablet Take 25 mg by mouth      HYDROcodone-acetaminophen (NORCO)  MG per tablet Take 0.5-1 tablets by mouth every 6 hours as needed for severe pain 56 tablet 0    ibuprofen (ADVIL/MOTRIN) 200 MG tablet Take 200 mg by mouth      JANUVIA 50 MG tablet Take 50 mg by mouth      LORazepam (ATIVAN) 1 MG tablet Take 1 tablet (1 mg) by mouth every 6 hours as needed for anxiety, nausea or sleep 30 tablet 1    methocarbamol (ROBAXIN) 500 MG tablet Take 3 tablets (1,500 mg) by mouth 3 times daily as needed for muscle spasms 270 tablet 1     naloxone (NARCAN) 4 MG/0.1ML nasal spray Spray 4 mg into one nostril alternating nostrils as needed for opioid reversal every 2-3 minutes until assistance arrives (Patient not taking: Reported on 5/10/2024)      nicotine (NICODERM CQ) 7 MG/24HR 24 hr patch Place 1 patch onto the skin every 24 hours 30 patch 3    OLANZapine (ZYPREXA) 2.5 MG tablet Take 1-2 tablets (2.5-5 mg) by mouth 3 times daily as needed (Nausea or anxiety) 150 tablet 2    pramipexole (MIRAPEX) 0.125 MG tablet Take 1 tablet (0.125 mg) by mouth at bedtime for 3 days, THEN 2 tablets (0.25 mg) at bedtime for 3 days, THEN 3 tablets (0.375 mg) at bedtime for 3 days, THEN 4 tablets (0.5 mg) at bedtime for 21 days. 102 tablet 0    Pregabalin (LYRICA) 200 MG capsule Take 1 capsule (200 mg) by mouth 2 times daily      prochlorperazine (COMPAZINE) 10 MG tablet Take 1 tablet (10 mg) by mouth every 6 hours as needed for nausea or vomiting 30 tablet 2    Prochlorperazine Maleate (COMPAZINE PO) Take 10 mg by mouth 3 times daily as needed for nausea (Patient not taking: Reported on 5/31/2024)      urea (GORMEL) 20 % external cream Apply topically as needed (Apply to feet twice daily) 480 g 1    VICTOZA PEN 18 MG/3ML soln Inject 1.8 mg Subcutaneous Per patient she is still using                Physical Exam:   There were no vitals taken for this visit.  ECOG PS: 0  Constitutional: WDWN female in NAD, pleasant and appropriate  HEENT:  NC/AT, no icterus, OP clear, MMM  Skin: No jaundice nor ecchymoses  Lungs: CTAB, no w/r/r, nonlabored breathing  Cardiovascular: RRR, S1, S2, no m/r/g  MSK/Extremities: Warm, well perfused. Trace bilateral pitting edema.   LN: no cervical, supraclavicular, or axillary lymphadenopathy  Breast Exam: in the right breast at the 2 o'clock position near the periphery there is a palpable ~1 cm mass. It is tough to discern a discreet mass form the background fibrous breast tissue. This appears similar in size compared to past exams.    Neurologic: alert, answering questions appropriately, moving all extremities spontaneously. CN 2-12 grossly intact.  Psych: appropriate affect  Data:     Most Recent 3 CBC's:  Recent Labs   Lab Test 06/14/24  1028 06/07/24  1046 05/31/24  0953   WBC 8.6 6.6 7.3   HGB 13.0 12.9 12.7   MCV 99 100 97    367 290   ANEUTAUTO 6.2 4.5 5.1     Most Recent 3 BMP's:  Recent Labs   Lab Test 06/07/24  1046 05/10/24  1006 04/26/24  1134 04/12/24  0734 03/15/24  0836   NA  --   --  136 138 135   POTASSIUM  --   --  3.9 3.5 3.3*   CHLORIDE  --   --  98 102 98   CO2  --   --  24 23 25   BUN  --   --  7.7 7.0 6.4   CR  --   --  0.49* 0.44* 0.43*   ANIONGAP  --   --  14 13 12   REMI  --   --  9.2 9.3 9.4   GLC  --   --  415* 206* 189*   PROTTOTAL 7.1 6.6 6.7 7.0 7.2   ALBUMIN 4.2 3.9 3.7 4.0 4.1    Most Recent 3 LFT's:  Recent Labs   Lab Test 06/07/24  1046 05/10/24  1006 04/26/24  1134   AST 39 28 36   ALT 34 23 24   ALKPHOS 70 67 70   BILITOTAL 0.3 0.2 0.2    Most Recent 2 TSH and T4:No lab results found.   I reviewed the above labs today.     Other Data     Most Recent 3 CBC's:  Recent Labs   Lab Test 06/14/24  1028 06/07/24  1046 05/31/24  0953   WBC 8.6 6.6 7.3   HGB 13.0 12.9 12.7   MCV 99 100 97    367 290   ANEUTAUTO 6.2 4.5 5.1     Most Recent 3 BMP's:  Recent Labs   Lab Test 06/07/24  1046 05/10/24  1006 04/26/24  1134 04/12/24  0734 03/15/24  0836   NA  --   --  136 138 135   POTASSIUM  --   --  3.9 3.5 3.3*   CHLORIDE  --   --  98 102 98   CO2  --   --  24 23 25   BUN  --   --  7.7 7.0 6.4   CR  --   --  0.49* 0.44* 0.43*   ANIONGAP  --   --  14 13 12   REMI  --   --  9.2 9.3 9.4   GLC  --   --  415* 206* 189*   PROTTOTAL 7.1 6.6 6.7 7.0 7.2   ALBUMIN 4.2 3.9 3.7 4.0 4.1    Most Recent 3 LFT's:  Recent Labs   Lab Test 06/07/24  1046 05/10/24  1006 04/26/24  1134   AST 39 28 36   ALT 34 23 24   ALKPHOS 70 67 70   BILITOTAL 0.3 0.2 0.2    Most Recent 2 TSH and T4:No lab results found.  I reviewed the above  labs today.

## 2024-06-21 ENCOUNTER — APPOINTMENT (OUTPATIENT)
Dept: LAB | Facility: CLINIC | Age: 39
End: 2024-06-21
Attending: PHYSICIAN ASSISTANT
Payer: COMMERCIAL

## 2024-06-21 ENCOUNTER — PATIENT OUTREACH (OUTPATIENT)
Dept: ONCOLOGY | Facility: CLINIC | Age: 39
End: 2024-06-21

## 2024-06-21 ENCOUNTER — ONCOLOGY VISIT (OUTPATIENT)
Dept: ONCOLOGY | Facility: CLINIC | Age: 39
End: 2024-06-21
Attending: INTERNAL MEDICINE
Payer: COMMERCIAL

## 2024-06-21 VITALS
OXYGEN SATURATION: 97 % | RESPIRATION RATE: 18 BRPM | SYSTOLIC BLOOD PRESSURE: 128 MMHG | DIASTOLIC BLOOD PRESSURE: 84 MMHG | HEART RATE: 107 BPM | WEIGHT: 278.9 LBS | TEMPERATURE: 98.7 F | BODY MASS INDEX: 43.67 KG/M2

## 2024-06-21 DIAGNOSIS — Z17.0 MALIGNANT NEOPLASM OF UPPER-OUTER QUADRANT OF LEFT BREAST IN FEMALE, ESTROGEN RECEPTOR POSITIVE (H): Primary | ICD-10-CM

## 2024-06-21 DIAGNOSIS — C50.412 MALIGNANT NEOPLASM OF UPPER-OUTER QUADRANT OF LEFT BREAST IN FEMALE, ESTROGEN RECEPTOR POSITIVE (H): Primary | ICD-10-CM

## 2024-06-21 PROCEDURE — 250N000011 HC RX IP 250 OP 636: Mod: JZ | Performed by: INTERNAL MEDICINE

## 2024-06-21 PROCEDURE — 99214 OFFICE O/P EST MOD 30 MIN: CPT | Performed by: INTERNAL MEDICINE

## 2024-06-21 PROCEDURE — G0463 HOSPITAL OUTPT CLINIC VISIT: HCPCS | Performed by: INTERNAL MEDICINE

## 2024-06-21 RX ORDER — SITAGLIPTIN 100 MG/1
100 TABLET, FILM COATED ORAL AT BEDTIME
COMMUNITY
Start: 2024-05-24

## 2024-06-21 RX ORDER — HEPARIN SODIUM (PORCINE) LOCK FLUSH IV SOLN 100 UNIT/ML 100 UNIT/ML
5 SOLUTION INTRAVENOUS ONCE
Status: COMPLETED | OUTPATIENT
Start: 2024-06-21 | End: 2024-06-21

## 2024-06-21 RX ORDER — NYSTATIN 100000/ML
SUSPENSION, ORAL (FINAL DOSE FORM) ORAL
COMMUNITY
Start: 2024-05-17 | End: 2024-07-23

## 2024-06-21 RX ADMIN — Medication 5 ML: at 09:55

## 2024-06-21 ASSESSMENT — PAIN SCALES - GENERAL: PAINLEVEL: EXTREME PAIN (8)

## 2024-06-21 NOTE — PROGRESS NOTES
[facility]: Cancer Care                                                                                        RN called patient to talk about the plan moving forward. Sent a urgent request to get her scheduled for ultrasound. RN will check up regarding her neuropathy symptoms before her next chemo in [place]. RN will discuss with Dr. Nolan if she needs to keep her virtual appt with him on 6/28, if yes than will move it to earlier so she has time to get to chemo. Patient also stated that her work sent a form to be filled for some financial assistance. RN hasn't seen this fax. RN suggested she either have them fax again, send via email or she can attached the form to My Chart.       Ana NEWBYN, RN, OCN  Care Coordinator  [facility]

## 2024-06-21 NOTE — NURSING NOTE
Port de accessed, per request of provider.   IV removed without complication, patient discharged to home.    Kristy Rachel CMA ([facility])

## 2024-06-21 NOTE — NURSING NOTE
"  Chief Complaint   Patient presents with    Oncology Clinic Visit     Malignant neoplasm of upper-outer quadrant of left breast    Port Draw     Port accessed with 20g 3/4\" power needle. Vitals WNL. Labs drawn by PIV with RN. Flushed with saline and heparin. Pt tolerated well. Patient checked into next appointment.         Leidy Corea, RN    "

## 2024-06-21 NOTE — LETTER
6/21/2024      Eliezer Izquierdo  [address] [facility] [address]      Dear Colleague,    Thank you for referring your patient, Eliezer Izquierdo, to the [facility]. Please see a copy of my visit note below.      [facility] Medical Oncology Note  Date of visit: Jun 21, 2024    New Outpatient Clinic Note    Assessment:     Clinical T2N1 invasive breast cancer with a high risk Mammaprint in this 39 year old woman. As discussed previously with Dr. Ford., the goal is neoadjuvant treatment and then surgery.    S/P 4 cycles of neoadjuvant ddAC with exam showing a dramatic response.  The mass has gone from 4 cm across to just 1 cm.  She had an US on 5/10/24 that showed improved disease mass measuring 1.8x2.0x1.9 cm (previously 2.5 cm). Clinically, the mass is measuring similar to past exams. Will continue to monitor closely.  However, she is a candidate even now for breast conservation now following neoadjuvant chemotherapy.   Status post 6 weeks of Taxol status post 5 weeks of Taxol with very severe side effects.  She can feel her hands.  She is dropping things.  She is in chronic pain.  This is true of her feet as well.  This begs the question of what is the benefit of continuing on with her chemotherapy?  The chance of pathologic complete response in this setting really is only in the 10-20% range and she is already that manifested enough of response that she can go for breast conservation.  Therefore I think the benefit of more chemotherapy is small, though not 0.  However if it comes at the cost of permanent debility, and this patient already has significant chronic pain issues, I am not sure we are doing her any favors.  Therefore the plan today is that we are going to hold today's chemotherapy and see if the side effects do not improve over the next week or so.  Will also get a breast ultrasound to see if there is been more of a response since the ultrasound after the AC.   If her symptoms do not improve and there really is no more response, then I would just abort the rest of the Taxol and go right to surgery.  Chronic pain issues managed by palliative care.  Unfortunately her neuropathy is refractory to gabapentin and Lyrica.       Plan:     Hold chemotherapy today  Ultrasound of the left breast over the next week  Return to clinic with me in 1 week for a virtual visit.  At that point, if there has been no further response and her symptoms have not improved, we will discontinue the Taxol and send her back to Dr. Ford for definitive surgical management.    30 minutes spent on the date of the encounter doing chart review, review of test results, interpretation of tests, patient visit, and documentation     Zachary Nolan MD, MSc  Associate Professor of Medicine  [facility] [place] [address]  [phone]    __________________________________________________________________    DIAGNOSES     Clinical T2N1 invasive breast cancer diagnosed at breast biopsy 2/9/2024. Alleyson palpated a left breast lump. Mammogram and US showed a 2.5 x 2.5 x 2.0 cm spiculated mass at 2:00 position.   Biopsy showed a [place] grade I IDC, 97% strongly positive for ER, 99% strongly positive for NJ, and negative for HER2 by FISH (IHC 2+). KI-67 is 24%. Mammaprint was high risk Luminal B. Left axillary US that showed three abnormal axillary nodes, biopsy positive for IDC. Biopsy showed METASTATIC BREAST CARCINOMA, almost entirely effacing lymph node, at least 11 mm in linear extent.  My first exam prior to neoadjuvant therapy showed a palpable deep left breast mass in the 2 o'clock position measuring roughly 4 cm x 4 cm.   Chronic pain with history of opiate prescriptions from multiple providers. She is currently seeing palliative care (Dr. Kylie Rodriguez), and tried buprenorphine, started on 4/11/2024, but reacted to the  "adhesive.  Then didn't tolerate fentanyl.  Current recommendation is suboxone.   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  But it was negative for everything else.  History of N/V from migraines, thus managing nausea during therapy has been challenging.         History of Present Illness/Therapy to dte:       Neoadjuvant ddAC initiated 3/15/2024 through 4/26/2024.   Weekly paclitaxel since 5/10/2026. She is back today for week 6 of a planned 12.      Interval History     Eliezer is back for week 6 Taxol.  Really doing poorly. Taxol is no easier than the AC  Severe neuropathy. Can't feel her hands. Feet are numb and painful.  Really suffering.  Life has been altered.    Does not feel there is been much change in her mass   Still smoking  She feels the mass may be a little bit larger now when she feels in certain positions.   She is eating and drinking well. No mucositis or thrush.   No chest pain, shortness of breath, or cough.   Continues to work with palliative care for pain management.   She has migraines, unchanged from prior. No vision changes.  She endorses dry skin on her feet. She has had dry skin before but this appears to be worsened with taxol.   No vomiting. No diarrhea or constipation. Having daily BM's.     Past Medical History:   Melanoma of right foot???     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  She thought about being a , but then got pregnant with her son Homero.  She had a lot of different for jobs.  She spent some time in [place] where her mom and other family member lives.  She then came back to [place].  She has a fiancé of 6 years named Saul and they currently live in [place].  She is an avid reader, and likes romance novels, but really anything.  She says she read 250 books last year.  She does smoke but is trying to quit.  She is currently on a low-carb diet.    Family History:     Family History   Problem Relation Age of Onset     Genitourinary Problems Mother         renal disease - minimal change, sponge kidney            Medications:     Current Outpatient Medications   Medication Sig Dispense Refill     albuterol (PROAIR HFA/PROVENTIL HFA/VENTOLIN HFA) 108 (90 Base) MCG/ACT inhaler Inhale 2 puffs into the lungs       amLODIPine (NORVASC) 10 MG tablet Take 10 mg by mouth       atorvastatin (LIPITOR) 10 MG tablet Take 10 mg by mouth       Blood Glucose Monitoring Suppl (ONETOUCH VERIO FLEX SYSTEM) w/Device KIT        buprenorphine HCl-naloxone HCl (SUBOXONE) 4-1 MG per film Place 1 Film under the tongue every morning AND 0.5 Film daily at 2 pm AND 1 Film at bedtime. Do all this for 30 days. 75 Film 0     cetirizine (ZYRTEC) 10 MG tablet Take 5 mg by mouth       EPINEPHrine (ANY BX GENERIC EQUIV) 0.3 MG/0.3ML injection 2-pack  (Patient not taking: Reported on 5/10/2024)       esomeprazole (NEXIUM) 20 MG DR capsule Take 20 mg by mouth daily before breakfast       fluticasone-salmeterol (ADVAIR HFA) 115-21 MCG/ACT inhaler Inhale 2 puffs into the lungs       hydrochlorothiazide (HYDRODIURIL) 25 MG tablet Take 25 mg by mouth       HYDROcodone-acetaminophen (NORCO)  MG per tablet Take 0.5-1 tablets by mouth every 6 hours as needed for severe pain 56 tablet 0     ibuprofen (ADVIL/MOTRIN) 200 MG tablet Take 200 mg by mouth       JANUVIA 50 MG tablet Take 50 mg by mouth        LORazepam (ATIVAN) 1 MG tablet Take 1 tablet (1 mg) by mouth every 6 hours as needed for anxiety, nausea or sleep 30 tablet 1     methocarbamol (ROBAXIN) 500 MG tablet Take 3 tablets (1,500 mg) by mouth 3 times daily as needed for muscle spasms 270 tablet 1     naloxone (NARCAN) 4 MG/0.1ML nasal spray Spray 4 mg into one nostril alternating nostrils as needed for opioid reversal every 2-3 minutes until assistance arrives (Patient not taking: Reported on 5/10/2024)       nicotine (NICODERM CQ) 7 MG/24HR 24 hr patch Place 1 patch onto the skin every 24 hours 30 patch 3     OLANZapine (ZYPREXA) 2.5 MG tablet Take 1-2 tablets (2.5-5 mg) by mouth 3 times daily as needed (Nausea or anxiety) 150 tablet 2     pramipexole (MIRAPEX) 0.125 MG tablet Take 1 tablet (0.125 mg) by mouth at bedtime for 3 days, THEN 2 tablets (0.25 mg) at bedtime for 3 days, THEN 3 tablets (0.375 mg) at bedtime for 3 days, THEN 4 tablets (0.5 mg) at bedtime for 21 days. 102 tablet 0     Pregabalin (LYRICA) 200 MG capsule Take 1 capsule (200 mg) by mouth 2 times daily       prochlorperazine (COMPAZINE) 10 MG tablet Take 1 tablet (10 mg) by mouth every 6 hours as needed for nausea or vomiting 30 tablet 2     Prochlorperazine Maleate (COMPAZINE PO) Take 10 mg by mouth 3 times daily as needed for nausea (Patient not taking: Reported on 5/31/2024)       urea (GORMEL) 20 % external cream Apply topically as needed (Apply to feet twice daily) 480 g 1     VICTOZA PEN 18 MG/3ML soln Inject 1.8 mg Subcutaneous Per patient she is still using                Physical Exam:   There were no vitals taken for this visit.  ECOG PS: 0  Constitutional: WDWN female in NAD, pleasant and appropriate  HEENT:  NC/AT, no icterus, OP clear, MMM  Skin: No jaundice nor ecchymoses  Lungs: CTAB, no w/r/r, nonlabored breathing  Cardiovascular: RRR, S1, S2, no m/r/g  MSK/Extremities: Warm, well perfused. Trace bilateral pitting edema.   LN: no cervical, supraclavicular, or axillary  lymphadenopathy  Breast Exam: in the right breast at the 2 o'clock position near the periphery there is a palpable ~1 cm mass. It is tough to discern a discreet mass form the background fibrous breast tissue. This appears similar in size compared to past exams.   Neurologic: alert, answering questions appropriately, moving all extremities spontaneously. CN 2-12 grossly intact.  Psych: appropriate affect  Data:     Most Recent 3 CBC's:  Recent Labs   Lab Test 06/14/24 1028 06/07/24 1046 05/31/24  0953   WBC 8.6 6.6 7.3   HGB 13.0 12.9 12.7   MCV 99 100 97    367 290   ANEUTAUTO 6.2 4.5 5.1     Most Recent 3 BMP's:  Recent Labs   Lab Test 06/07/24  1046 05/10/24  1006 04/26/24  1134 04/12/24  0734 03/15/24  0836   NA  --   --  136 138 135   POTASSIUM  --   --  3.9 3.5 3.3*   CHLORIDE  --   --  98 102 98   CO2  --   --  24 23 25   BUN  --   --  7.7 7.0 6.4   CR  --   --  0.49* 0.44* 0.43*   ANIONGAP  --   --  14 13 12   REMI  --   --  9.2 9.3 9.4   GLC  --   --  415* 206* 189*   PROTTOTAL 7.1 6.6 6.7 7.0 7.2   ALBUMIN 4.2 3.9 3.7 4.0 4.1    Most Recent 3 LFT's:  Recent Labs   Lab Test 06/07/24  1046 05/10/24  1006 04/26/24  1134   AST 39 28 36   ALT 34 23 24   ALKPHOS 70 67 70   BILITOTAL 0.3 0.2 0.2    Most Recent 2 TSH and T4:No lab results found.   I reviewed the above labs today.     Other Data     Most Recent 3 CBC's:  Recent Labs   Lab Test 06/14/24 1028 06/07/24 1046 05/31/24  0953   WBC 8.6 6.6 7.3   HGB 13.0 12.9 12.7   MCV 99 100 97    367 290   ANEUTAUTO 6.2 4.5 5.1     Most Recent 3 BMP's:  Recent Labs   Lab Test 06/07/24 1046 05/10/24  1006 04/26/24  1134 04/12/24  0734 03/15/24  0836   NA  --   --  136 138 135   POTASSIUM  --   --  3.9 3.5 3.3*   CHLORIDE  --   --  98 102 98   CO2  --   --  24 23 25   BUN  --   --  7.7 7.0 6.4   CR  --   --  0.49* 0.44* 0.43*   ANIONGAP  --   --  14 13 12   REMI  --   --  9.2 9.3 9.4   GLC  --   --  415* 206* 189*   PROTTOTAL 7.1 6.6 6.7 7.0 7.2   ALBUMIN  4.2 3.9 3.7 4.0 4.1    Most Recent 3 LFT's:  Recent Labs   Lab Test 06/07/24  1046 05/10/24  1006 04/26/24  1134   AST 39 28 36   ALT 34 23 24   ALKPHOS 70 67 70   BILITOTAL 0.3 0.2 0.2    Most Recent 2 TSH and T4:No lab results found.  I reviewed the above labs today.              Again, thank you for allowing me to participate in the care of your patient.        Sincerely,        Zachary Nolan MD

## 2024-06-21 NOTE — NURSING NOTE
"Oncology Rooming Note    June 21, 2024 10:00 AM   Eliezer Izquierdo is a 39 year old female who presents for:    Chief Complaint   Patient presents with    Oncology Clinic Visit     Malignant neoplasm of upper-outer quadrant of left breast    Port Draw     Port accessed with 20g 3/4\" power needle. Vitals WNL. Labs drawn by PIV with RN. Flushed with saline and heparin. Pt tolerated well. Patient checked into next appointment.       Initial Vitals: /84 (BP Location: Right arm, Patient Position: Sitting, Cuff Size: Adult Regular)   Pulse 107   Temp 98.7  F (37.1  C) (Oral)   Resp 18   Wt 126.5 kg (278 lb 14.4 oz)   SpO2 97%   BMI 43.67 kg/m   Estimated body mass index is 43.67 kg/m  as calculated from the following:    Height as of 5/10/24: 1.702 m (5' 7.01\").    Weight as of this encounter: 126.5 kg (278 lb 14.4 oz). Body surface area is 2.45 meters squared.  Extreme Pain (8) Comment: Data Unavailable   No LMP recorded. Patient has had a hysterectomy.  Allergies reviewed: Yes  Medications reviewed: Yes    Medications: Medication refills not needed today.  Pharmacy name entered into EPIC:    [facility] - [place], [place] - [address] [facility] [place], [place] - [address] [facility] [phone]  [facility] [place], [place] - [address] [place] [address]    Frailty Screening:   Is the patient here for a new oncology consult visit in cancer care? 2. No      Clinical concerns: Has form to fill out   Neuropathy       Ramila Briscoe              "

## 2024-06-24 ENCOUNTER — MYC REFILL (OUTPATIENT)
Dept: PALLIATIVE CARE | Facility: CLINIC | Age: 39
End: 2024-06-24
Payer: COMMERCIAL

## 2024-06-24 ENCOUNTER — HOSPITAL ENCOUNTER (OUTPATIENT)
Dept: ULTRASOUND IMAGING | Facility: CLINIC | Age: 39
Discharge: HOME | End: 2024-06-24
Attending: INTERNAL MEDICINE | Admitting: INTERNAL MEDICINE
Payer: COMMERCIAL

## 2024-06-24 DIAGNOSIS — C50.412 MALIGNANT NEOPLASM OF UPPER-OUTER QUADRANT OF LEFT BREAST IN FEMALE, ESTROGEN RECEPTOR POSITIVE (H): ICD-10-CM

## 2024-06-24 DIAGNOSIS — N64.4 BREAST PAIN: ICD-10-CM

## 2024-06-24 DIAGNOSIS — Z17.0 MALIGNANT NEOPLASM OF UPPER-OUTER QUADRANT OF LEFT BREAST IN FEMALE, ESTROGEN RECEPTOR POSITIVE (H): ICD-10-CM

## 2024-06-24 DIAGNOSIS — T45.1X5A PERIPHERAL NEUROPATHY DUE TO CHEMOTHERAPY (H): Primary | ICD-10-CM

## 2024-06-24 DIAGNOSIS — G89.3 CANCER RELATED PAIN: ICD-10-CM

## 2024-06-24 DIAGNOSIS — G62.0 PERIPHERAL NEUROPATHY DUE TO CHEMOTHERAPY (H): Primary | ICD-10-CM

## 2024-06-24 PROCEDURE — 76642 ULTRASOUND BREAST LIMITED: CPT | Mod: LT

## 2024-06-24 RX ORDER — HYDROCODONE BITARTRATE AND ACETAMINOPHEN 10; 325 MG/1; MG/1
.5-1 TABLET ORAL EVERY 6 HOURS PRN
Qty: 56 TABLET | Refills: 0 | Status: SHIPPED | OUTPATIENT
Start: 2024-06-24 | End: 2024-07-07

## 2024-06-24 RX ORDER — METHOCARBAMOL 500 MG/1
1500 TABLET, FILM COATED ORAL 3 TIMES DAILY PRN
Qty: 270 TABLET | Refills: 1 | Status: SHIPPED | OUTPATIENT
Start: 2024-06-24 | End: 2024-08-09

## 2024-06-24 RX ORDER — PRAMIPEXOLE DIHYDROCHLORIDE 0.5 MG/1
TABLET ORAL
Qty: 87 TABLET | Refills: 0 | Status: SHIPPED | OUTPATIENT
Start: 2024-06-24 | End: 2024-07-23

## 2024-06-24 NOTE — TELEPHONE ENCOUNTER
Received [employer] message from patient requesting refill of methocarbamol.     Last office visit: 6/4/24  Scheduled for follow up 7/2/24     Will route request to MD for review.

## 2024-06-24 NOTE — TELEPHONE ENCOUNTER
Pt called reporting continued struggles with neuropathy. She reports not being able to lay her hand flat on a table because it hurts too much to extend her hand open. She is currently taking mirapex 0.5 mg at night and has tolerated the ramp up. Dr. Rodriguez would like her to increase to 1 mg at bedtime for 3 days, then increase to 1.5 mg at bedtime. Pt is scheduled for follow up next week.    KATARINA Kulkarni, RN  Palliative Care Nurse Clinician    [phone] (Direct)  [phone] (Main)  [phone] (Appointment Scheduling)

## 2024-06-24 NOTE — TELEPHONE ENCOUNTER
Received [employer]t message from patient requesting refill of Norco.     Last refill reported on [place] : 5/28/24  Last refill per chart review: 6/10/24  Last office visit: 6/4/24  Scheduled for follow up 7/2/24     Will route request to MD for review.     Reviewed [place]  Report.

## 2024-06-25 DIAGNOSIS — Z17.0 MALIGNANT NEOPLASM OF UPPER-OUTER QUADRANT OF LEFT BREAST IN FEMALE, ESTROGEN RECEPTOR POSITIVE (H): Primary | ICD-10-CM

## 2024-06-25 DIAGNOSIS — C50.412 MALIGNANT NEOPLASM OF UPPER-OUTER QUADRANT OF LEFT BREAST IN FEMALE, ESTROGEN RECEPTOR POSITIVE (H): Primary | ICD-10-CM

## 2024-06-26 NOTE — PROGRESS NOTES
Virtual Visit Details    Type of service:  Video Visit   Video Start Time:  10:18  Video End Time:10:24 AM    Originating Location (pt. Location): Home    Distant Location (provider location):  On-site  Platform used for Video Visit: [facility] Medical Oncology Note  Date of visit: Jun 28, 2024    New Outpatient Clinic Note    Assessment:     Clinical T2N1 invasive breast cancer with a high risk Mammaprint in this 39 year old woman. As discussed previously with Dr. Ford., the goal is neoadjuvant treatment and then surgery.    S/P 4 cycles of neoadjuvant ddAC with exam showing a dramatic response.  The mass has gone from 4 cm across to just 1 cm.  She had an US on 5/10/24 that showed continued shrinkage, with the mass measuring 1.8 x 2.0 x 1.9 cm.  She is a candidate even now for breast conservation now following neoadjuvant chemotherapy.   Status post 5 weeks of Taxol with very severe life altering side effects.  Repeat ultrasound does show continued shrinkage of the mass, but I think the chance of a pathologic complete response if we completed the remaining 6 more weeks of Taxol is low, in the 10-20% range (per data of patients with high risk MammaPrint treated upfront with chemotherapy).  I think if we continue with the Taxol, we really risk permanent debilitating side effects of this patient who has had long-term issues with pain and need for opiates for control.  We have already achieved the fact that she can go for breast conservation.  Therefore, I think the risk-benefit ratio favors discontinuation of Taxol and moving forward with primary surgical management.  I have discussed the case with Dr. Ford and she will be seeing the patient soon.  I will see her again after her pathology report is known.  If she has breast conservation, she would need adjuvant radiotherapy. Depending on her RCB, she will need adjuvant endocrine therapy.  Ovarian function suppression certainly makes sense, but given her  significant side effects with all of her treatments, we could start with tamoxifen and add OFS when she recovers her menses.  We will just have to see how things go.      Plan:     Discontinue taxol  Cancel all upcoming infusion visits  Cancel Ene Bala appointment 7/12,.  Follow-up with Dr. Ford 7/12/2024  RTC with me after her surgical pathology report is known, probably in the latter half of August.        30 minutes spent on the date of the encounter doing chart review, review of test results, interpretation of tests, patient visit, and documentation.  I discussed the case with Dr. Ford.  I reviewed her imaging..  I discussed her case with my partners.    Zachary Nolan MD, MSc  Associate Professor of Medicine  [facility] [employer] [place] [address]  [phone]    __________________________________________________________________    DIAGNOSES     Clinical T2N1 invasive breast cancer diagnosed at breast biopsy 2/9/2024. Alleyson palpated a left breast lump. Mammogram and US showed a 2.5 x 2.5 x 2.0 cm spiculated mass at 2:00 position.   Biopsy showed a Jeniffer grade I IDC, 97% strongly positive for ER, 99% strongly positive for MI, and negative for HER2 by FISH (IHC 2+). KI-67 is 24%. Mammaprint was high risk Luminal B. Left axillary US that showed three abnormal axillary nodes, biopsy positive for IDC. Biopsy showed METASTATIC BREAST CARCINOMA, almost entirely effacing lymph node, at least 11 mm in linear extent.  My first exam prior to neoadjuvant therapy showed a palpable deep left breast mass in the 2 o'clock position measuring roughly 4 cm x 4 cm.   Chronic pain with history of opiate prescriptions from multiple providers. She is currently seeing palliative care (Dr. Kylie Rodriguez), and tried buprenorphine, started on 4/11/2024, but reacted to the adhesive.  Then didn't tolerate fentanyl.  Current recommendation is suboxone.  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  But it was negative for everything else.  History of N/V from migraines, thus managing nausea during therapy has been challenging.         History of Present Illness/Therapy to dte:       Neoadjuvant ddAC initiated 3/15/2024 through 4/26/2024.   Weekly paclitaxel since 5/10/2026. She is back today for week 6 of a planned 12.      Interval History     Eliezer is back for her virtual appointment  Her pain is no better.  She still has severe numbness in her hands and her feet.  Moreover, over the last week, she has felt ill.  She has nausea and fatigue that she thinks may be a virus.  At any rate, she tells me her decision is to stop neoadjuvant paclitaxel and go right to surgery.  She has an appointment with Dr. Ford coming up.    Past Medical History:   Melanoma of right foot???     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  She thought about being a , but then got pregnant with her son Homero.  She had a lot of different for jobs.  She spent some time in [place] where her mom and other family member lives.  She then came back to [place].  She has a fiancé of 6 years named Saul and they currently live in [place].  She is an avid reader, and likes romance novels, but really anything.  She says she read 250 books last year.  She does smoke but is trying to quit.  " She is currently on a low-carb diet.    Family History:     Family History   Problem Relation Age of Onset    Genitourinary Problems Mother         renal disease - minimal change, sponge kidney            Medications:     Current Outpatient Medications   Medication Sig Dispense Refill    albuterol (PROAIR HFA/PROVENTIL HFA/VENTOLIN HFA) 108 (90 Base) MCG/ACT inhaler Inhale 2 puffs into the lungs      amLODIPine (NORVASC) 10 MG tablet Take 10 mg by mouth      atorvastatin (LIPITOR) 10 MG tablet Take 10 mg by mouth      Blood Glucose Monitoring Suppl (ONETOUCH VERIO FLEX SYSTEM) w/Device KIT       buprenorphine HCl-naloxone HCl (SUBOXONE) 4-1 MG per film Place 1 Film under the tongue every morning AND 0.5 Film daily at 2 pm AND 1 Film at bedtime. Do all this for 30 days. 75 Film 0    cetirizine (ZYRTEC) 10 MG tablet Take 5 mg by mouth      EPINEPHrine (ANY BX GENERIC EQUIV) 0.3 MG/0.3ML injection 2-pack  (Patient not taking: Reported on 5/10/2024)      esomeprazole (NEXIUM) 20 MG DR capsule Take 20 mg by mouth daily before breakfast      fluticasone-salmeterol (ADVAIR HFA) 115-21 MCG/ACT inhaler Inhale 2 puffs into the lungs      hydrochlorothiazide (HYDRODIURIL) 25 MG tablet Take 25 mg by mouth      HYDROcodone-acetaminophen (NORCO)  MG per tablet Take 0.5-1 tablets by mouth every 6 hours as needed for severe pain 56 tablet 0    ibuprofen (ADVIL/MOTRIN) 200 MG tablet Take 200 mg by mouth      JANUVIA 100 MG tablet       JANUVIA 50 MG tablet Take 50 mg by mouth      LORazepam (ATIVAN) 1 MG tablet Take 1 tablet (1 mg) by mouth every 6 hours as needed for anxiety, nausea or sleep 30 tablet 1    methocarbamol (ROBAXIN) 500 MG tablet Take 3 tablets (1,500 mg) by mouth 3 times daily as needed for muscle spasms 270 tablet 1    naloxone (NARCAN) 4 MG/0.1ML nasal spray Spray 4 mg into one nostril alternating nostrils as needed for opioid reversal every 2-3 minutes until assistance arrives      nicotine (NICODERM CQ) 7  MG/24HR 24 hr patch Place 1 patch onto the skin every 24 hours 30 patch 3    nystatin (MYCOSTATIN) 072360 UNIT/ML suspension  (Patient not taking: Reported on 6/21/2024)      OLANZapine (ZYPREXA) 2.5 MG tablet Take 1-2 tablets (2.5-5 mg) by mouth 3 times daily as needed (Nausea or anxiety) 150 tablet 2    pramipexole (MIRAPEX) 0.125 MG tablet Take 1 tablet (0.125 mg) by mouth at bedtime for 3 days, THEN 2 tablets (0.25 mg) at bedtime for 3 days, THEN 3 tablets (0.375 mg) at bedtime for 3 days, THEN 4 tablets (0.5 mg) at bedtime for 21 days. 102 tablet 0    pramipexole (MIRAPEX) 0.5 MG tablet Take 2 tablets (1 mg) by mouth at bedtime for 3 days, THEN 3 tablets (1.5 mg) at bedtime for 27 days. 87 tablet 0    Pregabalin (LYRICA) 200 MG capsule Take 1 capsule (200 mg) by mouth 2 times daily      prochlorperazine (COMPAZINE) 10 MG tablet Take 1 tablet (10 mg) by mouth every 6 hours as needed for nausea or vomiting 30 tablet 2    Prochlorperazine Maleate (COMPAZINE PO) Take 10 mg by mouth 3 times daily as needed for nausea (Patient not taking: Reported on 5/31/2024)      urea (GORMEL) 20 % external cream Apply topically as needed (Apply to feet twice daily) 480 g 1    VICTOZA PEN 18 MG/3ML soln Inject 1.8 mg Subcutaneous Per patient she is still using                Physical Exam:   There were no vitals taken for this visit.    No exam was done today because this was a virtual visit.  I am including her previous exam below to have in comparison to future visits.  ECOG PS: 0  Constitutional: WDWN female in NAD, pleasant and appropriate  HEENT:  NC/AT, no icterus, OP clear, MMM  Skin: No jaundice nor ecchymoses  Lungs: CTAB, no w/r/r, nonlabored breathing  Cardiovascular: RRR, S1, S2, no m/r/g  MSK/Extremities: Warm, well perfused. Trace bilateral pitting edema.   LN: no cervical, supraclavicular, or axillary lymphadenopathy  Breast Exam: in the right breast at the 2 o'clock position near the periphery there is a palpable  ~1 cm mass. It is tough to discern a discreet mass form the background fibrous breast tissue. This appears similar in size compared to past exams.   Neurologic: alert, answering questions appropriately, moving all extremities spontaneously. CN 2-12 grossly intact.  Psych: appropriate affect  Data:     Left Breast ultrasound 6/24 (compared to 5/10) and personally reviewed by me.    FINDINGS: At the 2 o'clock position, 10 cm from the nipple, there is a  heterogenous hypoechoic shadowing mass measuring 14 x 13 x 9 mm, which has mildly decreased in size, most recently measuring 20 x 19 x 18 mm.          Most Recent 3 CBC's:  Recent Labs   Lab Test 06/21/24  0954 06/14/24  1028 06/07/24  1046   WBC 7.3 8.6 6.6   HGB 11.8 13.0 12.9   MCV 99 99 100    336 367   ANEUTAUTO 4.9 6.2 4.5     Most Recent 3 BMP's:  Recent Labs   Lab Test 06/07/24  1046 05/10/24  1006 04/26/24  1134 04/12/24  0734 03/15/24  0836   NA  --   --  136 138 135   POTASSIUM  --   --  3.9 3.5 3.3*   CHLORIDE  --   --  98 102 98   CO2  --   --  24 23 25   BUN  --   --  7.7 7.0 6.4   CR  --   --  0.49* 0.44* 0.43*   ANIONGAP  --   --  14 13 12   REMI  --   --  9.2 9.3 9.4   GLC  --   --  415* 206* 189*   PROTTOTAL 7.1 6.6 6.7 7.0 7.2   ALBUMIN 4.2 3.9 3.7 4.0 4.1    Most Recent 3 LFT's:  Recent Labs   Lab Test 06/07/24  1046 05/10/24  1006 04/26/24  1134   AST 39 28 36   ALT 34 23 24   ALKPHOS 70 67 70   BILITOTAL 0.3 0.2 0.2    Most Recent 2 TSH and T4:No lab results found.   I reviewed the above labs today.     Other Data     Most Recent 3 CBC's:  Recent Labs   Lab Test 06/21/24  0954 06/14/24  1028 06/07/24  1046   WBC 7.3 8.6 6.6   HGB 11.8 13.0 12.9   MCV 99 99 100    336 367   ANEUTAUTO 4.9 6.2 4.5     Most Recent 3 BMP's:  Recent Labs   Lab Test 06/07/24  1046 05/10/24  1006 04/26/24  1134 04/12/24  0734 03/15/24  0836   NA  --   --  136 138 135   POTASSIUM  --   --  3.9 3.5 3.3*   CHLORIDE  --   --  98 102 98   CO2  --   --  24 23 25    BUN  --   --  7.7 7.0 6.4   CR  --   --  0.49* 0.44* 0.43*   ANIONGAP  --   --  14 13 12   REMI  --   --  9.2 9.3 9.4   GLC  --   --  415* 206* 189*   PROTTOTAL 7.1 6.6 6.7 7.0 7.2   ALBUMIN 4.2 3.9 3.7 4.0 4.1    Most Recent 3 LFT's:  Recent Labs   Lab Test 06/07/24  1046 05/10/24  1006 04/26/24  1134   AST 39 28 36   ALT 34 23 24   ALKPHOS 70 67 70   BILITOTAL 0.3 0.2 0.2    Most Recent 2 TSH and T4:No lab results found.  I reviewed the above labs today.

## 2024-06-28 ENCOUNTER — VIRTUAL VISIT (OUTPATIENT)
Dept: ONCOLOGY | Facility: CLINIC | Age: 39
End: 2024-06-28
Attending: INTERNAL MEDICINE
Payer: COMMERCIAL

## 2024-06-28 DIAGNOSIS — Z17.0 MALIGNANT NEOPLASM OF UPPER-OUTER QUADRANT OF LEFT BREAST IN FEMALE, ESTROGEN RECEPTOR POSITIVE (H): Primary | ICD-10-CM

## 2024-06-28 DIAGNOSIS — C50.412 MALIGNANT NEOPLASM OF UPPER-OUTER QUADRANT OF LEFT BREAST IN FEMALE, ESTROGEN RECEPTOR POSITIVE (H): Primary | ICD-10-CM

## 2024-06-28 PROCEDURE — 99214 OFFICE O/P EST MOD 30 MIN: CPT | Mod: 95 | Performed by: INTERNAL MEDICINE

## 2024-06-28 NOTE — NURSING NOTE
Is the patient currently in the state of [place]? YES    Visit mode:VIDEO    If the visit is dropped, the patient can be reconnected by: VIDEO VISIT: Send to e-mail at: [email or web address]    Will anyone else be joining the visit? NO  (If patient encounters technical issues they should call [phone]    How would you like to obtain your AVS? MyChart    Are changes needed to the allergy or medication list? Pt stated no changes to allergies and Pt stated no med changes    Are refills needed on medications prescribed by this physician? NO    Reason for visit: RECHECK    Pratima VILLAGOMEZ

## 2024-06-28 NOTE — LETTER
6/28/2024      Eliezer Izquierdo  [address] [facility] [address]      Dear Colleague,    Thank you for referring your patient, Eliezer Izquierdo, to the [facility]. Please see a copy of my visit note below.    Virtual Visit Details    Type of service:  Video Visit   Video Start Time:  10:18  Video End Time:10:24 AM    Originating Location (pt. Location): Home    Distant Location (provider location):  On-site  Platform used for Video Visit: [facility] Note  Date of visit: Jun 28, 2024    New Outpatient Clinic Note    Assessment:     Clinical T2N1 invasive breast cancer with a high risk Mammaprint in this 39 year old woman. As discussed previously with Dr. Ford., the goal is neoadjuvant treatment and then surgery.    S/P 4 cycles of neoadjuvant ddAC with exam showing a dramatic response.  The mass has gone from 4 cm across to just 1 cm.  She had an US on 5/10/24 that showed continued shrinkage, with the mass measuring 1.8 x 2.0 x 1.9 cm.  She is a candidate even now for breast conservation now following neoadjuvant chemotherapy.   Status post 5 weeks of Taxol with very severe life altering side effects.  Repeat ultrasound does show continued shrinkage of the mass, but I think the chance of a pathologic complete response if we completed the remaining 6 more weeks of Taxol is low, in the 10-20% range (per data of patients with high risk MammaPrint treated upfront with chemotherapy).  I think if we continue with the Taxol, we really risk permanent debilitating side effects of this patient who has had long-term issues with pain and need for opiates for control.  We have already achieved the fact that she can go for breast conservation.  Therefore, I think the risk-benefit ratio favors discontinuation of Taxol and moving forward with primary surgical management.  I have discussed the case with Dr. Ford and she will be seeing the patient soon.  I will  see her again after her pathology report is known.  If she has breast conservation, she would need adjuvant radiotherapy. Depending on her RCB, she will need adjuvant endocrine therapy.  Ovarian function suppression certainly makes sense, but given her significant side effects with all of her treatments, we could start with tamoxifen and add OFS when she recovers her menses.  We will just have to see how things go.      Plan:     Discontinue taxol  Cancel all upcoming infusion visits  Cancel Ene Mckenna appointment 7/12,.  Follow-up with Dr. Ford 7/12/2024  RTC with me after her surgical pathology report is known, probably in the latter half of August.        30 minutes spent on the date of the encounter doing chart review, review of test results, interpretation of tests, patient visit, and documentation.  I discussed the case with Dr. Fodr.  I reviewed her imaging..  I discussed her case with my partners.    Zachary Nolan MD, MSc  Associate Professor of Medicine  [employer]  [address] [address]  [phone]    __________________________________________________________________    DIAGNOSES     Clinical T2N1 invasive breast cancer diagnosed at breast biopsy 2/9/2024. Alleyson palpated a left breast lump. Mammogram and US showed a 2.5 x 2.5 x 2.0 cm spiculated mass at 2:00 position.   Biopsy showed a Jeniffer grade I IDC, 97% strongly positive for ER, 99% strongly positive for NM, and negative for HER2 by FISH (IHC 2+). KI-67 is 24%. Mammaprint was high risk Luminal B. Left axillary US that showed three abnormal axillary nodes, biopsy positive for IDC. Biopsy showed METASTATIC BREAST CARCINOMA, almost entirely effacing lymph node, at least 11 mm in linear extent.  My first exam prior to neoadjuvant therapy showed a palpable deep left breast mass in the 2 o'clock position measuring roughly 4 cm x 4 cm.   Chronic pain with history of opiate  "prescriptions from multiple providers. She is currently seeing palliative care (Dr. Kylie Rodriguez), and tried buprenorphine, started on 4/11/2024, but reacted to the adhesive.  Then didn't tolerate fentanyl.  Current recommendation is suboxone.   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  But it was negative for everything else.  History of N/V from migraines, thus managing nausea during therapy has been challenging.         History of Present Illness/Therapy to dte:       Neoadjuvant ddAC initiated 3/15/2024 through 4/26/2024.   Weekly paclitaxel since 5/10/2026. She is back today for week 6 of a planned 12.      Interval History     Eliezer is back for her virtual appointment  Her pain is no better.  She still has severe numbness in her hands and her feet.  Moreover, over the last week, she has felt ill.  She has nausea and fatigue that she thinks may be a virus.  At any rate, she tells me her decision is to stop neoadjuvant paclitaxel and go right to surgery.  She has an appointment with Dr. Ford coming up.    Past Medical History:   Melanoma of right foot???     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  She thought about being a , but then got pregnant with her son Homero.  She had a lot of different for jobs.  She spent some time in [place] where her mom and other family member lives.  " She then came back to [place].  She has a fiancé of 6 years named Saul and they currently live in [place].  She is an avid reader, and likes romance novels, but really anything.  She says she read 250 books last year.  She does smoke but is trying to quit.  She is currently on a low-carb diet.    Family History:     Family History   Problem Relation Age of Onset     Genitourinary Problems Mother         renal disease - minimal change, sponge kidney            Medications:     Current Outpatient Medications   Medication Sig Dispense Refill     albuterol (PROAIR HFA/PROVENTIL HFA/VENTOLIN HFA) 108 (90 Base) MCG/ACT inhaler Inhale 2 puffs into the lungs       amLODIPine (NORVASC) 10 MG tablet Take 10 mg by mouth       atorvastatin (LIPITOR) 10 MG tablet Take 10 mg by mouth       Blood Glucose Monitoring Suppl (ONETOUCH VERIO FLEX SYSTEM) w/Device KIT        buprenorphine HCl-naloxone HCl (SUBOXONE) 4-1 MG per film Place 1 Film under the tongue every morning AND 0.5 Film daily at 2 pm AND 1 Film at bedtime. Do all this for 30 days. 75 Film 0     cetirizine (ZYRTEC) 10 MG tablet Take 5 mg by mouth       EPINEPHrine (ANY BX GENERIC EQUIV) 0.3 MG/0.3ML injection 2-pack  (Patient not taking: Reported on 5/10/2024)       esomeprazole (NEXIUM) 20 MG DR capsule Take 20 mg by mouth daily before breakfast       fluticasone-salmeterol (ADVAIR HFA) 115-21 MCG/ACT inhaler Inhale 2 puffs into the lungs       hydrochlorothiazide (HYDRODIURIL) 25 MG tablet Take 25 mg by mouth       HYDROcodone-acetaminophen (NORCO)  MG per tablet Take 0.5-1 tablets by mouth every 6 hours as needed for severe pain 56 tablet 0     ibuprofen (ADVIL/MOTRIN) 200 MG tablet Take 200 mg by mouth       JANUVIA 100 MG tablet        JANUVIA 50 MG tablet Take 50 mg by mouth       LORazepam (ATIVAN) 1 MG tablet Take 1 tablet (1 mg) by mouth every 6 hours as needed for anxiety, nausea or sleep 30 tablet 1     methocarbamol (ROBAXIN) 500 MG tablet Take 3  tablets (1,500 mg) by mouth 3 times daily as needed for muscle spasms 270 tablet 1     naloxone (NARCAN) 4 MG/0.1ML nasal spray Spray 4 mg into one nostril alternating nostrils as needed for opioid reversal every 2-3 minutes until assistance arrives       nicotine (NICODERM CQ) 7 MG/24HR 24 hr patch Place 1 patch onto the skin every 24 hours 30 patch 3     nystatin (MYCOSTATIN) 468211 UNIT/ML suspension  (Patient not taking: Reported on 6/21/2024)       OLANZapine (ZYPREXA) 2.5 MG tablet Take 1-2 tablets (2.5-5 mg) by mouth 3 times daily as needed (Nausea or anxiety) 150 tablet 2     pramipexole (MIRAPEX) 0.125 MG tablet Take 1 tablet (0.125 mg) by mouth at bedtime for 3 days, THEN 2 tablets (0.25 mg) at bedtime for 3 days, THEN 3 tablets (0.375 mg) at bedtime for 3 days, THEN 4 tablets (0.5 mg) at bedtime for 21 days. 102 tablet 0     pramipexole (MIRAPEX) 0.5 MG tablet Take 2 tablets (1 mg) by mouth at bedtime for 3 days, THEN 3 tablets (1.5 mg) at bedtime for 27 days. 87 tablet 0     Pregabalin (LYRICA) 200 MG capsule Take 1 capsule (200 mg) by mouth 2 times daily       prochlorperazine (COMPAZINE) 10 MG tablet Take 1 tablet (10 mg) by mouth every 6 hours as needed for nausea or vomiting 30 tablet 2     Prochlorperazine Maleate (COMPAZINE PO) Take 10 mg by mouth 3 times daily as needed for nausea (Patient not taking: Reported on 5/31/2024)       urea (GORMEL) 20 % external cream Apply topically as needed (Apply to feet twice daily) 480 g 1     VICTOZA PEN 18 MG/3ML soln Inject 1.8 mg Subcutaneous Per patient she is still using                Physical Exam:   There were no vitals taken for this visit.    No exam was done today because this was a virtual visit.  I am including her previous exam below to have in comparison to future visits.  ECOG PS: 0  Constitutional: WDWN female in NAD, pleasant and appropriate  HEENT:  NC/AT, no icterus, OP clear, MMM  Skin: No jaundice nor ecchymoses  Lungs: CTAB, no w/r/r,  nonlabored breathing  Cardiovascular: RRR, S1, S2, no m/r/g  MSK/Extremities: Warm, well perfused. Trace bilateral pitting edema.   LN: no cervical, supraclavicular, or axillary lymphadenopathy  Breast Exam: in the right breast at the 2 o'clock position near the periphery there is a palpable ~1 cm mass. It is tough to discern a discreet mass form the background fibrous breast tissue. This appears similar in size compared to past exams.   Neurologic: alert, answering questions appropriately, moving all extremities spontaneously. CN 2-12 grossly intact.  Psych: appropriate affect  Data:     Left Breast ultrasound 6/24 (compared to 5/10) and personally reviewed by me.    FINDINGS: At the 2 o'clock position, 10 cm from the nipple, there is a  heterogenous hypoechoic shadowing mass measuring 14 x 13 x 9 mm, which has mildly decreased in size, most recently measuring 20 x 19 x 18 mm.          Most Recent 3 CBC's:  Recent Labs   Lab Test 06/21/24  0954 06/14/24  1028 06/07/24  1046   WBC 7.3 8.6 6.6   HGB 11.8 13.0 12.9   MCV 99 99 100    336 367   ANEUTAUTO 4.9 6.2 4.5     Most Recent 3 BMP's:  Recent Labs   Lab Test 06/07/24  1046 05/10/24  1006 04/26/24  1134 04/12/24  0734 03/15/24  0836   NA  --   --  136 138 135   POTASSIUM  --   --  3.9 3.5 3.3*   CHLORIDE  --   --  98 102 98   CO2  --   --  24 23 25   BUN  --   --  7.7 7.0 6.4   CR  --   --  0.49* 0.44* 0.43*   ANIONGAP  --   --  14 13 12   REMI  --   --  9.2 9.3 9.4   GLC  --   --  415* 206* 189*   PROTTOTAL 7.1 6.6 6.7 7.0 7.2   ALBUMIN 4.2 3.9 3.7 4.0 4.1    Most Recent 3 LFT's:  Recent Labs   Lab Test 06/07/24  1046 05/10/24  1006 04/26/24  1134   AST 39 28 36   ALT 34 23 24   ALKPHOS 70 67 70   BILITOTAL 0.3 0.2 0.2    Most Recent 2 TSH and T4:No lab results found.   I reviewed the above labs today.     Other Data     Most Recent 3 CBC's:  Recent Labs   Lab Test 06/21/24  0954 06/14/24  1028 06/07/24  1046   WBC 7.3 8.6 6.6   HGB 11.8 13.0 12.9   MCV 99  99 100    336 367   ANEUTAUTO 4.9 6.2 4.5     Most Recent 3 BMP's:  Recent Labs   Lab Test 06/07/24  1046 05/10/24  1006 04/26/24  1134 04/12/24  0734 03/15/24  0836   NA  --   --  136 138 135   POTASSIUM  --   --  3.9 3.5 3.3*   CHLORIDE  --   --  98 102 98   CO2  --   --  24 23 25   BUN  --   --  7.7 7.0 6.4   CR  --   --  0.49* 0.44* 0.43*   ANIONGAP  --   --  14 13 12   REMI  --   --  9.2 9.3 9.4   GLC  --   --  415* 206* 189*   PROTTOTAL 7.1 6.6 6.7 7.0 7.2   ALBUMIN 4.2 3.9 3.7 4.0 4.1    Most Recent 3 LFT's:  Recent Labs   Lab Test 06/07/24  1046 05/10/24  1006 04/26/24  1134   AST 39 28 36   ALT 34 23 24   ALKPHOS 70 67 70   BILITOTAL 0.3 0.2 0.2    Most Recent 2 TSH and T4:No lab results found.  I reviewed the above labs today.              Again, thank you for allowing me to participate in the care of your patient.        Sincerely,        Zachary Nolan MD

## 2024-06-29 ENCOUNTER — MYC REFILL (OUTPATIENT)
Dept: PALLIATIVE CARE | Facility: CLINIC | Age: 39
End: 2024-06-29
Payer: COMMERCIAL

## 2024-06-29 DIAGNOSIS — C50.412 MALIGNANT NEOPLASM OF UPPER-OUTER QUADRANT OF LEFT BREAST IN FEMALE, ESTROGEN RECEPTOR POSITIVE (H): ICD-10-CM

## 2024-06-29 DIAGNOSIS — Z17.0 MALIGNANT NEOPLASM OF UPPER-OUTER QUADRANT OF LEFT BREAST IN FEMALE, ESTROGEN RECEPTOR POSITIVE (H): ICD-10-CM

## 2024-06-30 NOTE — TELEPHONE ENCOUNTER
Received [employer] message from patient requesting refill of Ativan.     Last refill: 5/10/2024   Last office visit: 6/4/2024  Scheduled for follow up 7/2/2024    Will route request to MD for review.     Reviewed [place]  Report.

## 2024-07-01 RX ORDER — LORAZEPAM 1 MG/1
1 TABLET ORAL EVERY 6 HOURS PRN
Qty: 30 TABLET | Refills: 1 | Status: SHIPPED | OUTPATIENT
Start: 2024-07-01 | End: 2024-07-14

## 2024-07-02 ENCOUNTER — VIRTUAL VISIT (OUTPATIENT)
Dept: PALLIATIVE CARE | Facility: CLINIC | Age: 39
End: 2024-07-02
Attending: STUDENT IN AN ORGANIZED HEALTH CARE EDUCATION/TRAINING PROGRAM
Payer: COMMERCIAL

## 2024-07-02 VITALS — WEIGHT: 272 LBS | HEIGHT: 69 IN | BODY MASS INDEX: 40.29 KG/M2

## 2024-07-02 DIAGNOSIS — G62.0 PERIPHERAL NEUROPATHY DUE TO CHEMOTHERAPY (H): ICD-10-CM

## 2024-07-02 DIAGNOSIS — Z79.891 ENCOUNTER FOR LONG-TERM USE OF OPIATE ANALGESIC: ICD-10-CM

## 2024-07-02 DIAGNOSIS — G89.3 CANCER RELATED PAIN: ICD-10-CM

## 2024-07-02 DIAGNOSIS — Z17.0 MALIGNANT NEOPLASM OF UPPER-OUTER QUADRANT OF LEFT BREAST IN FEMALE, ESTROGEN RECEPTOR POSITIVE (H): Primary | ICD-10-CM

## 2024-07-02 DIAGNOSIS — T45.1X5A PERIPHERAL NEUROPATHY DUE TO CHEMOTHERAPY (H): ICD-10-CM

## 2024-07-02 DIAGNOSIS — Z51.5 ENCOUNTER FOR PALLIATIVE CARE: ICD-10-CM

## 2024-07-02 DIAGNOSIS — C50.412 MALIGNANT NEOPLASM OF UPPER-OUTER QUADRANT OF LEFT BREAST IN FEMALE, ESTROGEN RECEPTOR POSITIVE (H): Primary | ICD-10-CM

## 2024-07-02 DIAGNOSIS — F11.90 OPIOID USE DISORDER: ICD-10-CM

## 2024-07-02 DIAGNOSIS — N64.4 BREAST PAIN: ICD-10-CM

## 2024-07-02 PROCEDURE — 99215 OFFICE O/P EST HI 40 MIN: CPT | Mod: 95 | Performed by: STUDENT IN AN ORGANIZED HEALTH CARE EDUCATION/TRAINING PROGRAM

## 2024-07-02 RX ORDER — BUPRENORPHINE AND NALOXONE 8; 2 MG/1; MG/1
1 FILM, SOLUBLE BUCCAL; SUBLINGUAL 3 TIMES DAILY
Qty: 90 FILM | Refills: 0 | Status: SHIPPED | OUTPATIENT
Start: 2024-07-02 | End: 2024-07-24

## 2024-07-02 RX ORDER — AMITRIPTYLINE HYDROCHLORIDE 10 MG/1
10-30 TABLET ORAL AT BEDTIME
Qty: 60 TABLET | Refills: 1 | Status: SHIPPED | OUTPATIENT
Start: 2024-07-02 | End: 2024-07-08

## 2024-07-02 NOTE — LETTER
7/2/2024       RE: Eliezer Izquierdo  [address] [facility] [address]       Dear Colleague,    Thank you for referring your patient, El[facility]quierdo[facility] . Please see a copy of my visit note below.    Palliative Care Progress Note    Patient Name: Eliezer Izquierdo  Primary Provider: Madison Roach    Chief Complaint/Patient ID: Eliezer Izquierdo is a 39 year old female with PMHx of T2 N1 invasive breast cancer on the left side.  Initiated neoadjuvant ddAC 3/15/24.  Given response to chemo, will likely be a candidate for breast conservation surgery now.  Will complete 12 additional weeks of chemo, followed by presumed lumpectomy, radiation, and then adjuvant therapy.  -Per chart review, some history of chronic pain (knees, migraines).  Mention about being on opiates in the past but weaned herself off.  -Formal dx of mild/mod OUD     Pain Hx:    Reports that she had been on pain medications for many years.  Originally was thought to have endometriosis, however after having surgery, was found to have more of an IBS picture.  She is also dealt with chronic pain in both of her knees, as she needs knee replacements.  She gets injections in her knees regularly.  Additionally deals with daily migraines.  Currently working on appeals process for the treatment for the migraines.     Last Palliative care appointment: 6/4/2024 with me.  Increased Suboxone.     Reviewed: Yes    Social History: Lives with [place].  Has a son named Homero.  Has lived in [place], [place], and [place].  Enjoys reading.  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     Advanced Care Planning: Has not completed an HCD. States her dad would be her HCA.    Interim History:  Eliezer Izquierdo is a 39 year old female who is seen today for follow up  "with Palliative Care via billable video visit.      Since last visit, trial of Mirapex for neuropathy.    Does feel the Suboxone has been a good fit.  Unfortunately says that neuropathy has been \"off the charts\", which is dramatically increased her pain.  She is not experiencing weakness in her hands as well as significant pain in her feet when she tries to stand.  She does push through this, as some exercise is needed to help keep her knees from hurting too badly.  Not sure that the Mirapex has been doing much, and she is currently taking 3 mg daily.  Asked about amitriptyline, as she was on this 25 years ago for migraines and knows it can be helpful for neuropathy.  Not sure that compression garments are helping or contributing to pain.  Still needing 10 mg of hydrocodone every 6 hours.  Wondering about increasing the Suboxone again.  She is currently taking 4 mg 3 times daily.    Further chemotherapy has been put on hold, and she meets with the breast surgeon next week regarding getting surgery scheduled.    Physical Exam:   Constitutional: Alert, pleasant, no apparent distress. Sitting up in chair.  Eyes: Sclera non-icteric, no eye discharge.  ENT: No nasal discharge. Ears grossly normal.  Respiratory: Unlabored respirations. Speaking in full sentences.  Musculoskeletal: Extremities appear normal- no gross deformities noted. No edema noted on upper body.   Skin: No suspicious lesions or rashes on visible skin.  Neurologic: Clear speech, no aphasia. No facial droop.  Psychiatric: Mentation appears normal, appropriate attention. Affect normal/bright. Does not appear anxious or depressed.    Key Data Reviewed:  LABS: 5/31/2024- WBC 7.3, Hgb 12.7, Plts 290.     IMAGING: Left breast ultrasound 6/24/2024- FINDINGS: At the 2 o'clock position, 10 cm from the nipple, there is a  heterogenous hypoechoic shadowing mass measuring 14 x 13 x 9 mm, which has mildly decreased in size, most recently measuring 20 x 19 x 18 " mm.      Impression & Recommendations & Counseling:  Eliezer Izquierdo is a 39 year old female with history of left-sided breast cancer.     Pain   OUD - See my note from 5/7/2024 regarding full conversation about opioid use disorder/dependence and chronic pain in.  It was at this visit that we initiated Suboxone.  This seems to have been a good fit for her, as she is tolerating it well and has noticed some improvement in pain.  She is interested in increasing the dose today, which is appropriate.  -Will plan to increase Suboxone.  New prescription sent for 8-2 mg films today.  She will increase 1 of her doses to 8 mg and continue 4 mg of the other 2 doses for 3 days.  As long as she tolerates okay, can then increase a second dose to 8 mg.  After an additional 3 days, increase the last dose, so that she is taking 8 mg TID.  -Continue hydrocodone 5-10mg QID. With the increase in Suboxone, discussed I am hopeful she will not need as much of the hydrocodone, which she would also prefer.  Discussed given the severity of neuropathy pain, she may not completely be off [place] by the time of surgery.    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  Further chemotherapy now placed on hold with plans to move directly to surgery.  No benefit from Gabapentin or Lyrica. Trial of Mirapex with no clear benefit at 3.mg   -Discontinue Mirapex.  -Start amitriptyline 10 mg nightly.  Can increase every 3 days up to 30 mg nightly, and then asked her to let me know how she is tolerating this.  Discussed potential for morning grogginess.  -Advised to look into acupuncture.  -She can continue compression stockings and gloves if she thinks they are helpful.  Discussed she might get more benefit from the compression stockings if she wears them longer than 1-2 hours/day.      Follow up: We will plan on 1 month, pending timing of surgery.           Total time spent on day of encounter is 50 mins, including reviewing record, review of above studies, above visit  with patient, symptomatic discussion as above, including medication adjustments/prescription management, and documentation.        Some chart documentation performed using Dragon Voice recognition Software. Although reviewed after completion, some words and grammatical errors may remain.      Again, thank you for allowing me to participate in the care of your patient.      Sincerely,    Kylie Rodriguez, DO

## 2024-07-02 NOTE — PROGRESS NOTES
"Palliative Care Progress Note    Patient Name: Eliezer Izquierdo  Primary Provider: Madison Roach    Chief Complaint/Patient ID: Eliezer Izquierdo is a 39 year old female with PMHx of T2 N1 invasive breast cancer on the left side.  Initiated neoadjuvant ddAC 3/15/24.  Given response to chemo, will likely be a candidate for breast conservation surgery now.  Will complete 12 additional weeks of chemo, followed by presumed lumpectomy, radiation, and then adjuvant therapy.  -Per chart review, some history of chronic pain (knees, migraines).  Mention about being on opiates in the past but weaned herself off.  -Formal dx of mild/mod OUD     Pain Hx:    Reports that she had been on pain medications for many years.  Originally was thought to have endometriosis, however after having surgery, was found to have more of an IBS picture.  She is also dealt with chronic pain in both of her knees, as she needs knee replacements.  She gets injections in her knees regularly.  Additionally deals with daily migraines.  Currently working on appeals process for the treatment for the migraines.     Last Palliative care appointment: 6/4/2024 with me.  Increased Suboxone.     Reviewed: Yes    Social History: Lives with [place]ifton.  Has a son named Homero.  Has lived in [place], [place], and [place].  Enjoys reading.  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     Advanced Care Planning: Has not completed an HCD. States her dad would be her HCA.    Interim History:  Eliezer Izquierdo is a 39 year old female who is seen today for follow up with Palliative Care via billable video visit.      Since last visit, trial of Mirapex for neuropathy.    Does feel the Suboxone has been a good fit.  Unfortunately says that neuropathy has been \"off the charts\", which is dramatically increased her pain.  She is not experiencing weakness in her hands as well as " significant pain in her feet when she tries to stand.  She does push through this, as some exercise is needed to help keep her knees from hurting too badly.  Not sure that the Mirapex has been doing much, and she is currently taking 3 mg daily.  Asked about amitriptyline, as she was on this 25 years ago for migraines and knows it can be helpful for neuropathy.  Not sure that compression garments are helping or contributing to pain.  Still needing 10 mg of hydrocodone every 6 hours.  Wondering about increasing the Suboxone again.  She is currently taking 4 mg 3 times daily.    Further chemotherapy has been put on hold, and she meets with the breast surgeon next week regarding getting surgery scheduled.    Physical Exam:   Constitutional: Alert, pleasant, no apparent distress. Sitting up in chair.  Eyes: Sclera non-icteric, no eye discharge.  ENT: No nasal discharge. Ears grossly normal.  Respiratory: Unlabored respirations. Speaking in full sentences.  Musculoskeletal: Extremities appear normal- no gross deformities noted. No edema noted on upper body.   Skin: No suspicious lesions or rashes on visible skin.  Neurologic: Clear speech, no aphasia. No facial droop.  Psychiatric: Mentation appears normal, appropriate attention. Affect normal/bright. Does not appear anxious or depressed.    Key Data Reviewed:  LABS: 5/31/2024- WBC 7.3, Hgb 12.7, Plts 290.     IMAGING: Left breast ultrasound 6/24/2024- FINDINGS: At the 2 o'clock position, 10 cm from the nipple, there is a  heterogenous hypoechoic shadowing mass measuring 14 x 13 x 9 mm, which has mildly decreased in size, most recently measuring 20 x 19 x 18 mm.      Impression & Recommendations & Counseling:  Eliezer Izquierdo is a 39 year old female with history of left-sided breast cancer.     Pain   OUD - See my note from 5/7/2024 regarding full conversation about opioid use disorder/dependence and chronic pain in.  It was at this visit that we initiated Suboxone.   This seems to have been a good fit for her, as she is tolerating it well and has noticed some improvement in pain.  She is interested in increasing the dose today, which is appropriate.  -Will plan to increase Suboxone.  New prescription sent for 8-2 mg films today.  She will increase 1 of her doses to 8 mg and continue 4 mg of the other 2 doses for 3 days.  As long as she tolerates okay, can then increase a second dose to 8 mg.  After an additional 3 days, increase the last dose, so that she is taking 8 mg TID.  -Continue hydrocodone 5-10mg QID. With the increase in Suboxone, discussed I am hopeful she will not need as much of the hydrocodone, which she would also prefer.  Discussed given the severity of neuropathy pain, she may not completely be off [place] by the time of surgery.    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  Further chemotherapy now placed on hold with plans to move directly to surgery.  No benefit from Gabapentin or Lyrica. Trial of Mirapex with no clear benefit at 3.mg   -Discontinue Mirapex.  -Start amitriptyline 10 mg nightly.  Can increase every 3 days up to 30 mg nightly, and then asked her to let me know how she is tolerating this.  Discussed potential for morning grogginess.  -Advised to look into acupuncture.  -She can continue compression stockings and gloves if she thinks they are helpful.  Discussed she might get more benefit from the compression stockings if she wears them longer than 1-2 hours/day.      Follow up: We will plan on 1 month, pending timing of surgery.        Video-Visit Details  Video Start Time: 1 PM  Video End Time: 1:29 PM    Originating Location (pt. Location): Friend's house in [place]     Distant Location (provider location):  Offsite- Personal Home      Platform used for Video Visit: [employer]     Total time spent on day of encounter is 50 mins, including reviewing record, review of above studies, above visit with patient, symptomatic discussion as above, including medication  adjustments/prescription management, and documentation.        Kylie Rodriguez,   Palliative Medicine   [id number]    Some chart documentation performed using Dragon Voice recognition Software. Although reviewed after completion, some words and grammatical errors may remain.

## 2024-07-02 NOTE — NURSING NOTE
Current patient location:  Pt is at her friends home in [place] , [place]     Is the patient currently in the state of [place]? YES    Visit mode:VIDEO    If the visit is dropped, the patient can be reconnected by: TELEPHONE VISIT: Phone number: [phone]    Will anyone else be joining the visit? NO  (If patient encounters technical issues they should call [phone]    How would you like to obtain your AVS? MyChart    Are changes needed to the allergy or medication list? No    Are refills needed on medications prescribed by this physician? NO    Reason for visit: RECHECK    Itzel VILLAGOMEZ

## 2024-07-02 NOTE — PATIENT INSTRUCTIONS
Recommendations:  -Will plan to increase Suboxone.  New prescription sent for 8-2 mg films today.  You will increase 1 dose to 8 mg and continue 4 mg of the other 2 doses for 3 days.  As long as you tolerate okay, can then increase a 2nd dose to 8 mg.  After an additional 3 days, increase the last dose so that you are taking 8mg three times daily.  -Continue hydrocodone for breakthrough pain.  -Stop Mirapex.  -Start amitriptyline 10 mg nightly.  Can increase every 3 days up to 30 mg nightly, and then let me know how you are tolerating this.  Discussed potential for morning grogginess.  -Advised to look into acupuncture.  -You can continue compression stockings and gloves if you think they are helpful.  Discussed you might get more benefit from the compression stockings if you wear them longer than 1-2 hours/day.    Follow up: 1 month      Reasons to Call    If you are having worsening/uncontrolled symptoms we want you to call!    You or your other physicians make any changes to medications we have prescribed.  -Please call for refills 4-5 days before you will run out of medication.    Important Phone Numbers, including: Refills, scheduling, and general questions     Palliative Care RN: Cecy Ghotra - [phone]  *After hours or on weekends. Will connect you with on call MD. [phone]

## 2024-07-07 ENCOUNTER — MYC REFILL (OUTPATIENT)
Dept: PALLIATIVE CARE | Facility: CLINIC | Age: 39
End: 2024-07-07
Payer: COMMERCIAL

## 2024-07-07 DIAGNOSIS — C50.412 MALIGNANT NEOPLASM OF UPPER-OUTER QUADRANT OF LEFT BREAST IN FEMALE, ESTROGEN RECEPTOR POSITIVE (H): ICD-10-CM

## 2024-07-07 DIAGNOSIS — T45.1X5A PERIPHERAL NEUROPATHY DUE TO CHEMOTHERAPY (H): ICD-10-CM

## 2024-07-07 DIAGNOSIS — G62.0 PERIPHERAL NEUROPATHY DUE TO CHEMOTHERAPY (H): ICD-10-CM

## 2024-07-07 DIAGNOSIS — Z17.0 MALIGNANT NEOPLASM OF UPPER-OUTER QUADRANT OF LEFT BREAST IN FEMALE, ESTROGEN RECEPTOR POSITIVE (H): ICD-10-CM

## 2024-07-08 RX ORDER — HYDROCODONE BITARTRATE AND ACETAMINOPHEN 10; 325 MG/1; MG/1
.5-1 TABLET ORAL EVERY 4 HOURS PRN
Qty: 84 TABLET | Refills: 0 | Status: SHIPPED | OUTPATIENT
Start: 2024-07-08 | End: 2024-07-19

## 2024-07-08 RX ORDER — AMITRIPTYLINE HYDROCHLORIDE 10 MG/1
TABLET ORAL
COMMUNITY
Start: 2024-07-08

## 2024-07-08 NOTE — TELEPHONE ENCOUNTER
Received [employer] message from patient requesting refill of [place]. Pt requesting to increase to Q 4 hr dosing, reporting this is the only medication that helps relieve her neuropathy. Dr. Rodriguez would also like to increase the amitriptyline so that she takes 50 mg at bedtime and 10-20 mg in the morning.     Last refill: 6/24/24   Last office visit: 7/2/24  Scheduled for follow up per check out request.     Will route request to MD for review.     Reviewed [place] .

## 2024-07-12 ENCOUNTER — ANCILLARY PROCEDURE (OUTPATIENT)
Dept: MAMMOGRAPHY | Facility: CLINIC | Age: 39
End: 2024-07-12
Attending: SURGERY
Payer: COMMERCIAL

## 2024-07-12 ENCOUNTER — ONCOLOGY VISIT (OUTPATIENT)
Dept: ONCOLOGY | Facility: CLINIC | Age: 39
End: 2024-07-12
Attending: SURGERY
Payer: COMMERCIAL

## 2024-07-12 ENCOUNTER — PATIENT OUTREACH (OUTPATIENT)
Dept: ONCOLOGY | Facility: CLINIC | Age: 39
End: 2024-07-12

## 2024-07-12 VITALS
RESPIRATION RATE: 16 BRPM | BODY MASS INDEX: 40.68 KG/M2 | TEMPERATURE: 98.9 F | HEART RATE: 113 BPM | WEIGHT: 275.5 LBS | DIASTOLIC BLOOD PRESSURE: 85 MMHG | SYSTOLIC BLOOD PRESSURE: 130 MMHG | OXYGEN SATURATION: 97 %

## 2024-07-12 DIAGNOSIS — C50.412 MALIGNANT NEOPLASM OF UPPER-OUTER QUADRANT OF LEFT BREAST IN FEMALE, ESTROGEN RECEPTOR POSITIVE (H): ICD-10-CM

## 2024-07-12 DIAGNOSIS — Z17.0 MALIGNANT NEOPLASM OF UPPER-OUTER QUADRANT OF LEFT BREAST IN FEMALE, ESTROGEN RECEPTOR POSITIVE (H): ICD-10-CM

## 2024-07-12 DIAGNOSIS — C50.919 MALIGNANT NEOPLASM OF FEMALE BREAST, UNSPECIFIED ESTROGEN RECEPTOR STATUS, UNSPECIFIED LATERALITY, UNSPECIFIED SITE OF BREAST (H): Primary | ICD-10-CM

## 2024-07-12 PROCEDURE — 76882 US LMTD JT/FCL EVL NVASC XTR: CPT | Mod: LT | Performed by: STUDENT IN AN ORGANIZED HEALTH CARE EDUCATION/TRAINING PROGRAM

## 2024-07-12 PROCEDURE — 250N000011 HC RX IP 250 OP 636: Performed by: SURGERY

## 2024-07-12 PROCEDURE — 99215 OFFICE O/P EST HI 40 MIN: CPT | Performed by: SURGERY

## 2024-07-12 PROCEDURE — G0463 HOSPITAL OUTPT CLINIC VISIT: HCPCS | Performed by: SURGERY

## 2024-07-12 RX ORDER — CEFAZOLIN SODIUM IN 0.9 % NACL 3 G/100 ML
3 INTRAVENOUS SOLUTION, PIGGYBACK (ML) INTRAVENOUS SEE ADMIN INSTRUCTIONS
Status: CANCELLED | OUTPATIENT
Start: 2024-07-12

## 2024-07-12 RX ORDER — CEFAZOLIN SODIUM IN 0.9 % NACL 3 G/100 ML
3 INTRAVENOUS SOLUTION, PIGGYBACK (ML) INTRAVENOUS
Status: CANCELLED | OUTPATIENT
Start: 2024-07-12

## 2024-07-12 RX ORDER — ACETAMINOPHEN 325 MG/1
975 TABLET ORAL ONCE
Status: CANCELLED | OUTPATIENT
Start: 2024-07-12 | End: 2024-07-12

## 2024-07-12 RX ORDER — HEPARIN SODIUM,PORCINE 10 UNIT/ML
5 VIAL (ML) INTRAVENOUS PRN
Qty: 9999 ML | Refills: 0 | Status: SHIPPED | OUTPATIENT
Start: 2024-07-12 | End: 2024-08-08

## 2024-07-12 RX ORDER — HEPARIN SODIUM (PORCINE) LOCK FLUSH IV SOLN 100 UNIT/ML 100 UNIT/ML
5 SOLUTION INTRAVENOUS PRN
Qty: 9999 ML | Refills: 0 | Status: SHIPPED | OUTPATIENT
Start: 2024-07-12 | End: 2024-08-08

## 2024-07-12 RX ORDER — HEPARIN SODIUM (PORCINE) LOCK FLUSH IV SOLN 100 UNIT/ML 100 UNIT/ML
5 SOLUTION INTRAVENOUS ONCE
Status: COMPLETED | OUTPATIENT
Start: 2024-07-12 | End: 2024-07-12

## 2024-07-12 RX ADMIN — Medication 5 ML: at 14:10

## 2024-07-12 ASSESSMENT — PAIN SCALES - GENERAL: PAINLEVEL: SEVERE PAIN (6)

## 2024-07-12 NOTE — PROGRESS NOTES
[facility] [address] [address]  Phone: [phone]  Fax: [phone]    PATIENT NAME:  Eliezer Izquierdo  PATIENT YOB: 1985    FOLLOW-UP  Jul 12, 2024    Eliezer Izquierdo is a 39 year old female who returns for follow-up for left breast cancer.     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  She has had significant side effects from taxol and it has been stopped.      She also sees Dr Kylie Rodriguez of palliative care for chronic pain; currently managed with suboxone and hydrocodone; recently the hydrocodone was increased for neuropathy.  Sees her monthly.    She continues to smoke 3 cigarettes a day.  HbA1c 9.7 in April 2024    /85 (BP Location: Right arm, Patient Position: Sitting, Cuff Size: Adult Large)   Pulse 113   Temp 98.9  F (37.2  C) (Oral)   Resp 16   Wt 125 kg (275 lb 8 oz)   SpO2 97%   BMI 40.68 kg/m     Physical Exam  Constitutional:       Appearance: She is well-developed.   Chest:   Breasts:     Breasts are symmetrical.      Right: No inverted nipple, mass, nipple discharge, skin change or tenderness.      Left: Mass present. No inverted nipple, nipple discharge, skin change or tenderness.          Comments: Patient was examined in both supine and upright positions.   Lymphadenopathy:      Cervical: No cervical adenopathy.      Right cervical: No superficial, deep or posterior cervical adenopathy.     Left  "cervical: No superficial, deep or posterior cervical adenopathy.      Upper Body:      Right upper body: No supraclavicular, axillary or pectoral adenopathy.      Left upper body: No supraclavicular, axillary or pectoral adenopathy.      Comments: No lymphedema in bilateral upper extremities.   Skin:     General: Skin is warm and dry.          INVESTIGATIONS:    Left Axillary US (today) showed:  FINDINGS:  Targeted ultrasound by technologist and radiologist. Biopsy proven metastatic left axillary lymph node with biopsy marking clip is visualized.  Lymph node continues to demonstrate diffuse cortical thickening but has decreased in size since 2/15/2024, measuring up to 1.6 cm today, previously measuring up to 2.8 cm.  Other  left axillary lymph nodes visualized today appear smaller in size as well.   IMPRESSION: BI-RADS CATEGORY: 6 - Known Biopsy-Proven Malignancy.    ASSESSMENT:    Eliezer Izquierdo is a 39 year old female with LEFT breast cancer, s/p neoadjuvant chemotherapy.    I personally reviewed the imaging above with our in-house breast radiologist today.    The diagnosis and management of locoregionally advanced breast cancer was discussed with Eliezer Izquierdo. She completed neoadjuvant systemic therapy on 6/14/2024. We reviewed that surgical resection is still recommended following neoadjuvant therapy, in the form of either breast conservation (segmental mastectomy plus radiation) or mastectomy.  Surgery is performed 4-8 weeks following completion of systemic therapy.  Eliezer Izquierdo IS a candidate for breast conservation therapy.  However, I would defer to the final clinical assessment following completion of neoadjuvant therapy. We discussed that breast conservation therapy involves two necessary components: the lumpectomy (or \"segmental mastectomy\"), and several weeks of whole breast radiation therapy.  We also discussed the significance of clear margins and that a subsequent procedure may be " necessary to achieve this.     Because the lesion is ill-defined, a radiofrequency identification (RFID) seed-localized approach will be taken.  She would present prior to surgery for an image-guided RFID seed placement, followed by a surgical excision in the operating room.  The risks of a RFID seed-localized segmental mastectomy were discussed with the patient, including the risks of bleeding, wound infection, wound dehiscence, and post-operative contour change to the breast.  There is also the risk of needing a repeat surgery (I.e. re-excisional segmental mastectomy) for involved margin(s).  We discussed obtaining a supportive bra for postoperative recovery and that prescriptions for narcotics are not typically provided for this procedure.    Eliezer Izquierdo is not interested in a mastectomy. We did not discuss this in detail.    In addition to the surgical management of the breast, her axilla must be addressed.  She was initially found to be node positive.  Currently, the adenopathy IS NOT palpable.  She is a candidate for sentinel lymph node biopsy. This is performed with the combination of the radioactive Tilmanocept and dye (lymphazurin or indocyanine green). The risks of a sentinel lymph node biopsy were discussed with the patient, including the risks of lymphedema (5%), bleeding, wound infection, wound dehiscence, seroma formation, and paresthesias. There is an approximately 10% false negative rate associated with sentinel lymph node biopsy as published in the literature.      She had a biopsy clip placed in the axillary node at the time of needle biopsy.  We reviewed that 85% of the time, the sentinel lymph node is the clipped node. However, because of the potential for it not to map, I have recommended an radiofrequency identification (RFID) seed-localization of that clipped node to facilitate its removal at the time of surgery. The seed would be placed prior to surgery with image guidance.    We  discussed that since she was initially node positive, adjuvant radiation will be recommended.  She prefers to do this up in [place].    We discussed that surgical pathology results will be reviewed at the postoperative visit to allow for careful discussion of next steps and for answering questions.    She states that Dr Nolan would prefer that we keep the port in place for now.    Given the elevated HbA1c, I recommend a pre-anesthesia clinic visit.    All of the above were discussed with the patient and all questions were answered.  She has elected to proceed with LEFT RFID seed-localized segmental mastectomy and LEFT RFID seed-localized axillary sentinel lymph node mapping and biopsy.    Total time spent with the patient was 30 minutes, of which 75% was counseling.     PLAN:  LEFT RFID seed-localized segmental mastectomy   LEFT RFID seed-localized axillary sentinel lymph node mapping and biopsy   Port to stay  PAC consult  Patient prefers radiation at [place]  Patient to report to her PCP for preoperative H&P and testing.   Do not access port on the day of surgery  Will touch base with Dr Rodriguez regarding postoperative pain management plan    Albina Ford MD MS [facility] FACS  Associate Professor of Surgery  Division of Surgical Oncology  [facility]     40 minutes spent on the date of the encounter doing chart review, review of test result(s), interpretation of test(s), patient visit, documentation, care coordination, and discussion with other physician(s).

## 2024-07-12 NOTE — NURSING NOTE
S: Provider request pt's port to be flushed during clinic visit.  B: Per patient, port did not have positive blood return during last visit but no other intervention was pursued as chemo ws cancelled for the day.   A: Port accessed, no blood return noted. See IV flow sheet. Pt declines TPA as pt will request port to be remove in the near future.   R: Nurse coordinator notified. Intervention will be needed for port to function correctly if port is not removed.

## 2024-07-12 NOTE — NURSING NOTE
"Oncology Rooming Note    July 12, 2024 1:25 PM   Eliezer Izquierdo is a 39 year old female who presents for:    Chief Complaint   Patient presents with    Oncology Clinic Visit     Breast cancer     Initial Vitals: /85 (BP Location: Right arm, Patient Position: Sitting, Cuff Size: Adult Large)   Pulse 113   Temp 98.9  F (37.2  C) (Oral)   Resp 16   Wt 125 kg (275 lb 8 oz)   SpO2 97%   BMI 40.68 kg/m   Estimated body mass index is 40.68 kg/m  as calculated from the following:    Height as of 7/2/24: 1.753 m (5' 9\").    Weight as of this encounter: 125 kg (275 lb 8 oz). Body surface area is 2.47 meters squared.  Severe Pain (6) Comment: Data Unavailable   No LMP recorded. Patient has had a hysterectomy.  Allergies reviewed: Yes  Medications reviewed: Yes    Medications: Medication refills not needed today.  Pharmacy name entered into EPIC:    [facility] - [place], [place] - [address] [facility] [place], [place] - [address] [facility] [address]  [facility] [place], [place] - [address] [place] [address]    Frailty Screening:   Is the patient here for a new oncology consult visit in cancer care? 2. No      Clinical concerns: Patient would like to ask if her port will be flushed today.  Dr. Ford was notified.      Jamey Brown EMT            "

## 2024-07-12 NOTE — LETTER
7/12/2024      Eliezer Izquierdo  [address] [address] [facility] [address]      Dear Colleague,    Thank you for referring your patient, Eliezer Izquierdo, to the [facility]. Please see a copy of my visit note below.      [facility]  [address] [address]  Phone: [phone]  Fax: [phone]    PATIENT NAME:  Eliezer Izquierdo  PATIENT YOB: 1985    FOLLOW-UP  Jul 12, 2024    Eliezer Izquierdo is a 39 year old female who returns for follow-up for left breast cancer.     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  She has had significant side effects from taxol and it has been stopped.      She also sees Dr Kylie Rodriguez of palliative care for chronic pain; currently managed with suboxone and hydrocodone; recently the hydrocodone was increased for neuropathy.  Sees her monthly.    She continues to smoke 3 cigarettes a day.  HbA1c 9.7 in April 2024    /85 (BP Location: Right arm, Patient Position: Sitting, Cuff Size: Adult Large)   Pulse 113   Temp 98.9  F (37.2  C) (Oral)   Resp 16   Wt 125 kg (275 lb 8 oz)   SpO2 97%   BMI 40.68 kg/m     Physical Exam  Constitutional:       Appearance: She is well-developed.   Chest:   Breasts:     Breasts are symmetrical.      Right: No inverted nipple, mass, nipple discharge, skin change or tenderness.      Left: Mass present. No inverted nipple, nipple  discharge, skin change or tenderness.          Comments: Patient was examined in both supine and upright positions.   Lymphadenopathy:      Cervical: No cervical adenopathy.      Right cervical: No superficial, deep or posterior cervical adenopathy.     Left cervical: No superficial, deep or posterior cervical adenopathy.      Upper Body:      Right upper body: No supraclavicular, axillary or pectoral adenopathy.      Left upper body: No supraclavicular, axillary or pectoral adenopathy.      Comments: No lymphedema in bilateral upper extremities.   Skin:     General: Skin is warm and dry.          INVESTIGATIONS:    Left Axillary US (today) showed:  FINDINGS:  Targeted ultrasound by technologist and radiologist. Biopsy proven metastatic left axillary lymph node with biopsy marking clip is visualized.  Lymph node continues to demonstrate diffuse cortical thickening but has decreased in size since 2/15/2024, measuring up to 1.6 cm today, previously measuring up to 2.8 cm.  Other  left axillary lymph nodes visualized today appear smaller in size as well.   IMPRESSION: BI-RADS CATEGORY: 6 - Known Biopsy-Proven Malignancy.    ASSESSMENT:    Eliezer Izquierdo is a 39 year old female with LEFT breast cancer, s/p neoadjuvant chemotherapy.    I personally reviewed the imaging above with our in-house breast radiologist today.    The diagnosis and management of locoregionally advanced breast cancer was discussed with Eliezer Izquierdo. She completed neoadjuvant systemic therapy on 6/14/2024. We reviewed that surgical resection is still recommended following neoadjuvant therapy, in the form of either breast conservation (segmental mastectomy plus radiation) or mastectomy.  Surgery is performed 4-8 weeks following completion of systemic therapy.  Eliezer Izquierdo IS a candidate for breast conservation therapy.  However, I would defer to the final clinical assessment following completion of neoadjuvant therapy. We  "discussed that breast conservation therapy involves two necessary components: the lumpectomy (or \"segmental mastectomy\"), and several weeks of whole breast radiation therapy.  We also discussed the significance of clear margins and that a subsequent procedure may be necessary to achieve this.     Because the lesion is ill-defined, a radiofrequency identification (RFID) seed-localized approach will be taken.  She would present prior to surgery for an image-guided RFID seed placement, followed by a surgical excision in the operating room.  The risks of a RFID seed-localized segmental mastectomy were discussed with the patient, including the risks of bleeding, wound infection, wound dehiscence, and post-operative contour change to the breast.  There is also the risk of needing a repeat surgery (I.e. re-excisional segmental mastectomy) for involved margin(s).  We discussed obtaining a supportive bra for postoperative recovery and that prescriptions for narcotics are not typically provided for this procedure.    Eliezer Izquierdo is not interested in a mastectomy. We did not discuss this in detail.    In addition to the surgical management of the breast, her axilla must be addressed.  She was initially found to be node positive.  Currently, the adenopathy IS NOT palpable.  She is a candidate for sentinel lymph node biopsy. This is performed with the combination of the radioactive Tilmanocept and dye (lymphazurin or indocyanine green). The risks of a sentinel lymph node biopsy were discussed with the patient, including the risks of lymphedema (5%), bleeding, wound infection, wound dehiscence, seroma formation, and paresthesias. There is an approximately 10% false negative rate associated with sentinel lymph node biopsy as published in the literature.      She had a biopsy clip placed in the axillary node at the time of needle biopsy.  We reviewed that 85% of the time, the sentinel lymph node is the clipped node. " However, because of the potential for it not to map, I have recommended an radiofrequency identification (RFID) seed-localization of that clipped node to facilitate its removal at the time of surgery. The seed would be placed prior to surgery with image guidance.    We discussed that since she was initially node positive, adjuvant radiation will be recommended.  She prefers to do this up in [place].    We discussed that surgical pathology results will be reviewed at the postoperative visit to allow for careful discussion of next steps and for answering questions.    She states that Dr Nolan would prefer that we keep the port in place for now.    Given the elevated HbA1c, I recommend a pre-anesthesia clinic visit.    All of the above were discussed with the patient and all questions were answered.  She has elected to proceed with LEFT RFID seed-localized segmental mastectomy and LEFT RFID seed-localized axillary sentinel lymph node mapping and biopsy.    Total time spent with the patient was 30 minutes, of which 75% was counseling.     PLAN:  LEFT RFID seed-localized segmental mastectomy   LEFT RFID seed-localized axillary sentinel lymph node mapping and biopsy   Port to stay  PAC consult  Patient prefers radiation at [place]  Patient to report to her PCP for preoperative H&P and testing.   Do not access port on the day of surgery  Will touch base with Dr Rodriguez regarding postoperative pain management plan    Albina Ford MD MS [facility] FACS  Associate Professor of Surgery  Division of Surgical Oncology  [facility]     40 minutes spent on the date of the encounter doing chart review, review of test result(s), interpretation of test(s), patient visit, documentation, care coordination, and discussion with other physician(s).

## 2024-07-14 ENCOUNTER — MYC REFILL (OUTPATIENT)
Dept: PALLIATIVE CARE | Facility: CLINIC | Age: 39
End: 2024-07-14
Payer: COMMERCIAL

## 2024-07-14 DIAGNOSIS — Z17.0 MALIGNANT NEOPLASM OF UPPER-OUTER QUADRANT OF LEFT BREAST IN FEMALE, ESTROGEN RECEPTOR POSITIVE (H): ICD-10-CM

## 2024-07-14 DIAGNOSIS — C50.412 MALIGNANT NEOPLASM OF UPPER-OUTER QUADRANT OF LEFT BREAST IN FEMALE, ESTROGEN RECEPTOR POSITIVE (H): ICD-10-CM

## 2024-07-14 NOTE — PROGRESS NOTES
[facility]: Surgical Oncology Plan of Care Education Note                                     Discussion with Patient:                                                         Met with Eliezer and her father following her visit with Dr. Ford on 7/12/2024. Introduced self and explained role of RNCC.       Plan:                                                       Reviewed plan for left tag localized lumpectomy and tag localized sentinel lymph node biopsy at the [place]. Provided appropriate OR location map. Discussed need for H&P within 30 days of surgery. Eliezer antwon be scheduled for a PAC visit at the time of her tag placements. Reviewed shower instructions and provided written hand-out and bottle of surgical scrub.     The process for scheduling the tag placement was reviewed with Eliezer. She is aware [facility] will be calling to schedule the tag placement. The tag placement will be scheduled for at least two days prior to her surgery.       Informed patient they should get a call within the next three business days from our OR  with surgery date, H&P plan and date of post-op visit with their surgeon. Writer answered all questions and concerns to the best of her ability and provided her contact information. Reviewed use of JCD as alternative way to contact team.  Encouraged patient to contact with questions.         Nichelle Gomez, RNCC  [facility]  Surgical Onolcogy      Approximately 10 minutes was spent in discussion with patient.

## 2024-07-15 ENCOUNTER — PREP FOR PROCEDURE (OUTPATIENT)
Dept: ONCOLOGY | Facility: CLINIC | Age: 39
End: 2024-07-15
Payer: COMMERCIAL

## 2024-07-15 ENCOUNTER — TELEPHONE (OUTPATIENT)
Dept: ONCOLOGY | Facility: CLINIC | Age: 39
End: 2024-07-15
Payer: COMMERCIAL

## 2024-07-15 RX ORDER — LORAZEPAM 1 MG/1
1 TABLET ORAL EVERY 6 HOURS PRN
Qty: 30 TABLET | Refills: 1 | Status: SHIPPED | OUTPATIENT
Start: 2024-07-15 | End: 2024-07-31

## 2024-07-15 NOTE — TELEPHONE ENCOUNTER
Received [employer] message from patient requesting refill of lorazepam.     Last refill: 7/8/24  Last office visit: 7/2/24  Scheduled for follow up 8/13/24     Will route request to MD for review.     Reviewed [place]  Report.

## 2024-07-15 NOTE — TELEPHONE ENCOUNTER
Called patient to schedule surgery with Dr. Ford    Spoke with:  Patient    Date of Surgery: 7/24/24    Arrival time Discussed with Patient:  No    Location of surgery: [facility]     Pre-Op H&P: Scheduled with PAC on 7/23/24 at 10:00 AM    Post-Op Appts: 8/8/24 at 8:15 AM    Imaging:  NM injection scheduled for delivery for surgeon to inject. Tag placement to be scheduled by breast RN- N/A  Scheduled: yes     Discussed with patient PAC RN will provide arrival time and instructions for surgery at the time of the appointment: [[facility] only]: Yes    Packet sent out: Yes  via Received in clinic by RN  [DATE] 7/12/24    Surgical Soap: Receiving via Mail - Standard      Informed patient that they will need an adult  to bring patient home from surgery: Yes  : UNK at this time       Additional Comments:  Message sent to breast RN to schedule tag placement.      All patients questions were answered and patient was instructed to review surgical packet and call back with any questions or concerns.       Sienna Ortiz on 7/15/2024 at 2:06 PM

## 2024-07-16 NOTE — TELEPHONE ENCOUNTER
"FUTURE VISIT INFORMATION      SURGERY INFORMATION:  Date: 24  Location: uu or  Surgeon:  Albina Ford MD   Anesthesia Type:  general with block  Procedure: LEFT Radiofrequency Identification Seed-Localized Segmental Mastectomy (=\"Lumpectomy\"), LEFT Radiofrequency Identification Seed-Localized Axillary [place] Lymph Node Biopsy   Consult: ov 24    RECORDS REQUESTED FROM:       Primary Care Provider: Madison Roach APNP  [facility]     Most recent EKG+ Tracin23- [facility]    Most recent ECHO: 3/11/24      "

## 2024-07-17 DIAGNOSIS — C50.919 MALIGNANT NEOPLASM OF FEMALE BREAST, UNSPECIFIED ESTROGEN RECEPTOR STATUS, UNSPECIFIED LATERALITY, UNSPECIFIED SITE OF BREAST (H): Primary | ICD-10-CM

## 2024-07-17 DIAGNOSIS — Z17.0 MALIGNANT NEOPLASM OF UPPER-OUTER QUADRANT OF LEFT BREAST IN FEMALE, ESTROGEN RECEPTOR POSITIVE (H): ICD-10-CM

## 2024-07-17 DIAGNOSIS — C50.412 MALIGNANT NEOPLASM OF UPPER-OUTER QUADRANT OF LEFT BREAST IN FEMALE, ESTROGEN RECEPTOR POSITIVE (H): ICD-10-CM

## 2024-07-19 DIAGNOSIS — C50.412 MALIGNANT NEOPLASM OF UPPER-OUTER QUADRANT OF LEFT BREAST IN FEMALE, ESTROGEN RECEPTOR POSITIVE (H): ICD-10-CM

## 2024-07-19 DIAGNOSIS — Z17.0 MALIGNANT NEOPLASM OF UPPER-OUTER QUADRANT OF LEFT BREAST IN FEMALE, ESTROGEN RECEPTOR POSITIVE (H): ICD-10-CM

## 2024-07-19 RX ORDER — HYDROCODONE BITARTRATE AND ACETAMINOPHEN 10; 325 MG/1; MG/1
.5-1 TABLET ORAL EVERY 4 HOURS PRN
Qty: 84 TABLET | Refills: 0 | Status: SHIPPED | OUTPATIENT
Start: 2024-07-22 | End: 2024-07-31

## 2024-07-19 NOTE — TELEPHONE ENCOUNTER
Received telephone call from patient earlier this week requesting refill of [place] for  on Monday. She asked about increasing the dose due to upcoming surgery. Dr. Rodriguez will not preemptively increase the dose, however pt has been instructed to call after surgery if her current medications are not managing the pain.    Last refill: 7/8/24  Last office visit: 7/2/24  Scheduled for follow up 8/13/24     Will route request to MD for review.     Reviewed [place] and [place]  Report.

## 2024-07-22 LAB
ABO/RH(D): NORMAL
ANTIBODY SCREEN: NEGATIVE
SPECIMEN EXPIRATION DATE: NORMAL

## 2024-07-23 ENCOUNTER — PRE VISIT (OUTPATIENT)
Dept: SURGERY | Facility: CLINIC | Age: 39
End: 2024-07-23

## 2024-07-23 ENCOUNTER — OFFICE VISIT (OUTPATIENT)
Dept: SURGERY | Facility: CLINIC | Age: 39
End: 2024-07-23
Payer: COMMERCIAL

## 2024-07-23 ENCOUNTER — ANCILLARY PROCEDURE (OUTPATIENT)
Dept: MAMMOGRAPHY | Facility: CLINIC | Age: 39
End: 2024-07-23
Attending: SURGERY
Payer: COMMERCIAL

## 2024-07-23 ENCOUNTER — APPOINTMENT (OUTPATIENT)
Dept: LAB | Facility: CLINIC | Age: 39
End: 2024-07-23
Payer: COMMERCIAL

## 2024-07-23 ENCOUNTER — LAB (OUTPATIENT)
Dept: LAB | Facility: CLINIC | Age: 39
End: 2024-07-23
Payer: COMMERCIAL

## 2024-07-23 ENCOUNTER — ANESTHESIA EVENT (OUTPATIENT)
Dept: SURGERY | Facility: CLINIC | Age: 39
End: 2024-07-23
Payer: COMMERCIAL

## 2024-07-23 VITALS
TEMPERATURE: 98.1 F | SYSTOLIC BLOOD PRESSURE: 111 MMHG | BODY MASS INDEX: 39.43 KG/M2 | HEART RATE: 106 BPM | RESPIRATION RATE: 16 BRPM | OXYGEN SATURATION: 94 % | WEIGHT: 266.2 LBS | DIASTOLIC BLOOD PRESSURE: 81 MMHG | HEIGHT: 69 IN

## 2024-07-23 DIAGNOSIS — Z01.818 PRE-OP EVALUATION: ICD-10-CM

## 2024-07-23 DIAGNOSIS — Z01.818 PRE-OP EVALUATION: Primary | ICD-10-CM

## 2024-07-23 DIAGNOSIS — Z85.3 PERSONAL HISTORY OF MALIGNANT NEOPLASM OF BREAST: ICD-10-CM

## 2024-07-23 LAB
ERYTHROCYTE [DISTWIDTH] IN BLOOD BY AUTOMATED COUNT: 13.5 % (ref 10–15)
HCT VFR BLD AUTO: 45.9 % (ref 35–47)
HGB BLD-MCNC: 15.4 G/DL (ref 11.7–15.7)
MCH RBC QN AUTO: 31.5 PG (ref 26.5–33)
MCHC RBC AUTO-ENTMCNC: 33.6 G/DL (ref 31.5–36.5)
MCV RBC AUTO: 94 FL (ref 78–100)
PLATELET # BLD AUTO: 337 10E3/UL (ref 150–450)
RBC # BLD AUTO: 4.89 10E6/UL (ref 3.8–5.2)
WBC # BLD AUTO: 8.2 10E3/UL (ref 4–11)

## 2024-07-23 PROCEDURE — 99204 OFFICE O/P NEW MOD 45 MIN: CPT | Performed by: NURSE PRACTITIONER

## 2024-07-23 PROCEDURE — 36415 COLL VENOUS BLD VENIPUNCTURE: CPT | Performed by: PATHOLOGY

## 2024-07-23 PROCEDURE — 85027 COMPLETE CBC AUTOMATED: CPT | Performed by: PATHOLOGY

## 2024-07-23 PROCEDURE — 86900 BLOOD TYPING SEROLOGIC ABO: CPT

## 2024-07-23 PROCEDURE — 19285 PERQ DEV BREAST 1ST US IMAG: CPT | Mod: LT | Performed by: RADIOLOGY

## 2024-07-23 PROCEDURE — 19286 PERQ DEV BREAST ADD US IMAG: CPT | Mod: LT | Performed by: RADIOLOGY

## 2024-07-23 ASSESSMENT — PAIN SCALES - GENERAL: PAINLEVEL: SEVERE PAIN (7)

## 2024-07-23 ASSESSMENT — LIFESTYLE VARIABLES: TOBACCO_USE: 1

## 2024-07-23 ASSESSMENT — ENCOUNTER SYMPTOMS: ORTHOPNEA: 0

## 2024-07-23 NOTE — H&P
"  Pre-Operative H & P     CC:  Preoperative exam to assess for increased cardiopulmonary risk while undergoing surgery and anesthesia.    Date of Encounter: 7/23/2024  Primary Care Physician:  Madison Roach     Reason for visit: Pre-operative evaluation      HPI  Eliezer Izquierdo is a 39 year old female who presents for pre-operative H & P in preparation for  Procedure Information       Case: [id number] Date/Time: 07/24/24 0730    Procedures:       LEFT Radiofrequency Identification Seed-Localized Segmental Mastectomy (=\"Lumpectomy\"), LEFT Radiofrequency Identification Seed-Localized Axillary [place] Lymph Node Biopsy (Left: Breast)      LEFT vascular access port removal (Left: Neck)    Anesthesia type: General with Block    Diagnosis: Malignant neoplasm of female breast, unspecified estrogen receptor status, unspecified laterality, unspecified site of breast (H) [C50.919]    Pre-op diagnosis: Malignant neoplasm of female breast, unspecified estrogen receptor status, unspecified laterality, unspecified site of breast (H) [C50.919]    Location:  OR  /  OR    Providers: Albina Ford MD          Patient is being evaluated for comorbid conditions of: HTN, HL, asthma, current tobacco use, DM2 ( poorly controlled) IBS, neuropathy, migraines, GERD, arthritis, Chronic pain and anxity.    She also has recent history of LEFT breast cancer, s/p neoadjuvant chemotherapy. She met with Dr. Ford and  presents today in preparation for the above recommended procedure.      History is obtained from the patient and chart review    Hx of abnormal bleeding or anti-platelet use: denies    Menstrual history: No LMP recorded. 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 04/22/2024    Two MC1R \"increased risk\" variants - c.451C>T and c.478C>T  [facility] 3/26/2024      " Hypercholesteraemia     Irritable bowel     Migraines        Past Surgical History  Past Surgical History:   Procedure Laterality Date    IR CHEST PORT PLACEMENT > 5 YRS OF AGE  3/11/2024       Prior to Admission Medications  Current Outpatient Medications   Medication Sig Dispense Refill    albuterol (PROAIR HFA/PROVENTIL HFA/VENTOLIN HFA) 108 (90 Base) MCG/ACT inhaler Inhale 2 puffs into the lungs every 4 hours as needed      amitriptyline (ELAVIL) 10 MG tablet Take 1-2 tablets (10-20 mg) by mouth every morning AND 5 tablets (50 mg) at bedtime.      amLODIPine (NORVASC) 10 MG tablet Take 10 mg by mouth at bedtime      atorvastatin (LIPITOR) 10 MG tablet Take 10 mg by mouth at bedtime      buprenorphine HCl-naloxone HCl (SUBOXONE) 8-2 MG per film Place 1 Film under the tongue 3 times daily 90 Film 0    cetirizine (ZYRTEC) 10 MG tablet Take 5 mg by mouth as needed      EPINEPHrine (ANY BX GENERIC EQUIV) 0.3 MG/0.3ML injection 2-pack       esomeprazole (NEXIUM) 20 MG DR capsule Take 20 mg by mouth as needed      fluticasone-salmeterol (ADVAIR HFA) 115-21 MCG/ACT inhaler Inhale 2 puffs into the lungs 2 times daily      heparin lock flush 10 unit/mL Inject 5 mLs as needed for line flush Flush IV after medication as directed, and/or at least every 24 hours, or prior to deaccessing to reaccess. 9999 mL 0    heparin lock flush 100 unit/mL Inject 5 mLs as needed for line flush Flush IV prior to deaccessing for no further use and/or at least every 4 weeks when not accessed. 9999 mL 0    hydrochlorothiazide (HYDRODIURIL) 25 MG tablet Take 25 mg by mouth as needed      HYDROcodone-acetaminophen (NORCO)  MG per tablet Take 0.5-1 tablets by mouth every 4 hours as needed for severe pain 84 tablet 0    JANUVIA 100 MG tablet Take 100 mg by mouth at bedtime      LORazepam (ATIVAN) 1 MG tablet Take 1 tablet (1 mg) by mouth every 6 hours as needed for anxiety, nausea or sleep 30 tablet 1    methocarbamol (ROBAXIN) 500 MG tablet  Take 3 tablets (1,500 mg) by mouth 3 times daily as needed for muscle spasms 270 tablet 1    naloxone (NARCAN) 4 MG/0.1ML nasal spray Spray 4 mg into one nostril alternating nostrils as needed for opioid reversal every 2-3 minutes until assistance arrives      OLANZapine (ZYPREXA) 2.5 MG tablet Take 1-2 tablets (2.5-5 mg) by mouth 3 times daily as needed (Nausea or anxiety) 150 tablet 2    Prochlorperazine Maleate (COMPAZINE PO) Take 10 mg by mouth 3 times daily as needed for nausea      sodium chloride, PF, 0.9% PF flush Inject 10 mLs into the vein as needed for line flush Flush IV before and after med administration as directed and/or at least every 24 hours, or prior to deaccessing for no further use and/or at least every 4 weeks when not accessed. 9999 mL 0    urea (GORMEL) 20 % external cream Apply topically as needed (Apply to feet twice daily) 480 g 1    VICTOZA PEN 18 MG/3ML soln Inject 1.8 mg subcutaneously every evening Per patient she is still using      Blood Glucose Monitoring Suppl (ONETOUCH VERIO FLEX SYSTEM) w/Device KIT       ibuprofen (ADVIL/MOTRIN) 200 MG tablet Take 200 mg by mouth (Patient not taking: Reported on 7/23/2024)      JANUVIA 50 MG tablet Take 50 mg by mouth      nicotine (NICODERM CQ) 7 MG/24HR 24 hr patch Place 1 patch onto the skin every 24 hours (Patient not taking: Reported on 7/23/2024) 30 patch 3    nystatin (MYCOSTATIN) 619624 UNIT/ML suspension  (Patient not taking: Reported on 6/21/2024)      pramipexole (MIRAPEX) 0.5 MG tablet Take 2 tablets (1 mg) by mouth at bedtime for 3 days, THEN 3 tablets (1.5 mg) at bedtime for 27 days. 87 tablet 0    Pregabalin (LYRICA) 200 MG capsule Take 1 capsule (200 mg) by mouth 2 times daily         Allergies  Allergies   Allergen Reactions    Ubrogepant Muscle Pain (Myalgia)     Other Reaction(s): Chest Pain, Chest Pain    Also caused blisters in mouth    Azithromycin Hives    Doxycycline Hives    Erythromycin Hives    Estrogens      Levofloxacin Hives    Maxalt [Rizatriptan]     Oxycodone-Acetaminophen      Burning mouth and lips with red sore taste buds and throat irritation    PER PATIENT NOT ALLERGIC TO     Penicillins     Propranolol Hcl Headache    Sulfa Antibiotics     Sumatriptan Muscle Pain (Myalgia)    Zofran [Ondansetron] Muscle Pain (Myalgia)    Hydromorphone Anxiety and Itching     Other Reaction(s): Tachycardia    Panic attacks    Ondansetron Hcl Anxiety, Other (See Comments) and Palpitations    Toradol [Ketorolac] Anxiety       Social History  Social History     Socioeconomic History    Marital status: Single     Spouse name: Not on file    Number of children: Not on file    Years of education: Not on file    Highest education level: Not on file   Occupational History    Not on file   Tobacco Use    Smoking status: Every Day     Types: Cigarettes     Passive exposure: Current    Smokeless tobacco: Never    Tobacco comments:     Smokes about 4 or less cigarette's a day. Trying to quit.   Substance and Sexual Activity    Alcohol use: No    Drug use: No    Sexual activity: Not on file   Other Topics Concern    Not on file   Social History Narrative    Excessive Diet Coke intake - 15 cans/day     Social Determinants of Health     Financial Resource Strain: Not on File (9/17/2023)    [facility] from [facility]    Financial Resource Strain     Financial Resource Strain: 0   Food Insecurity: Not on File (9/17/2023)    Received from [facility]    Food Insecurity     Food: 0   Transportation Needs: Not on File (9/17/2023)    Received from [facility]    Transportation Needs     Transportation: 0   Physical Activity: Not on File (9/17/2023)    Received from [facility]    Physical Activity     Physical Activity: 0   Stress: Not on File (9/17/2023)    Received from [facility]    Stress     Stress: 0   Social Connections: Not on File (9/17/2023)    Received from [facility]    Social Connections     Social Connections and Isolation: 0    Interpersonal Safety: Not on file   Housing Stability: Not on File (2023)    Received from [facility]    Housing Stability     Housin       Family History  Family History   Problem Relation Age of Onset    Genitourinary Problems Mother         renal disease - minimal change, sponge kidney       Review of Systems  The complete review of systems is negative other than noted in the HPI or here.   Anesthesia Evaluation   Pt has had prior anesthetic. Type: General.    No history of anesthetic complications       ROS/MED HX  ENT/Pulmonary:     (+)     CORBIN risk factors,  hypertension, obese,        tobacco use, Current use,    Mild Persistent, asthma  Treatment: Inhaler prn,                 Neurologic:     (+)    peripheral neuropathy, - bilateral hands and feet- numbness, tingling and pain. migraines,                          Cardiovascular:     (+) Dyslipidemia hypertension- -   -  - -                                 Previous cardiac testing   Echo: Date: 3/11/2024 Results:    Stress Test:  Date: Results:    ECG Reviewed:  Date: Results:    Cath:  Date: Results:   (-) taking anticoagulants/antiplatelets, MORALES and orthopnea/PND   METS/Exercise Tolerance:  Comment: Limited by knee pain and foot neuropathy.    Hematologic:       Musculoskeletal: Comment: Bilateral knees   (+)  arthritis,             GI/Hepatic:     (+) GERD, Asymptomatic on medication,     Inflammatory bowel disease,             Renal/Genitourinary:  - neg Renal ROS     Endo:     (+)  type II DM, Last HgA1c: 9.7, date: 2024, Not using insulin,  Normal glucose range: 200, not previously admitted for DM/DKA.              Psychiatric/Substance Use:     (+) psychiatric history anxiety       Infectious Disease:  - neg infectious disease ROS     Malignancy:   (+) Malignancy, History of Breast.Breast CA Active status post Chemo and Radiation.      Other:      (+)  , H/O Chronic Pain,         /81 (BP Location: Right arm, Patient Position:  "Sitting, Cuff Size: Adult Regular)   Pulse 106   Temp 98.1  F (36.7  C) (Oral)   Resp 16   Ht 1.753 m (5' 9\")   Wt 120.7 kg (266 lb 3.2 oz)   SpO2 94%   Breastfeeding No   BMI 39.31 kg/m      Physical Exam   Constitutional: Awake, alert, cooperative, no apparent distress, and appears stated age.  Eyes: Pupils equal, round and reactive to light, extra ocular muscles intact, sclera clear, conjunctiva normal.  HENT: Normocephalic, oral pharynx with moist mucus membranes, good dentition. No goiter appreciated.   Respiratory:  Intermittent expiratory wheeze noted, otherwise clear to auscultation bilaterally.  Cardiovascular: Regular rate and rhythm, normal S1 and S2, and no murmur noted.  Carotids +2, no bruits. No edema. Palpable pulses to radial  DP and PT arteries.   GI: Normal bowel sounds, soft and non-tender. Unable to adequately assess for hepatosplenomegaly given obese abdomen. No superficial masses noted.   Surgical scars: healed  Lymph/Hematologic: No cervical lymphadenopathy and no supraclavicular lymphadenopathy.  Genitourinary:  deferred  Skin: Warm and dry.  No rashes at anticipated surgical site. Right upper chest port in place.   Musculoskeletal: Full ROM of neck. There is no redness, warmth, or swelling of the joints. Gross motor strength is normal.    Neurologic: Awake, alert, oriented to name, place and time. Cranial nerves II-XII are grossly intact. Gait is normal.   Neuropsychiatric: Calm, cooperative. Normal affect.     Prior Labs/Diagnostic Studies   All labs and imaging personally reviewed   Lab Results   Component Value Date    WBC 7.3 06/21/2024    WBC 6.9 06/21/2015     Lab Results   Component Value Date    RBC 3.50 06/21/2024    RBC 4.30 06/21/2015     Lab Results   Component Value Date    HGB 11.8 06/21/2024    HGB 12.8 06/21/2015     Lab Results   Component Value Date    HCT 34.6 06/21/2024    HCT 38.2 06/21/2015     No components found for: \"MCT\"  Lab Results   Component Value Date " "   MCV 99 06/21/2024    MCV 89 06/21/2015     Lab Results   Component Value Date    MCH 33.7 06/21/2024    MCH 29.8 06/21/2015     Lab Results   Component Value Date    MCHC 34.1 06/21/2024    MCHC 33.5 06/21/2015     Lab Results   Component Value Date    RDW 15.0 06/21/2024    RDW 13.2 06/21/2015     Lab Results   Component Value Date     06/21/2024     06/21/2015     Last Comprehensive Metabolic Panel:  Lab Results   Component Value Date     04/26/2024    POTASSIUM 3.9 04/26/2024    CHLORIDE 98 04/26/2024    CO2 24 04/26/2024    ANIONGAP 14 04/26/2024     (H) 04/26/2024    BUN 7.7 04/26/2024    CR 0.49 (L) 04/26/2024    GFRESTIMATED >90 04/26/2024    REMI 9.2 04/26/2024       Lab Results   Component Value Date    AST 39 06/07/2024    AST 32 06/21/2015     Lab Results   Component Value Date    ALT 34 06/07/2024    ALT 38 06/21/2015     No results found for: \"BILICONJ\"   Lab Results   Component Value Date    BILITOTAL 0.3 06/07/2024    BILITOTAL 0.2 06/21/2015     Lab Results   Component Value Date    ALBUMIN 4.2 06/07/2024    ALBUMIN 3.5 06/21/2015     Lab Results   Component Value Date    PROTTOTAL 7.1 06/07/2024    PROTTOTAL 7.2 06/21/2015      Lab Results   Component Value Date    ALKPHOS 70 06/07/2024    ALKPHOS 62 06/21/2015         EKG/ stress test - if available please see in ROS above \    3/11/2024  Echo result w/o MOPS: Interpretation Summary The visual ejection fraction is 60-65%.There is mild concentric left ventricular hypertrophy.There is no comparison study available.    The patient's records and results personally reviewed by this provider.     Outside records reviewed from: Care Everywhere    LAB/DIAGNOSTIC STUDIES TODAY:    Type and screen     Lab Results   Component Value Date    WBC 8.2 07/23/2024    WBC 6.9 06/21/2015     Lab Results   Component Value Date    RBC 4.89 07/23/2024    RBC 4.30 06/21/2015     Lab Results   Component Value Date    HGB 15.4 07/23/2024    HGB " "12.8 06/21/2015     Lab Results   Component Value Date    HCT 45.9 07/23/2024    HCT 38.2 06/21/2015     No components found for: \"MCT\"  Lab Results   Component Value Date    MCV 94 07/23/2024    MCV 89 06/21/2015     Lab Results   Component Value Date    MCH 31.5 07/23/2024    MCH 29.8 06/21/2015     Lab Results   Component Value Date    MCHC 33.6 07/23/2024    MCHC 33.5 06/21/2015     Lab Results   Component Value Date    RDW 13.5 07/23/2024    RDW 13.2 06/21/2015     Lab Results   Component Value Date     07/23/2024     06/21/2015       Assessment    Eliezer Izquierdo is a 39 year old female seen as a PAC referral for risk assessment and optimization for anesthesia.    Plan/Recommendations  Pt will be optimized for the proposed procedure.  See below for details on the assessment, risk, and preoperative recommendations    NEUROLOGY  - No history of TIA, CVA or seizure  - Chronic Pain  On chronic opiates, morphine equivilant = 750-780 MME/Day   [facility]; currently managed with suboxone and hydrocodone   - Neuropathy- significant numbness, tingling and pain Feet > hands  Hydrocodone recently increased     -Post Op delirium risk factors:  No risk identified    ENT  - No current airway concerns.  Will need to be reassessed day of surgery.  Mallampati: I  TM: > 3    CARDIAC  - No history of CAD and Afib  No anginal symptoms, Denies palpitations, PND, dizziness or syncope.     - Hypertension- managed with amlodipine and ( hydrochlorothiazide holding dos)  Well controlled  - Hyperlipidemia-Managed on statins.         - METS (Metabolic Equivalents)  METS 3 limited by fatigue, neuropathy and knee pain.   Patient CANNOT perform 4 METS exercise without symptoms             Total Score: 1    Functional Capacity: Unable to complete 4 METS      RCRI-Very low risk: Class 1 0.4% complication rate             Total Score: 0        PULMONARY      She " "continues to smoke 3 cigarettes a day.   CORBIN Low Risk             Total Score: 2    CORBIN: Hypertension    CORBIN: BMI over 35 kg/m2      - Asthma  Well controlled  Doesn't recall when she last used her rescue inhaler.     Smoking 1/2 pack daily    - Tobacco History    History   Smoking Status    Every Day    Types: Cigarettes   Smokeless Tobacco    Never       GI  - GERD  Controlled on medications: Proton Pump Inhibitor  PONV Medium Risk  Total Score: 2           1 AN PONV: Pt is Female    1 AN PONV: Intended Post Op Opioids        /RENAL  - Baseline Creatinine  WNL    ENDOCRINE    - BMI: Estimated body mass index is 39.31 kg/m  as calculated from the following:    Height as of this encounter: 1.753 m (5' 9\").    Weight as of this encounter: 120.7 kg (266 lb 3.2 oz).  Obesity (BMI >30)  - Diabetes    Component  Ref Range & Units 3 mo ago   Hemoglobin A1C  4.0 - 6.0 % 9.7 Abnormal      Patient Admits to poor dietary habits. Eats sugar, fast foods etc.   Follows with Madison BROWN for diabetes management. Of note she was diagnosed at the same time she was also diagnosed with breast cancer. This has been a stressful time for her.   Encouraged her after her surgery and recovery to focus on her dietary restrictions to improve her blood sugar control.    Diabetes Type 2, insulin dependent. Hold morning oral hypoglycemic medications and short acting insulin DOS. Take 80% of last scheduled dose of long-acting insulin prior to procedure.  Recommend close monitoring of the patient's blood glucose levels throughout the perioperative period.    HEME/ ONC  Breast Cancer   Treatment to date:  1.Genetic testing - Two heterozygous risk alleles were detected in the MC1R gene; VUS in RB1  2.Neoadjuvant ddAC (3/15/2024 to 4/26/2024)  3.Neoadjuvant taxol (5/10/2024 to 6/14/2024)    VTE Medium Risk 1.8%             Total Score: 5    VTE: Current cancer      - No history of abnormal bleeding or antiplatelet use.  - Chronic  anemia in " the setting of chemotherapy/ cancer  - Hgb today -    Type and Screen ordered.     Recommend perioperative use of blood conservation techniques intraoperatively and close monitoring for postoperative bleeding.      MSK  Patient IS Frail             Total Score: 3    Frailty: Weight loss 10 lbs or greater    Frailty: Slower walking speed    Frailty: Decrease in strength      Given her surgical date is tomorrow. I did not place a PMNR referral.     PSYCH  - anxiety     Different anesthesia methods/types have been discussed with the patient, but they are aware that the final plan will be decided by the assigned anesthesia provider on the date of service.      The patient is optimized for their procedure. AVS with information on surgery time/arrival time, meds and NPO status given by nursing staff. No further diagnostic testing indicated.      On the day of service:     Prep time: 20 minutes  Visit time: 25 minutes  Documentation time: 10 minutes  ------------------------------------------  Total time: 55 minutes      JORGE Parker CNP  Preoperative Assessment Center  [facility]  Clinic and Surgery Center  Phone: [phone]  Fax: [phone]

## 2024-07-23 NOTE — PATIENT INSTRUCTIONS
Preparing for Your Surgery      Name:  Eliezer Izquierdo   MRN:  [id number]   :  1985   Today's Date:  2024       Arriving for surgery:  Surgery date:  24  Arrival time:  5.30AM    Please come to:     Please come to:       [facility]    [address] [place]  [address]     The [facility] [place] Patient/Visitor Ramp is at [address]. Patients and visitors who self-park will receive the reduced hospital parking rate. If the Patient /Visitor Ramp is full, please follow the signs to the [employer] car park located at the main hospital entrance.       parking is available (24 hours/ 7 days a week)      Discounted parking pass options are available for patients and visitors. They can be purchased at the [employer] desk at the main hospital entrance.     -    Stop at the security desk and they will direct surgery patients to the Surgery [place] [facility] [place] [phone]        - If you need directions, a wheelchair or an escort please stop at the Information/security desk in the lobby.     What can I eat or drink?  -  You may eat and drink normally up to 8 hours prior to arrival time. (Until 9.30PM)  -  You may have clear liquids until 2 hours prior to arrival time. (Until 3.30AM)    Examples of clear liquids:  Water  Clear broth  Juices (apple, white grape, white cranberry  and cider) without pulp  Noncarbonated, powder based beverages  (lemonade and Faisal-Aid)  Sodas (Sprite, 7-Up, ginger ale and seltzer)  Coffee or tea (without milk or cream)  Gatorade    -  No Alcohol or cannabis products for at least 24 hours before surgery.     Which medicines can I take?    Hold Aspirin for 7 days before surgery.   Hold Multivitamins for 7 days before surgery.  Hold Supplements for 7 days before surgery.  Hold Ibuprofen (Advil, Motrin) for 1 day(s) before surgery--unless otherwise  directed by surgeon.  Hold Naproxen (Aleve) for 4 days before surgery.    Hold Victoza injection 1 day before surgery. Do not take the injection tonight and tomorrow morning    -  DO NOT take these medications the day of surgery:  Cetirizine (Zyrtec)  Hydrochlorothiazide (Hydrodiuril)  Ibuprofen  Victoza injection    -  PLEASE TAKE these medications the day of surgery:  Albuterol inhaler as needed.  Advair inhaler  Amitriptyline (Elavil)  Amlodipine (Norvasc)  Lipitor  Suboxone  Esomeprazole (Nexium)  Norco as needed  Lorazepam (Ativan) as needed  Robaxin as needed  Naloxone (Narcan) as needed.  Olanzapine (Zyprexa)        How do I prepare myself?  - Please take 2 showers (one the night prior to surgery and one the morning of surgery) using Scrubcare or Hibiclens soap.    Use this soap only from the neck to your toes.     Leave the soap on your skin for one minute--then rinse thoroughly.      You may use your own shampoo and conditioner. No other hair products.   - Please remove all jewelry and body piercings.  - No lotions, deodorants or fragrance.  - No makeup or fingernail polish.   - Bring your ID and insurance card.    -If you use a CPAP machine, please bring the CPAP machine, tubing, and mask to hospital.    -If you have a Deep Brain Stimulator, Spinal Cord Stimulator, or any Neuro Stimulator device---you must bring the remote control to the hospital.      ALL PATIENTS GOING HOME THE SAME DAY OF SURGERY ARE REQUIRED TO HAVE A RESPONSIBLE ADULT TO DRIVE AND BE IN ATTENDANCE WITH THEM FOR 24 HOURS FOLLOWING SURGERY.    Covid testing policy as of 12/06/2022  Your surgeon will notify and schedule you for a COVID test if one is needed before surgery--please direct any questions or COVID symptoms to your surgeon      Questions or Concerns:    - For any questions regarding the day of surgery or your hospital stay, please contact the Pre Admission Nursing Office at [phone].       - If you have health changes  between today and your surgery, please call your surgeon.       - For questions after surgery, please call your surgeons office.           Current Visitor Guidelines    You may have 2 visitors in the pre op area.    Visiting hours: 8 a.m. to 8:30 p.m.    Patients confirmed or suspected to have symptoms of COVID 19 or flu:     No visitors allowed for adult patients.   Children (under age 18) can have 1 named visitor.     People who are sick or showing symptoms of COVID 19 or flu:    Are not allowed to visit patients--we can only make exceptions in special situations.       Please follow these guidelines for your visit:          Please maintain social distance          Masking is optional--however at times you may be asked to wear a mask for the safety of yourself and others     Clean your hands with alcohol hand . Do this when you arrive at and leave the building and patient room,    And again after you touch your mask or anything in the room.     Go directly to and from the room you are visiting.     Stay in the patient s room during your visit. Limit going to other places in the hospital as much as possible     Leave bags and jackets at home or in the car.     For everyone s health, please don t come and go during your visit. That includes for smoking   during your visit.

## 2024-07-24 ENCOUNTER — HOSPITAL ENCOUNTER (OUTPATIENT)
Dept: NUCLEAR MEDICINE | Facility: CLINIC | Age: 39
Setting detail: NUCLEAR MEDICINE
Discharge: HOME | End: 2024-07-24
Attending: SURGERY | Admitting: SURGERY
Payer: COMMERCIAL

## 2024-07-24 ENCOUNTER — ANCILLARY PROCEDURE (OUTPATIENT)
Dept: MAMMOGRAPHY | Facility: CLINIC | Age: 39
End: 2024-07-24
Attending: SURGERY
Payer: COMMERCIAL

## 2024-07-24 ENCOUNTER — HOSPITAL ENCOUNTER (OUTPATIENT)
Facility: CLINIC | Age: 39
Discharge: HOME | End: 2024-07-24
Attending: SURGERY | Admitting: SURGERY
Payer: COMMERCIAL

## 2024-07-24 ENCOUNTER — ANESTHESIA (OUTPATIENT)
Dept: SURGERY | Facility: CLINIC | Age: 39
End: 2024-07-24
Payer: COMMERCIAL

## 2024-07-24 VITALS
BODY MASS INDEX: 39.54 KG/M2 | HEART RATE: 102 BPM | WEIGHT: 266.98 LBS | TEMPERATURE: 98.7 F | DIASTOLIC BLOOD PRESSURE: 98 MMHG | OXYGEN SATURATION: 91 % | HEIGHT: 69 IN | RESPIRATION RATE: 17 BRPM | SYSTOLIC BLOOD PRESSURE: 151 MMHG

## 2024-07-24 DIAGNOSIS — G89.3 CANCER RELATED PAIN: ICD-10-CM

## 2024-07-24 DIAGNOSIS — F11.90 OPIOID USE DISORDER: ICD-10-CM

## 2024-07-24 DIAGNOSIS — Z85.3 PERSONAL HISTORY OF MALIGNANT NEOPLASM OF BREAST: ICD-10-CM

## 2024-07-24 DIAGNOSIS — C50.412 MALIGNANT NEOPLASM OF UPPER-OUTER QUADRANT OF LEFT BREAST IN FEMALE, ESTROGEN RECEPTOR POSITIVE (H): ICD-10-CM

## 2024-07-24 DIAGNOSIS — Z17.0 MALIGNANT NEOPLASM OF UPPER-OUTER QUADRANT OF LEFT BREAST IN FEMALE, ESTROGEN RECEPTOR POSITIVE (H): ICD-10-CM

## 2024-07-24 DIAGNOSIS — Z17.0 MALIGNANT NEOPLASM OF UPPER-OUTER QUADRANT OF LEFT BREAST IN FEMALE, ESTROGEN RECEPTOR POSITIVE (H): Primary | ICD-10-CM

## 2024-07-24 DIAGNOSIS — C50.412 MALIGNANT NEOPLASM OF UPPER-OUTER QUADRANT OF LEFT BREAST IN FEMALE, ESTROGEN RECEPTOR POSITIVE (H): Primary | ICD-10-CM

## 2024-07-24 DIAGNOSIS — N64.4 BREAST PAIN: ICD-10-CM

## 2024-07-24 LAB — GLUCOSE BLDC GLUCOMTR-MCNC: 298 MG/DL (ref 70–99)

## 2024-07-24 PROCEDURE — 38525 BIOPSY/REMOVAL LYMPH NODES: CPT | Mod: LT | Performed by: SURGERY

## 2024-07-24 PROCEDURE — 999N000141 HC STATISTIC PRE-PROCEDURE NURSING ASSESSMENT: Performed by: SURGERY

## 2024-07-24 PROCEDURE — 99207 NM LYMPHOSCINTIGRAPHY INJECTION ONLY: CPT | Performed by: RADIOLOGY

## 2024-07-24 PROCEDURE — 250N000013 HC RX MED GY IP 250 OP 250 PS 637: Performed by: STUDENT IN AN ORGANIZED HEALTH CARE EDUCATION/TRAINING PROGRAM

## 2024-07-24 PROCEDURE — 250N000009 HC RX 250: Mod: JZ | Performed by: STUDENT IN AN ORGANIZED HEALTH CARE EDUCATION/TRAINING PROGRAM

## 2024-07-24 PROCEDURE — 370N000017 HC ANESTHESIA TECHNICAL FEE, PER MIN: Performed by: SURGERY

## 2024-07-24 PROCEDURE — 360N000082 HC SURGERY LEVEL 2 W/ FLUORO, PER MIN: Performed by: SURGERY

## 2024-07-24 PROCEDURE — 250N000011 HC RX IP 250 OP 636: Performed by: ANESTHESIOLOGY

## 2024-07-24 PROCEDURE — 19301 PARTIAL MASTECTOMY: CPT | Performed by: ANESTHESIOLOGY

## 2024-07-24 PROCEDURE — 250N000011 HC RX IP 250 OP 636: Performed by: SURGERY

## 2024-07-24 PROCEDURE — 343N000001 HC RX 343: Performed by: SURGERY

## 2024-07-24 PROCEDURE — 250N000009 HC RX 250: Performed by: ANESTHESIOLOGY

## 2024-07-24 PROCEDURE — 38900 IO MAP OF SENT LYMPH NODE: CPT | Mod: LT | Performed by: SURGERY

## 2024-07-24 PROCEDURE — 710N000010 HC RECOVERY PHASE 1, LEVEL 2, PER MIN: Performed by: SURGERY

## 2024-07-24 PROCEDURE — 88300 SURGICAL PATH GROSS: CPT | Mod: TC | Performed by: SURGERY

## 2024-07-24 PROCEDURE — 36590 REMOVAL TUNNELED CV CATH: CPT | Mod: RT | Performed by: SURGERY

## 2024-07-24 PROCEDURE — 88300 SURGICAL PATH GROSS: CPT | Mod: 26 | Performed by: PATHOLOGY

## 2024-07-24 PROCEDURE — 19301 PARTIAL MASTECTOMY: CPT

## 2024-07-24 PROCEDURE — A9520 TC99 TILMANOCEPT DIAG 0.5MCI: HCPCS | Performed by: SURGERY

## 2024-07-24 PROCEDURE — 82962 GLUCOSE BLOOD TEST: CPT

## 2024-07-24 PROCEDURE — 88307 TISSUE EXAM BY PATHOLOGIST: CPT | Mod: 26 | Performed by: PATHOLOGY

## 2024-07-24 PROCEDURE — 250N000009 HC RX 250: Performed by: SURGERY

## 2024-07-24 PROCEDURE — 250N000013 HC RX MED GY IP 250 OP 250 PS 637: Performed by: SURGERY

## 2024-07-24 PROCEDURE — 38792 RA TRACER ID OF SENTINL NODE: CPT

## 2024-07-24 PROCEDURE — 710N000012 HC RECOVERY PHASE 2, PER MINUTE: Performed by: SURGERY

## 2024-07-24 PROCEDURE — 250N000011 HC RX IP 250 OP 636: Performed by: STUDENT IN AN ORGANIZED HEALTH CARE EDUCATION/TRAINING PROGRAM

## 2024-07-24 PROCEDURE — 19301 PARTIAL MASTECTOMY: CPT | Mod: LT | Performed by: SURGERY

## 2024-07-24 PROCEDURE — 272N000002 HC OR SUPPLY OTHER OPNP: Performed by: SURGERY

## 2024-07-24 PROCEDURE — 76098 X-RAY EXAM SURGICAL SPECIMEN: CPT | Mod: 76 | Performed by: RADIOLOGY

## 2024-07-24 PROCEDURE — 250N000013 HC RX MED GY IP 250 OP 250 PS 637: Performed by: ANESTHESIOLOGY

## 2024-07-24 PROCEDURE — 250N000011 HC RX IP 250 OP 636

## 2024-07-24 PROCEDURE — 88377 M/PHMTRC ALYS ISHQUANT/SEMIQ: CPT | Performed by: SURGERY

## 2024-07-24 PROCEDURE — 76098 X-RAY EXAM SURGICAL SPECIMEN: CPT | Performed by: RADIOLOGY

## 2024-07-24 PROCEDURE — 258N000003 HC RX IP 258 OP 636: Performed by: ANESTHESIOLOGY

## 2024-07-24 PROCEDURE — 272N000001 HC OR GENERAL SUPPLY STERILE: Performed by: SURGERY

## 2024-07-24 PROCEDURE — 250N000012 HC RX MED GY IP 250 OP 636 PS 637: Performed by: SURGERY

## 2024-07-24 RX ORDER — NALOXONE HYDROCHLORIDE 0.4 MG/ML
0.4 INJECTION, SOLUTION INTRAMUSCULAR; INTRAVENOUS; SUBCUTANEOUS
Status: DISCONTINUED | OUTPATIENT
Start: 2024-07-24 | End: 2024-07-24 | Stop reason: HOSPADM

## 2024-07-24 RX ORDER — DEXAMETHASONE SODIUM PHOSPHATE 4 MG/ML
4 INJECTION, SOLUTION INTRA-ARTICULAR; INTRALESIONAL; INTRAMUSCULAR; INTRAVENOUS; SOFT TISSUE
Status: DISCONTINUED | OUTPATIENT
Start: 2024-07-24 | End: 2024-07-24 | Stop reason: HOSPADM

## 2024-07-24 RX ORDER — ACETAMINOPHEN 500 MG
1000 TABLET ORAL EVERY 8 HOURS PRN
COMMUNITY
Start: 2024-07-24

## 2024-07-24 RX ORDER — OXYCODONE HYDROCHLORIDE 10 MG/1
10 TABLET ORAL
Status: CANCELLED | OUTPATIENT
Start: 2024-07-24

## 2024-07-24 RX ORDER — DEXAMETHASONE SODIUM PHOSPHATE 10 MG/ML
INJECTION, SOLUTION INTRAMUSCULAR; INTRAVENOUS
Status: COMPLETED | OUTPATIENT
Start: 2024-07-24 | End: 2024-07-24

## 2024-07-24 RX ORDER — FENTANYL CITRATE 50 UG/ML
INJECTION, SOLUTION INTRAMUSCULAR; INTRAVENOUS PRN
Status: DISCONTINUED | OUTPATIENT
Start: 2024-07-24 | End: 2024-07-24

## 2024-07-24 RX ORDER — DEXAMETHASONE SODIUM PHOSPHATE 4 MG/ML
4 INJECTION, SOLUTION INTRA-ARTICULAR; INTRALESIONAL; INTRAMUSCULAR; INTRAVENOUS; SOFT TISSUE
Status: CANCELLED | OUTPATIENT
Start: 2024-07-24

## 2024-07-24 RX ORDER — HYDROMORPHONE HCL IN WATER/PF 6 MG/30 ML
0.2 PATIENT CONTROLLED ANALGESIA SYRINGE INTRAVENOUS EVERY 5 MIN PRN
Status: DISCONTINUED | OUTPATIENT
Start: 2024-07-24 | End: 2024-07-24 | Stop reason: HOSPADM

## 2024-07-24 RX ORDER — DEXMEDETOMIDINE HYDROCHLORIDE 4 UG/ML
INJECTION, SOLUTION INTRAVENOUS
Status: COMPLETED | OUTPATIENT
Start: 2024-07-24 | End: 2024-07-24

## 2024-07-24 RX ORDER — PROPOFOL 10 MG/ML
INJECTION, EMULSION INTRAVENOUS PRN
Status: DISCONTINUED | OUTPATIENT
Start: 2024-07-24 | End: 2024-07-24

## 2024-07-24 RX ORDER — KETAMINE HYDROCHLORIDE 10 MG/ML
INJECTION INTRAMUSCULAR; INTRAVENOUS PRN
Status: DISCONTINUED | OUTPATIENT
Start: 2024-07-24 | End: 2024-07-24

## 2024-07-24 RX ORDER — NALOXONE HYDROCHLORIDE 0.4 MG/ML
0.1 INJECTION, SOLUTION INTRAMUSCULAR; INTRAVENOUS; SUBCUTANEOUS
Status: DISCONTINUED | OUTPATIENT
Start: 2024-07-24 | End: 2024-07-24 | Stop reason: HOSPADM

## 2024-07-24 RX ORDER — BUPIVACAINE HYDROCHLORIDE 2.5 MG/ML
INJECTION, SOLUTION EPIDURAL; INFILTRATION; INTRACAUDAL
Status: COMPLETED | OUTPATIENT
Start: 2024-07-24 | End: 2024-07-24

## 2024-07-24 RX ORDER — ONDANSETRON 4 MG/1
4 TABLET, ORALLY DISINTEGRATING ORAL EVERY 30 MIN PRN
Status: DISCONTINUED | OUTPATIENT
Start: 2024-07-24 | End: 2024-07-24 | Stop reason: HOSPADM

## 2024-07-24 RX ORDER — FENTANYL CITRATE 50 UG/ML
25 INJECTION, SOLUTION INTRAMUSCULAR; INTRAVENOUS EVERY 5 MIN PRN
Status: DISCONTINUED | OUTPATIENT
Start: 2024-07-24 | End: 2024-07-24 | Stop reason: HOSPADM

## 2024-07-24 RX ORDER — NALOXONE HYDROCHLORIDE 0.4 MG/ML
0.2 INJECTION, SOLUTION INTRAMUSCULAR; INTRAVENOUS; SUBCUTANEOUS
Status: DISCONTINUED | OUTPATIENT
Start: 2024-07-24 | End: 2024-07-24 | Stop reason: HOSPADM

## 2024-07-24 RX ORDER — CEFAZOLIN SODIUM/WATER 3 G/30 ML
3 SYRINGE (ML) INTRAVENOUS SEE ADMIN INSTRUCTIONS
Status: DISCONTINUED | OUTPATIENT
Start: 2024-07-24 | End: 2024-07-24 | Stop reason: HOSPADM

## 2024-07-24 RX ORDER — PROPOFOL 10 MG/ML
INJECTION, EMULSION INTRAVENOUS CONTINUOUS PRN
Status: DISCONTINUED | OUTPATIENT
Start: 2024-07-24 | End: 2024-07-24

## 2024-07-24 RX ORDER — ONDANSETRON 4 MG/1
4 TABLET, ORALLY DISINTEGRATING ORAL
Status: DISCONTINUED | OUTPATIENT
Start: 2024-07-24 | End: 2024-07-24 | Stop reason: HOSPADM

## 2024-07-24 RX ORDER — OXYCODONE HYDROCHLORIDE 5 MG/1
5 TABLET ORAL
Status: CANCELLED | OUTPATIENT
Start: 2024-07-24

## 2024-07-24 RX ORDER — SODIUM CHLORIDE, SODIUM LACTATE, POTASSIUM CHLORIDE, CALCIUM CHLORIDE 600; 310; 30; 20 MG/100ML; MG/100ML; MG/100ML; MG/100ML
INJECTION, SOLUTION INTRAVENOUS CONTINUOUS PRN
Status: DISCONTINUED | OUTPATIENT
Start: 2024-07-24 | End: 2024-07-24

## 2024-07-24 RX ORDER — ONDANSETRON 2 MG/ML
4 INJECTION INTRAMUSCULAR; INTRAVENOUS EVERY 30 MIN PRN
Status: DISCONTINUED | OUTPATIENT
Start: 2024-07-24 | End: 2024-07-24 | Stop reason: HOSPADM

## 2024-07-24 RX ORDER — DEXAMETHASONE SODIUM PHOSPHATE 4 MG/ML
INJECTION, SOLUTION INTRA-ARTICULAR; INTRALESIONAL; INTRAMUSCULAR; INTRAVENOUS; SOFT TISSUE PRN
Status: DISCONTINUED | OUTPATIENT
Start: 2024-07-24 | End: 2024-07-24

## 2024-07-24 RX ORDER — METHOCARBAMOL 750 MG/1
750 TABLET, FILM COATED ORAL
Status: COMPLETED | OUTPATIENT
Start: 2024-07-24 | End: 2024-07-24

## 2024-07-24 RX ORDER — OXYCODONE HYDROCHLORIDE 5 MG/1
5 TABLET ORAL
Status: DISCONTINUED | OUTPATIENT
Start: 2024-07-24 | End: 2024-07-24 | Stop reason: HOSPADM

## 2024-07-24 RX ORDER — ACETAMINOPHEN 325 MG/1
975 TABLET ORAL ONCE
Status: COMPLETED | OUTPATIENT
Start: 2024-07-24 | End: 2024-07-24

## 2024-07-24 RX ORDER — BUPRENORPHINE AND NALOXONE 8; 2 MG/1; MG/1
1 FILM, SOLUBLE BUCCAL; SUBLINGUAL EVERY 6 HOURS
Qty: 120 FILM | Refills: 0 | Status: SHIPPED | OUTPATIENT
Start: 2024-07-24 | End: 2024-08-21

## 2024-07-24 RX ORDER — HYDROMORPHONE HCL IN WATER/PF 6 MG/30 ML
0.4 PATIENT CONTROLLED ANALGESIA SYRINGE INTRAVENOUS EVERY 5 MIN PRN
Status: DISCONTINUED | OUTPATIENT
Start: 2024-07-24 | End: 2024-07-24 | Stop reason: HOSPADM

## 2024-07-24 RX ORDER — SODIUM CHLORIDE, SODIUM LACTATE, POTASSIUM CHLORIDE, CALCIUM CHLORIDE 600; 310; 30; 20 MG/100ML; MG/100ML; MG/100ML; MG/100ML
INJECTION, SOLUTION INTRAVENOUS CONTINUOUS
Status: DISCONTINUED | OUTPATIENT
Start: 2024-07-24 | End: 2024-07-24 | Stop reason: HOSPADM

## 2024-07-24 RX ORDER — HYDROXYZINE HYDROCHLORIDE 10 MG/1
10 TABLET, FILM COATED ORAL
Status: DISCONTINUED | OUTPATIENT
Start: 2024-07-24 | End: 2024-07-24 | Stop reason: HOSPADM

## 2024-07-24 RX ORDER — BUPIVACAINE HYDROCHLORIDE 2.5 MG/ML
INJECTION, SOLUTION INFILTRATION; PERINEURAL PRN
Status: DISCONTINUED | OUTPATIENT
Start: 2024-07-24 | End: 2024-07-24 | Stop reason: HOSPADM

## 2024-07-24 RX ORDER — FLUMAZENIL 0.1 MG/ML
0.2 INJECTION, SOLUTION INTRAVENOUS
Status: DISCONTINUED | OUTPATIENT
Start: 2024-07-24 | End: 2024-07-24 | Stop reason: HOSPADM

## 2024-07-24 RX ORDER — CEFAZOLIN SODIUM/WATER 3 G/30 ML
3 SYRINGE (ML) INTRAVENOUS
Status: COMPLETED | OUTPATIENT
Start: 2024-07-24 | End: 2024-07-24

## 2024-07-24 RX ORDER — OXYCODONE HYDROCHLORIDE 10 MG/1
10 TABLET ORAL ONCE
Status: COMPLETED | OUTPATIENT
Start: 2024-07-24 | End: 2024-07-24

## 2024-07-24 RX ORDER — NALOXONE HYDROCHLORIDE 0.4 MG/ML
0.1 INJECTION, SOLUTION INTRAMUSCULAR; INTRAVENOUS; SUBCUTANEOUS
Status: CANCELLED | OUTPATIENT
Start: 2024-07-24

## 2024-07-24 RX ORDER — ACETAMINOPHEN 325 MG/1
650 TABLET ORAL
Status: DISCONTINUED | OUTPATIENT
Start: 2024-07-24 | End: 2024-07-24 | Stop reason: HOSPADM

## 2024-07-24 RX ORDER — FENTANYL CITRATE 50 UG/ML
25-50 INJECTION, SOLUTION INTRAMUSCULAR; INTRAVENOUS
Status: DISCONTINUED | OUTPATIENT
Start: 2024-07-24 | End: 2024-07-24 | Stop reason: HOSPADM

## 2024-07-24 RX ORDER — ONDANSETRON 4 MG/1
4 TABLET, ORALLY DISINTEGRATING ORAL EVERY 30 MIN PRN
Status: CANCELLED | OUTPATIENT
Start: 2024-07-24

## 2024-07-24 RX ORDER — LIDOCAINE HYDROCHLORIDE 20 MG/ML
INJECTION, SOLUTION INFILTRATION; PERINEURAL PRN
Status: DISCONTINUED | OUTPATIENT
Start: 2024-07-24 | End: 2024-07-24

## 2024-07-24 RX ORDER — FENTANYL CITRATE 50 UG/ML
50 INJECTION, SOLUTION INTRAMUSCULAR; INTRAVENOUS EVERY 5 MIN PRN
Status: DISCONTINUED | OUTPATIENT
Start: 2024-07-24 | End: 2024-07-24 | Stop reason: HOSPADM

## 2024-07-24 RX ORDER — ONDANSETRON 2 MG/ML
4 INJECTION INTRAMUSCULAR; INTRAVENOUS EVERY 30 MIN PRN
Status: CANCELLED | OUTPATIENT
Start: 2024-07-24

## 2024-07-24 RX ORDER — APREPITANT 40 MG/1
40 CAPSULE ORAL ONCE
Status: COMPLETED | OUTPATIENT
Start: 2024-07-24 | End: 2024-07-24

## 2024-07-24 RX ORDER — ISOSULFAN BLUE 50 MG/5ML
INJECTION, SOLUTION SUBCUTANEOUS PRN
Status: DISCONTINUED | OUTPATIENT
Start: 2024-07-24 | End: 2024-07-24 | Stop reason: HOSPADM

## 2024-07-24 RX ORDER — IBUPROFEN 200 MG
400 TABLET ORAL ONCE
Status: COMPLETED | OUTPATIENT
Start: 2024-07-24 | End: 2024-07-24

## 2024-07-24 RX ORDER — ALBUTEROL SULFATE 90 UG/1
AEROSOL, METERED RESPIRATORY (INHALATION) PRN
Status: DISCONTINUED | OUTPATIENT
Start: 2024-07-24 | End: 2024-07-24

## 2024-07-24 RX ADMIN — DEXAMETHASONE SODIUM PHOSPHATE 4 MG: 4 INJECTION, SOLUTION INTRA-ARTICULAR; INTRALESIONAL; INTRAMUSCULAR; INTRAVENOUS; SOFT TISSUE at 08:23

## 2024-07-24 RX ADMIN — Medication 20 MG: at 09:00

## 2024-07-24 RX ADMIN — MIDAZOLAM 1 MG: 1 INJECTION INTRAMUSCULAR; INTRAVENOUS at 06:56

## 2024-07-24 RX ADMIN — BUPIVACAINE HYDROCHLORIDE 30 ML: 2.5 INJECTION, SOLUTION EPIDURAL; INFILTRATION; INTRACAUDAL; PERINEURAL at 06:50

## 2024-07-24 RX ADMIN — Medication 20 MG: at 09:39

## 2024-07-24 RX ADMIN — OXYCODONE HYDROCHLORIDE 10 MG: 10 TABLET ORAL at 11:45

## 2024-07-24 RX ADMIN — DEXMEDETOMIDINE 20 MCG: 100 INJECTION, SOLUTION INTRAVENOUS at 06:50

## 2024-07-24 RX ADMIN — FENTANYL CITRATE 50 MCG: 50 INJECTION, SOLUTION INTRAMUSCULAR; INTRAVENOUS at 11:04

## 2024-07-24 RX ADMIN — MIDAZOLAM 1 MG: 1 INJECTION INTRAMUSCULAR; INTRAVENOUS at 07:00

## 2024-07-24 RX ADMIN — ACETAMINOPHEN 975 MG: 325 TABLET, FILM COATED ORAL at 06:03

## 2024-07-24 RX ADMIN — FENTANYL CITRATE 50 MCG: 50 INJECTION INTRAMUSCULAR; INTRAVENOUS at 10:05

## 2024-07-24 RX ADMIN — FENTANYL CITRATE 100 MCG: 50 INJECTION INTRAMUSCULAR; INTRAVENOUS at 10:28

## 2024-07-24 RX ADMIN — FENTANYL CITRATE 50 MCG: 50 INJECTION INTRAMUSCULAR; INTRAVENOUS at 10:17

## 2024-07-24 RX ADMIN — Medication 50 MG: at 08:08

## 2024-07-24 RX ADMIN — FENTANYL CITRATE 50 MCG: 50 INJECTION INTRAMUSCULAR; INTRAVENOUS at 09:13

## 2024-07-24 RX ADMIN — FENTANYL CITRATE 50 MCG: 50 INJECTION INTRAMUSCULAR; INTRAVENOUS at 08:51

## 2024-07-24 RX ADMIN — APREPITANT 40 MG: 40 CAPSULE ORAL at 06:03

## 2024-07-24 RX ADMIN — SUGAMMADEX 50 MG: 100 INJECTION, SOLUTION INTRAVENOUS at 10:09

## 2024-07-24 RX ADMIN — DEXAMETHASONE SODIUM PHOSPHATE 20 MG: 10 INJECTION, SOLUTION INTRAMUSCULAR; INTRAVENOUS at 06:50

## 2024-07-24 RX ADMIN — SODIUM CHLORIDE, POTASSIUM CHLORIDE, SODIUM LACTATE AND CALCIUM CHLORIDE: 600; 310; 30; 20 INJECTION, SOLUTION INTRAVENOUS at 07:56

## 2024-07-24 RX ADMIN — LIDOCAINE HYDROCHLORIDE 100 MG: 20 INJECTION, SOLUTION INFILTRATION; PERINEURAL at 08:08

## 2024-07-24 RX ADMIN — Medication 20 MG: at 09:33

## 2024-07-24 RX ADMIN — PHENYLEPHRINE HYDROCHLORIDE 100 MCG: 10 INJECTION INTRAVENOUS at 08:08

## 2024-07-24 RX ADMIN — MIDAZOLAM 2 MG: 1 INJECTION INTRAMUSCULAR; INTRAVENOUS at 07:56

## 2024-07-24 RX ADMIN — FENTANYL CITRATE 100 MCG: 50 INJECTION INTRAMUSCULAR; INTRAVENOUS at 08:08

## 2024-07-24 RX ADMIN — Medication 20 MG: at 09:13

## 2024-07-24 RX ADMIN — METHOCARBAMOL 750 MG: 500 TABLET ORAL at 11:45

## 2024-07-24 RX ADMIN — FENTANYL CITRATE 50 MCG: 50 INJECTION, SOLUTION INTRAMUSCULAR; INTRAVENOUS at 06:56

## 2024-07-24 RX ADMIN — PROPOFOL 150 MCG/KG/MIN: 10 INJECTION, EMULSION INTRAVENOUS at 08:08

## 2024-07-24 RX ADMIN — PROPOFOL 30 MG: 10 INJECTION, EMULSION INTRAVENOUS at 10:10

## 2024-07-24 RX ADMIN — ALBUTEROL SULFATE 8 PUFF: 108 INHALANT RESPIRATORY (INHALATION) at 09:22

## 2024-07-24 RX ADMIN — IBUPROFEN 400 MG: 200 TABLET, FILM COATED ORAL at 11:45

## 2024-07-24 RX ADMIN — PROPOFOL 30 MG: 10 INJECTION, EMULSION INTRAVENOUS at 09:13

## 2024-07-24 RX ADMIN — ALBUTEROL SULFATE 4 PUFF: 108 INHALANT RESPIRATORY (INHALATION) at 10:09

## 2024-07-24 RX ADMIN — PROPOFOL 120 MG: 10 INJECTION, EMULSION INTRAVENOUS at 08:08

## 2024-07-24 RX ADMIN — Medication 30 MG: at 08:34

## 2024-07-24 RX ADMIN — Medication 30 MG: at 08:37

## 2024-07-24 RX ADMIN — Medication 3 G: at 08:15

## 2024-07-24 ASSESSMENT — ACTIVITIES OF DAILY LIVING (ADL)
ADLS_ACUITY_SCORE: 29

## 2024-07-24 NOTE — DISCHARGE INSTRUCTIONS
Contacting your Doctor -   To contact a doctor, call Dr[facility] @ [phone]  (option 5, then option 2 ) or [facility] at [phone]. Or Call Nichelle BREEN--[phone]  or:  [phone] and ask for the resident on call for Surgical Oncology (answered 24 hours a day)   Emergency Department:  [facility]: [phone]  [facility]: [phone] 911 if you are in need of immediate or emergent help

## 2024-07-24 NOTE — PROGRESS NOTES
Paged Dr Guzman at 0630 regarding: . Pt 2 hour procedure, same day. Do you want insulin given?      Per Dr Guzman, do not give any corrective medication. Pt can correct once at home.

## 2024-07-24 NOTE — ANESTHESIA POSTPROCEDURE EVALUATION
"Patient: Eliezer Izquierdo    Procedure: Procedure(s):  LEFT Radiofrequency Identification Seed-Localized Segmental Mastectomy (=\"Lumpectomy\"), LEFT Radiofrequency Identification Seed-Localized Axillary [place] Lymph Node Biopsy  RIGHT vascular access port removal       Anesthesia Type:  General    Note:  Disposition: Outpatient   Postop Pain Control: Uneventful            Sign Out: Well controlled pain   PONV:    Neuro/Psych: Uneventful            Sign Out: Acceptable/Baseline neuro status   Airway/Respiratory: Uneventful            Sign Out: Acceptable/Baseline resp. status   CV/Hemodynamics: Uneventful            Sign Out: Acceptable CV status; No obvious hypovolemia; No obvious fluid overload   Other NRE:    DID A NON-ROUTINE EVENT OCCUR?            Last vitals:  Vitals Value Taken Time   /74 07/24/24 1037   Temp 37.2  C (98.9  F) 07/24/24 1030   Pulse 105 07/24/24 1041   Resp 17 07/24/24 1041   SpO2 95 % 07/24/24 1041   Vitals shown include unfiled device data.    Electronically Signed By: Iram Guzman MD  July 24, 2024  10:42 AM  "

## 2024-07-24 NOTE — ANESTHESIA CARE TRANSFER NOTE
"  Patient: Eliezer Izquierdo    Procedure: Procedure(s):  LEFT Radiofrequency Identification Seed-Localized Segmental Mastectomy (=\"Lumpectomy\"), LEFT Radiofrequency Identification Seed-Localized Axillary [place] Lymph Node Biopsy  RIGHT vascular access port removal       Diagnosis: Malignant neoplasm of female breast, unspecified estrogen receptor status, unspecified laterality, unspecified site of breast (H) [C50.919]  Diagnosis Additional Information: No value filed.    Anesthesia Type:   General     Note:    Oropharynx: oropharynx clear of all foreign objects and spontaneously breathing  Level of Consciousness: awake  Oxygen Supplementation: face mask  Level of Supplemental Oxygen (L/min / FiO2): 6  Independent Airway: airway patency satisfactory and stable  Dentition: dentition unchanged  Vital Signs Stable: post-procedure vital signs reviewed and stable  Report to RN Given: handoff report given  Patient transferred to: PACU    Handoff Report: Identifed the Patient, Identified the Reponsible Provider, Reviewed the pertinent medical history, Discussed the surgical course, Reviewed Intra-OP anesthesia mangement and issues during anesthesia, Set expectations for post-procedure period and Allowed opportunity for questions and acknowledgement of understanding  Vitals:  Vitals Value Taken Time   /74 07/24/24 1037   Temp     Pulse 106 07/24/24 1039   Resp 18 07/24/24 1039   SpO2 96 % 07/24/24 1039   Vitals shown include unfiled device data.    Electronically Signed By: JORGE SHAH CRNA  July 24, 2024  10:40 AM  "

## 2024-07-24 NOTE — ANESTHESIA PROCEDURE NOTES
Pectoralis Procedure Note    Pre-Procedure   Staff -        Anesthesiologist:  Kingsley Natarajan MD       Resident/Fellow: Leo Pickett MD       Performed By: resident       Location: pre-op       Procedure Start/Stop Times: 7/24/2024 6:50 AM and 7/24/2024 7:00 AM       Pre-Anesthestic Checklist: patient identified, IV checked, site marked, risks and benefits discussed, informed consent, monitors and equipment checked, pre-op evaluation, at physician/surgeon's request and post-op pain management  Timeout:       Correct Patient: Yes        Correct Procedure: Yes        Correct Site: Yes        Correct Position: Yes        Correct Laterality: Yes   Procedure Documentation  Procedure: Pectoralis             Pectoralis I and Pectoralis II       Laterality: left       Patient Position: sitting       Patient Prep/Sterile Barriers: sterile gloves, mask       Skin prep: Chloraprep       Needle Type: short bevel       Needle Gauge: 21.        Needle Length (millimeters): 110        Ultrasound guided       1. Ultrasound was used to identify targeted nerve, plexus, vascular marker, or fascial plane and place a needle adjacent to it in real-time.       2. Ultrasound was used to visualize the spread of anesthetic in close proximity to the above referenced structure.       3. A permanent image is entered into the patient's record.       4. The visualized anatomic structures appeared normal.       5. There were no apparent abnormal pathologic findings.    Assessment/Narrative         The placement was negative for: blood aspirated, painful injection and site bleeding       Paresthesias: No.       Bolus given via needle..        Secured via.        Insertion/Infusion Method: Single Shot       Complications: none    Medication(s) Administered   Bupivacaine 0.25% PF (Infiltration) - Infiltration   30 mL - 7/24/2024 6:50:00 AM  Dexamethasone 10 mg/mL PF (Perineural) - Perineural   20 mg - 7/24/2024 6:50:00  "AM  Dexmedetomidine 4 mcg/mL (Perineural) - Perineural   20 mcg - 7/24/2024 6:50:00 AM  Medication Administration Time: 7/24/2024 6:50 AM      FOR [facility] ONLY:   Pain Team Contact information: please page the Pain Team [address] [place]. Search \"Pain\". During daytime hours, please page the attending first. At night please page the resident first.      "

## 2024-07-24 NOTE — TELEPHONE ENCOUNTER
Received telephone call from patient requesting refill an increase in suboxone. She feels this medication has been the most beneficial for managing her pain and she'd like to eventually wean off the Norco. Dr. Rodriguez will increase her dose to 8 mg Q 6 hr.    Last refill: 7/2/24  Last office visit: 7/2/24  Scheduled for follow up 8/13/24     Will route request to MD for review.     Reviewed [place]  Report.

## 2024-07-24 NOTE — PROGRESS NOTES
Talked to Dr Ford regarding penicillin allergy and ancef being ordered.     Dr Ford advised to continue with ancef. No change in antibiotics needed.

## 2024-07-24 NOTE — OP NOTE
PATIENT NAME:  Eliezer Izquierdo  PATIENT YOB: 1985    DATE OF SURGERY: July 24, 2024     SURGEON: Albina Ford MD  ASSISTANT(S): Gerardo Jacobson MD PGY-5    PREOPERATIVE DIAGNOSIS: Left breast cancer, upper outer quadrant  POSTOPERATIVE DIAGNOSIS: same    PROCEDURE(S):   Right vascular access port removal  Left RFID seed-localized segmental mastectomy   Left RFID seed-localized axillary sentinel lymph node mapping and biopsy     ANESTHESIA: General + Block    CLINICAL NOTE:  Eliezer Izquierdo is a 39 year old woman with left breast cancer, s/p neoadjuvant systemic therapy.  The risks and benefits of a port removal, left RFID seed-localized segmental mastectomy and axillary farhan staging were discussed with the patient and she elected to proceed with informed consent.    OPERATIVE FINDINGS:  1. Port was intact.  2. Breast specimen radiograph confirmed presence of the RFID seed, the mass and biopsy clip within the specimen.   3. Axillary specimen radiograph confirmed presence of the biopsy clip within the specimen. The RFID seed was also radiographed.    OPERATIVE PROCEDURE:  The radiofrequency identification (RFID) seed localization images were personally reviewed by me prior to the procedure. The patient was brought to the operating room and placed in the supine position with appropriate padding for all of the pressure points.  A general anesthetic was administered by the Anesthesia team.  A surgical safety checklist was performed to confirm the patient's identity, the site and laterality of the procedure. Perioperative antibiotics (Ancef) was provided.  VTE prophylaxis was provided with serial compression devices. 0.5 umer Curie of technetium-labelled Tilmanocept was injected into the left retroareolar space. 3 mL of lymphazurin was injected into the left  subareolar space and the left  breast was massaged for 5 minutes.   The bilateral  breast and axilla were then prepped and draped in the usual  sterile fashion using Chloraprep.     We began on the right. An incision was made at the site of the port insertion.  The port was dissected out. A counter incision was made at the right neck at the catheter insertion site.  Pressure was placed on the access point in the right internal jugular and the patient was placed in steep Trendelenburg.  The port was removed along with the catheter while Anesthesia administered a Valsalva maneuver.  It was intact and sent to pathology.  The patient was leveled out.  Pressure was held on the internal jugular for 5 minutes.  The wound was irrigated and hemostasis was achieved.  10 mL of 0.25% marcaine without epinephrine was infiltrated into the surgical site.   The incisions were closed in two layers with interrupted 3-0 vicryl and a running subcuticular 4-0 monocryl.  Steristrips and a dressing were applied.     Next we moved to the left.  A curvilinear incision was made in the  lateral aspect  of the left breast.  Superficial skin flaps were raised.  The breast tissue denoted by the localizing RFID seed (ID [id number]) was dissected out, with careful attention paid to resect this area with a grossly normal margin of surrounding breast tissue. The specimen was inked and radiographed.  It was found to contain the RFID seed, biopsy clip, and mass .  The specimen was then sent to pathology. The wound was irrigated and hemostasis was achieved. Hemoclips were placed to suzanne the edges of the cavity: medial, inferior, lateral, superior, and posterior (deep).   Surgicell snow was placed. Deep 3-0 vicryl sutures were placed to reapproximate the breast tissue to avoid a divot.  The incision was closed in two layers with interrupted 3-0 vicryl and a running subcuticular 4-0 monocryl.     An incision was made just below the hair-bearing area of the left axilla.  The clavipectoral fascia was incised to enter the axilla.  The Neoprobe and RFID localizer probe were used to identify the sentinel  lymph nodes.  [place] lymph node #1 was not blue and had an ex vivo count of 15; during the dissection of this node, the RFID seed was dislodged and set aside and radiographed with the specimen.  The specimen was radiographed.  It was found to contain the biopsy clip.  The specimen was then sent to pathology.  [place] lymph node #2 was not blue and had an ex vivo count of 10 but was firm and palpable and adjacent to #1.  [place] lymph node #3 was blue and had an ex vivo count of 3539.  The background count was 97. No other blue or palpable lymph nodes were found.  Thus no additional sentinel lymph nodes were sought.  All of the lymph nodes were sent to pathology individually. The wound was irrigated and hemostasis was achieved.  Surgicell snow was placed. The incision was closed in two layers with interrupted 3-0 vicryl and a running subcuticular 4-0 monocryl.  Steristrips and Primipore dressings were applied.      I was present and scrubbed for the entire above procedure.    Dr. Jacobson actively first assisted during this operation providing necessary retraction and exposure as well as maintaining hemostasis and clear operative field.  This was helpful in allowing the operation to proceed in a safe and expeditious manner.  They were present for the entire duration of the critical portions of the operation.    EBL: 10 mL    SPECIMEN(S):  A. Right vascular access port for gross only  B. Left breast mass  C. Left axillary sentinel lymph node # 1 with RFID seed  D. Left axillary sentinel lymph node # 2  E. Left axillary sentinel lymph node # 3    POSTOPERATIVE PLANS:  Eliezer Izquierdo will be discharged home today with wound care instructions and no prescription for analgesics.  She will follow-up with me in 2 weeks.     COMMISSION ON CANCER SYNOPTIC REPORT:  [place] Node Biopsy for Breast Cancer - Left  Operation performed with curative intent Yes   Tracer(s) used to identify sentinel nodes in the upfront surgery  (non-neoadjuvant) setting N/A   Tracer(s) used to identify sentinel nodes in the neoadjuvant setting Dye and Radioactive tracer   All nodes (colored or non-colored) present at the end of a dye-filled lymphatic channel were removed Yes   All significantly radioactive nodes were removed Yes   All palpably suspicious nodes were removed Yes   Biopsy-proven positive nodes marked with clips prior to chemotherapy were identified and removed Yes     Albina Ford MD MSc [facility] FACS  Associate Professor of Surgery  Division of Surgical Oncology  [facility]

## 2024-07-24 NOTE — ANESTHESIA PREPROCEDURE EVALUATION
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 2024    Two MC1R \"increased risk\" variants - c.451C>T and c.478C>T  [facility] 3/26/2024      Hypercholesteraemia     Irritable bowel     Migraines       Past Surgical History:   Procedure Laterality Date    IR CHEST PORT PLACEMENT > 5 YRS OF AGE  3/11/2024      Allergies   Allergen Reactions    Ubrogepant Muscle Pain (Myalgia)     Other Reaction(s): Chest Pain, Chest Pain    Also caused blisters in mouth    Azithromycin Hives    Doxycycline Hives    Erythromycin Hives    Estrogens     Levofloxacin Hives    Maxalt [Rizatriptan]     Oxycodone-Acetaminophen      Burning mouth and lips with red sore taste buds and throat irritation    PER PATIENT NOT ALLERGIC TO     Penicillins     Propranolol Hcl Headache    Sulfa Antibiotics     Sumatriptan Muscle Pain (Myalgia)    Zofran [Ondansetron] Muscle Pain (Myalgia)    Hydromorphone Anxiety and Itching     Other Reaction(s): Tachycardia    Panic attacks    Ondansetron Hcl Anxiety, Other (See Comments) and Palpitations    Toradol [Ketorolac] Anxiety      Social History     Tobacco Use    Smoking status: Every Day     Types: Cigarettes     Passive exposure: Current    Smokeless tobacco: Never    Tobacco comments:     Smokes about 4 or less cigarette's a day. Trying to quit.   Substance Use Topics    Alcohol use: No      Wt Readings from Last 1 Encounters:   24 121.1 kg (266 lb 15.6 oz)           Physical Exam    Airway  airway exam normal           Respiratory Devices and Support     "     Dental       (+) Completely normal teeth      Cardiovascular   cardiovascular exam normal          Pulmonary   pulmonary exam normal                OUTSIDE LABS:  CBC:   Lab Results   Component Value Date    WBC 8.2 07/23/2024    WBC 7.3 06/21/2024    HGB 15.4 07/23/2024    HGB 11.8 06/21/2024    HCT 45.9 07/23/2024    HCT 34.6 (L) 06/21/2024     07/23/2024     06/21/2024     BMP:   Lab Results   Component Value Date     04/26/2024     04/12/2024    POTASSIUM 3.9 04/26/2024    POTASSIUM 3.5 04/12/2024    CHLORIDE 98 04/26/2024    CHLORIDE 102 04/12/2024    CO2 24 04/26/2024    CO2 23 04/12/2024    BUN 7.7 04/26/2024    BUN 7.0 04/12/2024    CR 0.49 (L) 04/26/2024    CR 0.44 (L) 04/12/2024     (H) 07/24/2024     (H) 04/26/2024     COAGS:   Lab Results   Component Value Date    INR 0.97 06/21/2015     POC:   Lab Results   Component Value Date    HCG Negative 01/01/2015     HEPATIC:   Lab Results   Component Value Date    ALBUMIN 4.2 06/07/2024    PROTTOTAL 7.1 06/07/2024    ALT 34 06/07/2024    AST 39 06/07/2024    ALKPHOS 70 06/07/2024    BILITOTAL 0.3 06/07/2024     OTHER:   Lab Results   Component Value Date    REMI 9.2 04/26/2024    PHOS 4.7 (H) 04/12/2024    MAG 1.8 04/12/2024    LIPASE 71 (L) 01/01/2015    CRP 17.2 (H) 06/21/2015    SED 21 (H) 06/21/2015       Anesthesia Plan    ASA Status:  3       Anesthesia Type: General.     - Airway: ETT      Maintenance: Balanced.   Techniques and Equipment:     - Lines/Monitors: 2nd IV     Consents    Anesthesia Plan(s) and associated risks, benefits, and realistic alternatives discussed. Questions answered and patient/representative(s) expressed understanding.     - Discussed:     - Discussed with:  Patient            Postoperative Care    Pain management: IV analgesics.   PONV prophylaxis: Ondansetron (or other 5HT-3), Dexamethasone or Solumedrol     Comments:               Iram Guzman MD    I have reviewed the pertinent  "notes and labs in the chart from the past 30 days and (re)examined the patient.  Any updates or changes from those notes are reflected in this note.            # Drug Induced Coagulation Defect: home medication list includes an anticoagulant medication   # Obesity: Estimated body mass index is 39.43 kg/m  as calculated from the following:    Height as of this encounter: 1.753 m (5' 9\").    Weight as of this encounter: 121.1 kg (266 lb 15.6 oz).      "

## 2024-07-24 NOTE — ANESTHESIA PROCEDURE NOTES
Airway       Patient location during procedure: OR       Procedure Start/Stop Times: 7/24/2024 8:13 AM  Staff -        Anesthesiologist:  Iram Guzman MD       Resident/Fellow: Lisa Arechiga MD       Performed By: anesthesiologist  Consent for Airway        Urgency: elective  Indications and Patient Condition       Indications for airway management: olinda-procedural       Induction type:intravenous       Mask difficulty assessment: 2 - vent by mask + OA or adjuvant +/- NMBA    Final Airway Details       Final airway type: endotracheal airway       Successful airway: ETT - single  Endotracheal Airway Details        ETT size (mm): 7.0       Cuffed: yes       Cuff volume (mL): 8       Successful intubation technique: direct laryngoscopy       DL Blade Type: MAC 3       Grade View of Cords: 3       Adjucts: stylet       Position: Right       Measured from: gums/teeth       Secured at (cm): 22       Bite block used: None    Post intubation assessment        Placement verified by: capnometry, equal breath sounds and chest rise        Number of attempts at approach: 1       Number of other approaches attempted: 0       Secured with: tape       Ease of procedure: easy       Dentition: Intact and Unchanged    Medication(s) Administered   Medication Administration Time: 7/24/2024 8:13 AM    Additional Comments       Intubated by Fellow, Lisa Arechiga.

## 2024-07-26 ENCOUNTER — PATIENT OUTREACH (OUTPATIENT)
Dept: ONCOLOGY | Facility: CLINIC | Age: 39
End: 2024-07-26
Payer: COMMERCIAL

## 2024-07-26 NOTE — PROGRESS NOTES
[facility]: Surgical Oncology Post-op Call Note                                     Discussion with Patient                                                           Surgery:      Right vascular access port removal  Left RFID seed-localized segmental mastectomy   Left RFID seed-localized axillary sentinel lymph node mapping and biopsy         Surgery date:  7/24/2024     Discharge Date:  7/24/2024    Date of Post-op Call:  7/26/202       Immediate Concerns:     Pain control. Eliezer stated she has been approved for an increase in her narcotic by her Palliative provider and this increase is helping.     Fever:   Denies fever/chills.      Pain:  Pain fairly well controlled with current pain regimen.   Using pain medications as recommended with appropriate relief.   Discussed using medication only as needed. Not scheduled.      Incision:   No concerns, healing well, no redness, drainage or edema reported. Reviewed recommendation for wearing bra 24/7. Eliezer stated it is helpful for her to take breaks from wearing the bra but she does wear it the majority of the time.      Activity:   No difficulty with ADLs reported.   Patient is up independently at home.   Encouraged patient to continue to stay active.        Post op/follow up plans:      Post op appointment scheduled,confirmed date and time with patient. Direct contact number provided for any additional questions or concerns.     Future Appointments   Date Time Provider Department Center   8/8/2024  8:15 AM Albina Ford MD [facility]   8/13/2024  9:20 AM Kylie Rodriguez DO [facility]   8/22/2024  8:00 AM Zachary Nolan MD [facility]         VIKTORIA Garcia  [facility]  Surgical Oncology       Approximately 15 min was spent in conversation with the patient.

## 2024-07-31 ENCOUNTER — MYC REFILL (OUTPATIENT)
Dept: PALLIATIVE CARE | Facility: CLINIC | Age: 39
End: 2024-07-31
Payer: COMMERCIAL

## 2024-07-31 DIAGNOSIS — Z17.0 MALIGNANT NEOPLASM OF UPPER-OUTER QUADRANT OF LEFT BREAST IN FEMALE, ESTROGEN RECEPTOR POSITIVE (H): ICD-10-CM

## 2024-07-31 DIAGNOSIS — C50.412 MALIGNANT NEOPLASM OF UPPER-OUTER QUADRANT OF LEFT BREAST IN FEMALE, ESTROGEN RECEPTOR POSITIVE (H): ICD-10-CM

## 2024-07-31 RX ORDER — LORAZEPAM 1 MG/1
1 TABLET ORAL EVERY 6 HOURS PRN
Qty: 30 TABLET | Refills: 1 | Status: SHIPPED | OUTPATIENT
Start: 2024-07-31 | End: 2024-08-13

## 2024-07-31 RX ORDER — HYDROCODONE BITARTRATE AND ACETAMINOPHEN 10; 325 MG/1; MG/1
.5-1 TABLET ORAL EVERY 4 HOURS PRN
Qty: 84 TABLET | Refills: 0 | Status: SHIPPED | OUTPATIENT
Start: 2024-08-05 | End: 2024-08-13

## 2024-07-31 NOTE — TELEPHONE ENCOUNTER
Received [employer] message from patient requesting refill of Norco and lorazepam.     Last refill: 7/22/24  Last office visit: 7/2/24  Scheduled for follow up 8/13/24     Will route request to MD for review.     Reviewed [place]  Report.

## 2024-08-08 ENCOUNTER — PATIENT OUTREACH (OUTPATIENT)
Dept: ONCOLOGY | Facility: CLINIC | Age: 39
End: 2024-08-08
Payer: COMMERCIAL

## 2024-08-08 ENCOUNTER — ONCOLOGY VISIT (OUTPATIENT)
Dept: ONCOLOGY | Facility: CLINIC | Age: 39
End: 2024-08-08
Attending: SURGERY
Payer: COMMERCIAL

## 2024-08-08 VITALS
TEMPERATURE: 98.2 F | SYSTOLIC BLOOD PRESSURE: 133 MMHG | OXYGEN SATURATION: 94 % | HEART RATE: 106 BPM | WEIGHT: 268.8 LBS | DIASTOLIC BLOOD PRESSURE: 85 MMHG | RESPIRATION RATE: 16 BRPM | BODY MASS INDEX: 39.69 KG/M2

## 2024-08-08 DIAGNOSIS — C50.919 MALIGNANT NEOPLASM OF FEMALE BREAST, UNSPECIFIED ESTROGEN RECEPTOR STATUS, UNSPECIFIED LATERALITY, UNSPECIFIED SITE OF BREAST (H): Primary | ICD-10-CM

## 2024-08-08 PROCEDURE — G0463 HOSPITAL OUTPT CLINIC VISIT: HCPCS | Performed by: SURGERY

## 2024-08-08 PROCEDURE — 99024 POSTOP FOLLOW-UP VISIT: CPT | Performed by: SURGERY

## 2024-08-08 RX ORDER — BLOOD SUGAR DIAGNOSTIC
STRIP MISCELLANEOUS
COMMUNITY
Start: 2024-08-05

## 2024-08-08 RX ORDER — PROCHLORPERAZINE MALEATE 10 MG
TABLET ORAL
COMMUNITY
Start: 2024-06-28 | End: 2024-08-19

## 2024-08-08 ASSESSMENT — PAIN SCALES - GENERAL: PAINLEVEL: MILD PAIN (3)

## 2024-08-08 NOTE — PROGRESS NOTES
New Patient Oncology Nurse Navigator Note     Referring provider: Dr. Albina Ford     Referring Clinic/Organization: [facility]     Referred to (specialty:) Radiation Oncology      Date Referral Received: August 8, 2024     Evaluation for:  C50.919 (ICD-10-CM) - Malignant neoplasm of female breast, unspecified estrogen receptor status, unspecified laterality, unspecified site of breast (H)     Clinical History (per Nurse review of records provided):      7/24/24 Case: [id number]   A.  MEDICAL DEVICE, RIGHT VASCULAR ACCESS PORT, REMOVAL:  -See gross description for details (gross examination only).     B. 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  -Margins are negative; closest margins to invasive carcinoma are posterior at 4 mm and superior at 8 mm; all other margins are at >10 mm; closest uninvolved margin to DCIS is posterior at 4 mm; all other margins are >10 mm from DCIS.  -Calcifications associated with DCIS and invasive carcinoma.  -AJCC pathologic staging is ypT2 N2a(sn).  -See synoptic report.     C.  SENTINEL LYMPH NODE, LEFT AXILLARY #1 WITH RFID, EXCISION:  -METASTATIC BREAST CARCINOMA to three of three lymph nodes, 18 mm in greatest dimension, with 2 mm extranodal extension (3/3).  -Biopsy site changes.     D.  SENTINEL LYMPH NODE, LEFT AXILLARY #2, EXCISION:  -METASTATIC BREAST CARCINOMA to one lymph node, 5 mm in greatest dimension, with no extranodal extension (1/1).     E.  SENTINEL LYMPH NODE, LEFT AXILLARY #3, EXCISION:  -One lymph node, negative for malignancy (0/1).    Eliezer is scheduled for follow up with Dr. Nolan on 8/16.     8/8 1364 - Telephoned and spoke briefly with Natalie. She asked for call back tomorrow and will  expect a call from New Patient Scheduling to schedule rad onc.     8/8 1630 -  received referral, reviewed for appropriate plan, and referral transferred to New Patient Scheduling for completion.    8/16 Alleyson called New Patient Scheduling requesting radiation oncology consult with Dr. Fan at [facility] be moved to provider at [place]. Writer returned call and shared the consensus of breast tumor conference was for her to meet with radiation oncologist at [facility] due to the complexity of her case. She now states she will be canceling her CT scan currently scheduled for 8/22. The CT scan was recommended BEFORE she sees radiation oncology.     Dr. Nolan has ordered PET CT scheduled for 8/29 at 1:00pm. Dr. Fan now states PET results NOT needed prior to radiation consult with her.     8/19 1335- Telephoned and spoke with Kylie. Harryr received referral, reviewed for appropriate plan, and call warm transferred to New Patient Scheduling for completion.        Patient resides in [place], [place].

## 2024-08-08 NOTE — LETTER
8/8/2024      Eliezer Izquierdo  [address] [facility] [address]      Dear Colleague,    Thank you for referring your patient, Eliezer Izquierdo, to the [facility]. Please see a copy of my visit note below.      [facility]  [address] [facility] [place] [address]  Phone: [phone]  Fax: [phone]    PATIENT NAME:  Eliezer Izquierdo  PATIENT YOB: 1985    FOLLOW-UP  Aug 8, 2024    Eliezer Izquierdo is a 39 year old female who returns for her 1st post-operative follow-up visit.     Cancer Staging   Malignant neoplasm of upper-outer quadrant of left breast in female, estrogen receptor positive (H)  Staging form: Breast, AJCC 8th Edition  - Clinical: Stage IB (cT2, cN0, cM0, G1, ER+, [place]Y+, HER2: Equivocal) - Unsigned  - Pathologic: ypT2, pN2a, cM0, G2, ER+, WY+, HER2- - Unsigned      Treatment to date:  Genetic testing - Two heterozygous risk alleles were detected in the MC1R gene; VUS in RB1  Neoadjuvant ddAC (3/15/2024 to 4/26/2024)  Neoadjuvant taxol (5/10/2024 to 6/14/2024)  Left RFID seed-localized segmental mastectomy, left RFID seed-localized axillary sentinel lymph node mapping and biopsy and right vascular access port removal (7/24/2024)    HPI:    She underwent a left RFID seed-localized segmental mastectomy and left RFID seed-localized SLNB with port removal on 7/24/2024.  She is currently 2 week(s) post-op.  Final surgical pathology showed a [id number] invasive ductal carcinoma with uninvolved margins and 4/5 lymph nodes involved.    Since the procedure, she has been doing well. She denies any redness or swelling, or drainage from the incision, or fever/chills.  She has good range of motion and denies any upper extremity swelling.     /85 (BP Location: Right arm, Patient Position: Sitting, Cuff Size: Adult Regular)   Pulse 106   Temp 98.2  F (36.8  C) (Oral)   Resp 16   Wt 121.9 kg (268 lb 12.8  oz)   SpO2 94%   BMI 39.69 kg/m     Physical Exam  Chest:      Comments: Breast incision healing well.  No seroma. No cellulitis or abscess. No breast contour abnormality.  Port site incision healing well.  Lymphadenopathy:      Comments: Axillary incision healing well without seroma, hematoma, or cellulitis.          INVESTIGATIONS:    Surgical Pathology (7/24/2024):  Final Diagnosis   A.  MEDICAL DEVICE, RIGHT VASCULAR ACCESS PORT, REMOVAL:  -See gross description for details (gross examination only).     B. LEFT BREAST, MASS, SEED-LOCALIZED SEGMENTAL MASTECTOMY:  -INVASIVE DUCTAL CARCINOMA, [place] grade 2, 22 mm in greatest dimension.  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  -Margins are negative; closest margins to invasive carcinoma are posterior at 4 mm and superior at 8 mm; all other margins are at >10 mm; closest uninvolved margin to DCIS is posterior at 4 mm; all other margins are >10 mm from DCIS.  -Calcifications associated with DCIS and invasive carcinoma.  -AJCC pathologic staging is ypT2 N2a(sn).  -See synoptic report.     C.  SENTINEL LYMPH NODE, LEFT AXILLARY #1 WITH RFID, EXCISION:  -METASTATIC BREAST CARCINOMA to three of three lymph nodes, 18 mm in greatest dimension, with 2 mm extranodal extension (3/3).  -Biopsy site changes.     D.  SENTINEL LYMPH NODE, LEFT AXILLARY #2, EXCISION:  -METASTATIC BREAST CARCINOMA to one lymph node, 5 mm in greatest dimension, with no extranodal extension (1/1).     E.  SENTINEL LYMPH NODE, LEFT AXILLARY #3, EXCISION:  -One lymph node, negative for malignancy (0/1).       ASSESSMENT:    Eliezer Izquierdo is a 39 year old female with LEFT breast cancer, s/p neoadjuvant systemic therapy and surgery.    She is healing well.  I recommended she start moisturizing and massaging the scar with Vaseline. We discussed continuing to wear the supportive  pauline.    We reviewed the pathology today and a copy of the report was provided. No further breast surgery is indicated. We discussed potentially moving forward with a completion axillary lymph node dissection versus moving on to adjuvant radiation therapy.  We discussed that likely the gross disease has been removed but there may be additional lymph nodes with microscopic involvement.  Because of smoking and diabetes, she is at increased risk of lymphedema.  We also discussed that adjuvant radiation therapy would still be recommended following an axillary dissection.  Given this, I will discuss further with our radiation oncologist and medical oncologist, but I am leaning away from recommending further axillary surgery.    All of the above was discussed with the patient and all questions were answered.     PLAN:  Discuss with radiation oncology and medical oncology regarding axilla  Follow up with me EBER Ford MD MS [facility] FACS  Associate Professor of Surgery  Division of Surgical Oncology  [facility]       Again, thank you for allowing me to participate in the care of your patient.        Sincerely,        Albina Ford MD

## 2024-08-08 NOTE — NURSING NOTE
"Oncology Rooming Note    August 8, 2024 8:16 AM   Eliezer Izquierdo is a 39 year old female who presents for:    No chief complaint on file.    Initial Vitals: /85 (BP Location: Right arm, Patient Position: Sitting, Cuff Size: Adult Regular)   Pulse 106   Temp 98.2  F (36.8  C) (Oral)   Resp 16   Wt 121.9 kg (268 lb 12.8 oz)   SpO2 94%   BMI 39.69 kg/m   Estimated body mass index is 39.69 kg/m  as calculated from the following:    Height as of 7/24/24: 1.753 m (5' 9\").    Weight as of this encounter: 121.9 kg (268 lb 12.8 oz). Body surface area is 2.44 meters squared.  Mild Pain (3) Comment: Data Unavailable   No LMP recorded. Patient has had a hysterectomy.  Allergies reviewed: Yes  Medications reviewed: Yes    Medications: Medication refills not needed today.  Pharmacy name entered into EPIC:    [facility] - [place], [place] - [address] [facility] [place], [place] - [address] [facility] [phone]  [facility] [place], [place] - [address] [place] [address]    Frailty Screening:   Is the patient here for a new oncology consult visit in cancer care? 2. No      Clinical concerns: none.       Kirk Escamilla"

## 2024-08-08 NOTE — PROGRESS NOTES
[facility] [address] [address]  Phone: [phone]  Fax: [phone]    PATIENT NAME:  Eliezer Izquierdo  PATIENT YOB: 1985    FOLLOW-UP  Aug 8, 2024    Eliezer Izquierdo is a 39 year old female who returns for her 1st post-operative follow-up visit.     Cancer Staging   Malignant neoplasm of upper-outer quadrant of left breast in female, estrogen receptor positive (H)  Staging form: Breast, AJCC 8th Edition  - Clinical: Stage IB (cT2, cN0, cM0, G1, ER+, [place]+, HER2: Equivocal) - Unsigned  - Pathologic: ypT2, pN2a, cM0, G2, ER+, LA+, HER2- - Unsigned      Treatment to date:  Genetic testing - Two heterozygous risk alleles were detected in the MC1R gene; VUS in RB1  Neoadjuvant ddAC (3/15/2024 to 4/26/2024)  Neoadjuvant taxol (5/10/2024 to 6/14/2024)  Left RFID seed-localized segmental mastectomy, left RFID seed-localized axillary sentinel lymph node mapping and biopsy and right vascular access port removal (7/24/2024)    HPI:    She underwent a left RFID seed-localized segmental mastectomy and left RFID seed-localized SLNB with port removal on 7/24/2024.  She is currently 2 week(s) post-op.  Final surgical pathology showed a [id number] invasive ductal carcinoma with uninvolved margins and 4/5 lymph nodes involved.    Since the procedure, she has been doing well. She denies any redness or swelling, or drainage from the incision, or fever/chills.  She has good range of motion and denies any upper extremity swelling.     /85 (BP Location: Right arm, Patient Position: Sitting, Cuff Size: Adult Regular)   Pulse 106   Temp 98.2  F (36.8  C) (Oral)   Resp 16   Wt 121.9 kg (268 lb 12.8 oz)   SpO2 94%   BMI 39.69 kg/m     Physical Exam  Chest:      Comments: Breast incision healing well.  No seroma. No cellulitis or abscess. No breast contour abnormality.  Port site incision healing well.  Lymphadenopathy:      Comments: Axillary  incision healing well without seroma, hematoma, or cellulitis.          INVESTIGATIONS:    Surgical Pathology (7/24/2024):  Final Diagnosis   A.  MEDICAL DEVICE, RIGHT VASCULAR ACCESS PORT, REMOVAL:  -See gross description for details (gross examination only).     B. LEFT BREAST, MASS, SEED-LOCALIZED SEGMENTAL MASTECTOMY:  -INVASIVE DUCTAL CARCINOMA, [place] grade 2, 22 mm in greatest dimension.  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  -Margins are negative; closest margins to invasive carcinoma are posterior at 4 mm and superior at 8 mm; all other margins are at >10 mm; closest uninvolved margin to DCIS is posterior at 4 mm; all other margins are >10 mm from DCIS.  -Calcifications associated with DCIS and invasive carcinoma.  -AJCC pathologic staging is ypT2 N2a(sn).  -See synoptic report.     C.  SENTINEL LYMPH NODE, LEFT AXILLARY #1 WITH RFID, EXCISION:  -METASTATIC BREAST CARCINOMA to three of three lymph nodes, 18 mm in greatest dimension, with 2 mm extranodal extension (3/3).  -Biopsy site changes.     D.  SENTINEL LYMPH NODE, LEFT AXILLARY #2, EXCISION:  -METASTATIC BREAST CARCINOMA to one lymph node, 5 mm in greatest dimension, with no extranodal extension (1/1).     E.  SENTINEL LYMPH NODE, LEFT AXILLARY #3, EXCISION:  -One lymph node, negative for malignancy (0/1).       ASSESSMENT:    Eliezer Izquierdo is a 39 year old female with LEFT breast cancer, s/p neoadjuvant systemic therapy and surgery.    She is healing well.  I recommended she start moisturizing and massaging the scar with Vaseline. We discussed continuing to wear the supportive bra.    We reviewed the pathology today and a copy of the report was provided. No further breast surgery is indicated. We discussed potentially moving forward with a completion axillary lymph node dissection versus moving on to adjuvant radiation therapy.   We discussed that likely the gross disease has been removed but there may be additional lymph nodes with microscopic involvement.  Because of smoking and diabetes, she is at increased risk of lymphedema.  We also discussed that adjuvant radiation therapy would still be recommended following an axillary dissection.  Given this, I will discuss further with our radiation oncologist and medical oncologist, but I am leaning away from recommending further axillary surgery.    All of the above was discussed with the patient and all questions were answered.     PLAN:  Discuss with radiation oncology and medical oncology regarding axilla  Follow up with me EBER Ford MD MS [facility] FACS  Associate Professor of Surgery  Division of Surgical Oncology  [facility]

## 2024-08-09 ENCOUNTER — MYC REFILL (OUTPATIENT)
Dept: PALLIATIVE CARE | Facility: CLINIC | Age: 39
End: 2024-08-09

## 2024-08-09 ENCOUNTER — TUMOR CONFERENCE (OUTPATIENT)
Dept: ONCOLOGY | Facility: CLINIC | Age: 39
End: 2024-08-09
Payer: COMMERCIAL

## 2024-08-09 DIAGNOSIS — Z17.0 MALIGNANT NEOPLASM OF UPPER-OUTER QUADRANT OF LEFT BREAST IN FEMALE, ESTROGEN RECEPTOR POSITIVE (H): ICD-10-CM

## 2024-08-09 DIAGNOSIS — N64.4 BREAST PAIN: ICD-10-CM

## 2024-08-09 DIAGNOSIS — G89.3 CANCER RELATED PAIN: ICD-10-CM

## 2024-08-09 DIAGNOSIS — C50.412 MALIGNANT NEOPLASM OF UPPER-OUTER QUADRANT OF LEFT BREAST IN FEMALE, ESTROGEN RECEPTOR POSITIVE (H): ICD-10-CM

## 2024-08-09 LAB
PATH REPORT.ADDENDUM SPEC: ABNORMAL
PATH REPORT.COMMENTS IMP SPEC: ABNORMAL
PATH REPORT.COMMENTS IMP SPEC: ABNORMAL
PATH REPORT.COMMENTS IMP SPEC: YES
PATH REPORT.FINAL DX SPEC: ABNORMAL
PATH REPORT.GROSS SPEC: ABNORMAL
PATH REPORT.MICROSCOPIC SPEC OTHER STN: ABNORMAL
PATH REPORT.RELEVANT HX SPEC: ABNORMAL
PATHOLOGY SYNOPTIC REPORT: ABNORMAL
PHOTO IMAGE: ABNORMAL

## 2024-08-09 PROCEDURE — 88360 TUMOR IMMUNOHISTOCHEM/MANUAL: CPT | Mod: 26 | Performed by: PATHOLOGY

## 2024-08-09 RX ORDER — METHOCARBAMOL 500 MG/1
1500 TABLET, FILM COATED ORAL 3 TIMES DAILY PRN
Qty: 270 TABLET | Refills: 1 | Status: SHIPPED | OUTPATIENT
Start: 2024-08-09 | End: 2024-09-29

## 2024-08-09 NOTE — TELEPHONE ENCOUNTER
Received OUTSIDE THE BOX MARKETINGt message from patient requesting refill of methocarbamol.     Last office visit: 7/2/24  Scheduled for follow up 8/13/24     Will route request to NP for review.

## 2024-08-10 PROCEDURE — 88377 M/PHMTRC ALYS ISHQUANT/SEMIQ: CPT | Mod: 26 | Performed by: MEDICAL GENETICS

## 2024-08-13 ENCOUNTER — VIRTUAL VISIT (OUTPATIENT)
Dept: PALLIATIVE CARE | Facility: CLINIC | Age: 39
End: 2024-08-13
Attending: STUDENT IN AN ORGANIZED HEALTH CARE EDUCATION/TRAINING PROGRAM
Payer: COMMERCIAL

## 2024-08-13 VITALS — HEIGHT: 69 IN | WEIGHT: 262 LBS | BODY MASS INDEX: 38.8 KG/M2

## 2024-08-13 DIAGNOSIS — G89.3 CANCER RELATED PAIN: ICD-10-CM

## 2024-08-13 DIAGNOSIS — C50.412 MALIGNANT NEOPLASM OF UPPER-OUTER QUADRANT OF LEFT BREAST IN FEMALE, ESTROGEN RECEPTOR POSITIVE (H): Primary | ICD-10-CM

## 2024-08-13 DIAGNOSIS — G62.0 PERIPHERAL NEUROPATHY DUE TO CHEMOTHERAPY (H): ICD-10-CM

## 2024-08-13 DIAGNOSIS — Z51.5 ENCOUNTER FOR PALLIATIVE CARE: ICD-10-CM

## 2024-08-13 DIAGNOSIS — F41.9 ANXIETY: ICD-10-CM

## 2024-08-13 DIAGNOSIS — F11.90 OPIOID USE DISORDER: ICD-10-CM

## 2024-08-13 DIAGNOSIS — T45.1X5A PERIPHERAL NEUROPATHY DUE TO CHEMOTHERAPY (H): ICD-10-CM

## 2024-08-13 DIAGNOSIS — N64.4 BREAST PAIN: ICD-10-CM

## 2024-08-13 DIAGNOSIS — Z17.0 MALIGNANT NEOPLASM OF UPPER-OUTER QUADRANT OF LEFT BREAST IN FEMALE, ESTROGEN RECEPTOR POSITIVE (H): Primary | ICD-10-CM

## 2024-08-13 DIAGNOSIS — F43.9 STRESS: ICD-10-CM

## 2024-08-13 DIAGNOSIS — Z79.891 ENCOUNTER FOR LONG-TERM USE OF OPIATE ANALGESIC: ICD-10-CM

## 2024-08-13 LAB — INTERPRETATION: NORMAL

## 2024-08-13 PROCEDURE — 99215 OFFICE O/P EST HI 40 MIN: CPT | Mod: 24 | Performed by: STUDENT IN AN ORGANIZED HEALTH CARE EDUCATION/TRAINING PROGRAM

## 2024-08-13 RX ORDER — LORAZEPAM 1 MG/1
1 TABLET ORAL EVERY 6 HOURS PRN
Qty: 30 TABLET | Refills: 1 | Status: SHIPPED | OUTPATIENT
Start: 2024-08-15 | End: 2024-08-29

## 2024-08-13 RX ORDER — HYDROCODONE BITARTRATE AND ACETAMINOPHEN 10; 325 MG/1; MG/1
.5-1 TABLET ORAL EVERY 4 HOURS PRN
Qty: 84 TABLET | Refills: 0 | Status: SHIPPED | OUTPATIENT
Start: 2024-08-15 | End: 2024-08-24

## 2024-08-13 ASSESSMENT — PAIN SCALES - GENERAL: PAINLEVEL: SEVERE PAIN (7)

## 2024-08-13 NOTE — PATIENT INSTRUCTIONS
Recommendations:  -We changed her amitriptyline to 25 mg tablets, and increased range up to 50 to 75 mg daily.  Start on the lower end and you can do half tablets if a full tablet is too much.  -Continue pain medications the same for now.  Discussed we could consider a prescription for a different anti-inflammatory medication in place of the Advil at some point in the future.  It might also be a good idea to consider-doing some sort of medication change to try to help manage the anxiety and stress a little bit better.  Would wait on the change like this until your body has settled into adjusting to the Ozempic.    Follow up: We will plan on about 4 to 6 weeks with Dr. Singh      Reasons to Call    If you are having worsening/uncontrolled symptoms we want you to call!    You or your other physicians make any changes to medications we have prescribed.  -Please call for refills 4-5 days before you will run out of medication.    Important Phone Numbers, including: Refills, scheduling, and general questions     Palliative Care RN: Cecy Ghotra : [phone]  *For scheduling needs/follow up visits : [phone]  *After hours or on weekends- Will connect you with on call MD : [phone].

## 2024-08-13 NOTE — PROGRESS NOTES
Palliative Care Progress Note    Patient Name: Eliezer Izquierdo  Primary Provider: Madison Roach    Chief Complaint/Patient ID: Eliezer Izquierdo is a 39 year old female with PMHx of T2 N1 invasive breast cancer on the left side.  Initiated neoadjuvant ddAC 3/15/24.  Completed 5 additional weeks of chemo (further cycles canceled due to significant neuropathy), followed by Left lumpectomy and sentinel node bx 7/24/24. Planning radiation and possibly adjuvant therapy.  -Per chart review, some history of chronic pain (knees, migraines).  Mention about being on opiates in the past but weaned herself off.  -Formal dx of mild/mod OUD- now on Suboxone.     Pain Hx:    Reports that she had been on pain medications for many years.  Originally was thought to have endometriosis, however after having surgery, was found to have more of an IBS picture.  She is also dealt with chronic pain in both of her knees, as she needs knee replacements.  She gets injections in her knees regularly.  Additionally deals with daily migraines.  Currently working on appeals process for the treatment for the migraines.     Last Palliative care appointment: 7/2/2024 with me. Trial of amitriptyline for neuropathy.     Reviewed: Yes    Social History: Lives with [place].  Has a son named Homero.  Has lived in [place], [place], and [place].  Enjoys reading.  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     Advanced Care Planning: Has not completed an HCD. States her dad would be her HCA.    Interim History:  Eliezer Izquierdo is a 39 year old female who is seen today for follow up with Palliative Care via billable video visit.      Since last visit, had lumpectomy and sentinel node biopsy on 7/24/2024.    Also increased Suboxone to 8mg Q6H.    Does feel the Suboxone has been a good fit.  Thinks the increase was helpful.  However, she still reports that her  "breast \"hurts so bad\", and she became emotional talking about this.  \"Everything is super tender\".  Describes a near constant aching sensation as well as sensitivity to the touch.  Continues taking 6 tablets of 10 mg Norco daily as well as 1000 mg of Tylenol twice daily and 600 mg of Advil 4 times daily.  She does not feel that the Tylenol and Advil are doing much but she is taking them out of \"the hope\" they will help some.    Neuropathy is also significantly still impacting her, particularly her sleep.  She notes that it has been getting worse despite being off chemo.  Describes it as more intense as well as affecting a greater area on her hands and feet.  She is unable to sleep on her left side due to pain, and that is her common sleeping side.  Has been taking 30 mg of amitriptyline nightly.  Not sure how much it has been helping.  Does feel that it makes her sleepy.    Acknowledges additional stress in her life and that anxiety is likely playing a role in worsening pain.  Considering terminating her relationship with her fiancé, and they are also additional stressors about their deed to ownership of the land they have been living on, so she is not sure if she is going to have a place to live in the coming weeks.  States her PCP has looked and there are no therapist in her area who are taking her insurance right now.  She has been journaling some.  She enjoys making art/coloring, however this has been difficult with her neuropathy.  Notes she was on Prozac at one point in time in the past and did okay with it.      Physical Exam:   Constitutional: Alert, pleasant, no apparent distress. Sitting up in chair.  Eyes: Sclera non-icteric, no eye discharge.  ENT: No nasal discharge. Ears grossly normal.  Respiratory: Unlabored respirations. Speaking in full sentences.  Musculoskeletal: Extremities appear normal- no gross deformities noted. No edema noted on upper body.   Skin: No suspicious lesions or rashes on visible " skin.  Neurologic: Clear speech, no aphasia. No facial droop.  Psychiatric: Mentation appears normal, appropriate attention. Affect normal/bright. Does not appear anxious or depressed.    Key Data Reviewed:  LABS:   Lab Results   Component Value Date    WBC 8.2 07/23/2024    HGB 15.4 07/23/2024    HCT 45.9 07/23/2024     07/23/2024     04/26/2024    POTASSIUM 3.9 04/26/2024    CHLORIDE 98 04/26/2024    CO2 24 04/26/2024    BUN 7.7 04/26/2024    CR 0.49 (L) 04/26/2024     (H) 07/24/2024    SED 21 (H) 06/21/2015    AST 39 06/07/2024    ALT 34 06/07/2024    ALKPHOS 70 06/07/2024    BILITOTAL 0.3 06/07/2024    INR 0.97 06/21/2015       Impression & Recommendations & Counseling:  Eliezer Izquierdo is a 39 year old female with history of left-sided breast cancer.     Pain   OUD - See my note from 5/7/2024 regarding full conversation about opioid use disorder/dependence and chronic pain in.  It was at this visit that we initiated Suboxone.  This seems to have been a good fit for her, as she is tolerating it well and has noticed some improvement in pain.   -Continue Suboxone 8 mg Q6H.  -Continue hydrocodone 5-10mg up to every 4 hours as needed.  Has been consistently needing full amount allotted.  Forward dated a refill for 8/15.  She can pick this up a day early as she will be in the [facility] on 8/16.  -We did discuss possibly considering a prescription for a different NSAID and discontinuing ibuprofen, however decided to change amitriptyline as below instead.  -Also discussed trying to address anxiety/stress and consideration of resuming an SSRI.  However, she recently had a change to her diabetes medications, and with all other changes recently, I was hesitant about starting another medication.  I do think this is worthwhile considering in the future, as I do suspect there is a high amount of her pain that is worsened by anxiety and stress.    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  Further chemotherapy was  placed on hold due to severity of neuropathy side effects.  No benefit from Gabapentin or Lyrica. Trial of Mirapex with no clear benefit at 3mg   -Increased range on amitriptyline to 50 to 75 mg nightly.  She knows she can take less, for example 37.5 mg, if she thinks that 50 is too much for her.  -Previously advised to look into acupuncture.  -She can continue compression stockings and gloves if she thinks they are helpful.  Discussed she might get more benefit from the compression stockings if she wears them longer than 1-2 hours/day.    Anxiety  Stress - Multifactorial from her health-related concerns as well as social and economic factors including her relationship with her fiancé and the property in which she is living.  It is unfortunate there are no therapist in her area who are taking her insurance right now.  Encouraged ongoing journaling, and I do think consideration for daily controller medication for anxiety would be beneficial in the near future.  Additionally refilled her Ativan today for 8/15.      Follow up: About a month, and plan to transition to Dr. Singh due to [place] licensure.      Video-Visit Details  Video Start Time: 9:22 AM  Video End Time: 9:48 AM    Originating Location (pt. Location): Home     Distant Location (provider location):  Offsite- Personal Home      Platform used for Video Visit: Steve     Total time spent on day of encounter is 47 mins, including reviewing record, review of above studies, above visit with patient, symptomatic discussion as above, including medication adjustments/prescription management, and documentation.       Kylie Rodriguez DO  Palliative Medicine   AMCOM ID [id number]    Some chart documentation performed using Dragon Voice recognition Software. Although reviewed after completion, some words and grammatical errors may remain.

## 2024-08-13 NOTE — NURSING NOTE
Current patient location:  Pt is unsure of the address    Is the patient currently in the state of [place]? YES    Visit mode:VIDEO    If the visit is dropped, the patient can be reconnected by: VIDEO VISIT: Text to cell phone:   Telephone Information:   Mobile [phone]       Will anyone else be joining the visit? NO  (If patient encounters technical issues they should call [phone])    How would you like to obtain your AVS? MyChart    Are changes needed to the allergy or medication list? Pt stated no changes to allergies and Pt stated no med changes    Are refills needed on medications prescribed by this physician? NO    Rooming Documentation:  Assigned questionnaire(s) completed      Reason for visit: WILI ADAMSF

## 2024-08-13 NOTE — LETTER
8/13/2024       RE: Eliezer Izquierdo  [address] [facility] [address]     Dear Colleague,    Thank you for referring your patient, Eliezer Izquierdo, [facility]. Please see a copy of my visit note below.    Palliative Care Progress Note    Patient Name: Eliezer Izquierdo  Primary Provider: Madison Roach    Chief Complaint/Patient ID: Eliezer Izquierdo is a 39 year old female with PMHx of T2 N1 invasive breast cancer on the left side.  Initiated neoadjuvant ddAC 3/15/24.  Completed 5 additional weeks of chemo (further cycles canceled due to significant neuropathy), followed by Left lumpectomy and sentinel node bx 7/24/24. Planning radiation and possibly adjuvant therapy.  -Per chart review, some history of chronic pain (knees, migraines).  Mention about being on opiates in the past but weaned herself off.  -Formal dx of mild/mod OUD- now on Suboxone.     Pain Hx:    Reports that she had been on pain medications for many years.  Originally was thought to have endometriosis, however after having surgery, was found to have more of an IBS picture.  She is also dealt with chronic pain in both of her knees, as she needs knee replacements.  She gets injections in her knees regularly.  Additionally deals with daily migraines.  Currently working on appeals process for the treatment for the migraines.     Last Palliative care appointment: 7/2/2024 with me. Trial of amitriptyline for neuropathy.     Reviewed: Yes    Social History: Lives with clifton.  Has a son named Homero.  Has lived in [place], [place], and [place].  Enjoys reading.  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     Advanced Care Planning: Has not completed an HCD. States her dad would be her HCA.    Interim History:  Eliezer Izquierdo is a 39 year old female  "who is seen today for follow up with Palliative Care via billable video visit.      Since last visit, had lumpectomy and sentinel node biopsy on 7/24/2024.    Also increased Suboxone to 8mg Q6H.    Does feel the Suboxone has been a good fit.  Thinks the increase was helpful.  However, she still reports that her breast \"hurts so bad\", and she became emotional talking about this.  \"Everything is super tender\".  Describes a near constant aching sensation as well as sensitivity to the touch.  Continues taking 6 tablets of 10 mg Norco daily as well as 1000 mg of Tylenol twice daily and 600 mg of Advil 4 times daily.  She does not feel that the Tylenol and Advil are doing much but she is taking them out of \"the hope\" they will help some.    Neuropathy is also significantly still impacting her, particularly her sleep.  She notes that it has been getting worse despite being off chemo.  Describes it as more intense as well as affecting a greater area on her hands and feet.  She is unable to sleep on her left side due to pain, and that is her common sleeping side.  Has been taking 30 mg of amitriptyline nightly.  Not sure how much it has been helping.  Does feel that it makes her sleepy.    Acknowledges additional stress in her life and that anxiety is likely playing a role in worsening pain.  Considering terminating her relationship with her fiancé, and they are also additional stressors about their deed to ownership of the land they have been living on, so she is not sure if she is going to have a place to live in the coming weeks.  States her PCP has looked and there are no therapist in her area who are taking her insurance right now.  She has been journaling some.  She enjoys making art/coloring, however this has been difficult with her neuropathy.  Notes she was on Prozac at one point in time in the past and did okay with it.      Physical Exam:   Constitutional: Alert, pleasant, no apparent distress. Sitting up in " chair.  Eyes: Sclera non-icteric, no eye discharge.  ENT: No nasal discharge. Ears grossly normal.  Respiratory: Unlabored respirations. Speaking in full sentences.  Musculoskeletal: Extremities appear normal- no gross deformities noted. No edema noted on upper body.   Skin: No suspicious lesions or rashes on visible skin.  Neurologic: Clear speech, no aphasia. No facial droop.  Psychiatric: Mentation appears normal, appropriate attention. Affect normal/bright. Does not appear anxious or depressed.    Key Data Reviewed:  LABS:   Lab Results   Component Value Date    WBC 8.2 07/23/2024    HGB 15.4 07/23/2024    HCT 45.9 07/23/2024     07/23/2024     04/26/2024    POTASSIUM 3.9 04/26/2024    CHLORIDE 98 04/26/2024    CO2 24 04/26/2024    BUN 7.7 04/26/2024    CR 0.49 (L) 04/26/2024     (H) 07/24/2024    SED 21 (H) 06/21/2015    AST 39 06/07/2024    ALT 34 06/07/2024    ALKPHOS 70 06/07/2024    BILITOTAL 0.3 06/07/2024    INR 0.97 06/21/2015       Impression & Recommendations & Counseling:  Eliezer Izquierdo is a 39 year old female with history of left-sided breast cancer.     Pain   OUD - See my note from 5/7/2024 regarding full conversation about opioid use disorder/dependence and chronic pain in.  It was at this visit that we initiated Suboxone.  This seems to have been a good fit for her, as she is tolerating it well and has noticed some improvement in pain.   -Continue Suboxone 8 mg Q6H.  -Continue hydrocodone 5-10mg up to every 4 hours as needed.  Has been consistently needing full amount allotted.  Forward dated a refill for 8/15.  She can pick this up a day early as she will be in the [facility] on 8/16.  -We did discuss possibly considering a prescription for a different NSAID and discontinuing ibuprofen, however decided to change amitriptyline as below instead.  -Also discussed trying to address anxiety/stress and consideration of resuming an SSRI.  However, she recently had a change to  her diabetes medications, and with all other changes recently, I was hesitant about starting another medication.  I do think this is worthwhile considering in the future, as I do suspect there is a high amount of her pain that is worsened by anxiety and stress.    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  Further chemotherapy was placed on hold due to severity of neuropathy side effects.  No benefit from Gabapentin or Lyrica. Trial of Mirapex with no clear benefit at 3mg   -Increased range on amitriptyline to 50 to 75 mg nightly.  She knows she can take less, for example 37.5 mg, if she thinks that 50 is too much for her.  -Previously advised to look into acupuncture.  -She can continue compression stockings and gloves if she thinks they are helpful.  Discussed she might get more benefit from the compression stockings if she wears them longer than 1-2 hours/day.    Anxiety  Stress - Multifactorial from her health-related concerns as well as social and economic factors including her relationship with her fiancé and the property in which she is living.  It is unfortunate there are no therapist in her area who are taking her insurance right now.  Encouraged ongoing journaling, and I do think consideration for daily controller medication for anxiety would be beneficial in the near future.  Additionally refilled her Ativan today for 8/15.      Follow up: About a month, and plan to transition to Dr. Singh due to [place] licensure.      Video-Visit Details  Video Start Time: 9:22 AM  Video End Time: 9:48 AM    Originating Location (pt. Location): Home     Distant Location (provider location):  Offsite- Personal Home      Platform used for Video Visit: Steve     Total time spent on day of encounter is 47 mins, including reviewing record, review of above studies, above visit with patient, symptomatic discussion as above, including medication adjustments/prescription management, and documentation.       Kylie Rodriguez,  DO  Palliative Medicine   [id number]    Some chart documentation performed using Dragon Voice recognition Software. Although reviewed after completion, some words and grammatical errors may remain.      Again, thank you for allowing me to participate in the care of your patient.      Sincerely,    Kylie Rodriguez, DO

## 2024-08-14 NOTE — PROGRESS NOTES
[facility] Medical Oncology Note  Date of visit: Aug 16, 2024    Assessment:     Stage IB Luminal A invasive breast cancer, status post neoadjuvant chemotherapy followed by lumpectomy and sentinel lymph node biopsies.  While there was obvious response by exam, pathology did not show any significant response to neoadjuvant treatment, confirming that this is a luminal a breast cancer.  Her KI-67 was 24%, and so neoadjuvant chemotherapy was a reasonable strategy here.  And with all the nodes positive, the recommendation would have been for adjuvant chemotherapy.  This has been already been completed.    She has diffuse pain that I can't rule out is from metastatic cancer. Given the olack of response to her neoadjuvant therapy, I would like to get a CT/PET scan to make sure we are not dealing with Stage IV disease here. Eliezer is in agreement with this.   The next step is adjuvant breast radiotherapy.  She would prefer this be done in [place].  After that, adjuvant endocrine therapy in this setting is straightforward.  Given the results of the SOFT/TEXT trials, Kathy would be recommended ovarian function suppression for 5 years.  And given the node positivity, she also would be recommended adjuvant Ribociclib per the results of the recently published MONY trial.  To be treated with a CDK 4/6 inhibitor, she would need to be on an aromatase inhibitor as well.  Certainly there will be side effects with all of this. Per our discussion today, she would prefer a BSO, but only if done in [place].  Significant side effects of treatment in the context of chronic pain requiring narcotics.  I am so happy for the involvement of palliative care.  Ongoing smoking.  She has no plans on quitting despite my encouragement.  It is bad for you.  It has been in all of the newspapers.      Plan:     CT/PET over the next few weeks  Referral to Rad Onc in [place] for adjuvatn RT  Referral to [place] [facility] for BSO  RTC with me  in 2 months to et started with an AI/Ribociclib   Continue follow-up with Palliative care        30 minutes spent on the date of the encounter doing chart review, review of test results, interpretation of tests, patient visit, and documentation.      Zachary Nolan MD, MSc  Associate Professor of Medicine  [employer]  [facility]  [address] [place] [address]  [phone]    __________________________________________________________________    DIAGNOSES     Pathologic prognostic stage IB ([id number](sn)) Jeniffer grade 2 invasive ductal breast cancer, diagnosed definitively at lumpectomy and sentinel lymph node biopsy 7/24/2024.  Kathy had a 2.2 cm moderately differentiated tumor with 4 out of 5 sentinel nodes positive for involvement, 1 of which showed a 2 mm area of extracapsular extension.  The tumor was 91 to 100% strongly positive for both ER and SC.  It was negative for HER2 amplification by FISH.  There is no obvious tumor response to neoadjuvant treatment in both the breast and the nodes.  Eliezer presented with  a clinical T2N1 invasive breast cancer diagnosed at breast biopsy 2/9/2024. Eliezer palpated a left breast lump. Mammogram and US showed a 2.5 x 2.5 x 2.0 cm spiculated mass at 2:00 position.   Biopsy showed a Jeniffer grade I IDC, 97% strongly positive for ER, 99% strongly positive for SC, and negative for HER2 by FISH (IHC 2+). KI-67 is 24%. Mammaprint was high risk Luminal B. Left axillary US that showed three abnormal axillary nodes, biopsy positive for IDC. Biopsy showed METASTATIC BREAST CARCINOMA, almost entirely effacing lymph node, at least 11 mm in linear extent.  My first exam prior to neoadjuvant therapy showed a palpable deep left breast mass in the 2 o'clock position measuring roughly 4 cm x 4 cm.   Chronic pain with history of opiate prescriptions from multiple providers. She is currently seeing palliative care (Dr. Espinal  "Michael), and tried buprenorphine, started on 4/11/2024, but reacted to the adhesive.  Then didn't tolerate fentanyl.  Current recommendation is suboxone.   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  But it was negative for everything else.  History of N/V from migraines, thus managing nausea during therapy has been challenging.         History of Present Illness/Therapy to dte:     Neoadjuvant ddAC initiated 3/15/2024 through 4/26/2024.   Weekly paclitaxel 5/10/2026-.6/14/2026.  It was then discontinued due to crippling side effects.   Lumpectomy and sentinel lymph node biopsy, 7/24/2024.      Interval History     Eliezer is back with Saul to discuss next steps.  Her pain is still severe.  The neuropathy in her hands and feet has even worsened since the discontinuation of her paclitaxel.  She came in in a wheelchair today because ambulation is so difficult for her.  Thinks she needs Social Security disability.  Has really serious fatigue.  Wonders if she needs a transfusion.  (Hemoglobin of 15, 3 weeks ago)  She has diffuse other pains that she thinks are new.  She has pain at the site of her surgery.  She is worried that she has metastatic cancer given the underwhelming response to neoadjuvant treatment.    She continues to lose weight, not intentionally.  This is another concern.  She still smoking.  She has no plans to quit.  I should save my breath, she tells me.    Past Medical History:   Melanoma of right foot???     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  She thought about being a , but then got pregnant with her son Homero.  She had a lot of different for jobs.  She spent some time in [place] where her mom and other family member lives.  She then came back to [place].  She has a fiancé of 6 years named Saul and they currently live in [place], [place].  She is an avid reader, and likes romance novels, but really anything.  She says she read 250 books last year.  She does smoke and has no plans on quitting.  She is currently on a low-carb diet.    Family History:     Family History   Problem Relation Age of Onset    Genitourinary Problems Mother         renal disease - minimal change, sponge kidney            Medications:     Current Outpatient Medications   Medication Sig Dispense Refill    acetaminophen (TYLENOL) 500 MG tablet Take 2 tablets (1,000 mg) by mouth every 8 hours as needed for mild pain      albuterol (PROAIR HFA/PROVENTIL HFA/VENTOLIN HFA) 108 (90 Base) MCG/ACT inhaler Inhale 2 puffs into the lungs every 4 hours as needed      amitriptyline (ELAVIL) 10 MG tablet Take 1-2 tablets (10-20 mg) by mouth every morning AND 5 tablets (50 mg) at bedtime.      amitriptyline (ELAVIL) 25 MG tablet Take 2-3 tablets (50-75 mg) by mouth at bedtime 90 tablet 2    amLODIPine (NORVASC) 10 MG tablet Take 10 mg by mouth at bedtime      atorvastatin (LIPITOR) 10 MG tablet Take 10 mg by mouth at bedtime      Blood Glucose Monitoring Suppl (ONETOUCH VERIO FLEX SYSTEM) w/Device KIT       buprenorphine HCl-naloxone HCl (SUBOXONE) 8-2 MG per film Place 1 Film under the tongue every 6 hours 120 Film 0    cetirizine (ZYRTEC) 10 MG tablet Take 5 mg by mouth as needed      EPINEPHrine (ANY BX GENERIC EQUIV) 0.3 MG/0.3ML injection 2-pack       esomeprazole (NEXIUM) 20 MG DR capsule Take 20 mg by mouth as needed       fluticasone-salmeterol (ADVAIR HFA) 115-21 MCG/ACT inhaler Inhale 2 puffs into the lungs 2 times daily      hydrochlorothiazide (HYDRODIURIL) 25 MG tablet Take 25 mg by mouth as needed      [START ON 8/15/2024] HYDROcodone-acetaminophen (NORCO)  MG per tablet Take 0.5-1 tablets by mouth every 4 hours as needed for severe pain 84 tablet 0    ibuprofen (ADVIL/MOTRIN) 200 MG tablet Take 200 mg by mouth      JANUVIA 100 MG tablet Take 100 mg by mouth at bedtime      [START ON 8/15/2024] LORazepam (ATIVAN) 1 MG tablet Take 1 tablet (1 mg) by mouth every 6 hours as needed for anxiety, nausea or sleep 30 tablet 1    methocarbamol (ROBAXIN) 500 MG tablet Take 3 tablets (1,500 mg) by mouth 3 times daily as needed for muscle spasms 270 tablet 1    naloxone (NARCAN) 4 MG/0.1ML nasal spray Spray 4 mg into one nostril alternating nostrils as needed for opioid reversal every 2-3 minutes until assistance arrives      OLANZapine (ZYPREXA) 2.5 MG tablet Take 1-2 tablets (2.5-5 mg) by mouth 3 times daily as needed (Nausea or anxiety) 150 tablet 2    ONETOUCH VERIO IQ test strip       prochlorperazine (COMPAZINE) 10 MG tablet       Prochlorperazine Maleate (COMPAZINE PO) Take 10 mg by mouth 3 times daily as needed for nausea      Semaglutide, 1 MG/DOSE, (OZEMPIC) 4 MG/3ML pen Inject 1 mg subcutaneously      urea (GORMEL) 20 % external cream Apply topically as needed (Apply to feet twice daily) 480 g 1              Physical Exam:   not currently breastfeeding.    ECOG PS: 1  Constitutional: WDWN female in NAD, pleasant and appropriate.  She can get up onto the exam table without my assistance.  She smells of tobacco.  HEENT:  NC/AT, no icterus, OP clear, MMM  Skin: No jaundice nor ecchymoses  Lungs: CTAB, no w/r/r, nonlabored breathing.  Cardiovascular: RRR, S1, S2, no m/r/g  MSK/Extremities: Warm, well perfused. Trace bilateral pitting edema.   LN: no cervical, supraclavicular lymphadenopathy  Neurologic: alert, answering  questions appropriately, moving all extremities spontaneously. CN 2-12 grossly intact.  Psych: appropriate affect  Data:     PATHOLOGY 7/24/2024  B. LEFT BREAST, MASS, SEED-LOCALIZED SEGMENTAL MASTECTOMY:  -INVASIVE DUCTAL CARCINOMA, Jeniffer grade 2, 22 mm in greatest dimension.  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  -Margins are negative; closest margins to invasive carcinoma are posterior at 4 mm and superior at 8 mm; all other margins are at >10 mm; closest uninvolved margin to DCIS is posterior at 4 mm; all other margins are >10 mm from DCIS.  -Calcifications associated with DCIS and invasive carcinoma.  -AJCC pathologic staging is ypT2 N2a(sn).  -See synoptic report.     C.  SENTINEL LYMPH NODE, LEFT AXILLARY #1 WITH RFID, EXCISION:  -METASTATIC BREAST CARCINOMA to three of three lymph nodes, 18 mm in greatest dimension, with 2 mm extranodal extension (3/3).  -Biopsy site changes.     D.  SENTINEL LYMPH NODE, LEFT AXILLARY #2, EXCISION:  -METASTATIC BREAST CARCINOMA to one lymph node, 5 mm in greatest dimension, with no extranodal extension (1/1).     E.  SENTINEL LYMPH NODE, LEFT AXILLARY #3, EXCISION:  -One lymph node, negative for malignancy (0/1).          REGIONAL LYMPH NODES   Regional Lymph Node Status  Tumor present in regional lymph node(s)   Number of Lymph Nodes with Macrometastases  3   Number of Lymph Nodes with Micrometastases  1   Size of Largest Gudelia Metastatic Deposit  18 mm   Extranodal Extension  Present, 2 mm or less   Total Number of Lymph Nodes Examined (sentinel and non-sentinel)  5   Number of Ralston Nodes Examined  5   pTNM CLASSIFICATION (AJCC 8th Edition)   Reporting of pT, pN, and (when applicable) pM categories is based on information available to the pathologist at the time the report is issued. As per the AJCC (Chapter 1, 8th Ed.) it is the managing physician s  responsibility to establish the final pathologic stage based upon all pertinent information, including but potentially not limited to this pathology report.   Modified Classification  y   pT Category  pT2   pN Category  pN2a   N Suffix  (sn)   SPECIAL STUDIES        Estrogen Receptor (ER) Status  Positive (greater than 10% of cells demonstrate nuclear positivity)   Percentage of Cells with Nuclear Positivity  >95% %        Progesterone Receptor (PgR) Status  Positive   Percentage of Cells with Nuclear Positivity  100 %        HER2 (by immunohistochemistry)  Equivocal (Score 2+)   Percentage of Cells with Uniform Intense Complete Membrane Staining  Cannot be determined        Ki-67 Percentage of Positive Nuclei  25 %                         Addendum   B. LEFT BREAST, SEED-LOCALIZED SEGMENTAL MASTECTOMY:  -Invasive carcinoma is HER2 negative (score 1+) by immunohistochemistry (performed on this specimen, see first biomarker reporting template below)     D. LYMPH NODE, LEFT AXILLARY, SENTINEL #2, EXCISION:  -Metastatic carcinoma is HER2 equivocal (score 2+) by immunohistochemistry (performed on this specimen, see second biomarker reporting template below)  -HER2 FISH results will be reported separately by cytogenetics               Left Breast ultrasound 6/24 (compared to 5/10) and personally reviewed by me.    FINDINGS: At the 2 o'clock position, 10 cm from the nipple, there is a  heterogenous hypoechoic shadowing mass measuring 14 x 13 x 9 mm, which has mildly decreased in size, most recently measuring 20 x 19 x 18 mm.          Most Recent 3 CBC's:  Recent Labs   Lab Test 07/23/24  1214 06/21/24  0954 06/14/24  1028 06/07/24  1046   WBC 8.2 7.3 8.6 6.6   HGB 15.4 11.8 13.0 12.9   MCV 94 99 99 100    342 336 367   ANEUTAUTO  --  4.9 6.2 4.5     Most Recent 3 BMP's:  Recent Labs   Lab Test 07/24/24  0606 06/07/24  1046 05/10/24  1006 04/26/24  1134 04/12/24  0734 03/15/24  0836   NA  --   --   --  136 138 135    POTASSIUM  --   --   --  3.9 3.5 3.3*   CHLORIDE  --   --   --  98 102 98   CO2  --   --   --  24 23 25   BUN  --   --   --  7.7 7.0 6.4   CR  --   --   --  0.49* 0.44* 0.43*   ANIONGAP  --   --   --  14 13 12   REMI  --   --   --  9.2 9.3 9.4   *  --   --  415* 206* 189*   PROTTOTAL  --  7.1 6.6 6.7 7.0 7.2   ALBUMIN  --  4.2 3.9 3.7 4.0 4.1    Most Recent 3 LFT's:  Recent Labs   Lab Test 06/07/24  1046 05/10/24  1006 04/26/24  1134   AST 39 28 36   ALT 34 23 24   ALKPHOS 70 67 70   BILITOTAL 0.3 0.2 0.2    Most Recent 2 TSH and T4:No lab results found.   I reviewed the above labs today.     Other Data     Most Recent 3 CBC's:  Recent Labs   Lab Test 07/23/24  1214 06/21/24  0954 06/14/24  1028 06/07/24  1046   WBC 8.2 7.3 8.6 6.6   HGB 15.4 11.8 13.0 12.9   MCV 94 99 99 100    342 336 367   ANEUTAUTO  --  4.9 6.2 4.5     Most Recent 3 BMP's:  Recent Labs   Lab Test 07/24/24  0606 06/07/24  1046 05/10/24  1006 04/26/24  1134 04/12/24  0734 03/15/24  0836   NA  --   --   --  136 138 135   POTASSIUM  --   --   --  3.9 3.5 3.3*   CHLORIDE  --   --   --  98 102 98   CO2  --   --   --  24 23 25   BUN  --   --   --  7.7 7.0 6.4   CR  --   --   --  0.49* 0.44* 0.43*   ANIONGAP  --   --   --  14 13 12   REMI  --   --   --  9.2 9.3 9.4   *  --   --  415* 206* 189*   PROTTOTAL  --  7.1 6.6 6.7 7.0 7.2   ALBUMIN  --  4.2 3.9 3.7 4.0 4.1    Most Recent 3 LFT's:  Recent Labs   Lab Test 06/07/24  1046 05/10/24  1006 04/26/24  1134   AST 39 28 36   ALT 34 23 24   ALKPHOS 70 67 70   BILITOTAL 0.3 0.2 0.2    Most Recent 2 TSH and T4:No lab results found.  I reviewed the above labs today.

## 2024-08-16 ENCOUNTER — ONCOLOGY VISIT (OUTPATIENT)
Dept: ONCOLOGY | Facility: CLINIC | Age: 39
End: 2024-08-16
Attending: INTERNAL MEDICINE
Payer: COMMERCIAL

## 2024-08-16 VITALS
TEMPERATURE: 98.9 F | HEART RATE: 106 BPM | DIASTOLIC BLOOD PRESSURE: 88 MMHG | OXYGEN SATURATION: 97 % | SYSTOLIC BLOOD PRESSURE: 134 MMHG | RESPIRATION RATE: 16 BRPM

## 2024-08-16 DIAGNOSIS — C50.919 MALIGNANT NEOPLASM OF FEMALE BREAST, UNSPECIFIED ESTROGEN RECEPTOR STATUS, UNSPECIFIED LATERALITY, UNSPECIFIED SITE OF BREAST (H): Primary | ICD-10-CM

## 2024-08-16 PROCEDURE — 99214 OFFICE O/P EST MOD 30 MIN: CPT | Performed by: INTERNAL MEDICINE

## 2024-08-16 PROCEDURE — G0463 HOSPITAL OUTPT CLINIC VISIT: HCPCS | Performed by: INTERNAL MEDICINE

## 2024-08-16 ASSESSMENT — PAIN SCALES - GENERAL: PAINLEVEL: MODERATE PAIN (4)

## 2024-08-16 NOTE — LETTER
8/16/2024      Eliezer Izquierdo  [address] [facility] [address]      Dear Colleague,    Thank you for referring your patient, Eliezer Izquierdo, to the [facility]. Please see a copy of my visit note below.        [facility] Medical Oncology Note  Date of visit: Aug 16, 2024    Assessment:     Stage IB Luminal A invasive breast cancer, status post neoadjuvant chemotherapy followed by lumpectomy and sentinel lymph node biopsies.  While there was obvious response by exam, pathology did not show any significant response to neoadjuvant treatment, confirming that this is a luminal a breast cancer.  Her KI-67 was 24%, and so neoadjuvant chemotherapy was a reasonable strategy here.  And with all the nodes positive, the recommendation would have been for adjuvant chemotherapy.  This has been already been completed.    She has diffuse pain that I can't rule out is from metastatic cancer. Given the olack of response to her neoadjuvant therapy, I would like to get a CT/PET scan to make sure we are not dealing with Stage IV disease here. Eliezer is in agreement with this.   The next step is adjuvant breast radiotherapy.  She would prefer this be done in [place].  After that, adjuvant endocrine therapy in this setting is straightforward.  Given the results of the SOFT/TEXT trials, Kathy would be recommended ovarian function suppression for 5 years.  And given the node positivity, she also would be recommended adjuvant Ribociclib per the results of the recently published MONY trial.  To be treated with a CDK 4/6 inhibitor, she would need to be on an aromatase inhibitor as well.  Certainly there will be side effects with all of this. Per our discussion today, she would prefer a BSO, but only if done in [place].  Significant side effects of treatment in the context of chronic pain requiring narcotics.  I am so happy for the involvement of palliative care.  Ongoing smoking.  She  has no plans on quitting despite my encouragement.  It is bad for you.  It has been in all of the newspapers.      Plan:     CT/PET over the next few weeks  Referral to Rad Onc in [place] for adjuvatn RT  Referral to [place] [facility] for BSO  RTC with me in 2 months to et started with an AI/Ribociclib   Continue follow-up with Palliative care        30 minutes spent on the date of the encounter doing chart review, review of test results, interpretation of tests, patient visit, and documentation.      Zachary Nolan MD, MSc  Associate Professor of Medicine  [employer]  [facility]  [address] [place] [id number]  [phone]    __________________________________________________________________    DIAGNOSES     Pathologic prognostic stage IB ([id number](sn)) [place] grade 2 invasive ductal breast cancer, diagnosed definitively at lumpectomy and sentinel lymph node biopsy 7/24/2024.  Kathy had a 2.2 cm moderately differentiated tumor with 4 out of 5 sentinel nodes positive for involvement, 1 of which showed a 2 mm area of extracapsular extension.  The tumor was 91 to 100% strongly positive for both ER and RI.  It was negative for HER2 amplification by FISH.  There is no obvious tumor response to neoadjuvant treatment in both the breast and the nodes.  Eliezer presented with  a clinical T2N1 invasive breast cancer diagnosed at breast biopsy 2/9/2024. Eliezer palpated a left breast lump. Mammogram and US showed a 2.5 x 2.5 x 2.0 cm spiculated mass at 2:00 position.   Biopsy showed a Jeniffer grade I IDC, 97% strongly positive for ER, 99% strongly positive for RI, and negative for HER2 by FISH (IHC 2+). KI-67 is 24%. Mammaprint was high risk Luminal B. Left axillary US that showed three abnormal axillary nodes, biopsy positive for IDC. Biopsy showed METASTATIC BREAST CARCINOMA, almost entirely effacing lymph node, at least 11 mm in linear extent.  My first exam  "prior to neoadjuvant therapy showed a palpable deep left breast mass in the 2 o'clock position measuring roughly 4 cm x 4 cm.   Chronic pain with history of opiate prescriptions from multiple providers. She is currently seeing palliative care (Dr. Kylie Rodriguez), and tried buprenorphine, started on 4/11/2024, but reacted to the adhesive.  Then didn't tolerate fentanyl.  Current recommendation is suboxone.   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  But it was negative for everything else.  History of N/V from migraines, thus managing nausea during therapy has been challenging.         History of Present Illness/Therapy to dte:     Neoadjuvant ddAC initiated 3/15/2024 through 4/26/2024.   Weekly paclitaxel 5/10/2026-.6/14/2026.  It was then discontinued due to crippling side effects.   Lumpectomy and sentinel lymph node biopsy, 7/24/2024.      Interval History     Eliezer is back with Saul to discuss next steps.  Her pain is still severe.  The neuropathy in her hands and feet has even worsened since the discontinuation of her paclitaxel.  She came in in a wheelchair today because ambulation is so difficult for her.  Thinks she needs Social Security disability.  Has really serious fatigue.  Wonders if she needs a transfusion.  (Hemoglobin of 15, 3 weeks ago)  She has diffuse other pains that she thinks are new.  She has pain at the site of her surgery.  She is worried that she has metastatic cancer given the underwhelming response to neoadjuvant treatment.    She continues to lose weight, not intentionally.  This is another concern.  She still smoking.  She has no plans to quit.  I should save my breath, she tells me.    Past Medical History:   Melanoma of right foot???     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  She thought about being a , but then got pregnant with her son Homero.  She had a lot of different for jobs.  She spent some time in [place] where her mom and other family member lives.  She then came back to [place].  She has a fiancé of 6 years named Saul and they currently live in [place], [place].  She is an avid reader, and likes romance novels, but really anything.  She says she read 250 books last year.  She does smoke and has no plans on quitting.  She is currently on a low-carb diet.    Family History:     Family History   Problem Relation Age of Onset     Genitourinary Problems Mother         renal disease - minimal change, sponge kidney            Medications:     Current Outpatient Medications   Medication Sig Dispense Refill     acetaminophen (TYLENOL) 500 MG tablet Take 2 tablets (1,000 mg) by mouth every 8 hours as needed for mild pain       albuterol (PROAIR HFA/PROVENTIL HFA/VENTOLIN HFA) 108 (90 Base) MCG/ACT inhaler Inhale 2 puffs into the lungs every 4 hours as needed       amitriptyline (ELAVIL) 10 MG tablet Take 1-2 tablets (10-20 mg) by mouth every morning AND 5 tablets (50 mg) at bedtime.       amitriptyline (ELAVIL) 25 MG tablet Take 2-3 tablets (50-75 mg) by mouth at bedtime 90 tablet 2     amLODIPine (NORVASC) 10 MG tablet Take 10 mg by mouth at bedtime       atorvastatin (LIPITOR) 10 MG tablet Take 10 mg by mouth at bedtime       Blood Glucose Monitoring Suppl (ONETOUCH VERIO FLEX SYSTEM) w/Device KIT        buprenorphine HCl-naloxone HCl  (SUBOXONE) 8-2 MG per film Place 1 Film under the tongue every 6 hours 120 Film 0     cetirizine (ZYRTEC) 10 MG tablet Take 5 mg by mouth as needed       EPINEPHrine (ANY BX GENERIC EQUIV) 0.3 MG/0.3ML injection 2-pack        esomeprazole (NEXIUM) 20 MG DR capsule Take 20 mg by mouth as needed       fluticasone-salmeterol (ADVAIR HFA) 115-21 MCG/ACT inhaler Inhale 2 puffs into the lungs 2 times daily       hydrochlorothiazide (HYDRODIURIL) 25 MG tablet Take 25 mg by mouth as needed       [START ON 8/15/2024] HYDROcodone-acetaminophen (NORCO)  MG per tablet Take 0.5-1 tablets by mouth every 4 hours as needed for severe pain 84 tablet 0     ibuprofen (ADVIL/MOTRIN) 200 MG tablet Take 200 mg by mouth       JANUVIA 100 MG tablet Take 100 mg by mouth at bedtime       [START ON 8/15/2024] LORazepam (ATIVAN) 1 MG tablet Take 1 tablet (1 mg) by mouth every 6 hours as needed for anxiety, nausea or sleep 30 tablet 1     methocarbamol (ROBAXIN) 500 MG tablet Take 3 tablets (1,500 mg) by mouth 3 times daily as needed for muscle spasms 270 tablet 1     naloxone (NARCAN) 4 MG/0.1ML nasal spray Spray 4 mg into one nostril alternating nostrils as needed for opioid reversal every 2-3 minutes until assistance arrives       OLANZapine (ZYPREXA) 2.5 MG tablet Take 1-2 tablets (2.5-5 mg) by mouth 3 times daily as needed (Nausea or anxiety) 150 tablet 2     ONETOUCH VERIO IQ test strip        prochlorperazine (COMPAZINE) 10 MG tablet        Prochlorperazine Maleate (COMPAZINE PO) Take 10 mg by mouth 3 times daily as needed for nausea       Semaglutide, 1 MG/DOSE, (OZEMPIC) 4 MG/3ML pen Inject 1 mg subcutaneously       urea (GORMEL) 20 % external cream Apply topically as needed (Apply to feet twice daily) 480 g 1              Physical Exam:   not currently breastfeeding.    ECOG PS: 1  Constitutional: WDWN female in NAD, pleasant and appropriate.  She can get up onto the exam table without my assistance.  She smells of  tobacco.  HEENT:  NC/AT, no icterus, OP clear, MMM  Skin: No jaundice nor ecchymoses  Lungs: CTAB, no w/r/r, nonlabored breathing.  Cardiovascular: RRR, S1, S2, no m/r/g  MSK/Extremities: Warm, well perfused. Trace bilateral pitting edema.   LN: no cervical, supraclavicular lymphadenopathy  Neurologic: alert, answering questions appropriately, moving all extremities spontaneously. CN 2-12 grossly intact.  Psych: appropriate affect  Data:     PATHOLOGY 7/24/2024  B. LEFT BREAST, MASS, SEED-LOCALIZED SEGMENTAL MASTECTOMY:  -INVASIVE DUCTAL CARCINOMA, [place] grade 2, 22 mm in greatest dimension.  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  -Margins are negative; closest margins to invasive carcinoma are posterior at 4 mm and superior at 8 mm; all other margins are at >10 mm; closest uninvolved margin to DCIS is posterior at 4 mm; all other margins are >10 mm from DCIS.  -Calcifications associated with DCIS and invasive carcinoma.  -AJCC pathologic staging is ypT2 N2a(sn).  -See synoptic report.     C.  SENTINEL LYMPH NODE, LEFT AXILLARY #1 WITH RFID, EXCISION:  -METASTATIC BREAST CARCINOMA to three of three lymph nodes, 18 mm in greatest dimension, with 2 mm extranodal extension (3/3).  -Biopsy site changes.     D.  SENTINEL LYMPH NODE, LEFT AXILLARY #2, EXCISION:  -METASTATIC BREAST CARCINOMA to one lymph node, 5 mm in greatest dimension, with no extranodal extension (1/1).     E.  SENTINEL LYMPH NODE, LEFT AXILLARY #3, EXCISION:  -One lymph node, negative for malignancy (0/1).          REGIONAL LYMPH NODES   Regional Lymph Node Status  Tumor present in regional lymph node(s)   Number of Lymph Nodes with Macrometastases  3   Number of Lymph Nodes with Micrometastases  1   Size of Largest [place] Metastatic Deposit  18 mm   Extranodal Extension  Present, 2 mm or less   Total Number of Lymph Nodes Examined  (sentinel and non-sentinel)  5   Number of [place] Nodes Examined  5   pTNM CLASSIFICATION (AJCC 8th Edition)   Reporting of pT, pN, and (when applicable) pM categories is based on information available to the pathologist at the time the report is issued. As per the AJCC (Chapter 1, 8th Ed.) it is the managing physician s responsibility to establish the final pathologic stage based upon all pertinent information, including but potentially not limited to this pathology report.   Modified Classification  y   pT Category  pT2   pN Category  pN2a   N Suffix  (sn)   SPECIAL STUDIES        Estrogen Receptor (ER) Status  Positive (greater than 10% of cells demonstrate nuclear positivity)   Percentage of Cells with Nuclear Positivity  >95% %        Progesterone Receptor (PgR) Status  Positive   Percentage of Cells with Nuclear Positivity  100 %        HER2 (by immunohistochemistry)  Equivocal (Score 2+)   Percentage of Cells with Uniform Intense Complete Membrane Staining  Cannot be determined        Ki-67 Percentage of Positive Nuclei  25 %                         Addendum   B. LEFT BREAST, SEED-LOCALIZED SEGMENTAL MASTECTOMY:  -Invasive carcinoma is HER2 negative (score 1+) by immunohistochemistry (performed on this specimen, see first biomarker reporting template below)     D. LYMPH NODE, LEFT AXILLARY, SENTINEL #2, EXCISION:  -Metastatic carcinoma is HER2 equivocal (score 2+) by immunohistochemistry (performed on this specimen, see second biomarker reporting template below)  -HER2 FISH results will be reported separately by cytogenetics               Left Breast ultrasound 6/24 (compared to 5/10) and personally reviewed by me.    FINDINGS: At the 2 o'clock position, 10 cm from the nipple, there is a  heterogenous hypoechoic shadowing mass measuring 14 x 13 x 9 mm, which has mildly decreased in size, most recently measuring 20 x 19 x 18 mm.          Most Recent 3 CBC's:  Recent Labs   Lab Test 07/23/24  1214  06/21/24  0954 06/14/24  1028 06/07/24  1046   WBC 8.2 7.3 8.6 6.6   HGB 15.4 11.8 13.0 12.9   MCV 94 99 99 100    342 336 367   ANEUTAUTO  --  4.9 6.2 4.5     Most Recent 3 BMP's:  Recent Labs   Lab Test 07/24/24  0606 06/07/24  1046 05/10/24  1006 04/26/24  1134 04/12/24  0734 03/15/24  0836   NA  --   --   --  136 138 135   POTASSIUM  --   --   --  3.9 3.5 3.3*   CHLORIDE  --   --   --  98 102 98   CO2  --   --   --  24 23 25   BUN  --   --   --  7.7 7.0 6.4   CR  --   --   --  0.49* 0.44* 0.43*   ANIONGAP  --   --   --  14 13 12   REMI  --   --   --  9.2 9.3 9.4   *  --   --  415* 206* 189*   PROTTOTAL  --  7.1 6.6 6.7 7.0 7.2   ALBUMIN  --  4.2 3.9 3.7 4.0 4.1    Most Recent 3 LFT's:  Recent Labs   Lab Test 06/07/24  1046 05/10/24  1006 04/26/24  1134   AST 39 28 36   ALT 34 23 24   ALKPHOS 70 67 70   BILITOTAL 0.3 0.2 0.2    Most Recent 2 TSH and T4:No lab results found.   I reviewed the above labs today.     Other Data     Most Recent 3 CBC's:  Recent Labs   Lab Test 07/23/24  1214 06/21/24  0954 06/14/24  1028 06/07/24  1046   WBC 8.2 7.3 8.6 6.6   HGB 15.4 11.8 13.0 12.9   MCV 94 99 99 100    342 336 367   ANEUTAUTO  --  4.9 6.2 4.5     Most Recent 3 BMP's:  Recent Labs   Lab Test 07/24/24  0606 06/07/24  1046 05/10/24  1006 04/26/24  1134 04/12/24  0734 03/15/24  0836   NA  --   --   --  136 138 135   POTASSIUM  --   --   --  3.9 3.5 3.3*   CHLORIDE  --   --   --  98 102 98   CO2  --   --   --  24 23 25   BUN  --   --   --  7.7 7.0 6.4   CR  --   --   --  0.49* 0.44* 0.43*   ANIONGAP  --   --   --  14 13 12   REMI  --   --   --  9.2 9.3 9.4   *  --   --  415* 206* 189*   PROTTOTAL  --  7.1 6.6 6.7 7.0 7.2   ALBUMIN  --  4.2 3.9 3.7 4.0 4.1    Most Recent 3 LFT's:  Recent Labs   Lab Test 06/07/24  1046 05/10/24  1006 04/26/24  1134   AST 39 28 36   ALT 34 23 24   ALKPHOS 70 67 70   BILITOTAL 0.3 0.2 0.2    Most Recent 2 TSH and T4:No lab results found.  I reviewed the above labs  today.              Again, thank you for allowing me to participate in the care of your patient.        Sincerely,        Zachary Nolan MD

## 2024-08-16 NOTE — PROGRESS NOTES
ADDENDUM:    Following my visit with Eliezer Izquierdo, I was able to discuss her case with some of our radiation oncologists.  Will plan to obtain CT imaging to determine if there are any grossly abnormal residual axillary nodes to help determine whether we can forgo completion axillary dissection (and also for radiation planning of regional farhan basins).    Albina Ford MD MS [facility] FACS  Associate Professor of Surgery  Division of Surgical Oncology  [facility]

## 2024-08-16 NOTE — NURSING NOTE
"Oncology Rooming Note    August 16, 2024 10:10 AM   Eliezer Izquierdo is a 39 year old female who presents for:    Chief Complaint   Patient presents with    Oncology Clinic Visit     Malignant neoplasm of upper-outer quadrant of left breast     Initial Vitals: /88 (BP Location: Right arm, Patient Position: Sitting, Cuff Size: Adult Large)   Pulse 106   Temp 98.9  F (37.2  C) (Oral)   Resp 16   SpO2 97%  Estimated body mass index is 38.69 kg/m  as calculated from the following:    Height as of 8/13/24: 1.753 m (5' 9\").    Weight as of 8/13/24: 118.8 kg (262 lb). There is no height or weight on file to calculate BSA.  Moderate Pain (4) Comment: feet; hands; breast   No LMP recorded. Patient has had a hysterectomy.  Allergies reviewed: Yes  Medications reviewed: Yes    Medications: Medication refills not needed today.  Pharmacy name entered into EPIC:    [facility], [place] - [address] [place] [address] PHARMACY [place], [place] - [address] [facility] [phone]  [facility] [place], [place] - [address] [place] [address]    Frailty Screening:   Is the patient here for a new oncology consult visit in cancer care? 2. No      Clinical concerns:  wondering if they want to do PET scan; pt states that her mother wanted to ask her about a blood transfusion for energy; Wondering why she would have to see radiation oncologist in [place] rather than [place].       Ramila Briscoe              "

## 2024-08-19 DIAGNOSIS — C50.919 MALIGNANT NEOPLASM OF FEMALE BREAST, UNSPECIFIED ESTROGEN RECEPTOR STATUS, UNSPECIFIED LATERALITY, UNSPECIFIED SITE OF BREAST (H): Primary | ICD-10-CM

## 2024-08-19 RX ORDER — PROCHLORPERAZINE MALEATE 10 MG
10 TABLET ORAL EVERY 6 HOURS PRN
Qty: 30 TABLET | Refills: 1 | Status: SHIPPED | OUTPATIENT
Start: 2024-08-19

## 2024-08-19 NOTE — TELEPHONE ENCOUNTER
Medication:prochlorperazine  Last prescribing provider:Dr Nolan  Last clinic visit date: 8/16/2024 Dr. Nolan  Recommendations for requested medication:for nausea/vomiting  Any other pertinent information:routed to last provider.

## 2024-08-21 ENCOUNTER — MYC REFILL (OUTPATIENT)
Dept: PALLIATIVE CARE | Facility: CLINIC | Age: 39
End: 2024-08-21
Payer: COMMERCIAL

## 2024-08-21 DIAGNOSIS — F11.90 OPIOID USE DISORDER: ICD-10-CM

## 2024-08-21 DIAGNOSIS — N64.4 BREAST PAIN: ICD-10-CM

## 2024-08-21 DIAGNOSIS — G89.3 CANCER RELATED PAIN: ICD-10-CM

## 2024-08-21 RX ORDER — BUPRENORPHINE AND NALOXONE 8; 2 MG/1; MG/1
1 FILM, SOLUBLE BUCCAL; SUBLINGUAL EVERY 6 HOURS
Qty: 120 FILM | Refills: 0 | Status: SHIPPED | OUTPATIENT
Start: 2024-08-23 | End: 2024-09-18

## 2024-08-21 NOTE — TELEPHONE ENCOUNTER
Received [employer] message from patient requesting refill of suboxone.     Last refill: 7/26/24  Last office visit: 8/13/24  Scheduled for follow up 9/26/24     Will route request to MD/ for review.     Reviewed [place]  Report.

## 2024-08-24 ENCOUNTER — MYC REFILL (OUTPATIENT)
Dept: PALLIATIVE CARE | Facility: CLINIC | Age: 39
End: 2024-08-24
Payer: COMMERCIAL

## 2024-08-24 DIAGNOSIS — C50.412 MALIGNANT NEOPLASM OF UPPER-OUTER QUADRANT OF LEFT BREAST IN FEMALE, ESTROGEN RECEPTOR POSITIVE (H): ICD-10-CM

## 2024-08-24 DIAGNOSIS — Z17.0 MALIGNANT NEOPLASM OF UPPER-OUTER QUADRANT OF LEFT BREAST IN FEMALE, ESTROGEN RECEPTOR POSITIVE (H): ICD-10-CM

## 2024-08-24 NOTE — PROGRESS NOTES
ATTENDING NOTE:    DIAGNOSIS: Invasive ductal carcinoma of the left breast, cT2N1, s/p neoadjuvant chemotherapy, [id number](sn)      HISTORY OF PRESENT ILLNESS: Ms. Eliezer Izquierdo is a 40 yo female with a locally advanced left breast cancer    She self palpated a mass in her breast since 12/2023.     Diagnostic mammogram on 1/29/2024 showed a 2 cm spiculated lesion at 2:00, posterior 3rd of the left breast, upper outer quadrant. No mammographic correlation of the palpable lump in the right breast.   US on 2/9/2024 confirmed an irregular shadowing solid hypovascular mass at 2:00, 10 cm from the nipple, measuring 2.5 x 2.5 x 2.0 cm. US guided biopsy showed invasive ductal carcinoma, Cosby grade 1, no angiolymphatic invasion. DCIS was present, nuclear grade 1. Invasive carcinoma was ER 97%, FL 99%, HER2 2+,  FISH negative Ki-67 24%  US of the left axilla revealed at least 3 morphologically abnormal lymph nodes. US bipsy of a left axillary LN on 2/22/2024 revealed metastatic breast carcinoma, almost entirely effacing lymph node, ER 95%, FL 95%, HER2 2+, Ki-67 24%.   She was set up to have a PET/CT but this was canceled due to elevated glucose, although patient thought that it was due to insurance denial.  CT of the abdomen/pelvis on 3/1/2024 showed steatosis but no convincing evidence of distant metastases. Breast Action Panel showed no pathogenic mutation or VUS.     Eliezer established care with Dr. Ford and Dr. Nolan. Exam showed 3 cm mobile mass in upper outer left breast, no palpable adenopathy. She was not eligible for I-SPY-2 due to A1c greater than 10. Her Mammaprint came back high risk. She was recommended neoadjuvant AC followed by Taxol.     US on 5/10/2024 after 4 cycles of AC showed decreased size of the lesion to 1.8 x 2.0 x 1.9 cm.     Unfortunately, Eliezer did not tolerate Taxol due to debilitating neuropathy. Thus Taxol was held after 5 weekly cycles. US on 6/24/2024 showed the mass to measure  1.4 x1.3 x 0.9 cm.     Given difficulty with chemo and small likelihood of achieving pCR with neoadjuvant therapy, Eliezer was recommended to proceed with surgery. US of the axilla on 7/12/2024 showed the the biopsied lymph node continue to demonstrate diffuse cortical thickening but decreased from 2.8 cm to 1.6 cm. Other lymph nodes appeared smaller. Her axillary LN were not palpable. Dr. Ford recommended lumpectomy and RFID-tagged SLN evaluation of the axilla.     On 7/24/2024, she underwent left RFID seed-localized segmental mastectomy and RFID seed-localized axillary SLN mapping and biopsy.   Pathology showed   1) Left breast lumpectomy: residual invasive ductal carcinoma, Jeniffer grade 2, 2.2 cm in greatest dimension. LVSI was present. ER 95%, %, HER2 1+, Ki-67 25%; No definite treatment response. Associated DCIS was high nuclear grade, cribriform type with focal central necrosis, 1.5 cm in greatest extent. Closest margin for the invasive component was 4 mm posterior, for DCIS was also 4 mm posterior.   2) Left axillary LN: RFID tagged specimen was not blue or radioactive, contained 3 LN, all positive, 1.8 cm in greatest dimension with 2 mm GEORGIE. A LN neither blue nor radioactive had positive carcinoma, 5 mm, no GEORGIE.  A third SLN was blue and radioactive. This was negative. Thus a total of 4 our of 5 axillary nodes positive. Axillary LN was HER2 2+, FISH negative (group 2)  Stage [id number](sn).     Given the surprise finding of 4 out of 5 positive lymph nodes, options of completion dissection vs. adjuvant radiation therapy for axilla were discussed. She was deemed high risk for lymphedema due diabetes and obesity. The plan is to evaulate the axilla with diagnostic CT and lean towards radiation therapy as opposed to further surgery.     Eliezer saw Dr. Nolan on 8/16. She was recommended ovarian suppression with AI plus Ribociclib per [facility] trial. PET/CT was also ordered to rule out metastatic disease.  Eliezer was supposed to have a PET/CT today, but due to preauthorization mandated by insurance, her PET/CT is now rescheduled to .     On interview, she states that she is healing well from recent surgery. Her left breast was smaller to begin with and there has been more asymmetry since the surgery; however, she is not bothered by it. She is scheduled to see Gyn for oophorectomy at [facility] next week. Of note, she continues to smoke cigarette daily.       OTHER RELEVANT HISTORIES:   PMH is significant for hysterectomy at age 21 (ovaries intact), right foot melanoma, type 2 DM, Hgb A1c 11.1 in 2024, s/p cholecystectomy, s/p , s/p knee surgery; chronic pain managed by palliative care.     FH significant for paternal great grandfather with lung cancer, mother with melanoma, mother with breast cancer, paternal grandmother with possible uterine or ovarian cancer.     Smokes 10 cigarettes per day  Does not currently work.   Son Homero   Has a fiance named Saul, live in [place], [place], which is 2 hours from the [place] and 40 minutes from [facility].   Avid reader    EXAM:   No acute distress  Axilla: well healed SLN Bx scar, no palpable adenopathy  Neck: no palpable cervical adenopathy  Breasts: left breast smaller than the right. Well healed lumpectomy scar in the outer aspect. Palpable seroma. No erythema or edema.       KEY IMAGING:        ASSESSMENT/PLAN:   In summary, Eliezer is a 38 yo female with a cT2N1 invasive ductal carcinoma of the left breast, grade 2, ER+/NV+.HER2 nonamplifed. Her initial US showed at least 3 morphologically suspicious axillary lymph nodes. She is status post AC and Taxol with the latter stopped early due to neuropathy. Her surgical pathology showed little response in the primary, with tumor measuring 2.2 cm, with LVSI. Both DCIS and invasive margins were 4 mm. She was unfortunately found to have more axillary disease than initially anticipated. Interestingly, the one SLN had  no tumor, but 4 other nodes which were neither blue nor radioactive (i.e., non-SLN) showed metastatic tumor, with the largest measuring 1.8 cm with 2 mm GEORGIE, [id number](sn). Her systemic staging PET/CT is pending.     We discussed that with the finding of 4 involved nodes in the axilla with all being macrometastases, the standard of care is to proceed with a completion axillary farhan dissection. However, dissection carries a much higher risk of symptomatic lymphedema, which will negatively impact her QOL. Her lymphedema risk is further increased by her obesity and ongoing smoking. We discussed that we may potentially forgo dissection if her imaging does not show any grossly abnormal lymph nodes. In this case, we would use radiation therapy to sterilize any microscopic residual farhan disease. This approach does not reflect the current standard of care, but is reasonable, balancing the risks and benefits of dissection plus radiation vs. radiation alone following limited axillary sampling. Eliezer is agreeable to the above plan. We placed a referral for lymphedema therapy.     Regardless, Eliezer will require adjuvant radiation therapy to the whole breast and farhan region. She lives in [place], [place] and prefers to be treated at [facility] in [place]. Once the treatment plan is finalized, I will reach out to Dr. Velasquez or Dr. Kern at [facility]. She will receive excellent care at [facility].    Lastly, I counseled her on smoking cessation. This will help lessen radiation dermatitis. She acknowledged the importance of cutting down on cigarettes but it is unclear if she is mentally ready to quit at this time.          Baudilio Fan MD/PhD  Radiation Oncology  [phone] (pager)        I reviewed patient's chart, internal/external medical records, imaging studies (including actual images), labs and pathology reports.  I interviewed and counseled the patient face to face.  I additionally discussed the case with patient's referring  physicians and care team.      80 minutes were spent on the date of the encounter doing chart review, history and exam, documentation and further activities as noted above.           Baudilio Fan MD

## 2024-08-25 NOTE — TELEPHONE ENCOUNTER
Received [employer] message from patient requesting refill of Norco.     Last refill: 8/13/2024  Last office visit: 8/13/2024  Scheduled for follow up 9/26/2024    Will route request to MD/ for review.     Reviewed [place]  Report.

## 2024-08-26 RX ORDER — HYDROCODONE BITARTRATE AND ACETAMINOPHEN 10; 325 MG/1; MG/1
.5-1 TABLET ORAL EVERY 4 HOURS PRN
Qty: 84 TABLET | Refills: 0 | Status: SHIPPED | OUTPATIENT
Start: 2024-08-26 | End: 2024-09-06

## 2024-08-27 NOTE — PROGRESS NOTES
Department of Radiation Oncology  [address] [address]  [phone]       Consultation Note    Name: Eliezer Izquierdo MRN: [id number]   : 1985   Date of Service: 2024  Referring: Dr. Nolan     Diagnosis: Invasive Ductal Carcinoma of the Left Breast  Stage: [id number](sn)Mx    History of Present Illness   Ms. Izquierdo is a 39 year old female with a stage Ib invasive ductal carcinoma of the left breast who presents to the clinic for initial evaluation to discuss the role of adjuvant radiation in treatment of her disease.    In 2023, Ms. Izquierdo noticed a palpable lump in her left breast and on 2024, she underwent a diagnostic mammogram which revealed a 2 cm spiculated lesion in the upper outer quadrant of the left breast at the 2 o'clock position.  She underwent an ultrasound 2024 which revealed a 2.5 x 2.5 x 2.0 cm cm lesion at the 2 o'clock position of the left breast corresponding with mammographic finding.  Ultrasound-guided core biopsy was performed the same day which revealed invasive ductal carcinoma, Jeniffer grade 1 with associated DCIS, low nuclear grade with cribriform and solid types.  Invasive carcinoma was ER 95%, %, KI-67 = 25%. HER2 2+. (HER2 negative by FISH)    On 2/15/2024 she underwent left axillary ultrasound which revealed 3 morphologically abnormal lymph nodes.  She then proceeded to a left axillary lymph node biopsy on 2024 which was positive for metastatic breast carcinoma effacing almost the entire lymph node.    CT scan of the abdomen and pelvis performed on 3/01/2024 to evaluate elevated LFTS. This scan showed some incidental findings of hepatic steatosis and some enlarged upper retroperitoneal/juanjo hepatis lymph nodes but no evidence of distant metastatic disease.    She established care with Dr. Nolan. She had a Mammaprint test which returned as high risk.  He originally felt  she could be a candidate for the ISPY trial, however her hemoglobin A1c excluded her from the trial.  She was therefore started on neoadjuvant chemotherapy with plans for 4 cycles of AC followed by 12 weeks of paclitaxel.    Follow-up ultrasound on 5/10/2024 of the left breast mass revealed good response to AC with mass shrinking to 1.8 x 2.0 x 1.9 cm.  She completed 5 total weeks of paclitaxel with repeat ultrasound showing continued response to the tumor, however she began to experience pretty severe side effects including neuropathy, fatigue, and worsening of chronic pain.  Taxol was discontinued.    She moved forward with left RF ID seed localized segmental mastectomy with seed localized axillary sentinel lymph node mapping and biopsy on 7/24/2024 with Dr. Ford.  Pathology showed invasive ductal carcinoma, Jeniffer grade 2, 22 mm in greatest dimension with high-grade DCIS measuring 15 mm in greatest extent.  Closest margins for invasive carcinoma or posterior 4 mm and superior to 8 mm.  LVSI present. A total of 5 lymph nodes were excised with 4 positive for malignancy. 1.8cm in greatest dimension with 2mm of GEORGIE.     Following surgery, she established with palliative care given longstanding chronic pain as well as side effects from chemotherapy. At her postop follow up with Dr. Nolan, they have made plans for a BSO through OB/Gyn and the initiation of an AI/Ribociclib to begin after radiation.     She presents to the clinic with her clifton Hughes today.  On interview, she reports overall healing well postsurgery.  She was unable to have her PET/CT performed today due to insurance prior authorization issues and this has been rescheduled to September 12.  She denies any new concerning symptoms aside from those stated in prior medical oncology notes.    CHEMOTHERAPY HISTORY:   Neoadjuvant ddAC initiated 3/15/2024 through 4/26/2024.   Weekly paclitaxel 5/10/2026-.6/14/2026.  It was then discontinued due to crippling  "side effects.   PACEMAKER: No   PREGNANCY STATUS: Not pregnant  PAST RADIATION THERAPY HISTORY: None    PAST MEDICAL HISTORY:  Past Medical History:   Diagnosis Date    Breast cancer (H)     Carrier of high risk cancer gene mutation - MC1R increased risk variants 04/22/2024    Two MC1R \"increased risk\" variants - c.451C>T and c.478C>T  [facility] 3/26/2024      Hypercholesteraemia     Irritable bowel     Migraines        PAST SURGICAL HISTORY:  Past Surgical History:   Procedure Laterality Date    IR CHEST PORT PLACEMENT > 5 YRS OF AGE  03/11/2024    LEFT Radiofrequency Identification Seed-Localized Segmental Mastectomy (=\"Lumpectomy\"), LEFT Radiofrequency Identification Seed-Localized Axillary [place] Lymph Node Biopsy - Left  07/24/2024    LUMPECTOMY BREAST, SEED LOCALIZATION, SENTINEL NODE Left 7/24/2024    Procedure: LEFT Radiofrequency Identification Seed-Localized Segmental Mastectomy (=\"Lumpectomy\"), LEFT Radiofrequency Identification Seed-Localized Axillary [place] Lymph Node Biopsy;  Surgeon: Albina Ford MD;  Location: [facility] OR    REMOVE PORT VASCULAR ACCESS Right 7/24/2024    Procedure: RIGHT vascular access port removal;  Surgeon: Albina Ford MD;  Location: [facility] OR       ALLERGIES:  Allergies as of 08/29/2024 - Reviewed 08/16/2024   Allergen Reaction Noted    Ubrogepant Muscle Pain (Myalgia) 01/17/2024    Azithromycin Hives 03/11/2024    Doxycycline Hives 09/21/2019    Erythromycin Hives 03/11/2024    Estrogens  01/01/2015    Levofloxacin Hives 03/11/2024    Maxalt [rizatriptan]  01/01/2015    Oxycodone-acetaminophen  07/10/2009    Penicillins  01/01/2015    Propranolol hcl Headache 03/11/2024    Sulfa antibiotics  01/01/2015    Sumatriptan Muscle Pain (Myalgia) 03/11/2024    Zofran [ondansetron] Muscle Pain (Myalgia) 03/11/2024    Hydromorphone Anxiety and Itching 07/11/2011    Ondansetron hcl Anxiety, Other (See Comments), and Palpitations 03/08/2010    Toradol " [ketorolac] Anxiety 01/01/2015       MEDICATIONS:  Current Outpatient Medications   Medication Sig Dispense Refill    insulin glargine (LANTUS PEN) 100 UNIT/ML pen Inject 35 Units subcutaneously at bedtime.      TRUE COMFORT PRO PEN NEEDLES 31G X 5 MM miscellaneous       acetaminophen (TYLENOL) 500 MG tablet Take 2 tablets (1,000 mg) by mouth every 8 hours as needed for mild pain      albuterol (PROAIR HFA/PROVENTIL HFA/VENTOLIN HFA) 108 (90 Base) MCG/ACT inhaler Inhale 2 puffs into the lungs every 4 hours as needed      amitriptyline (ELAVIL) 10 MG tablet Take 1-2 tablets (10-20 mg) by mouth every morning AND 5 tablets (50 mg) at bedtime.      amitriptyline (ELAVIL) 25 MG tablet Take 2-3 tablets (50-75 mg) by mouth at bedtime 90 tablet 2    amLODIPine (NORVASC) 10 MG tablet Take 10 mg by mouth at bedtime      atorvastatin (LIPITOR) 10 MG tablet Take 10 mg by mouth at bedtime      Blood Glucose Monitoring Suppl (ONETOUCH VERIO FLEX SYSTEM) w/Device KIT       buprenorphine HCl-naloxone HCl (SUBOXONE) 8-2 MG per film Place 1 Film under the tongue every 6 hours. Do not start before August 23, 2024. 120 Film 0    cetirizine (ZYRTEC) 10 MG tablet Take 5 mg by mouth as needed      EPINEPHrine (ANY BX GENERIC EQUIV) 0.3 MG/0.3ML injection 2-pack       esomeprazole (NEXIUM) 20 MG DR capsule Take 20 mg by mouth as needed      fluticasone-salmeterol (ADVAIR HFA) 115-21 MCG/ACT inhaler Inhale 2 puffs into the lungs 2 times daily      hydrochlorothiazide (HYDRODIURIL) 25 MG tablet Take 25 mg by mouth as needed      HYDROcodone-acetaminophen (NORCO)  MG per tablet Take 0.5-1 tablets by mouth every 4 hours as needed for severe pain. 84 tablet 0    ibuprofen (ADVIL/MOTRIN) 200 MG tablet Take 200 mg by mouth      JANUVIA 100 MG tablet Take 100 mg by mouth at bedtime      LORazepam (ATIVAN) 1 MG tablet Take 1 tablet (1 mg) by mouth every 6 hours as needed for anxiety, nausea or sleep. 30 tablet 1    methocarbamol (ROBAXIN) 500  MG tablet Take 3 tablets (1,500 mg) by mouth 3 times daily as needed for muscle spasms 270 tablet 1    naloxone (NARCAN) 4 MG/0.1ML nasal spray Spray 4 mg into one nostril alternating nostrils as needed for opioid reversal every 2-3 minutes until assistance arrives      OLANZapine (ZYPREXA) 2.5 MG tablet Take 1-2 tablets (2.5-5 mg) by mouth 3 times daily as needed (Nausea or anxiety) 150 tablet 2    ONETOUCH VERIO IQ test strip       prochlorperazine (COMPAZINE) 10 MG tablet Take 1 tablet (10 mg) by mouth every 6 hours as needed for nausea or vomiting 30 tablet 1    Prochlorperazine Maleate (COMPAZINE PO) Take 10 mg by mouth 3 times daily as needed for nausea      Semaglutide, 1 MG/DOSE, (OZEMPIC) 4 MG/3ML pen Inject 1 mg subcutaneously      urea (GORMEL) 20 % external cream Apply topically as needed (Apply to feet twice daily) 480 g 1        FAMILY HISTORY:  Family History   Problem Relation Age of Onset    Breast Cancer Mother     Genitourinary Problems Mother         renal disease - minimal change, sponge kidney    Cancer Paternal Grandmother         GYN cancer    Cancer Paternal Grandfather         Stomach       SOCIAL HISTORY:  Social History     Socioeconomic History    Marital status: Single     Spouse name: Not on file    Number of children: Not on file    Years of education: Not on file    Highest education level: Not on file   Occupational History    Not on file   Tobacco Use    Smoking status: Every Day     Types: Cigarettes     Passive exposure: Current    Smokeless tobacco: Never    Tobacco comments:     Smokes about 4 or less cigarette's a day. Trying to quit.   Vaping Use    Vaping status: Never Used   Substance and Sexual Activity    Alcohol use: No    Drug use: No    Sexual activity: Not on file   Other Topics Concern    Not on file   Social History Narrative    Excessive Diet Coke intake - 15 cans/day     Social Determinants of Health     Financial Resource Strain: Not on File (9/17/2023)     Received from [facility]    Financial Resource Strain     Financial Resource Strain: 0   Food Insecurity: Not on File (2023)    Received from [facility]    Food Insecurity     Food: 0   Transportation Needs: Not on File (2023)    Received from [facility]    Transportation Needs     Transportation: 0   Physical Activity: Not on File (2023)    Received from [facility]    Physical Activity     Physical Activity: 0   Stress: Not on File (2023)    Received from [facility]    Stress     Stress: 0   Social Connections: Not on File (2023)    Received from [facility]    Social Connections     Social Connections and Isolation: 0   Interpersonal Safety: Not on file   Housing Stability: Not on File (2023)    Received from [facility]    Housing Stability     [place]         Review of Systems   A 12-point review of systems was performed. Pertinent findings are noted in the HPI.    Physical Exam   ECOG Status: 1    Patient was set up in the treatment position as part of her exam and is able to tolerate appropriate body positioning for treatment.    VITALS: /74   Pulse 94   Wt 117.5 kg (259 lb)   SpO2 95%   BMI 38.25 kg/m    GEN: Appears well, alert, oriented, and in NAD  HEENT: EOMI, normal conjunctiva, MMM  CV: Warm and well-perfused  BREAST: Appropriate abduction of arm.  Incisions clean and dry on the left breast and axilla.  No lymphadenopathy palpated.  Moderate sized seroma inferior to lumpectomy scar.  RESP: Breathing comfortably on room air  SKIN: Normal color and turgor  NEURO: No focal deficits, CN 3-12 grossly intact  PSYCH: Appropriate mood and affect  Relevant lymphedema risk: Moderate  Imaging/Path/Labs   Imaging: per hpi   Diagnostic Mammogram 2024       Path: per hpi     Labs: reviewed    Assessment    Ms. Izquierdo is a 39 year old female with bjQ5aS0h(sn) invasive ductal carcinoma of the left breast. She is s/p 3 cycles of neoadjuvant ddAC, 5 weeks of  Paclitaxel, and Left segmental mastectomy with SNL biopsy.     Plan   In depth discussion was had with Ms. Izquierdo today regarding the role of adjuvant radiation in treatment of her invasive ductal carcinoma. In summary, she is certainly an individual who would benefit from adjuvant radiation given her extent of residual disease after neoadjuvant chemo. She has been unfortunately suffering from chronic pain that appears to have been exacerbated by her current treatments as well as stress of dealing with a cancer diagnosis.  Given diffuse pain, Dr. Nolan has ordered a PET/CT to rule out the presence of stage IV disease.  This was originally planned for today but unfortunately was delayed due to prior authorization issues.  We feel this scan is very reasonable and may assist in evaluating further lymph node involvement.    Given Ms. Izquierdo had 4/5 positive lymph nodes in the axilla, we will await PET/CT results in order to monitor disease burden within the axilla.  If the PET/CT shows evidence of remaining metastatic lymph nodes after the surgery, she may benefit from additional conversation with Dr. Ford regarding axillary dissection.  Dr. Ford was hesitant to perform further dissection prior to imaging given her heightened risks of lymphedema.  If PET/CT shows no evidence of remaining metastatic lymph nodes within the axilla, we will plan to proceed with adjuvant radiation at that time.  Regardless if an additional surgery is warranted, we will still recommend adjuvant radiation to the left breast, levels I-III, IMN, and SCV nodes.     She has an upcoming appointment with OB/GYN to discuss oophorectomy on September 5.  She prefers to have her radiation treatment done at [facility] given it is over an hour closer to her home.  We feel this is very reasonable and will discuss with their providers.    Relevant lymphedema risk: Moderate, risk is increased if decision is made to move forward with axillary  dissection.  Regardless she would likely benefit from a referral to lymphedema PT, I will place this referral today.  She also prefers to see PT at [facility].    Risks and benefits of radiation to the left breast and comprehensive lymph nodes was discussed with MsNaeem Izquierdo, she expressed understanding of these risks and wishes to proceed with treatment.    Patient was seen and discussed with Dr. Fan.    Santhosh Hearn MD  PGY-2  Radiation Oncology

## 2024-08-28 NOTE — TELEPHONE ENCOUNTER
RECORDS STATUS - BREAST    RECORDS REQUESTED FROM: [facility]   NOTES DETAILS STATUS   OFFICE NOTE from referring provider Epic 8/16/24: Dr. Zachary Nolan   OFFICE NOTE from surgeon Epic 8/8/24: Dr. Albina Ford   OPERATIVE REPORT [facility] 7/24/24: Mastectomy   2/22/24: US Axillary Bx    2/9/24: US Breast Bx   MEDICATION LIST - [facility]    LABS     PATHOLOGY REPORTS  (Tissue diagnosis, Stage, ER/TX percentage positive and intensity of staining, HER2 IHC, FISH, and all biopsies from breast and any distant metastasis)                 Report in Epic/ [facility]- [facility] 7/24/24: [id number]   7/24/24: HER2  2/22/24: [id number]     Path Consult:  2/19/24: [id number] ([id number])    [facility]:  2/9/24: [id number] (positive)    GENONOMIC TESTING     TYPE:   (Next Generation Sequencing, including Foundation One testing, and Oncotype score) Report in Epic NGS:   3/26/24: [id number]   3/20/24: [id number]     BRCA1/BRCA2:   3/20/24: [id number]    IMAGING (NEED IMAGES & REPORT)     MRI PACS 1/17/24: MR Brain    MAMMO PACS 7/24/24-1/29/24   ULTRASOUND PACS 7/23/24-2/9/24: US Breast  7/12/24: US Axillary  2/9/24: US Breast Bx

## 2024-08-29 ENCOUNTER — OFFICE VISIT (OUTPATIENT)
Dept: RADIATION ONCOLOGY | Facility: CLINIC | Age: 39
End: 2024-08-29
Attending: SURGERY
Payer: COMMERCIAL

## 2024-08-29 ENCOUNTER — PRE VISIT (OUTPATIENT)
Dept: RADIATION ONCOLOGY | Facility: CLINIC | Age: 39
End: 2024-08-29
Payer: COMMERCIAL

## 2024-08-29 ENCOUNTER — MYC REFILL (OUTPATIENT)
Dept: PALLIATIVE CARE | Facility: CLINIC | Age: 39
End: 2024-08-29
Payer: COMMERCIAL

## 2024-08-29 VITALS
BODY MASS INDEX: 38.25 KG/M2 | OXYGEN SATURATION: 95 % | HEART RATE: 94 BPM | DIASTOLIC BLOOD PRESSURE: 74 MMHG | WEIGHT: 259 LBS | SYSTOLIC BLOOD PRESSURE: 133 MMHG

## 2024-08-29 DIAGNOSIS — Z17.0 MALIGNANT NEOPLASM OF UPPER-OUTER QUADRANT OF LEFT BREAST IN FEMALE, ESTROGEN RECEPTOR POSITIVE (H): ICD-10-CM

## 2024-08-29 DIAGNOSIS — C50.412 MALIGNANT NEOPLASM OF UPPER-OUTER QUADRANT OF LEFT BREAST IN FEMALE, ESTROGEN RECEPTOR POSITIVE (H): ICD-10-CM

## 2024-08-29 DIAGNOSIS — C50.919 MALIGNANT NEOPLASM OF FEMALE BREAST, UNSPECIFIED ESTROGEN RECEPTOR STATUS, UNSPECIFIED LATERALITY, UNSPECIFIED SITE OF BREAST (H): ICD-10-CM

## 2024-08-29 PROCEDURE — 99205 OFFICE O/P NEW HI 60 MIN: CPT | Performed by: RADIOLOGY

## 2024-08-29 PROCEDURE — G0463 HOSPITAL OUTPT CLINIC VISIT: HCPCS | Performed by: RADIOLOGY

## 2024-08-29 PROCEDURE — 99417 PROLNG OP E/M EACH 15 MIN: CPT | Performed by: RADIOLOGY

## 2024-08-29 RX ORDER — LORAZEPAM 1 MG/1
1 TABLET ORAL EVERY 6 HOURS PRN
Qty: 30 TABLET | Refills: 1 | Status: SHIPPED | OUTPATIENT
Start: 2024-08-29 | End: 2024-09-13

## 2024-08-29 RX ORDER — PEN NEEDLE, DIABETIC 31 GX5/16"
NEEDLE, DISPOSABLE MISCELLANEOUS
COMMUNITY
Start: 2024-08-24

## 2024-08-29 NOTE — LETTER
8/29/2024      Eliezer Izquierdo  [address] [facility] [address]      Dear Colleague,    Thank you for referring your patient, Eliezer Izquierdo, to the [facility] RADIATION ONCOLOGY. Please see a copy of my visit note below.    ATTENDING NOTE:    DIAGNOSIS: Invasive ductal carcinoma of the left breast, cT2N1, s/p neoadjuvant chemotherapy, [id number](sn)      HISTORY OF PRESENT ILLNESS: Ms. Eliezer Izquierdo is a 40 yo female with a locally advanced left breast cancer    She self palpated a mass in her breast since 12/2023.     Diagnostic mammogram on 1/29/2024 showed a 2 cm spiculated lesion at 2:00, posterior 3rd of the left breast, upper outer quadrant. No mammographic correlation of the palpable lump in the right breast.   US on 2/9/2024 confirmed an irregular shadowing solid hypovascular mass at 2:00, 10 cm from the nipple, measuring 2.5 x 2.5 x 2.0 cm. US guided biopsy showed invasive ductal carcinoma, [place] grade 1, no angiolymphatic invasion. DCIS was present, nuclear grade 1. Invasive carcinoma was ER 97%, ME 99%, HER2 2+,  FISH negative Ki-67 24%  US of the left axilla revealed at least 3 morphologically abnormal lymph nodes. US bipsy of a left axillary LN on 2/22/2024 revealed metastatic breast carcinoma, almost entirely effacing lymph node, ER 95%, ME 95%, HER2 2+, Ki-67 24%.   She was set up to have a PET/CT but this was canceled due to elevated glucose, although patient thought that it was due to insurance denial.  CT of the abdomen/pelvis on 3/1/2024 showed steatosis but no convincing evidence of distant metastases. Breast Action Panel showed no pathogenic mutation or VUS.     Eliezer established care with Dr. Ford and Dr. Nolan. Exam showed 3 cm mobile mass in upper outer left breast, no palpable adenopathy. She was not eligible for I-SPY-2 due to A1c greater than 10. Her Mammaprint came back high risk. She was recommended neoadjuvant AC followed by Taxol.     US on  5/10/2024 after 4 cycles of AC showed decreased size of the lesion to 1.8 x 2.0 x 1.9 cm.     Unfortunately, Eliezer did not tolerate Taxol due to debilitating neuropathy. Thus Taxol was held after 5 weekly cycles. US on 6/24/2024 showed the mass to measure 1.4 x1.3 x 0.9 cm.     Given difficulty with chemo and small likelihood of achieving pCR with neoadjuvant therapy, Eliezer was recommended to proceed with surgery. US of the axilla on 7/12/2024 showed the the biopsied lymph node continue to demonstrate diffuse cortical thickening but decreased from 2.8 cm to 1.6 cm. Other lymph nodes appeared smaller. Her axillary LN were not palpable. Dr. Ford recommended lumpectomy and RFID-tagged SLN evaluation of the axilla.     On 7/24/2024, she underwent left RFID seed-localized segmental mastectomy and RFID seed-localized axillary SLN mapping and biopsy.   Pathology showed   1) Left breast lumpectomy: residual invasive ductal carcinoma, [place] grade 2, 2.2 cm in greatest dimension. LVSI was present. ER 95%, %, HER2 1+, Ki-67 25%; No definite treatment response. Associated DCIS was high nuclear grade, cribriform type with focal central necrosis, 1.5 cm in greatest extent. Closest margin for the invasive component was 4 mm posterior, for DCIS was also 4 mm posterior.   2) Left axillary LN: RFID tagged specimen was not blue or radioactive, contained 3 LN, all positive, 1.8 cm in greatest dimension with 2 mm GEORGIE. A LN neither blue nor radioactive had positive carcinoma, 5 mm, no GEORGIE.  A third SLN was blue and radioactive. This was negative. Thus a total of 4 our of 5 axillary nodes positive. Axillary LN was HER2 2+, FISH negative (group 2)  Stage [id number](sn).     Given the surprise finding of 4 out of 5 positive lymph nodes, options of completion dissection vs. adjuvant radiation therapy for axilla were discussed. She was deemed high risk for lymphedema due diabetes and obesity. The plan is to evaulate the axilla  with diagnostic CT and lean towards radiation therapy as opposed to further surgery.     Eliezer saw Dr. Nolan on . She was recommended ovarian suppression with AI plus Ribociclib per MONY trial. PET/CT was also ordered to rule out metastatic disease. Eliezer was supposed to have a PET/CT today, but due to preauthorization mandated by insurance, her PET/CT is now rescheduled to .     On interview, she states that she is healing well from recent surgery. Her left breast was smaller to begin with and there has been more asymmetry since the surgery; however, she is not bothered by it. She is scheduled to see Gyn for oophorectomy at [facility] next week. Of note, she continues to smoke cigarette daily.       OTHER RELEVANT HISTORIES:   PMH is significant for hysterectomy at age 21 (ovaries intact), right foot melanoma, type 2 DM, Hgb A1c 11.1 in 2024, s/p cholecystectomy, s/p , s/p knee surgery; chronic pain managed by palliative care.     FH significant for paternal great grandfather with lung cancer, mother with melanoma, mother with breast cancer, paternal grandmother with possible uterine or ovarian cancer.     Smokes 10 cigarettes per day  Does not currently work.   Son Homero   Has a fiance named Saul, live in [place], [place], which is 2 hours from the [facility] and 40 minutes from [facility].   Avid reader    EXAM:   No acute distress  Axilla: well healed SLN Bx scar, no palpable adenopathy  Neck: no palpable cervical adenopathy  Breasts: left breast smaller than the right. Well healed lumpectomy scar in the outer aspect. Palpable seroma. No erythema or edema.       KEY IMAGING:        ASSESSMENT/PLAN:   In summary, Eliezer is a 38 yo female with a cT2N1 invasive ductal carcinoma of the left breast, grade 2, ER+/AZ+.HER2 nonamplifed. Her initial US showed at least 3 morphologically suspicious axillary lymph nodes. She is status post AC and Taxol with the latter stopped early due to neuropathy.  Her surgical pathology showed little response in the primary, with tumor measuring 2.2 cm, with LVSI. Both DCIS and invasive margins were 4 mm. She was unfortunately found to have more axillary disease than initially anticipated. Interestingly, the one SLN had no tumor, but 4 other nodes which were neither blue nor radioactive (i.e., non-SLN) showed metastatic tumor, with the largest measuring 1.8 cm with 2 mm GEORGIE, [id number](sn). Her systemic staging PET/CT is pending.     We discussed that with the finding of 4 involved nodes in the axilla with all being macrometastases, the standard of care is to proceed with a completion axillary farhan dissection. However, dissection carries a much higher risk of symptomatic lymphedema, which will negatively impact her QOL. Her lymphedema risk is further increased by her obesity and ongoing smoking. We discussed that we may potentially forgo dissection if her imaging does not show any grossly abnormal lymph nodes. In this case, we would use radiation therapy to sterilize any microscopic residual farhan disease. This approach does not reflect the current standard of care, but is reasonable, balancing the risks and benefits of dissection plus radiation vs. radiation alone following limited axillary sampling. lEiezer is agreeable to the above plan. We placed a referral for lymphedema therapy.     Regardless, Eliezer will require adjuvant radiation therapy to the whole breast and farhan region. She lives in [place], [place] and prefers to be treated at [facility] in [place]. Once the treatment plan is finalized, I will reach out to Dr. Velasquez or Dr. Kern at [facility]. She will receive excellent care at [facility].    Lastly, I counseled her on smoking cessation. This will help lessen radiation dermatitis. She acknowledged the importance of cutting down on cigarettes but it is unclear if she is mentally ready to quit at this time.          Baudilio Fan MD/PhD  Radiation Oncology  [phone]  (pager)        I reviewed patient's chart, internal/external medical records, imaging studies (including actual images), labs and pathology reports.  I interviewed and counseled the patient face to face.  I additionally discussed the case with patient's referring physicians and care team.      80 minutes were spent on the date of the encounter doing chart review, history and exam, documentation and further activities as noted above.           Baudilio Fan MD        Department of Radiation Oncology  [address] [address]  [phone]       Consultation Note    Name: Eliezer Izquierdo MRN: [id number]   : 1985   Date of Service: 2024  Referring: Dr. Nolan     Diagnosis: Invasive Ductal Carcinoma of the Left Breast  Stage: [id number](sn)Mx    History of Present Illness   Ms. Izquierdo is a 39 year old female with a stage Ib invasive ductal carcinoma of the left breast who presents to the clinic for initial evaluation to discuss the role of adjuvant radiation in treatment of her disease.    In 2023, Ms. Izquierdo noticed a palpable lump in her left breast and on 2024, she underwent a diagnostic mammogram which revealed a 2 cm spiculated lesion in the upper outer quadrant of the left breast at the 2 o'clock position.  She underwent an ultrasound 2024 which revealed a 2.5 x 2.5 x 2.0 cm cm lesion at the 2 o'clock position of the left breast corresponding with mammographic finding.  Ultrasound-guided core biopsy was performed the same day which revealed invasive ductal carcinoma, [place] grade 1 with associated DCIS, low nuclear grade with cribriform and solid types.  Invasive carcinoma was ER 95%, %, KI-67 = 25%. HER2 2+. (HER2 negative by FISH)    On 2/15/2024 she underwent left axillary ultrasound which revealed 3 morphologically abnormal lymph nodes.  She then proceeded to a left axillary lymph node biopsy on 2024  which was positive for metastatic breast carcinoma effacing almost the entire lymph node.    CT scan of the abdomen and pelvis performed on 3/01/2024 to evaluate elevated LFTS. This scan showed some incidental findings of hepatic steatosis and some enlarged upper retroperitoneal/juanjo hepatis lymph nodes but no evidence of distant metastatic disease.    She established care with Dr. Nolan. She had a Mammaprint test which returned as high risk.  He originally felt she could be a candidate for the ISPY trial, however her hemoglobin A1c excluded her from the trial.  She was therefore started on neoadjuvant chemotherapy with plans for 4 cycles of AC followed by 12 weeks of paclitaxel.    Follow-up ultrasound on 5/10/2024 of the left breast mass revealed good response to AC with mass shrinking to 1.8 x 2.0 x 1.9 cm.  She completed 5 total weeks of paclitaxel with repeat ultrasound showing continued response to the tumor, however she began to experience pretty severe side effects including neuropathy, fatigue, and worsening of chronic pain.  Taxol was discontinued.    She moved forward with left RF ID seed localized segmental mastectomy with seed localized axillary sentinel lymph node mapping and biopsy on 7/24/2024 with Dr. Ford.  Pathology showed invasive ductal carcinoma, [place] grade 2, 22 mm in greatest dimension with high-grade DCIS measuring 15 mm in greatest extent.  Closest margins for invasive carcinoma or posterior 4 mm and superior to 8 mm.  LVSI present. A total of 5 lymph nodes were excised with 4 positive for malignancy. 1.8cm in greatest dimension with 2mm of GEORGIE.     Following surgery, she established with palliative care given longstanding chronic pain as well as side effects from chemotherapy. At her postop follow up with Dr. Nolan, they have made plans for a BSO through OB/Gyn and the initiation of an AI/Ribociclib to begin after radiation.     She presents to the clinic with her clifton Hughes today.   "On interview, she reports overall healing well postsurgery.  She was unable to have her PET/CT performed today due to insurance prior authorization issues and this has been rescheduled to September 12.  She denies any new concerning symptoms aside from those stated in prior medical oncology notes.    CHEMOTHERAPY HISTORY:   Neoadjuvant ddAC initiated 3/15/2024 through 4/26/2024.   Weekly paclitaxel 5/10/2026-.6/14/2026.  It was then discontinued due to crippling side effects.   PACEMAKER: No   PREGNANCY STATUS: Not pregnant  PAST RADIATION THERAPY HISTORY: None    PAST MEDICAL HISTORY:  Past Medical History:   Diagnosis Date     Breast cancer (H)      Carrier of high risk cancer gene mutation - MC1R increased risk variants 04/22/2024    Two MC1R \"increased risk\" variants - c.451C>T and c.478C>T  Molecular Diagnostics Laboratory 3/26/2024       Hypercholesteraemia      Irritable bowel      Migraines        PAST SURGICAL HISTORY:  Past Surgical History:   Procedure Laterality Date     IR CHEST PORT PLACEMENT > 5 YRS OF AGE  03/11/2024     LEFT Radiofrequency Identification Seed-Localized Segmental Mastectomy (=\"Lumpectomy\"), LEFT Radiofrequency Identification Seed-Localized Axillary White Owl Lymph Node Biopsy - Left  07/24/2024     LUMPECTOMY BREAST, SEED LOCALIZATION, SENTINEL NODE Left 7/24/2024    Procedure: LEFT Radiofrequency Identification Seed-Localized Segmental Mastectomy (=\"Lumpectomy\"), LEFT Radiofrequency Identification Seed-Localized Axillary White Owl Lymph Node Biopsy;  Surgeon: Albina Ford MD;  Location: [facility] OR     REMOVE PORT VASCULAR ACCESS Right 7/24/2024    Procedure: RIGHT vascular access port removal;  Surgeon: Albina Ford MD;  Location: [facility] OR       ALLERGIES:  Allergies as of 08/29/2024 - Reviewed 08/16/2024   Allergen Reaction Noted     Ubrogepant Muscle Pain (Myalgia) 01/17/2024     Azithromycin Hives 03/11/2024     Doxycycline Hives 09/21/2019     Erythromycin Hives " 03/11/2024     Estrogens  01/01/2015     Levofloxacin Hives 03/11/2024     Maxalt [rizatriptan]  01/01/2015     Oxycodone-acetaminophen  07/10/2009     Penicillins  01/01/2015     Propranolol hcl Headache 03/11/2024     Sulfa antibiotics  01/01/2015     Sumatriptan Muscle Pain (Myalgia) 03/11/2024     Zofran [ondansetron] Muscle Pain (Myalgia) 03/11/2024     Hydromorphone Anxiety and Itching 07/11/2011     Ondansetron hcl Anxiety, Other (See Comments), and Palpitations 03/08/2010     Toradol [ketorolac] Anxiety 01/01/2015       MEDICATIONS:  Current Outpatient Medications   Medication Sig Dispense Refill     insulin glargine (LANTUS PEN) 100 UNIT/ML pen Inject 35 Units subcutaneously at bedtime.       TRUE COMFORT PRO PEN NEEDLES 31G X 5 MM miscellaneous        acetaminophen (TYLENOL) 500 MG tablet Take 2 tablets (1,000 mg) by mouth every 8 hours as needed for mild pain       albuterol (PROAIR HFA/PROVENTIL HFA/VENTOLIN HFA) 108 (90 Base) MCG/ACT inhaler Inhale 2 puffs into the lungs every 4 hours as needed       amitriptyline (ELAVIL) 10 MG tablet Take 1-2 tablets (10-20 mg) by mouth every morning AND 5 tablets (50 mg) at bedtime.       amitriptyline (ELAVIL) 25 MG tablet Take 2-3 tablets (50-75 mg) by mouth at bedtime 90 tablet 2     amLODIPine (NORVASC) 10 MG tablet Take 10 mg by mouth at bedtime       atorvastatin (LIPITOR) 10 MG tablet Take 10 mg by mouth at bedtime       Blood Glucose Monitoring Suppl (ONETOUCH VERIO FLEX SYSTEM) w/Device KIT        buprenorphine HCl-naloxone HCl (SUBOXONE) 8-2 MG per film Place 1 Film under the tongue every 6 hours. Do not start before August 23, 2024. 120 Film 0     cetirizine (ZYRTEC) 10 MG tablet Take 5 mg by mouth as needed       EPINEPHrine (ANY BX GENERIC EQUIV) 0.3 MG/0.3ML injection 2-pack        esomeprazole (NEXIUM) 20 MG DR capsule Take 20 mg by mouth as needed       fluticasone-salmeterol (ADVAIR HFA) 115-21 MCG/ACT inhaler Inhale 2 puffs into the lungs 2 times  daily       hydrochlorothiazide (HYDRODIURIL) 25 MG tablet Take 25 mg by mouth as needed       HYDROcodone-acetaminophen (NORCO)  MG per tablet Take 0.5-1 tablets by mouth every 4 hours as needed for severe pain. 84 tablet 0     ibuprofen (ADVIL/MOTRIN) 200 MG tablet Take 200 mg by mouth       JANUVIA 100 MG tablet Take 100 mg by mouth at bedtime       LORazepam (ATIVAN) 1 MG tablet Take 1 tablet (1 mg) by mouth every 6 hours as needed for anxiety, nausea or sleep. 30 tablet 1     methocarbamol (ROBAXIN) 500 MG tablet Take 3 tablets (1,500 mg) by mouth 3 times daily as needed for muscle spasms 270 tablet 1     naloxone (NARCAN) 4 MG/0.1ML nasal spray Spray 4 mg into one nostril alternating nostrils as needed for opioid reversal every 2-3 minutes until assistance arrives       OLANZapine (ZYPREXA) 2.5 MG tablet Take 1-2 tablets (2.5-5 mg) by mouth 3 times daily as needed (Nausea or anxiety) 150 tablet 2     ONETOUCH VERIO IQ test strip        prochlorperazine (COMPAZINE) 10 MG tablet Take 1 tablet (10 mg) by mouth every 6 hours as needed for nausea or vomiting 30 tablet 1     Prochlorperazine Maleate (COMPAZINE PO) Take 10 mg by mouth 3 times daily as needed for nausea       Semaglutide, 1 MG/DOSE, (OZEMPIC) 4 MG/3ML pen Inject 1 mg subcutaneously       urea (GORMEL) 20 % external cream Apply topically as needed (Apply to feet twice daily) 480 g 1        FAMILY HISTORY:  Family History   Problem Relation Age of Onset     Breast Cancer Mother      Genitourinary Problems Mother         renal disease - minimal change, sponge kidney     Cancer Paternal Grandmother         GYN cancer     Cancer Paternal Grandfather         Stomach       SOCIAL HISTORY:  Social History     Socioeconomic History     Marital status: Single     Spouse name: Not on file     Number of children: Not on file     Years of education: Not on file     Highest education level: Not on file   Occupational History     Not on file   Tobacco Use      Smoking status: Every Day     Types: Cigarettes     Passive exposure: Current     Smokeless tobacco: Never     Tobacco comments:     Smokes about 4 or less cigarette's a day. Trying to quit.   Vaping Use     Vaping status: Never Used   Substance and Sexual Activity     Alcohol use: No     Drug use: No     Sexual activity: Not on file   Other Topics Concern     Not on file   Social History Narrative    Excessive Diet Coke intake - 15 cans/day     Social Determinants of Health     Financial Resource Strain: Not on File (2023)    Received from [facility] [employer]    Financial Resource Strain      Financial Resource Strain: 0   Food Insecurity: Not on File (2023)    Received from [facility]    Food Insecurity      Food: 0   Transportation Needs: Not on File (2023)    Received from [email or web address]    Transportation Needs      Transportation: 0   Physical Activity: Not on File (2023)    Received from [email or web address]    Physical Activity      Physical Activity: 0   Stress: Not on File (2023)    Received from [email or web address]    Stress      Stress: 0   Social Connections: Not on File (2023)    Received from [employer]    Social Connections      Social Connections and Isolation: 0   Interpersonal Safety: Not on file   Housing Stability: Not on File (2023)    Received from [employer]    Housing Stability      [place]         Review of Systems   A 12-point review of systems was performed. Pertinent findings are noted in the HPI.    Physical Exam   ECOG Status: 1    Patient was set up in the treatment position as part of her exam and is able to tolerate appropriate body positioning for treatment.    VITALS: /74   Pulse 94   Wt 117.5 kg (259 lb)   SpO2 95%   BMI 38.25 kg/m    GEN: Appears well, alert, oriented, and in NAD  HEENT: EOMI, normal conjunctiva, MMM  CV: Warm and well-perfused  BREAST: Appropriate abduction of arm.  Incisions clean and dry on the left breast and axilla.  No  lymphadenopathy palpated.  Moderate sized seroma inferior to lumpectomy scar.  RESP: Breathing comfortably on room air  SKIN: Normal color and turgor  NEURO: No focal deficits, CN 3-12 grossly intact  PSYCH: Appropriate mood and affect  Relevant lymphedema risk: Moderate  Imaging/Path/Labs   Imaging: per hpi   Diagnostic Mammogram 2/9/2024       Path: per hpi     Labs: reviewed    Assessment    Ms. Izquierdo is a 39 year old female with gbC2wV0r(sn) invasive ductal carcinoma of the left breast. She is s/p 3 cycles of neoadjuvant ddAC, 5 weeks of Paclitaxel, and Left segmental mastectomy with SNL biopsy.     Plan   In depth discussion was had with Ms. Izquierdo today regarding the role of adjuvant radiation in treatment of her invasive ductal carcinoma. In summary, she is certainly an individual who would benefit from adjuvant radiation given her extent of residual disease after neoadjuvant chemo. She has been unfortunately suffering from chronic pain that appears to have been exacerbated by her current treatments as well as stress of dealing with a cancer diagnosis.  Given diffuse pain, Dr. Nolan has ordered a PET/CT to rule out the presence of stage IV disease.  This was originally planned for today but unfortunately was delayed due to prior authorization issues.  We feel this scan is very reasonable and may assist in evaluating further lymph node involvement.    Given Ms. Izquierdo had 4/5 positive lymph nodes in the axilla, we will await PET/CT results in order to monitor disease burden within the axilla.  If the PET/CT shows evidence of remaining metastatic lymph nodes after the surgery, she may benefit from additional conversation with Dr. Ford regarding axillary dissection.  Dr. Ford was hesitant to perform further dissection prior to imaging given her heightened risks of lymphedema.  If PET/CT shows no evidence of remaining metastatic lymph nodes within the axilla, we will plan to proceed with adjuvant radiation  "at that time.  Regardless if an additional surgery is warranted, we will still recommend adjuvant radiation to the left breast, levels I-III, IMN, and SCV nodes.     She has an upcoming appointment with OB/GYN to discuss oophorectomy on September 5.  She prefers to have her radiation treatment done at [facility] given it is over an hour closer to her home.  We feel this is very reasonable and will discuss with their providers.    Relevant lymphedema risk: Moderate, risk is increased if decision is made to move forward with axillary dissection.  Regardless she would likely benefit from a referral to lymphedema PT, I will place this referral today.  She also prefers to see PT at [facility].    Risks and benefits of radiation to the left breast and comprehensive lymph nodes was discussed with Ms. Izquierdo, she expressed understanding of these risks and wishes to proceed with treatment.    Patient was seen and discussed with Dr. Fan.    Santhosh Hearn MD  PGY-2  Radiation Oncology      Oncology Rooming Note    August 29, 2024 10:24 AM   Eliezer Izquierdo is a 39 year old female who presents for:    Chief Complaint   Patient presents with     Breast Cancer     Radiation consult     Initial Vitals: /74   Pulse 94   Wt 117.5 kg (259 lb)   SpO2 95%   BMI 38.25 kg/m   Estimated body mass index is 38.25 kg/m  as calculated from the following:    Height as of 8/13/24: 1.753 m (5' 9\").    Weight as of this encounter: 117.5 kg (259 lb). Body surface area is 2.39 meters squared.  Data Unavailable Comment: Data Unavailable   No LMP recorded. Patient has had a hysterectomy.  Allergies reviewed: Yes  Medications reviewed: Yes    Medications: Medication refills not needed today.  Pharmacy name entered into EPIC:    [facility] [place] [place] - [address] [facility] [place], [place] - [address] [place] [address] [phone]  [facility] [place] [facility] [place], [place] - " [address] [place] [address]    Frailty Screening:   Is the patient here for a new oncology consult visit in cancer care? 2. No      Clinical concerns: Here for consultConsiderations for radiation treatment   Pregnancy status: Female with hysterectomy   Implanted Cardiac Devices: No   Any previous radiation therapy: No      Lurdes Little RN                INITIAL PATIENT ASSESSMENT    Diagnosis: Breast cancer, 7/24/24 Lt lumpectomy. Good ROM.    Prior radiation therapy: None    Prior chemotherapy: None    Prior hormonal therapy: Neoadjuvant chemo then surgery, details in chart    Pain Eval:  See toxicity screen    Psychosocial  Living arrangements: Significant other and child  Fall Risk: independent   referral needs: Not needed    Advanced Directive: No  Implantable Cardiac Device? No    Onset of menarche: @ age 11  LMP: No LMP recorded. Patient has had a hysterectomy.  Onset of menopause: Hysterectomy at 22, ovaries were left in.  Abnormal vaginal bleeding/discharge: No  Are you pregnant? No  Reproductive note: 1 child      Again, thank you for allowing me to participate in the care of your patient.        Sincerely,        Baudilio Fan MD

## 2024-08-29 NOTE — PROGRESS NOTES
INITIAL PATIENT ASSESSMENT    Diagnosis: Breast cancer, 7/24/24 Lt lumpectomy. Good ROM.    Prior radiation therapy: None    Prior chemotherapy: None    Prior hormonal therapy: Neoadjuvant chemo then surgery, details in chart    Pain Eval:  See toxicity screen    Psychosocial  Living arrangements: Significant other and child  Fall Risk: independent   referral needs: Not needed    Advanced Directive: No  Implantable Cardiac Device? No    Onset of menarche: @ age 11  LMP: No LMP recorded. Patient has had a hysterectomy.  Onset of menopause: Hysterectomy at 22, ovaries were left in.  Abnormal vaginal bleeding/discharge: No  Are you pregnant? No  Reproductive note: 1 child

## 2024-08-29 NOTE — TELEPHONE ENCOUNTER
Received [employer] message from patient requesting refill of lorazepam.     Last refill: 8/13/2024  Last office visit: 8/13/2024  Scheduled for follow up 9/26/2022 for 8/13/2024    Will route request to MD/ for review.     Reviewed [place]  [place].

## 2024-08-29 NOTE — PROGRESS NOTES
"Oncology Rooming Note    August 29, 2024 10:24 AM   Eliezer Izquierdo is a 39 year old female who presents for:    Chief Complaint   Patient presents with    Breast Cancer     Radiation consult     Initial Vitals: /74   Pulse 94   Wt 117.5 kg (259 lb)   SpO2 95%   BMI 38.25 kg/m   Estimated body mass index is 38.25 kg/m  as calculated from the following:    Height as of 8/13/24: 1.753 m (5' 9\").    Weight as of this encounter: 117.5 kg (259 lb). Body surface area is 2.39 meters squared.  Data Unavailable Comment: Data Unavailable   No LMP recorded. Patient has had a hysterectomy.  Allergies reviewed: Yes  Medications reviewed: Yes    Medications: Medication refills not needed today.  Pharmacy name entered into EPIC:    [facility] - [place], [place] - [address] [facility] [place], [place] - [address] [facility] [phone]  [facility] [place], [place] - [address] [place] [address]    Frailty Screening:   Is the patient here for a new oncology consult visit in cancer care? 2. No      Clinical concerns: Here for consultConsiderations for radiation treatment   Pregnancy status: Female with hysterectomy   Implanted Cardiac Devices: No   Any previous radiation therapy: No      Lurdes Little RN              "

## 2024-09-06 ENCOUNTER — MYC REFILL (OUTPATIENT)
Dept: PALLIATIVE CARE | Facility: CLINIC | Age: 39
End: 2024-09-06
Payer: COMMERCIAL

## 2024-09-06 ENCOUNTER — PATIENT OUTREACH (OUTPATIENT)
Dept: ONCOLOGY | Facility: CLINIC | Age: 39
End: 2024-09-06
Payer: COMMERCIAL

## 2024-09-06 DIAGNOSIS — C50.412 MALIGNANT NEOPLASM OF UPPER-OUTER QUADRANT OF LEFT BREAST IN FEMALE, ESTROGEN RECEPTOR POSITIVE (H): Primary | ICD-10-CM

## 2024-09-06 DIAGNOSIS — T45.1X5A PERIPHERAL NEUROPATHY DUE TO CHEMOTHERAPY (H): ICD-10-CM

## 2024-09-06 DIAGNOSIS — Z17.0 MALIGNANT NEOPLASM OF UPPER-OUTER QUADRANT OF LEFT BREAST IN FEMALE, ESTROGEN RECEPTOR POSITIVE (H): Primary | ICD-10-CM

## 2024-09-06 DIAGNOSIS — C50.412 MALIGNANT NEOPLASM OF UPPER-OUTER QUADRANT OF LEFT BREAST IN FEMALE, ESTROGEN RECEPTOR POSITIVE (H): ICD-10-CM

## 2024-09-06 DIAGNOSIS — G62.0 PERIPHERAL NEUROPATHY DUE TO CHEMOTHERAPY (H): ICD-10-CM

## 2024-09-06 DIAGNOSIS — G43.719 INTRACTABLE CHRONIC MIGRAINE WITHOUT AURA AND WITHOUT STATUS MIGRAINOSUS: Primary | ICD-10-CM

## 2024-09-06 DIAGNOSIS — Z17.0 MALIGNANT NEOPLASM OF UPPER-OUTER QUADRANT OF LEFT BREAST IN FEMALE, ESTROGEN RECEPTOR POSITIVE (H): ICD-10-CM

## 2024-09-06 RX ORDER — PREGABALIN 200 MG/1
200 CAPSULE ORAL AT BEDTIME
Qty: 30 CAPSULE | Refills: 1 | Status: SHIPPED | OUTPATIENT
Start: 2024-09-06

## 2024-09-06 RX ORDER — HYDROCODONE BITARTRATE AND ACETAMINOPHEN 10; 325 MG/1; MG/1
.5-1 TABLET ORAL EVERY 4 HOURS PRN
Qty: 84 TABLET | Refills: 0 | Status: SHIPPED | OUTPATIENT
Start: 2024-09-09 | End: 2024-09-18

## 2024-09-06 NOTE — TELEPHONE ENCOUNTER
Received [email or web address] message from patient requesting refill of  pregabalin. Pt stopped taking this, however noticed worsening migraines since she's been off it. Dr. Rodriguez would like her to resume it once daily at bedtime .     Last refill: 5/22/24  Last office visit: 8/13/24  Scheduled for follow up 9/26/24     Will route request to MD/ for review.     Reviewed [place]  Report.

## 2024-09-06 NOTE — PROGRESS NOTES
"[facility]: Cancer Care                                                                                          Patient called and has decided to hold off getting her ovaries removed for now. She stated \" I would like to get on the shot to turn off my ovaries\". RN sent Dr. Nolan a message regarding her wishes for Zoladex. RN explained once the orders are placed, insurance will be verified and scheduling will call. She is hopeful to have it in [place].     Ana BRAY, RN, OCN  Care Coordinator  [facility]   "

## 2024-09-06 NOTE — TELEPHONE ENCOUNTER
Received [employer] message from patient requesting refill of  Norco .     Last refill: 8/26/24  Last office visit: 8/13/24  Scheduled for follow up 9/26/24     Will route request to MD/ for review.     Reviewed [place]  [place].

## 2024-09-12 ENCOUNTER — INFUSION THERAPY VISIT (OUTPATIENT)
Dept: INFUSION THERAPY | Facility: CLINIC | Age: 39
End: 2024-09-12
Attending: INTERNAL MEDICINE
Payer: COMMERCIAL

## 2024-09-12 VITALS
SYSTOLIC BLOOD PRESSURE: 139 MMHG | DIASTOLIC BLOOD PRESSURE: 86 MMHG | HEART RATE: 91 BPM | TEMPERATURE: 98.9 F | RESPIRATION RATE: 16 BRPM

## 2024-09-12 DIAGNOSIS — Z17.0 MALIGNANT NEOPLASM OF UPPER-OUTER QUADRANT OF LEFT BREAST IN FEMALE, ESTROGEN RECEPTOR POSITIVE (H): Primary | ICD-10-CM

## 2024-09-12 DIAGNOSIS — C50.412 MALIGNANT NEOPLASM OF UPPER-OUTER QUADRANT OF LEFT BREAST IN FEMALE, ESTROGEN RECEPTOR POSITIVE (H): Primary | ICD-10-CM

## 2024-09-12 PROCEDURE — 250N000011 HC RX IP 250 OP 636: Performed by: INTERNAL MEDICINE

## 2024-09-12 PROCEDURE — 96402 CHEMO HORMON ANTINEOPL SQ/IM: CPT

## 2024-09-12 RX ADMIN — GOSERELIN ACETATE 10.8 MG: 10.8 IMPLANT SUBCUTANEOUS at 15:06

## 2024-09-12 NOTE — PROGRESS NOTES
Infusion Nursing Note:  Eliezer VENKATA Timbo presents today for Zoladax.    Patient seen by provider today: No   present during visit today: Not Applicable.    Note: N/A.      Intravenous Access:  No Intravenous access/labs at this visit.    Treatment Conditions:  Not Applicable.      Post Infusion Assessment:  Patient tolerated injection in lower left abd without incident.  Site patent and intact, free from redness, edema or discomfort.  No evidence of extravasations.       Discharge Plan:   Discharge instructions reviewed with: Patient.  Patient discharged in stable condition accompanied by: self and son.  Departure Mode: Ambulatory.      Leatha Taylor RN

## 2024-09-13 ENCOUNTER — MYC REFILL (OUTPATIENT)
Dept: PALLIATIVE CARE | Facility: CLINIC | Age: 39
End: 2024-09-13
Payer: COMMERCIAL

## 2024-09-13 ENCOUNTER — HOSPITAL ENCOUNTER (OUTPATIENT)
Dept: PET IMAGING | Facility: CLINIC | Age: 39
Discharge: HOME | End: 2024-09-13
Attending: INTERNAL MEDICINE | Admitting: INTERNAL MEDICINE
Payer: COMMERCIAL

## 2024-09-13 DIAGNOSIS — C50.919 MALIGNANT NEOPLASM OF FEMALE BREAST, UNSPECIFIED ESTROGEN RECEPTOR STATUS, UNSPECIFIED LATERALITY, UNSPECIFIED SITE OF BREAST (H): ICD-10-CM

## 2024-09-13 DIAGNOSIS — Z17.0 MALIGNANT NEOPLASM OF UPPER-OUTER QUADRANT OF LEFT BREAST IN FEMALE, ESTROGEN RECEPTOR POSITIVE (H): ICD-10-CM

## 2024-09-13 DIAGNOSIS — C50.412 MALIGNANT NEOPLASM OF UPPER-OUTER QUADRANT OF LEFT BREAST IN FEMALE, ESTROGEN RECEPTOR POSITIVE (H): ICD-10-CM

## 2024-09-13 PROCEDURE — A9552 F18 FDG: HCPCS | Performed by: INTERNAL MEDICINE

## 2024-09-13 PROCEDURE — 78816 PET IMAGE W/CT FULL BODY: CPT | Mod: PI

## 2024-09-13 PROCEDURE — 343N000001 HC RX 343: Performed by: INTERNAL MEDICINE

## 2024-09-13 PROCEDURE — 78816 PET IMAGE W/CT FULL BODY: CPT | Mod: 26 | Performed by: STUDENT IN AN ORGANIZED HEALTH CARE EDUCATION/TRAINING PROGRAM

## 2024-09-13 RX ORDER — FLUDEOXYGLUCOSE F 18 200 MCI/ML
10-18 INJECTION, SOLUTION INTRAVENOUS ONCE
Status: COMPLETED | OUTPATIENT
Start: 2024-09-13 | End: 2024-09-13

## 2024-09-13 RX ORDER — LORAZEPAM 1 MG/1
1 TABLET ORAL EVERY 6 HOURS PRN
Qty: 30 TABLET | Refills: 1 | Status: SHIPPED | OUTPATIENT
Start: 2024-09-13 | End: 2024-09-28

## 2024-09-13 RX ADMIN — FLUDEOXYGLUCOSE F 18 15.55 MILLICURIE: 200 INJECTION, SOLUTION INTRAVENOUS at 10:55

## 2024-09-13 NOTE — TELEPHONE ENCOUNTER
Received [employer] message from patient requesting refill of  lorazepam .     Last refill: 9/6/24  Last office visit: 8/13/24  Scheduled for follow up 9/26/24     Will route request to MD/ for review.     Reviewed [place].

## 2024-09-16 DIAGNOSIS — C50.919 MALIGNANT NEOPLASM OF FEMALE BREAST, UNSPECIFIED ESTROGEN RECEPTOR STATUS, UNSPECIFIED LATERALITY, UNSPECIFIED SITE OF BREAST (H): Primary | ICD-10-CM

## 2024-09-18 ENCOUNTER — MYC REFILL (OUTPATIENT)
Dept: PALLIATIVE CARE | Facility: CLINIC | Age: 39
End: 2024-09-18
Payer: COMMERCIAL

## 2024-09-18 DIAGNOSIS — N64.4 BREAST PAIN: ICD-10-CM

## 2024-09-18 DIAGNOSIS — C50.412 MALIGNANT NEOPLASM OF UPPER-OUTER QUADRANT OF LEFT BREAST IN FEMALE, ESTROGEN RECEPTOR POSITIVE (H): ICD-10-CM

## 2024-09-18 DIAGNOSIS — Z17.0 MALIGNANT NEOPLASM OF UPPER-OUTER QUADRANT OF LEFT BREAST IN FEMALE, ESTROGEN RECEPTOR POSITIVE (H): ICD-10-CM

## 2024-09-18 DIAGNOSIS — F11.90 OPIOID USE DISORDER: ICD-10-CM

## 2024-09-18 DIAGNOSIS — G89.3 CANCER RELATED PAIN: ICD-10-CM

## 2024-09-18 RX ORDER — HYDROCODONE BITARTRATE AND ACETAMINOPHEN 10; 325 MG/1; MG/1
.5-1 TABLET ORAL EVERY 4 HOURS PRN
Qty: 84 TABLET | Refills: 0 | Status: SHIPPED | OUTPATIENT
Start: 2024-09-23 | End: 2024-10-02

## 2024-09-18 RX ORDER — BUPRENORPHINE AND NALOXONE 8; 2 MG/1; MG/1
1 FILM, SOLUBLE BUCCAL; SUBLINGUAL EVERY 6 HOURS
Qty: 120 FILM | Refills: 0 | Status: SHIPPED | OUTPATIENT
Start: 2024-09-18

## 2024-09-18 NOTE — TELEPHONE ENCOUNTER
Received [employer] message from patient requesting refill of  suboxone and norco .     Last refill of Norco: 9/9/24  Last refill of suboxone: 8/21/24 per chart review  Last office visit: 8/13/24  Scheduled for follow up 9/26/24     Will route request to MD/ for review.     Reviewed [place]  Report.

## 2024-09-28 ENCOUNTER — MYC REFILL (OUTPATIENT)
Dept: PALLIATIVE CARE | Facility: CLINIC | Age: 39
End: 2024-09-28
Payer: COMMERCIAL

## 2024-09-28 DIAGNOSIS — Z17.0 MALIGNANT NEOPLASM OF UPPER-OUTER QUADRANT OF LEFT BREAST IN FEMALE, ESTROGEN RECEPTOR POSITIVE (H): ICD-10-CM

## 2024-09-28 DIAGNOSIS — C50.412 MALIGNANT NEOPLASM OF UPPER-OUTER QUADRANT OF LEFT BREAST IN FEMALE, ESTROGEN RECEPTOR POSITIVE (H): ICD-10-CM

## 2024-09-29 ENCOUNTER — MYC REFILL (OUTPATIENT)
Dept: PALLIATIVE CARE | Facility: CLINIC | Age: 39
End: 2024-09-29
Payer: COMMERCIAL

## 2024-09-29 DIAGNOSIS — N64.4 BREAST PAIN: ICD-10-CM

## 2024-09-29 DIAGNOSIS — G89.3 CANCER RELATED PAIN: ICD-10-CM

## 2024-09-29 DIAGNOSIS — C50.412 MALIGNANT NEOPLASM OF UPPER-OUTER QUADRANT OF LEFT BREAST IN FEMALE, ESTROGEN RECEPTOR POSITIVE (H): ICD-10-CM

## 2024-09-29 DIAGNOSIS — Z17.0 MALIGNANT NEOPLASM OF UPPER-OUTER QUADRANT OF LEFT BREAST IN FEMALE, ESTROGEN RECEPTOR POSITIVE (H): ICD-10-CM

## 2024-09-30 RX ORDER — METHOCARBAMOL 500 MG/1
1500 TABLET, FILM COATED ORAL 3 TIMES DAILY PRN
Qty: 270 TABLET | Refills: 1 | Status: SHIPPED | OUTPATIENT
Start: 2024-09-30

## 2024-09-30 RX ORDER — LORAZEPAM 1 MG/1
1 TABLET ORAL EVERY 6 HOURS PRN
Qty: 30 TABLET | Refills: 1 | Status: SHIPPED | OUTPATIENT
Start: 2024-09-30

## 2024-09-30 NOTE — TELEPHONE ENCOUNTER
Received [employer] message from patient requesting refill of  lorazepam .     Last refill: 9/23/24  Last office visit: 8/13/24  Scheduled for follow up 10/31/24     Will route request to MD/ for review.     Reviewed [place].

## 2024-09-30 NOTE — TELEPHONE ENCOUNTER
Received [employer] message from patient requesting refill of  methocarbamol .     Last office visit: 8/13/24  Scheduled for follow up 10/31/24     Will route request to MD/ for review.

## 2024-10-01 RX ORDER — METHOCARBAMOL 500 MG/1
1500 TABLET, FILM COATED ORAL 3 TIMES DAILY PRN
Qty: 270 TABLET | Refills: 1 | OUTPATIENT
Start: 2024-10-01

## 2024-10-02 ENCOUNTER — MYC REFILL (OUTPATIENT)
Dept: PALLIATIVE CARE | Facility: CLINIC | Age: 39
End: 2024-10-02
Payer: COMMERCIAL

## 2024-10-02 DIAGNOSIS — Z17.0 MALIGNANT NEOPLASM OF UPPER-OUTER QUADRANT OF LEFT BREAST IN FEMALE, ESTROGEN RECEPTOR POSITIVE (H): ICD-10-CM

## 2024-10-02 DIAGNOSIS — C50.412 MALIGNANT NEOPLASM OF UPPER-OUTER QUADRANT OF LEFT BREAST IN FEMALE, ESTROGEN RECEPTOR POSITIVE (H): ICD-10-CM

## 2024-10-02 RX ORDER — HYDROCODONE BITARTRATE AND ACETAMINOPHEN 10; 325 MG/1; MG/1
.5-1 TABLET ORAL EVERY 4 HOURS PRN
Qty: 84 TABLET | Refills: 0 | Status: SHIPPED | OUTPATIENT
Start: 2024-10-07 | End: 2024-10-17

## 2024-10-02 NOTE — TELEPHONE ENCOUNTER
Received [employer] message from patient requesting refill of  Norco .     Last refill: 9/23/24  Last office visit: 8/13/24  Scheduled for follow up 10/31/24     Will route request to MD/ for review.     [place]ed [place].

## 2024-10-07 ENCOUNTER — PATIENT OUTREACH (OUTPATIENT)
Dept: ONCOLOGY | Facility: CLINIC | Age: 39
End: 2024-10-07
Payer: COMMERCIAL

## 2024-10-07 DIAGNOSIS — C50.919 MALIGNANT NEOPLASM OF FEMALE BREAST, UNSPECIFIED ESTROGEN RECEPTOR STATUS, UNSPECIFIED LATERALITY, UNSPECIFIED SITE OF BREAST (H): Primary | ICD-10-CM

## 2024-10-07 NOTE — PROGRESS NOTES
[facility]: Cancer Care                                                                                          Patient left message stating she wanted to start AI prior to her visit with Dr. Nolan on 10/21. She is concerned that it has been 2 months since her surgery. RN sent Dr. Nolan a message.     Ana NEWBYN, RN, OCN  Care Coordinator  [facility]

## 2024-10-10 ENCOUNTER — PATIENT OUTREACH (OUTPATIENT)
Dept: ONCOLOGY | Facility: CLINIC | Age: 39
End: 2024-10-10
Payer: COMMERCIAL

## 2024-10-10 DIAGNOSIS — C50.919 MALIGNANT NEOPLASM OF FEMALE BREAST, UNSPECIFIED ESTROGEN RECEPTOR STATUS, UNSPECIFIED LATERALITY, UNSPECIFIED SITE OF BREAST (H): Primary | ICD-10-CM

## 2024-10-10 RX ORDER — LETROZOLE 2.5 MG/1
2.5 TABLET, FILM COATED ORAL DAILY
Qty: 30 TABLET | Refills: 11 | Status: SHIPPED | OUTPATIENT
Start: 2024-10-10 | End: 2024-10-22

## 2024-10-10 NOTE — PROGRESS NOTES
[facility]: Cancer Care                                                                                        RN received a voice message from patient's mom. She was concerned that patient is vomiting, not receiving treatment for her breast cancer, laying in bed and incoherent @ times. RN unable to call mom back d/t not being on the authorized consent to communicate. RN called patient to discuss. RN discussed with palliative team RN. Palliative provider recommends visit to the ED to assess. RN called patient. She was A/O x 3, patient states she lays around because of the pain. RN reminded patient is is getting  treatment which is Zoladex. She is to get MRI for follow up on the PET, after that she is to start radiation. RN advised patient to go to the ED for her vomiting. Patient refused to go today. She said she hadn't vomited for 2 days. She states that ever since chemo, nothing taste good to her. She is getting plenty of fluids and is able to keep them down. She is going to eat some chicken noodle soup, if she vomits she will go to the ED. Patient agrees to get labs tomorrow at [facility] in [place], which is close to her home. RN will place a nutrition consult.      Ana BRAY, RN, OCN  Care Coordinator  [facility]

## 2024-10-12 ENCOUNTER — MYC REFILL (OUTPATIENT)
Dept: PALLIATIVE CARE | Facility: CLINIC | Age: 39
End: 2024-10-12
Payer: COMMERCIAL

## 2024-10-12 DIAGNOSIS — Z17.0 MALIGNANT NEOPLASM OF UPPER-OUTER QUADRANT OF LEFT BREAST IN FEMALE, ESTROGEN RECEPTOR POSITIVE (H): ICD-10-CM

## 2024-10-12 DIAGNOSIS — G89.3 CANCER RELATED PAIN: ICD-10-CM

## 2024-10-12 DIAGNOSIS — C50.412 MALIGNANT NEOPLASM OF UPPER-OUTER QUADRANT OF LEFT BREAST IN FEMALE, ESTROGEN RECEPTOR POSITIVE (H): ICD-10-CM

## 2024-10-12 DIAGNOSIS — F11.90 OPIOID USE DISORDER: ICD-10-CM

## 2024-10-12 DIAGNOSIS — N64.4 BREAST PAIN: ICD-10-CM

## 2024-10-14 ENCOUNTER — MYC REFILL (OUTPATIENT)
Dept: ONCOLOGY | Facility: CLINIC | Age: 39
End: 2024-10-14
Payer: COMMERCIAL

## 2024-10-14 ENCOUNTER — PATIENT OUTREACH (OUTPATIENT)
Dept: ONCOLOGY | Facility: CLINIC | Age: 39
End: 2024-10-14
Payer: COMMERCIAL

## 2024-10-14 DIAGNOSIS — C50.919 MALIGNANT NEOPLASM OF FEMALE BREAST, UNSPECIFIED ESTROGEN RECEPTOR STATUS, UNSPECIFIED LATERALITY, UNSPECIFIED SITE OF BREAST (H): ICD-10-CM

## 2024-10-14 RX ORDER — LORAZEPAM 1 MG/1
1 TABLET ORAL EVERY 6 HOURS PRN
Qty: 30 TABLET | Refills: 1 | Status: SHIPPED | OUTPATIENT
Start: 2024-10-14 | End: 2024-10-25

## 2024-10-14 RX ORDER — BUPRENORPHINE AND NALOXONE 8; 2 MG/1; MG/1
1 FILM, SOLUBLE BUCCAL; SUBLINGUAL EVERY 6 HOURS
Qty: 120 FILM | Refills: 0 | Status: SHIPPED | OUTPATIENT
Start: 2024-10-14 | End: 2024-11-07

## 2024-10-14 NOTE — PROGRESS NOTES
[facility]: Cancer Care                                                                                        RN called to follow up with patient. Patient stated she started vomiting again yesterday and went to the ED. Her labs were okay. ED gave her pain medication, antiemetics and IVF.  Patient still doesn't have a great appetite. Pt feels this might be from starting Letrozole. Patient reported she is feeling better. She will reach out to clinic if needed.       Ana NEWBYN, RN, OCN  Care Coordinator  [facility]

## 2024-10-14 NOTE — TELEPHONE ENCOUNTER
Received [employer] message from patient requesting refill of  Suboxone and lorazepam .     Last refill of suboxone: 9/18/24  Last refill of lorazepam: 10/7/24  Last office visit: 8/13/24  Scheduled for follow up 10/31/24     Will route request to NP for review.     Reviewed [place]  [place].

## 2024-10-15 RX ORDER — PROCHLORPERAZINE MALEATE 10 MG
10 TABLET ORAL EVERY 6 HOURS PRN
Qty: 30 TABLET | Refills: 1 | Status: SHIPPED | OUTPATIENT
Start: 2024-10-15

## 2024-10-15 NOTE — TELEPHONE ENCOUNTER
Medication:prochlorperazine  Last prescribing provider:Dr Nolan  Last clinic visit date: 8/16/2024 Dr. Nolan  Recommendations for requested medication:for nausea  Any other pertinent information:routed to last provider Dr. Nolan

## 2024-10-17 ENCOUNTER — MYC REFILL (OUTPATIENT)
Dept: PALLIATIVE CARE | Facility: CLINIC | Age: 39
End: 2024-10-17
Payer: COMMERCIAL

## 2024-10-17 DIAGNOSIS — G89.3 CANCER RELATED PAIN: ICD-10-CM

## 2024-10-17 DIAGNOSIS — Z17.0 MALIGNANT NEOPLASM OF UPPER-OUTER QUADRANT OF LEFT BREAST IN FEMALE, ESTROGEN RECEPTOR POSITIVE (H): ICD-10-CM

## 2024-10-17 DIAGNOSIS — N64.4 BREAST PAIN: ICD-10-CM

## 2024-10-17 DIAGNOSIS — C50.412 MALIGNANT NEOPLASM OF UPPER-OUTER QUADRANT OF LEFT BREAST IN FEMALE, ESTROGEN RECEPTOR POSITIVE (H): ICD-10-CM

## 2024-10-17 RX ORDER — METHOCARBAMOL 500 MG/1
1500 TABLET, FILM COATED ORAL 3 TIMES DAILY PRN
Qty: 270 TABLET | Refills: 1 | Status: SHIPPED | OUTPATIENT
Start: 2024-10-17

## 2024-10-17 RX ORDER — HYDROCODONE BITARTRATE AND ACETAMINOPHEN 10; 325 MG/1; MG/1
.5-1 TABLET ORAL EVERY 4 HOURS PRN
Qty: 84 TABLET | Refills: 0 | Status: SHIPPED | OUTPATIENT
Start: 2024-10-17 | End: 2024-10-26

## 2024-10-17 NOTE — TELEPHONE ENCOUNTER
Received [employer] message from patient requesting refill of Robaxin and Norco.     Last refill: Robaxin 9/30/2024  Last refill Norco 10/2/2024   to last office visit: 8/13/2024  Scheduled for follow up 10/31/2024    Will route request to MD/ for review.     Reviewed [place].

## 2024-10-20 ENCOUNTER — ANCILLARY PROCEDURE (OUTPATIENT)
Dept: MRI IMAGING | Facility: CLINIC | Age: 39
End: 2024-10-20
Attending: INTERNAL MEDICINE
Payer: COMMERCIAL

## 2024-10-20 DIAGNOSIS — C50.919 MALIGNANT NEOPLASM OF FEMALE BREAST, UNSPECIFIED ESTROGEN RECEPTOR STATUS, UNSPECIFIED LATERALITY, UNSPECIFIED SITE OF BREAST (H): ICD-10-CM

## 2024-10-20 PROCEDURE — A9581 GADOXETATE DISODIUM INJ: HCPCS | Performed by: RADIOLOGY

## 2024-10-20 PROCEDURE — 72146 MRI CHEST SPINE W/O DYE: CPT | Mod: GC | Performed by: RADIOLOGY

## 2024-10-20 PROCEDURE — 74183 MRI ABD W/O CNTR FLWD CNTR: CPT | Performed by: RADIOLOGY

## 2024-10-21 ENCOUNTER — PATIENT OUTREACH (OUTPATIENT)
Dept: ONCOLOGY | Facility: CLINIC | Age: 39
End: 2024-10-21
Payer: COMMERCIAL

## 2024-10-21 NOTE — PROGRESS NOTES
[facility]: Cancer Care                                                                                        Patient called and figured out it was her Letrozole that she recently started causing her to vomit. Patient will stop taking it today,. RN will update Dr. Nolan and follow up with patient on plan      Ana BRAY, RN, OCN  Care Coordinator  [facility]

## 2024-10-22 ENCOUNTER — LAB (OUTPATIENT)
Dept: LAB | Facility: CLINIC | Age: 39
End: 2024-10-22
Payer: COMMERCIAL

## 2024-10-22 DIAGNOSIS — G62.0 PERIPHERAL NEUROPATHY DUE TO CHEMOTHERAPY (H): ICD-10-CM

## 2024-10-22 DIAGNOSIS — C50.919 MALIGNANT NEOPLASM OF FEMALE BREAST, UNSPECIFIED ESTROGEN RECEPTOR STATUS, UNSPECIFIED LATERALITY, UNSPECIFIED SITE OF BREAST (H): ICD-10-CM

## 2024-10-22 DIAGNOSIS — C50.919 MALIGNANT NEOPLASM OF FEMALE BREAST, UNSPECIFIED ESTROGEN RECEPTOR STATUS, UNSPECIFIED LATERALITY, UNSPECIFIED SITE OF BREAST (H): Primary | ICD-10-CM

## 2024-10-22 DIAGNOSIS — T45.1X5A PERIPHERAL NEUROPATHY DUE TO CHEMOTHERAPY (H): ICD-10-CM

## 2024-10-22 DIAGNOSIS — G43.719 INTRACTABLE CHRONIC MIGRAINE WITHOUT AURA AND WITHOUT STATUS MIGRAINOSUS: ICD-10-CM

## 2024-10-22 LAB
ALBUMIN SERPL BCG-MCNC: 3.9 G/DL (ref 3.5–5.2)
ALP SERPL-CCNC: 81 U/L (ref 40–150)
ALT SERPL W P-5'-P-CCNC: 24 U/L (ref 0–50)
ANION GAP SERPL CALCULATED.3IONS-SCNC: 12 MMOL/L (ref 7–15)
AST SERPL W P-5'-P-CCNC: 28 U/L (ref 0–45)
BASOPHILS # BLD AUTO: 0 10E3/UL (ref 0–0.2)
BASOPHILS NFR BLD AUTO: 1 %
BILIRUB SERPL-MCNC: 0.3 MG/DL
BUN SERPL-MCNC: 4.6 MG/DL (ref 6–20)
CALCIUM SERPL-MCNC: 9.5 MG/DL (ref 8.8–10.4)
CHLORIDE SERPL-SCNC: 99 MMOL/L (ref 98–107)
CREAT SERPL-MCNC: 0.6 MG/DL (ref 0.51–0.95)
EGFRCR SERPLBLD CKD-EPI 2021: >90 ML/MIN/1.73M2
EOSINOPHIL # BLD AUTO: 0.3 10E3/UL (ref 0–0.7)
EOSINOPHIL NFR BLD AUTO: 3 %
ERYTHROCYTE [DISTWIDTH] IN BLOOD BY AUTOMATED COUNT: 14.8 % (ref 10–15)
GLUCOSE SERPL-MCNC: 134 MG/DL (ref 70–99)
HCO3 SERPL-SCNC: 24 MMOL/L (ref 22–29)
HCT VFR BLD AUTO: 46.9 % (ref 35–47)
HGB BLD-MCNC: 16.1 G/DL (ref 11.7–15.7)
IMM GRANULOCYTES # BLD: 0 10E3/UL
IMM GRANULOCYTES NFR BLD: 0 %
LYMPHOCYTES # BLD AUTO: 2.3 10E3/UL (ref 0.8–5.3)
LYMPHOCYTES NFR BLD AUTO: 28 %
MCH RBC QN AUTO: 31 PG (ref 26.5–33)
MCHC RBC AUTO-ENTMCNC: 34.3 G/DL (ref 31.5–36.5)
MCV RBC AUTO: 90 FL (ref 78–100)
MONOCYTES # BLD AUTO: 0.7 10E3/UL (ref 0–1.3)
MONOCYTES NFR BLD AUTO: 8 %
NEUTROPHILS # BLD AUTO: 5 10E3/UL (ref 1.6–8.3)
NEUTROPHILS NFR BLD AUTO: 60 %
NRBC # BLD AUTO: 0 10E3/UL
NRBC BLD AUTO-RTO: 0 /100
PLATELET # BLD AUTO: 364 10E3/UL (ref 150–450)
POTASSIUM SERPL-SCNC: 4.1 MMOL/L (ref 3.4–5.3)
PROT SERPL-MCNC: 7.2 G/DL (ref 6.4–8.3)
RBC # BLD AUTO: 5.19 10E6/UL (ref 3.8–5.2)
SODIUM SERPL-SCNC: 135 MMOL/L (ref 135–145)
WBC # BLD AUTO: 8.3 10E3/UL (ref 4–11)

## 2024-10-22 PROCEDURE — 85025 COMPLETE CBC W/AUTO DIFF WBC: CPT | Performed by: INTERNAL MEDICINE

## 2024-10-22 PROCEDURE — 80053 COMPREHEN METABOLIC PANEL: CPT | Performed by: INTERNAL MEDICINE

## 2024-10-22 PROCEDURE — 36415 COLL VENOUS BLD VENIPUNCTURE: CPT | Performed by: INTERNAL MEDICINE

## 2024-10-22 PROCEDURE — 82670 ASSAY OF TOTAL ESTRADIOL: CPT | Performed by: INTERNAL MEDICINE

## 2024-10-22 RX ORDER — PREGABALIN 200 MG/1
200 CAPSULE ORAL AT BEDTIME
Qty: 30 CAPSULE | Refills: 1 | Status: SHIPPED | OUTPATIENT
Start: 2024-10-22

## 2024-10-22 RX ORDER — ANASTROZOLE 1 MG/1
1 TABLET ORAL DAILY
Qty: 30 TABLET | Refills: 11 | Status: SHIPPED | OUTPATIENT
Start: 2024-10-22

## 2024-10-22 NOTE — TELEPHONE ENCOUNTER
Received [employer] message from pharmacy requesting refill of Lyrica      Last office visit: 8/13/2024  Scheduled for follow up 10/31/2024     Will route request to MD/ for review.     Reviewed [place].

## 2024-10-23 LAB — ESTRADIOL SERPL-MCNC: <5 PG/ML

## 2024-10-25 ENCOUNTER — MYC REFILL (OUTPATIENT)
Dept: PALLIATIVE CARE | Facility: CLINIC | Age: 39
End: 2024-10-25
Payer: COMMERCIAL

## 2024-10-25 DIAGNOSIS — C50.412 MALIGNANT NEOPLASM OF UPPER-OUTER QUADRANT OF LEFT BREAST IN FEMALE, ESTROGEN RECEPTOR POSITIVE (H): ICD-10-CM

## 2024-10-25 DIAGNOSIS — Z17.0 MALIGNANT NEOPLASM OF UPPER-OUTER QUADRANT OF LEFT BREAST IN FEMALE, ESTROGEN RECEPTOR POSITIVE (H): ICD-10-CM

## 2024-10-25 RX ORDER — LORAZEPAM 1 MG/1
1 TABLET ORAL EVERY 6 HOURS PRN
Qty: 30 TABLET | Refills: 1 | Status: SHIPPED | OUTPATIENT
Start: 2024-10-28 | End: 2024-11-07

## 2024-10-25 NOTE — TELEPHONE ENCOUNTER
Received [employer] message from patient requesting refill of  lorazepam .     Last refill: 10/21/24  Last office visit: 8/13/24  Scheduled for follow up 10/31/24     Will route request to MD/ for review.     Reviewed [place].

## 2024-10-26 ENCOUNTER — MYC REFILL (OUTPATIENT)
Dept: PALLIATIVE CARE | Facility: CLINIC | Age: 39
End: 2024-10-26
Payer: COMMERCIAL

## 2024-10-26 DIAGNOSIS — Z17.0 MALIGNANT NEOPLASM OF UPPER-OUTER QUADRANT OF LEFT BREAST IN FEMALE, ESTROGEN RECEPTOR POSITIVE (H): ICD-10-CM

## 2024-10-26 DIAGNOSIS — C50.412 MALIGNANT NEOPLASM OF UPPER-OUTER QUADRANT OF LEFT BREAST IN FEMALE, ESTROGEN RECEPTOR POSITIVE (H): ICD-10-CM

## 2024-10-28 ENCOUNTER — PATIENT OUTREACH (OUTPATIENT)
Dept: ONCOLOGY | Facility: CLINIC | Age: 39
End: 2024-10-28

## 2024-10-28 ENCOUNTER — ONCOLOGY VISIT (OUTPATIENT)
Dept: ONCOLOGY | Facility: CLINIC | Age: 39
End: 2024-10-28
Attending: INTERNAL MEDICINE
Payer: COMMERCIAL

## 2024-10-28 VITALS
DIASTOLIC BLOOD PRESSURE: 52 MMHG | TEMPERATURE: 99.2 F | RESPIRATION RATE: 20 BRPM | OXYGEN SATURATION: 100 % | SYSTOLIC BLOOD PRESSURE: 122 MMHG | HEART RATE: 117 BPM

## 2024-10-28 DIAGNOSIS — C50.412 MALIGNANT NEOPLASM OF UPPER-OUTER QUADRANT OF LEFT BREAST IN FEMALE, ESTROGEN RECEPTOR POSITIVE (H): Primary | ICD-10-CM

## 2024-10-28 DIAGNOSIS — Z17.0 MALIGNANT NEOPLASM OF UPPER-OUTER QUADRANT OF LEFT BREAST IN FEMALE, ESTROGEN RECEPTOR POSITIVE (H): Primary | ICD-10-CM

## 2024-10-28 PROCEDURE — G2211 COMPLEX E/M VISIT ADD ON: HCPCS | Performed by: INTERNAL MEDICINE

## 2024-10-28 PROCEDURE — 99214 OFFICE O/P EST MOD 30 MIN: CPT | Performed by: INTERNAL MEDICINE

## 2024-10-28 PROCEDURE — G0463 HOSPITAL OUTPT CLINIC VISIT: HCPCS | Performed by: INTERNAL MEDICINE

## 2024-10-28 RX ORDER — HYDROCODONE BITARTRATE AND ACETAMINOPHEN 10; 325 MG/1; MG/1
.5-1 TABLET ORAL EVERY 4 HOURS PRN
Qty: 84 TABLET | Refills: 0 | Status: SHIPPED | OUTPATIENT
Start: 2024-10-31 | End: 2024-10-29

## 2024-10-28 ASSESSMENT — PAIN SCALES - GENERAL: PAINLEVEL_OUTOF10: MODERATE PAIN (5)

## 2024-10-28 NOTE — TELEPHONE ENCOUNTER
Received [employer] message from patient requesting refill of  Norco .     Last refill: 10/17/24  Last office visit: 8/13/24  Scheduled for follow up 10/31/24     Will route request to MD/ for review.     Reviewed [place]  [place].

## 2024-10-28 NOTE — NURSING NOTE
"Oncology Rooming Note    October 28, 2024 10:44 AM   Eliezer Izquierdo is a 39 year old female who presents for:    Chief Complaint   Patient presents with    Oncology Clinic Visit     Breast Cancer     Initial Vitals: /52   Pulse 117   Temp 99.2  F (37.3  C) (Oral)   Resp 20   SpO2 100%  Estimated body mass index is 38.25 kg/m  as calculated from the following:    Height as of 8/13/24: 1.753 m (5' 9\").    Weight as of 8/29/24: 117.5 kg (259 lb). There is no height or weight on file to calculate BSA.  Moderate Pain (5) Comment: pain of 5 in the left breast   No LMP recorded. Patient has had a hysterectomy.  Allergies reviewed: Yes  Medications reviewed: Yes    Medications: Medication refills not needed today.  Pharmacy name entered into EPIC:    [facility] - [place], [place] - [address] [place] [address] PHARMACY [place], [place] - [address] [facility] [phone]  [facility] [place], [place] - [address] [place] [address]    Frailty Screening:   Is the patient here for a new oncology consult visit in cancer care? 2. No      Clinical concerns: Patient reports of constant emesis      Freddy Jarvis LPN            "

## 2024-10-28 NOTE — PROGRESS NOTES
"New Patient Oncology Nurse Navigator Note     Referring provider: Dr Zachary Nolan     Referring Clinic/Organization: [facility]     Referred to (specialty:) Radiation Oncology     Requested provider (if applicable): Dr. Nicholas Velasquez     Date Referral Received: October 28, 2024     Evaluation for:  C50.412, Z17.0 (ICD-10-CM) - Malignant neoplasm of upper-outer quadrant of left breast in female, estrogen receptor positive (H)      Clinical History (per Nurse review of records provided):      Eliezer Izquierdo is a 39 year old female with Stage IB Luminal A invasive breast cancer, status post neoadjuvant chemotherapy followed by lumpectomy and sentinel lymph node biopsies.     Dr. Nolan wrote the following today, \"I do absolutely recommend radiotherapy. Eliezer tells me today she doesn't want to do that.  The reasons for this are vague.  But there is no question it is standard treatment.  We left it today that I will make her an appointment to be seen by Dr. Velasquez in [place].  Will be up to her whether or not she wants to go to that appointment.  Her fiancé Saul, and Kathy's mother both want her to get the radiation.\"    She has been referred to radiation therapy in the past and met with Dr. Fan on 8/29/24 due to complexity of case and recommendation of tumor board. Please see provider documentation but she did recommend [facility] for care.     11/8 - Patient was scheduled to see Dr. Jaqueline Kern today, but provider requesting node assessment first. Left axillary ultrasound was performed on 11/7/24 showing mild postsurgical changes with a small amount of fluid stranding present. Normal appearing lymph nodes are noted. No suspicious mass or lymphadenopathy. Mild postsurgical changes likely account for finding on recent PET/CT.    Records Location: See Bookmarked material     Writer received referral, reviewed for appropriate plan, and referral transferred to New Patient " Scheduling for completion.

## 2024-10-28 NOTE — LETTER
10/28/2024      Eliezer Izquierdo  [address] [facility] [address]      Dear Colleague,    Thank you for referring your patient, Eliezer Izquierdo, to the [facility]. Please see a copy of my visit note below.        [facility] Medical Oncology Note  Date of visit: Oct 28, 2024    Assessment:     Stage IB Luminal A invasive breast cancer, status post neoadjuvant chemotherapy followed by lumpectomy and sentinel lymph node biopsies.  While there was obvious response by exam, pathology did not show any significant response to neoadjuvant treatment, confirming that this is a luminal a breast cancer.  Her KI-67 was 24%, and so neoadjuvant chemotherapy was a reasonable strategy here.  And with all the nodes positive, the recommendation would have been for adjuvant chemotherapy.  This has been already been completed.    Because of her persistent description of diffuse pain, we ordered studies to rule out distant metastasis as an etiology. Her PET showed a slightly enlarged left axillary node, and equivocal solitary lesions in T5 and the left lobe for the liver. But subsequent MRIs showed no lesion in the thoracic spine, and no suspicious hepatic lesions. There was hepatic steatosis and stable minimally enlarged port hepatis nodes. So happily there was no evidence of metastatic disease. The left axillary node will be covered by her adjuvant radiotherapy.  S/p initiation of OFS and on anstrozole with a little better tolerance. [facility] of care is to offer ribociclib based on the MONY trial. I would usually wait until she has received radiotherapy.  Given her poor tolerance of all of her treatments, I really doubt this is a medication that she will stay on.  And in terms of getting a BSO, she would rather stay on goserelin.  I do absolutely recommend radiotherapy. Eliezer tells me today she doesn't want to do that.  The reasons for this are vague.  But there is no question it is  standard treatment.  We left it today that I will make her an appointment to be seen by Dr. Velasquez in [place].  Will be up to her whether or not she wants to go to that appointment.  Her fiancé Saul, and Kathy's mother both want her to get the radiation.  She continues to have a surprising amount of side effects.  She has profound nausea from letrozole.  The switch to anastrozole has resulted in some improvement in that, but she still has issues.  She has to decide what she is willing to put up with.  Brain metastasis can also cause nausea but she did horribly with her MRI of the liver and does not want to get another MRI right now.  For what its worth, I did review her CT/PET scan and the images of the brain did not show anything obvious (sometimes it does in the setting of brain metastasis).  Kathy has always been challenging to care for and nothing has changed in that regard today.  Significant side effects of treatment in the context of chronic pain requiring narcotics.  I am so happy for the involvement of palliative care.  Ongoing smoking.  She has no plans on quitting despite my encouragement.  It is bad for you.  It has been in all of the newspapers.      Plan:     Continue anastrozole  Referral to Rad Onc in [place] for adjuvant RT  Next goserelin should be 12/6/2024  RTC with me in 3 months for a virtual appointment.  If she is doing better at that point we would consider a discussion about Ribociclib.   Continue follow-up with Palliative care    The longitudinal plan of care for the diagnosis(es)/condition(s) as documented were addressed during this visit. Due to the added complexity in care, I will continue to support Eliezer in the subsequent management and with ongoing continuity of care.      30 minutes spent on the date of the encounter doing chart review, review of test results, interpretation of tests, patient visit, and documentation.      Zachary Nolan MD, MSc   of  "Medicine  [facility]  [facility]  [address] [place] [address]  [phone]    __________________________________________________________________    DIAGNOSES     Pathologic prognostic stage IB ([id number](sn)) [place] grade 2 invasive ductal breast cancer, diagnosed definitively at lumpectomy and sentinel lymph node biopsy 7/24/2024.  Kathy had a 2.2 cm moderately differentiated tumor with 4 out of 5 sentinel nodes positive for involvement, 1 of which showed a 2 mm area of extracapsular extension.  The tumor was 91 to 100% strongly positive for both ER and PA.  It was negative for HER2 amplification by FISH.  There is no obvious tumor response to neoadjuvant treatment in both the breast and the nodes.  Eliezer presented with  a clinical T2N1 invasive breast cancer diagnosed at breast biopsy 2/9/2024. Eliezer palpated a left breast lump. Mammogram and US showed a 2.5 x 2.5 x 2.0 cm spiculated mass at 2:00 position.   Biopsy showed a Jeniffer grade I IDC, 97% strongly positive for ER, 99% strongly positive for PA, and negative for HER2 by FISH (IHC 2+). KI-67 is 24%. Mammaprint was high risk Luminal B. Left axillary US that showed three abnormal axillary nodes, biopsy positive for IDC. Biopsy showed METASTATIC BREAST CARCINOMA, almost entirely effacing lymph node, at least 11 mm in linear extent.  My first exam prior to neoadjuvant therapy showed a palpable deep left breast mass in the 2 o'clock position measuring roughly 4 cm x 4 cm.   Chronic pain with history of opiate prescriptions from multiple providers. She is currently seeing palliative care (Dr. Kylie Rodriguez), and tried buprenorphine, started on 4/11/2024, but reacted to the adhesive.  Then didn't tolerate fentanyl.  Current recommendation is suboxone.   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  But it was negative for everything else.  History " "of N/V from migraines, thus managing nausea during therapy has been challenging.         History of Present Illness/Therapy to dte:     Neoadjuvant ddAC initiated 3/15/2024 through 4/26/2024.   Weekly paclitaxel 5/10/2026-.6/14/2026.  It was then discontinued due to crippling side effects.   Lumpectomy and sentinel lymph node biopsy, 7/24/2024.    Goserelin 10.8 mg initiated 9/12/2024.  Letrozole 10/10-22/24. This was held due to nausea and vomiting. Anastrozole was substituted 10/22/2024.    Interval History     Eliezer is back with Saul   She has crippling nausea.  She was not throwing up a few times a day while on letrozole.  Going to anastrozole, she is now having every other day emesis.  It is really getting in the way of her life.  Her pain remains severe.  It is in her hands and feet.  She is in a wheelchair today, again, because of the pain.  This is being managed by palliative care.  She is very fatigued.    She always has headaches. She has had migraines since she was a kid and the quality of these headaches is very similar.  She continues to lose weight, not intentionally.  This is another concern.  She still smoking.  She has no plans to quit.  I should save my breath, she tells me.  She really does not want to do radiation.  She does not understand why she should have to now that she is cancer free.  Discussed at length.    Past Medical History:   Melanoma of right foot???     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  She thought about being a , but then got pregnant with her son Homero.  She had a lot of different for jobs.  She spent some time in [place] where her mom and other family member lives.  She then came back to [place].  She has a fiancé of 6 years named Saul and they currently live in [place], [place].  She is an avid reader, and likes romance novels, but really anything.  She says she read 250 books last year.  She does smoke and has no plans on quitting.  She is currently on a low-carb diet.    Family History:     Family History   Problem Relation Age of Onset     Breast Cancer Mother      Genitourinary Problems Mother         renal disease - minimal change, sponge kidney     Cancer Paternal Grandmother         GYN cancer     Cancer Paternal Grandfather         Stomach            Medications:     Current Outpatient Medications   Medication Sig Dispense Refill     acetaminophen (TYLENOL) 500 MG tablet Take 2 tablets (1,000 mg) by mouth every 8 hours as needed for mild pain       albuterol (PROAIR HFA/PROVENTIL HFA/VENTOLIN HFA) 108 (90 Base) MCG/ACT inhaler Inhale 2 puffs into the lungs every 4 hours as needed       amitriptyline (ELAVIL) 10 MG tablet Take 1-2 tablets (10-20 mg) by mouth every morning AND 5 tablets (50 mg) at bedtime.       amitriptyline (ELAVIL) 25 MG tablet Take 2-3 tablets (50-75 mg) by mouth at bedtime 90 tablet 2     amLODIPine (NORVASC) 10 MG tablet Take 10 mg by mouth at bedtime       anastrozole (ARIMIDEX) 1 MG tablet Take 1 tablet (1 mg) by mouth daily. 30 tablet 11     atorvastatin (LIPITOR) 10 MG tablet Take 10 mg by mouth at bedtime       Blood Glucose Monitoring Suppl (ONETOUCH VERIO FLEX SYSTEM) w/Device KIT        buprenorphine HCl-naloxone HCl (SUBOXONE) 8-2 MG per film Place 1 Film under the tongue every 6 hours. 120 Film 0     cetirizine (ZYRTEC) 10 MG tablet Take 5  mg by mouth as needed       EPINEPHrine (ANY BX GENERIC EQUIV) 0.3 MG/0.3ML injection 2-pack        esomeprazole (NEXIUM) 20 MG DR capsule Take 20 mg by mouth as needed       fluticasone-salmeterol (ADVAIR HFA) 115-21 MCG/ACT inhaler Inhale 2 puffs into the lungs 2 times daily       hydrochlorothiazide (HYDRODIURIL) 25 MG tablet Take 25 mg by mouth as needed       HYDROcodone-acetaminophen (NORCO)  MG per tablet Take 0.5-1 tablets by mouth every 4 hours as needed for severe pain. 84 tablet 0     ibuprofen (ADVIL/MOTRIN) 200 MG tablet Take 200 mg by mouth       insulin glargine (LANTUS PEN) 100 UNIT/ML pen Inject 35 Units subcutaneously at bedtime.       JANUVIA 100 MG tablet Take 100 mg by mouth at bedtime       [START ON 10/28/2024] LORazepam (ATIVAN) 1 MG tablet Take 1 tablet (1 mg) by mouth every 6 hours as needed for anxiety, nausea or sleep. 30 tablet 1     methocarbamol (ROBAXIN) 500 MG tablet Take 3 tablets (1,500 mg) by mouth 3 times daily as needed for muscle spasms. 270 tablet 1     naloxone (NARCAN) 4 MG/0.1ML nasal spray Spray 4 mg into one nostril alternating nostrils as needed for opioid reversal every 2-3 minutes until assistance arrives       OLANZapine (ZYPREXA) 2.5 MG tablet Take 1-2 tablets (2.5-5 mg) by mouth 3 times daily as needed (Nausea or anxiety) 150 tablet 2     ONETOUCH VERIO IQ test strip        Pregabalin (LYRICA) 200 MG capsule Take 1 capsule (200 mg) by mouth at bedtime. 30 capsule 1     prochlorperazine (COMPAZINE) 10 MG tablet Take 1 tablet (10 mg) by mouth every 6 hours as needed for nausea or vomiting. 30 tablet 1     Prochlorperazine Maleate (COMPAZINE PO) Take 10 mg by mouth 3 times daily as needed for nausea       TRUE COMFORT PRO PEN NEEDLES 31G X 5 MM miscellaneous        urea (GORMEL) 20 % external cream Apply topically as needed (Apply to feet twice daily) 480 g 1              Physical Exam:   not currently breastfeeding.    ECOG PS: 1  Constitutional: WDWN female in  NAD, pleasant and appropriate.  She can get up onto the exam table without my assistance.  She smells of tobacco.  HEENT:  NC/AT, no icterus, OP clear, MMM  Skin: No jaundice nor ecchymoses  Lungs: CTAB, no w/r/r, nonlabored breathing.  Cardiovascular: RRR, S1, S2, no m/r/g  MSK/Extremities: Warm, well perfused. Trace bilateral pitting edema.   LN: no cervical, supraclavicular or left axillary lymphadenopathy  Neurologic: alert, answering questions appropriately, moving all extremities spontaneously. CN 2-12 grossly intact.  Psych: appropriate affect  Data:     CT/PET 9/13/2024  IMPRESSION:      1. Postoperative changes of left breast lumpectomy and left axillary  farhan dissection with presumed postprocedural inflammation and seroma.  Mildly FDG avid probable 1.2 cm left axillary node with adjacent  inflammatory stranding, concerning for possible residual farhan  metastasis.      2. Indeterminate mildly hypermetabolic foci most pronounced within the  left hepatic lobe without CT correlate. Recommend further evaluation  with liver MRI with contrast to exclude hepatic metastases.     3. Mildly FDG avid subtle sclerotic focus within the T5 vertebral  body. Consider further evaluation with thoracic spine MRI with  contrast to exclude osseous metastasis.      MRI THRACIS SPINE 10/20/2024  IMPRESSION: No evidence of metastatic disease in the thoracic spine on  this noncontrast MRI. Specifically, there is no lesion in the T5  vertebral body.    PATHOLOGY 7/24/2024  B. LEFT BREAST, MASS, SEED-LOCALIZED SEGMENTAL MASTECTOMY:  -INVASIVE DUCTAL CARCINOMA, Jeniffer grade 2, 22 mm in greatest dimension.  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  -Margins are negative; closest margins to invasive carcinoma are posterior at 4 mm and superior at 8 mm; all other margins are at >10 mm; closest uninvolved margin to DCIS is  posterior at 4 mm; all other margins are >10 mm from DCIS.  -Calcifications associated with DCIS and invasive carcinoma.  -AJCC pathologic staging is ypT2 N2a(sn).  -See synoptic report.     C.  SENTINEL LYMPH NODE, LEFT AXILLARY #1 WITH RFID, EXCISION:  -METASTATIC BREAST CARCINOMA to three of three lymph nodes, 18 mm in greatest dimension, with 2 mm extranodal extension (3/3).  -Biopsy site changes.     D.  SENTINEL LYMPH NODE, LEFT AXILLARY #2, EXCISION:  -METASTATIC BREAST CARCINOMA to one lymph node, 5 mm in greatest dimension, with no extranodal extension (1/1).     E.  SENTINEL LYMPH NODE, LEFT AXILLARY #3, EXCISION:  -One lymph node, negative for malignancy (0/1).          REGIONAL LYMPH NODES   Regional Lymph Node Status  Tumor present in regional lymph node(s)   Number of Lymph Nodes with Macrometastases  3   Number of Lymph Nodes with Micrometastases  1   Size of Largest Gudelia Metastatic Deposit  18 mm   Extranodal Extension  Present, 2 mm or less   Total Number of Lymph Nodes Examined (sentinel and non-sentinel)  5   Number of [place] Nodes Examined  5   pTNM CLASSIFICATION (AJCC 8th Edition)   Reporting of pT, pN, and (when applicable) pM categories is based on information available to the pathologist at the time the report is issued. As per the AJCC (Chapter 1, 8th Ed.) it is the managing physician s responsibility to establish the final pathologic stage based upon all pertinent information, including but potentially not limited to this pathology report.   Modified Classification  y   pT Category  pT2   pN Category  pN2a   N Suffix  (sn)   SPECIAL STUDIES        Estrogen Receptor (ER) Status  Positive (greater than 10% of cells demonstrate nuclear positivity)   Percentage of Cells with Nuclear Positivity  >95% %        Progesterone Receptor (PgR) Status  Positive   Percentage of Cells with Nuclear Positivity  100 %        HER2 (by immunohistochemistry)  Equivocal (Score 2+)   Percentage of Cells with  Uniform Intense Complete Membrane Staining  Cannot be determined        Ki-67 Percentage of Positive Nuclei  25 %                         Addendum   B. LEFT BREAST, SEED-LOCALIZED SEGMENTAL MASTECTOMY:  -Invasive carcinoma is HER2 negative (score 1+) by immunohistochemistry (performed on this specimen, see first biomarker reporting template below)     D. LYMPH NODE, LEFT AXILLARY, SENTINEL #2, EXCISION:  -Metastatic carcinoma is HER2 equivocal (score 2+) by immunohistochemistry (performed on this specimen, see second biomarker reporting template below)  -HER2 FISH results will be reported separately by cytogenetics               Left Breast ultrasound 6/24 (compared to 5/10) and personally reviewed by me.    FINDINGS: At the 2 o'clock position, 10 cm from the nipple, there is a  heterogenous hypoechoic shadowing mass measuring 14 x 13 x 9 mm, which has mildly decreased in size, most recently measuring 20 x 19 x 18 mm.          Most Recent 3 CBC's:  Recent Labs   Lab Test 10/22/24  1432 07/23/24  1214 06/21/24  0954 06/14/24  1028   WBC 8.3 8.2 7.3 8.6   HGB 16.1* 15.4 11.8 13.0   MCV 90 94 99 99    337 342 336   ANEUTAUTO 5.0  --  4.9 6.2     Most Recent 3 BMP's:  Recent Labs   Lab Test 10/22/24  1432 07/24/24  0606 06/07/24  1046 05/10/24  1006 04/26/24  1134 04/12/24  0734     --   --   --  136 138   POTASSIUM 4.1  --   --   --  3.9 3.5   CHLORIDE 99  --   --   --  98 102   CO2 24  --   --   --  24 23   BUN 4.6*  --   --   --  7.7 7.0   CR 0.60  --   --   --  0.49* 0.44*   ANIONGAP 12  --   --   --  14 13   REMI 9.5  --   --   --  9.2 9.3   * 298*  --   --  415* 206*   PROTTOTAL 7.2  --  7.1 6.6 6.7 7.0   ALBUMIN 3.9  --  4.2 3.9 3.7 4.0    Most Recent 3 LFT's:  Recent Labs   Lab Test 10/22/24  1432 06/07/24  1046 05/10/24  1006   AST 28 39 28   ALT 24 34 23   ALKPHOS 81 70 67   BILITOTAL 0.3 0.3 0.2    Most Recent 2 TSH and T4:No lab results found.   I reviewed the above labs today.     Other  Data     Most Recent 3 CBC's:  Recent Labs   Lab Test 10/22/24  1432 07/23/24  1214 06/21/24  0954 06/14/24  1028   WBC 8.3 8.2 7.3 8.6   HGB 16.1* 15.4 11.8 13.0   MCV 90 94 99 99    337 342 336   ANEUTAUTO 5.0  --  4.9 6.2     Most Recent 3 BMP's:  Recent Labs   Lab Test 10/22/24  1432 07/24/24  0606 06/07/24  1046 05/10/24  1006 04/26/24  1134 04/12/24  0734     --   --   --  136 138   POTASSIUM 4.1  --   --   --  3.9 3.5   CHLORIDE 99  --   --   --  98 102   CO2 24  --   --   --  24 23   BUN 4.6*  --   --   --  7.7 7.0   CR 0.60  --   --   --  0.49* 0.44*   ANIONGAP 12  --   --   --  14 13   REMI 9.5  --   --   --  9.2 9.3   * 298*  --   --  415* 206*   PROTTOTAL 7.2  --  7.1 6.6 6.7 7.0   ALBUMIN 3.9  --  4.2 3.9 3.7 4.0    Most Recent 3 LFT's:  Recent Labs   Lab Test 10/22/24  1432 06/07/24  1046 05/10/24  1006   AST 28 39 28   ALT 24 34 23   ALKPHOS 81 70 67   BILITOTAL 0.3 0.3 0.2    Most Recent 2 TSH and T4:No lab results found.  I reviewed the above labs today.              Again, thank you for allowing me to participate in the care of your patient.        Sincerely,        Zachary Nolan MD

## 2024-10-29 DIAGNOSIS — Z17.0 MALIGNANT NEOPLASM OF UPPER-OUTER QUADRANT OF LEFT BREAST IN FEMALE, ESTROGEN RECEPTOR POSITIVE (H): ICD-10-CM

## 2024-10-29 DIAGNOSIS — C50.412 MALIGNANT NEOPLASM OF UPPER-OUTER QUADRANT OF LEFT BREAST IN FEMALE, ESTROGEN RECEPTOR POSITIVE (H): Primary | ICD-10-CM

## 2024-10-29 DIAGNOSIS — C50.412 MALIGNANT NEOPLASM OF UPPER-OUTER QUADRANT OF LEFT BREAST IN FEMALE, ESTROGEN RECEPTOR POSITIVE (H): ICD-10-CM

## 2024-10-29 DIAGNOSIS — Z17.0 MALIGNANT NEOPLASM OF UPPER-OUTER QUADRANT OF LEFT BREAST IN FEMALE, ESTROGEN RECEPTOR POSITIVE (H): Primary | ICD-10-CM

## 2024-10-29 RX ORDER — HYDROCODONE BITARTRATE AND ACETAMINOPHEN 10; 325 MG/1; MG/1
.5-1 TABLET ORAL EVERY 4 HOURS PRN
Qty: 84 TABLET | Refills: 0 | Status: SHIPPED | OUTPATIENT
Start: 2024-10-31

## 2024-10-29 NOTE — TELEPHONE ENCOUNTER
Resending Norco prescription to a different pharmacy who has it in stock. Pt's local pharmacy notified pt that it is on backorder and they are unsure when they will get it in.    KEYONNA KulkarniN, RN  Palliative Care Nurse Clinician    [phone] (Direct)  [phone] (Main)  [phone] (Appointment Scheduling)

## 2024-10-31 ENCOUNTER — VIRTUAL VISIT (OUTPATIENT)
Dept: PALLIATIVE CARE | Facility: CLINIC | Age: 39
End: 2024-10-31
Attending: STUDENT IN AN ORGANIZED HEALTH CARE EDUCATION/TRAINING PROGRAM
Payer: COMMERCIAL

## 2024-10-31 VITALS — HEIGHT: 69 IN | BODY MASS INDEX: 36.73 KG/M2 | WEIGHT: 248 LBS

## 2024-10-31 DIAGNOSIS — R11.0 NAUSEA: ICD-10-CM

## 2024-10-31 DIAGNOSIS — T45.1X5A PERIPHERAL NEUROPATHY DUE TO CHEMOTHERAPY (H): ICD-10-CM

## 2024-10-31 DIAGNOSIS — G62.0 PERIPHERAL NEUROPATHY DUE TO CHEMOTHERAPY (H): ICD-10-CM

## 2024-10-31 DIAGNOSIS — C50.412 MALIGNANT NEOPLASM OF UPPER-OUTER QUADRANT OF LEFT BREAST IN FEMALE, ESTROGEN RECEPTOR POSITIVE (H): ICD-10-CM

## 2024-10-31 DIAGNOSIS — F11.90 OPIOID USE DISORDER: ICD-10-CM

## 2024-10-31 DIAGNOSIS — G89.3 CANCER RELATED PAIN: ICD-10-CM

## 2024-10-31 DIAGNOSIS — Z51.5 ENCOUNTER FOR PALLIATIVE CARE: Primary | ICD-10-CM

## 2024-10-31 DIAGNOSIS — Z17.0 MALIGNANT NEOPLASM OF UPPER-OUTER QUADRANT OF LEFT BREAST IN FEMALE, ESTROGEN RECEPTOR POSITIVE (H): ICD-10-CM

## 2024-10-31 DIAGNOSIS — N64.4 BREAST PAIN: ICD-10-CM

## 2024-10-31 PROCEDURE — 99215 OFFICE O/P EST HI 40 MIN: CPT | Mod: 95 | Performed by: FAMILY MEDICINE

## 2024-10-31 PROCEDURE — G2211 COMPLEX E/M VISIT ADD ON: HCPCS | Mod: 95 | Performed by: FAMILY MEDICINE

## 2024-10-31 RX ORDER — HYDROMORPHONE HYDROCHLORIDE 2 MG/1
2-4 TABLET ORAL EVERY 6 HOURS PRN
Qty: 84 TABLET | Refills: 0 | Status: SHIPPED | OUTPATIENT
Start: 2024-11-14 | End: 2024-12-14

## 2024-10-31 ASSESSMENT — PAIN SCALES - GENERAL: PAINLEVEL_OUTOF10: SEVERE PAIN (7)

## 2024-10-31 NOTE — LETTER
10/31/2024       RE: Eliezer Izquierdo  [address] [facility] [address]     Dear Colleague,    Thank you for referring your patient, Eliezer Izquierdo, [facility]. Please see a copy of my visit note below.    Virtual Visit Details    Type of service:  Video Visit   Video Start Time: 3:01 PM  Video End Time: 1545    Originating Location (pt. Location): Home    Distant Location (provider location):  On-site  Platform used for Video Visit: [facility]    Palliative Care Outpatient Clinic Progress Note    Patient Name: Eliezer Izquierdo  Primary Provider: Madison Roach    Impression & Recommendations & Counseling:  Eliezer Izquierdo is a 39 year old female with history of left-sided breast cancer.      Pain   OUD - See my note from 5/7/2024 regarding full conversation about opioid use disorder/dependence and chronic pain in.  It was at this visit that we initiated Suboxone.  This seems to have been a good fit for her, as she is tolerating it well and has noticed some improvement in pain.   -Continue Suboxone 8 mg Q6H.  -Kylie has been informed her pharmacy is struggling to stock Norco so we discussed rotating to Dilaudid 2-4 mg po q 6 hours prn when her next prescription would be due on 11/14/2024 and a pre-dated rx for that date was sent.  Eliezer's profile says she has a hydromorphone allergy but she unequivocally states that is an error and she is OK proceeding with the new rx  -We did discuss possibly considering a prescription for a different NSAID and discontinuing ibuprofen, however decided to change amitriptyline as below instead.  -Also discussed trying to address anxiety/stress and consideration of resuming an SSRI.  However, she recently had a change to her diabetes medications, and with all other changes recently, I was hesitant about starting another medication.  I do think this is worthwhile  considering in the future, as I do suspect there is a high amount of her pain that is worsened by anxiety and stress.     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  Further chemotherapy was placed on hold due to severity of neuropathy side effects.  No benefit from Gabapentin or Lyrica. Trial of Mirapex with no clear benefit at 3mg   -Increased range on amitriptyline to 50 to 75 mg nightly.  She knows she can take less, for example 37.5 mg, if she thinks that 50 is too much for her.  10/31/2024--Eliezer stopped amitriptyline due to ineffectiveness and asked to remove it from her med list.  -Previously advised to look into acupuncture.  -She can continue compression stockings and gloves if she thinks they are helpful.  Discussed she might get more benefit from the compression stockings if she wears them longer than 1-2 hours/day.  - Patient counseled to wear gloves and warm stockings now that winter has arrived and when in the refrigerator or freezer.     Anxiety  Stress - Multifactorial from her health-related concerns as well as social and economic factors including her relationship with her fiancé and the property in which she is living.  It is unfortunate there are no therapist in her area who are taking her insurance right now.  Encouraged ongoing journaling, and I do think consideration for daily controller medication for anxiety would be beneficial in the near future.  Additionally refilled her Ativan today for 8/15.    Nausea  Comes out of the blue--no response to po zofran, compazine or phenergan; ativan does help.  Has not tried olanzapine at HS and is willing to try it 10 mg po at HS    Chief Complaint/Patient ID: Eliezer HASTINGS Timbo 39 year old female with PMHx of left breast cancer; cancer associated pain and OUD.    Last Palliative care appointment: 08/13/2024 with Dr. Kylie Tucker     Reviewed:  Yes:   reviewed - controlled substances reflected in medication list.    Interim History:  Eliezer HASTINGS  Timbo is a 39 year old female who is seen today for follow up with Palliative Care via  billable video visit.      Appetite/Nausea: NAUSEA--COMES OUT OF THE BLUE needed to go to the ED once and IV phenergan helped.     Bowels: no concerns when she uses stool softeners     Sleep: usually OK     Mood: pretty good--stress plays a role in her pain experience     Coping:  overall OK    Family History- Reviewed in Epic.    Allergies   Allergen Reactions     Ubrogepant Muscle Pain (Myalgia)     Other Reaction(s): Chest Pain, Chest Pain    Also caused blisters in mouth     Azithromycin Hives     Doxycycline Hives     Erythromycin Hives     Estrogens      Levofloxacin Hives     Maxalt [Rizatriptan]      Oxycodone-Acetaminophen      Burning mouth and lips with red sore taste buds and throat irritation    PER PATIENT NOT ALLERGIC TO      Penicillins      Propranolol Hcl Headache     Sulfa Antibiotics      Sumatriptan Muscle Pain (Myalgia)     Zofran [Ondansetron] Muscle Pain (Myalgia)     Hydromorphone Anxiety and Itching     Other Reaction(s): Tachycardia    Panic attacks     Ondansetron Hcl Anxiety, Other (See Comments) and Palpitations     Toradol [Ketorolac] Anxiety       Social History:  Pertinent changes to social history/social situation since last visit: child is almost 19 yo and they live in a house on contract for [employer] but the deed [employer] has gone bankrupt and they may lose their home and investment  Key support resources: angel; son and her mom who lives in [place], [place];   Advance Directive Status:  no ACP     Social History     Tobacco Use     Smoking status: Every Day     Types: Cigarettes     Passive exposure: Current     Smokeless tobacco: Never     Tobacco comments:     Smokes about 4 or less cigarette's a day. Trying to quit.   Vaping Use     Vaping status: Never Used   Substance Use Topics     Alcohol use: No     Drug use: No         Allergies   Allergen Reactions     Ubrogepant Muscle Pain (Myalgia)      Other Reaction(s): Chest Pain, Chest Pain    Also caused blisters in mouth     Azithromycin Hives     Doxycycline Hives     Erythromycin Hives     Estrogens      Levofloxacin Hives     Maxalt [Rizatriptan]      Oxycodone-Acetaminophen      Burning mouth and lips with red sore taste buds and throat irritation    PER PATIENT NOT ALLERGIC TO      Penicillins      Propranolol Hcl Headache     Sulfa Antibiotics      Sumatriptan Muscle Pain (Myalgia)     Zofran [Ondansetron] Muscle Pain (Myalgia)     Hydromorphone Anxiety and Itching     Other Reaction(s): Tachycardia    Panic attacks     Ondansetron Hcl Anxiety, Other (See Comments) and Palpitations     Toradol [Ketorolac] Anxiety     Current Outpatient Medications   Medication Sig Dispense Refill     acetaminophen (TYLENOL) 500 MG tablet Take 2 tablets (1,000 mg) by mouth every 8 hours as needed for mild pain       albuterol (PROAIR HFA/PROVENTIL HFA/VENTOLIN HFA) 108 (90 Base) MCG/ACT inhaler Inhale 2 puffs into the lungs every 4 hours as needed       amitriptyline (ELAVIL) 10 MG tablet Take 1-2 tablets (10-20 mg) by mouth every morning AND 5 tablets (50 mg) at bedtime.       amitriptyline (ELAVIL) 25 MG tablet Take 2-3 tablets (50-75 mg) by mouth at bedtime 90 tablet 2     amLODIPine (NORVASC) 10 MG tablet Take 10 mg by mouth at bedtime       anastrozole (ARIMIDEX) 1 MG tablet Take 1 tablet (1 mg) by mouth daily. 30 tablet 11     atorvastatin (LIPITOR) 10 MG tablet Take 10 mg by mouth at bedtime       Blood Glucose Monitoring Suppl (ONETOUCH VERIO FLEX SYSTEM) w/Device KIT        buprenorphine HCl-naloxone HCl (SUBOXONE) 8-2 MG per film Place 1 Film under the tongue every 6 hours. 120 Film 0     cetirizine (ZYRTEC) 10 MG tablet Take 5 mg by mouth as needed       EPINEPHrine (ANY BX GENERIC EQUIV) 0.3 MG/0.3ML injection 2-pack        esomeprazole (NEXIUM) 20 MG DR capsule Take 20 mg by mouth as needed       fluticasone-salmeterol (ADVAIR HFA) 115-21 MCG/ACT inhaler  "Inhale 2 puffs into the lungs 2 times daily       hydrochlorothiazide (HYDRODIURIL) 25 MG tablet Take 25 mg by mouth as needed       HYDROcodone-acetaminophen (NORCO)  MG per tablet Take 0.5-1 tablets by mouth every 4 hours as needed for severe pain. 84 tablet 0     ibuprofen (ADVIL/MOTRIN) 200 MG tablet Take 200 mg by mouth       insulin glargine (LANTUS PEN) 100 UNIT/ML pen Inject 35 Units subcutaneously at bedtime.       JANUVIA 100 MG tablet Take 100 mg by mouth at bedtime       LORazepam (ATIVAN) 1 MG tablet Take 1 tablet (1 mg) by mouth every 6 hours as needed for anxiety, nausea or sleep. 30 tablet 1     methocarbamol (ROBAXIN) 500 MG tablet Take 3 tablets (1,500 mg) by mouth 3 times daily as needed for muscle spasms. 270 tablet 1     naloxone (NARCAN) 4 MG/0.1ML nasal spray Spray 4 mg into one nostril alternating nostrils as needed for opioid reversal every 2-3 minutes until assistance arrives       OLANZapine (ZYPREXA) 2.5 MG tablet Take 1-2 tablets (2.5-5 mg) by mouth 3 times daily as needed (Nausea or anxiety) 150 tablet 2     ONETOUCH VERIO IQ test strip        Pregabalin (LYRICA) 200 MG capsule Take 1 capsule (200 mg) by mouth at bedtime. 30 capsule 1     prochlorperazine (COMPAZINE) 10 MG tablet Take 1 tablet (10 mg) by mouth every 6 hours as needed for nausea or vomiting. 30 tablet 1     Prochlorperazine Maleate (COMPAZINE PO) Take 10 mg by mouth 3 times daily as needed for nausea       TRUE COMFORT PRO PEN NEEDLES 31G X 5 MM miscellaneous        urea (GORMEL) 20 % external cream Apply topically as needed (Apply to feet twice daily) 480 g 1     Past Medical History:   Diagnosis Date     Breast cancer (H)      Carrier of high risk cancer gene mutation - MC1R increased risk variants 04/22/2024    Two MC1R \"increased risk\" variants - c.451C>T and c.478C>T  [facility] 3/26/2024       Hypercholesteraemia      Irritable bowel      Migraines      Past Surgical History:   Procedure " "Laterality Date     IR CHEST PORT PLACEMENT > 5 YRS OF AGE  2024     LEFT Radiofrequency Identification Seed-Localized Segmental Mastectomy (=\"Lumpectomy\"), LEFT Radiofrequency Identification Seed-Localized Axillary [place] Lymph Node Biopsy - Left  2024     LUMPECTOMY BREAST, SEED LOCALIZATION, SENTINEL NODE Left 2024    Procedure: LEFT Radiofrequency Identification Seed-Localized Segmental Mastectomy (=\"Lumpectomy\"), LEFT Radiofrequency Identification Seed-Localized Axillary [place] Lymph Node Biopsy;  Surgeon: Albina Ford MD;  Location: [facility] OR     REMOVE PORT VASCULAR ACCESS Right 2024    Procedure: RIGHT vascular access port removal;  Surgeon: Albina Ford MD;  Location: [facility] OR       Physical Exam:   GENERAL APPEARANCE: healthy appearing young woman, alert and no distress; neatly groomed with hair growing out from chemo; she is smoking and smokes about 1 ppd (this has increased as she has felt more stress from her treatments);   EYES: Eyes grossly normal to inspection, PERRLA, conjunctivae and sclerae without injection or discharge, EOM intact   RESP:  no increased work of breathing; speaks in complete sentences;   MS: No musculoskeletal defects are noted  SKIN: No suspicious lesions or rashes, hydration status appears adequate with normal skin turgor   PSYCH: Alert and oriented x3; speech- coherent , normal rate and volume; able to articulate logical thoughts, able to abstract reason, no tangential thoughts, no hallucinations or delusions, mentation appears normal, Mood is euthymic. Affect is appropriate for this mood state and bright. Thought content is free of suicidal ideation, hallucinations, and delusions.  Eye contact is good during conversation.       Key Data Reviewed:  LABS: - Cr 0.60, Albumin 3.9,  Hgb 16.1,      IMAGIN2024 PET ONCOLOGY WHOLE BODY    43 minutes spent on the date of the encounter doing chart review, history and exam, patient education & " counseling, documentation and other activities as noted above.    The longitudinal plan of care for the diagnosis(es)/condition(s) as documented were addressed during this visit. Due to the added complexity in care, I will continue to support Eliezer in the subsequent management and with ongoing continuity of care.      Estevan Singh MD MS FAAFP CAQHPM  [facility]  Office [phone]  Fax [phone]       Again, thank you for allowing me to participate in the care of your patient.      Sincerely,    Estevan Singh MD

## 2024-10-31 NOTE — PROGRESS NOTES
Virtual Visit Details    Type of service:  Video Visit   Video Start Time: 3:01 PM  Video End Time: 8805    Originating Location (pt. Location): Home    Distant Location (provider location):  On-site  Platform used for Video Visit: [facility]    Palliative Care Outpatient Clinic Progress Note    Patient Name: Eliezer Izquierdo  Primary Provider: Madison Roach    Impression & Recommendations & Counseling:  Eliezer Izquierdo is a 39 year old female with history of left-sided breast cancer.      Pain   OUD - See my note from 5/7/2024 regarding full conversation about opioid use disorder/dependence and chronic pain in.  It was at this visit that we initiated Suboxone.  This seems to have been a good fit for her, as she is tolerating it well and has noticed some improvement in pain.   -Continue Suboxone 8 mg Q6H.  -Kylie has been informed her pharmacy is struggling to stock Norco so we discussed rotating to Dilaudid 2-4 mg po q 6 hours prn when her next prescription would be due on 11/14/2024 and a pre-dated rx for that date was sent.  Eliezer's profile says she has a hydromorphone allergy but she unequivocally states that is an error and she is OK proceeding with the new rx  -We did discuss possibly considering a prescription for a different NSAID and discontinuing ibuprofen, however decided to change amitriptyline as below instead.  -Also discussed trying to address anxiety/stress and consideration of resuming an SSRI.  However, she recently had a change to her diabetes medications, and with all other changes recently, I was hesitant about starting another medication.  I do think this is worthwhile considering in the future, as I do suspect there is a high amount of her pain that is worsened by anxiety and stress.     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  Further chemotherapy was placed on hold due to severity of neuropathy side effects.  No benefit from Gabapentin or Lyrica. Trial of Mirapex with no clear benefit at 3mg    -Increased range on amitriptyline to 50 to 75 mg nightly.  She knows she can take less, for example 37.5 mg, if she thinks that 50 is too much for her.  10/31/2024--Eliezer stopped amitriptyline due to ineffectiveness and asked to remove it from her med list.  -Previously advised to look into acupuncture.  -She can continue compression stockings and gloves if she thinks they are helpful.  Discussed she might get more benefit from the compression stockings if she wears them longer than 1-2 hours/day.  - Patient counseled to wear gloves and warm stockings now that winter has arrived and when in the refrigerator or freezer.     Anxiety  Stress - Multifactorial from her health-related concerns as well as social and economic factors including her relationship with her fiancé and the property in which she is living.  It is unfortunate there are no therapist in her area who are taking her insurance right now.  Encouraged ongoing journaling, and I do think consideration for daily controller medication for anxiety would be beneficial in the near future.  Additionally refilled her Ativan today for 8/15.    Nausea  Comes out of the blue--no response to po zofran, compazine or phenergan; ativan does help.  Has not tried olanzapine at HS and is willing to try it 10 mg po at HS    Chief Complaint/Patient ID: Eliezer Izquierdo 39 year old female with PMHx of left breast cancer; cancer associated pain and OUD.    Last Palliative care appointment: 08/13/2024 with Dr. Kylie Tucker     Reviewed:  Yes:   reviewed - controlled substances reflected in medication list.    Interim History:  Eliezer Izquierdo is a 39 year old female who is seen today for follow up with Palliative Care via  billable video visit.      Appetite/Nausea: NAUSEA--COMES OUT OF THE BLUE needed to go to the ED once and IV phenergan helped.     Bowels: no concerns when she uses stool softeners     Sleep: usually OK     Mood: pretty good--stress  plays a role in her pain experience     Coping:  overall OK    Family History- Reviewed in Epic.    Allergies   Allergen Reactions    Ubrogepant Muscle Pain (Myalgia)     Other Reaction(s): Chest Pain, Chest Pain    Also caused blisters in mouth    Azithromycin Hives    Doxycycline Hives    Erythromycin Hives    Estrogens     Levofloxacin Hives    Maxalt [Rizatriptan]     Oxycodone-Acetaminophen      Burning mouth and lips with red sore taste buds and throat irritation    PER PATIENT NOT ALLERGIC TO     Penicillins     Propranolol Hcl Headache    Sulfa Antibiotics     Sumatriptan Muscle Pain (Myalgia)    Zofran [Ondansetron] Muscle Pain (Myalgia)    Hydromorphone Anxiety and Itching     Other Reaction(s): Tachycardia    Panic attacks    Ondansetron Hcl Anxiety, Other (See Comments) and Palpitations    Toradol [Ketorolac] Anxiety       Social History:  Pertinent changes to social history/social situation since last visit: child is almost 19 yo and they live in a house on contract for [employer] but the deed [employer] has gone bankrupt and they may lose their home and investment  Key support resources: angel; son and her mom who lives in [place], [place];   Advance Directive Status:  no ACP     Social History     Tobacco Use    Smoking status: Every Day     Types: Cigarettes     Passive exposure: Current    Smokeless tobacco: Never    Tobacco comments:     Smokes about 4 or less cigarette's a day. Trying to quit.   Vaping Use    Vaping status: Never Used   Substance Use Topics    Alcohol use: No    Drug use: No         Allergies   Allergen Reactions    Ubrogepant Muscle Pain (Myalgia)     Other Reaction(s): Chest Pain, Chest Pain    Also caused blisters in mouth    Azithromycin Hives    Doxycycline Hives    Erythromycin Hives    Estrogens     Levofloxacin Hives    Maxalt [Rizatriptan]     Oxycodone-Acetaminophen      Burning mouth and lips with red sore taste buds and throat irritation    PER PATIENT NOT ALLERGIC TO     Penicillins      Propranolol Hcl Headache    Sulfa Antibiotics     Sumatriptan Muscle Pain (Myalgia)    Zofran [Ondansetron] Muscle Pain (Myalgia)    Hydromorphone Anxiety and Itching     Other Reaction(s): Tachycardia    Panic attacks    Ondansetron Hcl Anxiety, Other (See Comments) and Palpitations    Toradol [Ketorolac] Anxiety     Current Outpatient Medications   Medication Sig Dispense Refill    acetaminophen (TYLENOL) 500 MG tablet Take 2 tablets (1,000 mg) by mouth every 8 hours as needed for mild pain      albuterol (PROAIR HFA/PROVENTIL HFA/VENTOLIN HFA) 108 (90 Base) MCG/ACT inhaler Inhale 2 puffs into the lungs every 4 hours as needed      amitriptyline (ELAVIL) 10 MG tablet Take 1-2 tablets (10-20 mg) by mouth every morning AND 5 tablets (50 mg) at bedtime.      amitriptyline (ELAVIL) 25 MG tablet Take 2-3 tablets (50-75 mg) by mouth at bedtime 90 tablet 2    amLODIPine (NORVASC) 10 MG tablet Take 10 mg by mouth at bedtime      anastrozole (ARIMIDEX) 1 MG tablet Take 1 tablet (1 mg) by mouth daily. 30 tablet 11    atorvastatin (LIPITOR) 10 MG tablet Take 10 mg by mouth at bedtime      Blood Glucose Monitoring Suppl (ONETOUCH VERIO FLEX SYSTEM) w/Device KIT       buprenorphine HCl-naloxone HCl (SUBOXONE) 8-2 MG per film Place 1 Film under the tongue every 6 hours. 120 Film 0    cetirizine (ZYRTEC) 10 MG tablet Take 5 mg by mouth as needed      EPINEPHrine (ANY BX GENERIC EQUIV) 0.3 MG/0.3ML injection 2-pack       esomeprazole (NEXIUM) 20 MG DR capsule Take 20 mg by mouth as needed      fluticasone-salmeterol (ADVAIR HFA) 115-21 MCG/ACT inhaler Inhale 2 puffs into the lungs 2 times daily      hydrochlorothiazide (HYDRODIURIL) 25 MG tablet Take 25 mg by mouth as needed      HYDROcodone-acetaminophen (NORCO)  MG per tablet Take 0.5-1 tablets by mouth every 4 hours as needed for severe pain. 84 tablet 0    ibuprofen (ADVIL/MOTRIN) 200 MG tablet Take 200 mg by mouth      insulin glargine (LANTUS PEN) 100 UNIT/ML pen  "Inject 35 Units subcutaneously at bedtime.      JANUVIA 100 MG tablet Take 100 mg by mouth at bedtime      LORazepam (ATIVAN) 1 MG tablet Take 1 tablet (1 mg) by mouth every 6 hours as needed for anxiety, nausea or sleep. 30 tablet 1    methocarbamol (ROBAXIN) 500 MG tablet Take 3 tablets (1,500 mg) by mouth 3 times daily as needed for muscle spasms. 270 tablet 1    naloxone (NARCAN) 4 MG/0.1ML nasal spray Spray 4 mg into one nostril alternating nostrils as needed for opioid reversal every 2-3 minutes until assistance arrives      OLANZapine (ZYPREXA) 2.5 MG tablet Take 1-2 tablets (2.5-5 mg) by mouth 3 times daily as needed (Nausea or anxiety) 150 tablet 2    ONETOUCH VERIO IQ test strip       Pregabalin (LYRICA) 200 MG capsule Take 1 capsule (200 mg) by mouth at bedtime. 30 capsule 1    prochlorperazine (COMPAZINE) 10 MG tablet Take 1 tablet (10 mg) by mouth every 6 hours as needed for nausea or vomiting. 30 tablet 1    Prochlorperazine Maleate (COMPAZINE PO) Take 10 mg by mouth 3 times daily as needed for nausea      TRUE COMFORT PRO PEN NEEDLES 31G X 5 MM miscellaneous       urea (GORMEL) 20 % external cream Apply topically as needed (Apply to feet twice daily) 480 g 1     Past Medical History:   Diagnosis Date    Breast cancer (H)     Carrier of high risk cancer gene mutation - MC1R increased risk variants 04/22/2024    Two MC1R \"increased risk\" variants - c.451C>T and c.478C>T  [facility] 3/26/2024      Hypercholesteraemia     Irritable bowel     Migraines      Past Surgical History:   Procedure Laterality Date    IR CHEST PORT PLACEMENT > 5 YRS OF AGE  03/11/2024    LEFT Radiofrequency Identification Seed-Localized Segmental Mastectomy (=\"Lumpectomy\"), LEFT Radiofrequency Identification Seed-Localized Axillary [place] Lymph Node Biopsy - Left  07/24/2024    LUMPECTOMY BREAST, SEED LOCALIZATION, SENTINEL NODE Left 7/24/2024    Procedure: LEFT Radiofrequency Identification Seed-Localized " "Segmental Mastectomy (=\"Lumpectomy\"), LEFT Radiofrequency Identification Seed-Localized Axillary [place] Lymph Node Biopsy;  Surgeon: Albina Ford MD;  Location: [facility] OR    REMOVE PORT VASCULAR ACCESS Right 2024    Procedure: RIGHT vascular access port removal;  Surgeon: Albina Ford MD;  Location: [facility] OR       Physical Exam:   GENERAL APPEARANCE: healthy appearing young woman, alert and no distress; neatly groomed with hair growing out from chemo; she is smoking and smokes about 1 ppd (this has increased as she has felt more stress from her treatments);   EYES: Eyes grossly normal to inspection, PERRLA, conjunctivae and sclerae without injection or discharge, EOM intact   RESP:  no increased work of breathing; speaks in complete sentences;   MS: No musculoskeletal defects are noted  SKIN: No suspicious lesions or rashes, hydration status appears adequate with normal skin turgor   PSYCH: Alert and oriented x3; speech- coherent , normal rate and volume; able to articulate logical thoughts, able to abstract reason, no tangential thoughts, no hallucinations or delusions, mentation appears normal, Mood is euthymic. Affect is appropriate for this mood state and bright. Thought content is free of suicidal ideation, hallucinations, and delusions.  Eye contact is good during conversation.       Key Data Reviewed:  LABS: - Cr 0.60, Albumin 3.9,  Hgb 16.1,      IMAGIN2024 PET ONCOLOGY WHOLE BODY    43 minutes spent on the date of the encounter doing chart review, history and exam, patient education & counseling, documentation and other activities as noted above.    The longitudinal plan of care for the diagnosis(es)/condition(s) as documented were addressed during this visit. Due to the added complexity in care, I will continue to support Eliezer in the subsequent management and with ongoing continuity of care.      Estevan Singh MD MS FAAFP [facility] " Service  Office [phone]  Fax [phone]

## 2024-10-31 NOTE — NURSING NOTE
Current patient location: [address] [place] [id number]    Is the patient currently in the state of [place]? NO Provider licensed in [place]    Visit mode:VIDEO    If the visit is dropped, the patient can be reconnected by: VIDEO VISIT: Text to cell phone:   Telephone Information:   Mobile [phone]       Will anyone else be joining the visit? NO  (If patient encounters technical issues they should call [phone]    Are changes needed to the allergy or medication list? Pt stated no changes to allergies and Pt stated no med changes    Are refills needed on medications prescribed by this physician? NO    Rooming Documentation:  Questionnaire(s) completed    Reason for visit: WILI Devine LPN

## 2024-10-31 NOTE — PATIENT INSTRUCTIONS
It was good to see you today, Eliezer.    Here are the things we talked about:    [facility] seeing Dr. Reginald Garcia for Lazor treatment to help  with neuropathy.  Contact them to see if your insurance covers their care.  Below is the website.  [employer]     I ordered dilaudid as the new as needed pain medicine.  You can use 1-2 tablets four times a day.  The first script will be ready November 14th and please call Cecy on the [address] or 22[address] and let her know how it is working. I removed the Norco from your med list.        Use olanzapine 10 mg (4 of the 2.5 mg tablets) at bedtime and let's see if that doesn't help your nausea.  Someone from the team will reach out to schedule a follow up appointment in 2 months.     How to get a hold of us:  For non-urgent matters, MyChart works best.    For more urgent matters, or if you prefer not to use MyChart, call our clinic nurse coordinator Cecy Ghotra RN at [phone]    We have an on-call number for evenings and weekends. Please call this only if you are having uncontrolled symptoms or serious side effects from your medicines: [phone].     For refills, please give us a week (5 working days) notice. We don't always have providers available everyday to do refills. If you call the day you run out of your medicine, we may not be able to refill it in time, so call 5 days in advance!    Estevan Singh MD MS FAAFP CAQHPM  [facility]  Office [phone]  Fax [phone]

## 2024-11-05 ENCOUNTER — PATIENT OUTREACH (OUTPATIENT)
Dept: ONCOLOGY | Facility: CLINIC | Age: 39
End: 2024-11-05
Payer: COMMERCIAL

## 2024-11-05 NOTE — PROGRESS NOTES
[facility]: Surgical Oncology Cancer Care Short Note                                     Discussion with Patient:                                                          OUTBOUND CALL:     Spoke with Eliezer regarding plan to assess her left axilla via ultrasound followed by a potential biopsy. Reviewed with Eliezer this plan was discussed with  and and will mean her visit with Dr. Kern on 11/8/2024 will be cancelled and rescheduled to after her ultrasound.     Eliezer verbalized agreement to the plan. She has been scheduled for an ultrasound at the [facility] on 11/7/2024 at 11:00 am.     Encouraged Eliezer to call with any further questions or concerns. Direct contact number provided.     Nichelle Gomez RNCC  [facility]   Surgical Oncology     Approximately 10 minutes was spent in conversation with the patient/caregiver.

## 2024-11-07 ENCOUNTER — MYC REFILL (OUTPATIENT)
Dept: PALLIATIVE CARE | Facility: CLINIC | Age: 39
End: 2024-11-07

## 2024-11-07 ENCOUNTER — ANCILLARY PROCEDURE (OUTPATIENT)
Dept: MAMMOGRAPHY | Facility: CLINIC | Age: 39
End: 2024-11-07
Attending: SURGERY
Payer: COMMERCIAL

## 2024-11-07 DIAGNOSIS — N64.4 BREAST PAIN: ICD-10-CM

## 2024-11-07 DIAGNOSIS — F11.90 OPIOID USE DISORDER: ICD-10-CM

## 2024-11-07 DIAGNOSIS — Z17.0 MALIGNANT NEOPLASM OF UPPER-OUTER QUADRANT OF LEFT BREAST IN FEMALE, ESTROGEN RECEPTOR POSITIVE (H): ICD-10-CM

## 2024-11-07 DIAGNOSIS — C50.412 MALIGNANT NEOPLASM OF UPPER-OUTER QUADRANT OF LEFT BREAST IN FEMALE, ESTROGEN RECEPTOR POSITIVE (H): ICD-10-CM

## 2024-11-07 DIAGNOSIS — G89.3 CANCER RELATED PAIN: ICD-10-CM

## 2024-11-07 DIAGNOSIS — R59.9 ENLARGED LYMPH NODE: ICD-10-CM

## 2024-11-07 PROCEDURE — 76882 US LMTD JT/FCL EVL NVASC XTR: CPT | Mod: LT | Performed by: RADIOLOGY

## 2024-11-07 RX ORDER — BUPRENORPHINE AND NALOXONE 8; 2 MG/1; MG/1
1 FILM, SOLUBLE BUCCAL; SUBLINGUAL EVERY 6 HOURS
Qty: 120 FILM | Refills: 0 | Status: SHIPPED | OUTPATIENT
Start: 2024-11-11

## 2024-11-07 NOTE — TELEPHONE ENCOUNTER
Received MyChart message from pharmacy requesting refill of  Suboxone .     Last refill: 10/14/224  Last office visit: 10/31/24  Scheduled for follow up per check out request.     Will route request to MD/ for review.     Reviewed [place]  Report.

## 2024-11-08 RX ORDER — LORAZEPAM 1 MG/1
1 TABLET ORAL EVERY 6 HOURS PRN
Qty: 30 TABLET | Refills: 1 | Status: SHIPPED | OUTPATIENT
Start: 2024-11-12

## 2024-11-08 NOTE — TELEPHONE ENCOUNTER
Received [employer] message from patient requesting refill of  lorazepam .     Last refill: 11/5/24  Last office visit: 10/31/24  Scheduled for follow up per check out request.     Will route request to MD/ for review.     Reviewed [place]  Report.

## 2024-11-20 ENCOUNTER — MYC REFILL (OUTPATIENT)
Dept: PALLIATIVE CARE | Facility: CLINIC | Age: 39
End: 2024-11-20
Payer: COMMERCIAL

## 2024-11-20 DIAGNOSIS — T45.1X5A PERIPHERAL NEUROPATHY DUE TO CHEMOTHERAPY (H): ICD-10-CM

## 2024-11-20 DIAGNOSIS — C50.412 MALIGNANT NEOPLASM OF UPPER-OUTER QUADRANT OF LEFT BREAST IN FEMALE, ESTROGEN RECEPTOR POSITIVE (H): ICD-10-CM

## 2024-11-20 DIAGNOSIS — N64.4 BREAST PAIN: ICD-10-CM

## 2024-11-20 DIAGNOSIS — Z51.5 ENCOUNTER FOR PALLIATIVE CARE: ICD-10-CM

## 2024-11-20 DIAGNOSIS — G89.3 CANCER RELATED PAIN: ICD-10-CM

## 2024-11-20 DIAGNOSIS — Z17.0 MALIGNANT NEOPLASM OF UPPER-OUTER QUADRANT OF LEFT BREAST IN FEMALE, ESTROGEN RECEPTOR POSITIVE (H): ICD-10-CM

## 2024-11-20 DIAGNOSIS — G62.0 PERIPHERAL NEUROPATHY DUE TO CHEMOTHERAPY (H): ICD-10-CM

## 2024-11-20 NOTE — TELEPHONE ENCOUNTER
Received [employer] message from patient requesting refill of hydromorphone. Pt also left a voicemail reporting she is tolerating the hydromorphone well and finds it works better than the Norco she previously was taking.    Last refill: 11/14/24 per chart review, not listed on [place] .  Last office visit: 10/31/24  Scheduled for follow up 1/16/24     Will route request to MD/ for review.     Reviewed [place] and [place]  Report.

## 2024-11-21 ENCOUNTER — OFFICE VISIT (OUTPATIENT)
Dept: RADIATION THERAPY | Facility: OUTPATIENT CENTER | Age: 39
End: 2024-11-21
Payer: COMMERCIAL

## 2024-11-21 VITALS
RESPIRATION RATE: 16 BRPM | DIASTOLIC BLOOD PRESSURE: 89 MMHG | HEART RATE: 101 BPM | WEIGHT: 247 LBS | SYSTOLIC BLOOD PRESSURE: 135 MMHG | BODY MASS INDEX: 36.48 KG/M2

## 2024-11-21 DIAGNOSIS — Z17.0 MALIGNANT NEOPLASM OF UPPER-OUTER QUADRANT OF LEFT BREAST IN FEMALE, ESTROGEN RECEPTOR POSITIVE (H): ICD-10-CM

## 2024-11-21 DIAGNOSIS — C50.412 MALIGNANT NEOPLASM OF UPPER-OUTER QUADRANT OF LEFT BREAST IN FEMALE, ESTROGEN RECEPTOR POSITIVE (H): ICD-10-CM

## 2024-11-21 RX ORDER — HYDROMORPHONE HYDROCHLORIDE 2 MG/1
2-4 TABLET ORAL EVERY 6 HOURS PRN
Qty: 84 TABLET | Refills: 0 | Status: SHIPPED | OUTPATIENT
Start: 2024-11-23

## 2024-11-21 ASSESSMENT — PAIN SCALES - GENERAL: PAINLEVEL_OUTOF10: NO PAIN (0)

## 2024-11-21 NOTE — PROGRESS NOTES
Department of Radiation Oncology  Radiation Therapy Center  [facility]  [address], [address]  [place] [address]  [phone]       Consultation Note    Name: Eliezer Izquierdo MRN: [id number]   : 1985   Date of Service: 2024  Referring: Dr. Nolan and Dr. Ford     Reason for consultation: Invasive ductal carcinoma of the left breast, clinical T2N1 status post neoadjuvant chemotherapy followed by lumpectomy and sentinel lymph node evaluation.  Final pathology demonstrated residual disease in the breast, grade 2, 22 mm in size, positive DCIS, positive LVSI, no definitive response to chemotherapy in the breast or lymph node regions, negative margins, 4 out of 5 lymph nodes positive (18 mm, positive GEORGIE, 3 lymph nodes with macrometastasis), ER positive, MN positive, HER2 negative, ypT2 N2.  Evaluate potential role for radiation therapy.    History of Present Illness   Ms. Izquierdo is a 39 year old female with a diagnosis of left breast cancer.    The patient initially palpated a left breast mass in early .  Mammogram and ultrasound demonstrated a 2.5 spiculated mass at the 2 o'clock position.  Biopsy obtained on 2024 demonstrated IDC, ER positive, MN positive, HER2 negative.  Ultrasound biopsy was obtained and demonstrated concern for axial lymphadenopathy.  Left axillary lymph node biopsy on 2024 demonstrated metastatic breast cancer.  Patient was treated with neoadjuvant chemotherapy with dose dense before meals and paclitaxel under the care of Dr. Nolan.  On 24 the patient underwent left breast lumpectomy and sentinel lymph node evaluation by Dr. Ford. Final pathology demonstrated residual disease in the breast, grade 2, 22 mm in size, positive DCIS, positive LVSI, no definitive response to chemotherapy in the breast or lymph node regions, negative margins, 4 out of 5 lymph nodes positive (18 mm, positive GEORGIE, 3 lymph nodes with macrometastasis),  "ER positive, NV positive, HER2 negative, ypT2 N2. The  patient underwent PET scan postoperatively on 9/13/2024 which demonstrated change in T5 as well as liver, and left axilla.  MRI of the spine and liver were negative.  Ultrasound axilla was negative.  Patient was referred to our clinic to discuss potential role of adjuvant radiation therapy.    Patient is overall doing okay.  No prior radiation.  No history of connective disorder.  No pacemaker.  Does have mild residual incisional pain.  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    Past Medical History:   Past Medical History:   Diagnosis Date    Breast cancer (H)     Carrier of high risk cancer gene mutation - MC1R increased risk variants 04/22/2024    Two MC1R \"increased risk\" variants - c.451C>T and c.478C>T  Molecular Diagnostics Laboratory 3/26/2024      Hypercholesteraemia     Irritable bowel     Migraines        Past Surgical History:   Past Surgical History:   Procedure Laterality Date    IR CHEST PORT PLACEMENT > 5 YRS OF AGE  03/11/2024    LEFT Radiofrequency Identification Seed-Localized Segmental Mastectomy (=\"Lumpectomy\"), LEFT Radiofrequency Identification Seed-Localized Axillary [place] Lymph Node Biopsy - Left  07/24/2024    LUMPECTOMY BREAST, SEED LOCALIZATION, SENTINEL NODE Left 7/24/2024    Procedure: LEFT Radiofrequency Identification Seed-Localized Segmental Mastectomy (=\"Lumpectomy\"), LEFT Radiofrequency Identification Seed-Localized Axillary [place] Lymph Node Biopsy;  Surgeon: Albina Ford MD;  Location: [facility] OR    REMOVE PORT VASCULAR ACCESS Right 7/24/2024    Procedure: RIGHT vascular access port removal;  Surgeon: Albina Ford MD;  Location: [facility] OR       Chemotherapy History:  Per HPI    Radiation History:  None    Pregnant: Not Applicable  Implanted Cardiac Devices: No    Medications:  Current Outpatient Medications   Medication Sig Dispense Refill    acetaminophen (TYLENOL) 500 MG tablet Take 2 tablets " (1,000 mg) by mouth every 8 hours as needed for mild pain      albuterol (PROAIR HFA/PROVENTIL HFA/VENTOLIN HFA) 108 (90 Base) MCG/ACT inhaler Inhale 2 puffs into the lungs every 4 hours as needed      amitriptyline (ELAVIL) 10 MG tablet Take 1-2 tablets (10-20 mg) by mouth every morning AND 5 tablets (50 mg) at bedtime.      amLODIPine (NORVASC) 10 MG tablet Take 10 mg by mouth at bedtime      anastrozole (ARIMIDEX) 1 MG tablet Take 1 tablet (1 mg) by mouth daily. 30 tablet 11    atorvastatin (LIPITOR) 10 MG tablet Take 10 mg by mouth at bedtime      Blood Glucose Monitoring Suppl (ONETOUCH VERIO FLEX SYSTEM) w/Device KIT       buprenorphine HCl-naloxone HCl (SUBOXONE) 8-2 MG per film Place 1 Film under the tongue every 6 hours. 120 Film 0    cetirizine (ZYRTEC) 10 MG tablet Take 5 mg by mouth as needed      EPINEPHrine (ANY BX GENERIC EQUIV) 0.3 MG/0.3ML injection 2-pack       esomeprazole (NEXIUM) 20 MG DR capsule Take 20 mg by mouth as needed      fluticasone-salmeterol (ADVAIR HFA) 115-21 MCG/ACT inhaler Inhale 2 puffs into the lungs 2 times daily      hydrochlorothiazide (HYDRODIURIL) 25 MG tablet Take 25 mg by mouth as needed      HYDROcodone-acetaminophen (NORCO)  MG per tablet Take 0.5-1 tablets by mouth every 4 hours as needed for severe pain. 84 tablet 0    [START ON 11/23/2024] HYDROmorphone (DILAUDID) 2 MG tablet Take 1-2 tablets (2-4 mg) by mouth every 6 hours as needed for pain. 84 tablet 0    ibuprofen (ADVIL/MOTRIN) 200 MG tablet Take 200 mg by mouth      insulin glargine (LANTUS PEN) 100 UNIT/ML pen Inject 35 Units subcutaneously at bedtime.      JANUVIA 100 MG tablet Take 100 mg by mouth at bedtime      LORazepam (ATIVAN) 1 MG tablet Take 1 tablet (1 mg) by mouth every 6 hours as needed for anxiety, nausea or sleep. 30 tablet 1    methocarbamol (ROBAXIN) 500 MG tablet Take 3 tablets (1,500 mg) by mouth 3 times daily as needed for muscle spasms. 270 tablet 1    naloxone (NARCAN) 4 MG/0.1ML  nasal spray Spray 4 mg into one nostril alternating nostrils as needed for opioid reversal every 2-3 minutes until assistance arrives      OLANZapine (ZYPREXA) 2.5 MG tablet Take 1-2 tablets (2.5-5 mg) by mouth 3 times daily as needed (Nausea or anxiety) 150 tablet 2    ONETOUCH VERIO IQ test strip       Pregabalin (LYRICA) 200 MG capsule Take 1 capsule (200 mg) by mouth at bedtime. 30 capsule 1    prochlorperazine (COMPAZINE) 10 MG tablet Take 1 tablet (10 mg) by mouth every 6 hours as needed for nausea or vomiting. 30 tablet 1    Prochlorperazine Maleate (COMPAZINE PO) Take 10 mg by mouth 3 times daily as needed for nausea      TRUE COMFORT PRO PEN NEEDLES 31G X 5 MM miscellaneous        No current facility-administered medications for this visit.         Allergies:     Allergies   Allergen Reactions    Ubrogepant Muscle Pain (Myalgia)     Other Reaction(s): Chest Pain, Chest Pain    Also caused blisters in mouth    Azithromycin Hives    Doxycycline Hives    Erythromycin Hives    Estrogens     Levofloxacin Hives    Maxalt [Rizatriptan]     Oxycodone-Acetaminophen      Burning mouth and lips with red sore taste buds and throat irritation    PER PATIENT NOT ALLERGIC TO     Penicillins     Propranolol Hcl Headache    Sulfa Antibiotics     Sumatriptan Muscle Pain (Myalgia)    Zofran [Ondansetron] Muscle Pain (Myalgia)    Hydromorphone Anxiety and Itching     Other Reaction(s): Tachycardia    Panic attacks    Ondansetron Hcl Anxiety, Other (See Comments) and Palpitations    Toradol [Ketorolac] Anxiety         Family History:  Family History   Problem Relation Age of Onset    Breast Cancer Mother     Genitourinary Problems Mother         renal disease - minimal change, sponge kidney    Cancer Paternal Grandmother         GYN cancer    Cancer Paternal Grandfather         Stomach       Review of Systems   A 10-point review of systems was performed. Pertinent findings are noted in the HPI.    Physical Exam   ECOG Status:  1    Vitals:  /89 (BP Location: Right arm, Cuff Size: Adult Large)   Pulse 101   Resp 16   Wt 112 kg (247 lb)   BMI 36.48 kg/m      Gen: Alert, in NAD  Head: NC/AT  Eyes: PERRL, EOMI, sclera anicteric  Ears: No external auricular lesions  Nose/sinus: No rhinorrhea or epistaxis  Oral cavity/oropharynx: MMM, no visible oral cavity lesions, FOM and BOT are soft to palpation  Neck: Full ROM, supple, no palpable adenopathy  Pulm: No wheezing, stridor or respiratory distress  CV: Extremities are warm and well-perfused, no cyanosis, no pedal edema  Abdominal: Normal bowel sounds, soft, nontender, no masses  Musculoskeletal: No issues with ROM or lymphedema  Skin: Normal color and turgor  Neuro: A/Ox3, CN II-XII intact, normal gait    Imaging/Path/Labs   Imaging:   Per HPI    Path:   2/9/24  CASE FROM [place], [place] ([id number], OBTAINED 02/09/24):  LEFT BREAST, 2:00, 2 CM FROM NIPPLE, ULTRASOUND-GUIDED CORE BIOPSY:  -INVASIVE DUCTAL CARCINOMA, Jeniffer grade 1, at least 13 mm in linear extent.  -Ductal carcinoma in-situ (DCIS), low nuclear grade, cribriform and solid types.  -Invasive carcinoma is positive for estrogen receptor (>95% nuclei, strong staining) and positive for progesterone receptor (100% nuclei, strong staining); Ki-67 proliferation index is approx. 25%.  -See comment.    2/22/24  LYMPH NODE, LEFT AXILLARY, BIOPSY:  -METASTATIC BREAST CARCINOMA, almost entirely effacing lymph node, at least 11 mm in linear extent.  -Metastatic carcinoma is positive for estrogen receptor (>95% nuclei, strong staining), positive for progesterone receptor (>95% nuclei, strong staining), and equivocal for HER2 gene amplification by immunohistochemistry (score 2+); reflex HER2 FISH test is in progress and result will be provided separately.    7/24/24  A.  MEDICAL DEVICE, RIGHT VASCULAR ACCESS PORT, REMOVAL:  -See gross description for details (gross examination only).     B. LEFT BREAST, MASS,  SEED-LOCALIZED SEGMENTAL MASTECTOMY:  -INVASIVE DUCTAL CARCINOMA, Jeniffer grade 2, 22 mm in greatest dimension.  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  -Margins are negative; closest margins to invasive carcinoma are posterior at 4 mm and superior at 8 mm; all other margins are at >10 mm; closest uninvolved margin to DCIS is posterior at 4 mm; all other margins are >10 mm from DCIS.  -Calcifications associated with DCIS and invasive carcinoma.  -AJCC pathologic staging is ypT2 N2a(sn).  -See synoptic report.     C.  SENTINEL LYMPH NODE, LEFT AXILLARY #1 WITH RFID, EXCISION:  -METASTATIC BREAST CARCINOMA to three of three lymph nodes, 18 mm in greatest dimension, with 2 mm extranodal extension (3/3).  -Biopsy site changes.     D.  SENTINEL LYMPH NODE, LEFT AXILLARY #2, EXCISION:  -METASTATIC BREAST CARCINOMA to one lymph node, 5 mm in greatest dimension, with no extranodal extension (1/1).     E.  SENTINEL LYMPH NODE, LEFT AXILLARY #3, EXCISION:  -One lymph node, negative for malignancy (0/1).   Electronically signed by Juve Owen MD on 8/2/2024 at  2:59 PM   Synoptic Checklist   INVASIVE CARCINOMA OF THE BREAST: Resection   8th Edition - Protocol posted: 12/13/2023INVASIVE CARCINOMA OF THE BREAST: RESECTION - All Specimens  SPECIMEN   Procedure  Excision (less than total mastectomy)   Specimen Laterality  Left   TUMOR   Histologic Type  Invasive carcinoma of no special type (ductal)   Histologic Grade (Jeniffer Histologic Score)     Glandular (Acinar) / Tubular Differentiation  Score 1   Nuclear Pleomorphism  Score 3   Mitotic Rate  Score 2   Overall Grade  Grade 2 (scores of 6 or 7)   Tumor Size  Greatest dimension of largest invasive focus (Millimeters): 22 mm   Ductal Carcinoma In Situ (DCIS)  Present     Negative for extensive intraductal component (EIC)   Size (Extent) of DCIS   Estimated size (extent) of DCIS is at least (Millimeters): 15 mm   Architectural Patterns  Cribriform   Nuclear Grade  Grade III (high)   Necrosis  Present, focal (small foci or single cell necrosis)   Number of Blocks with DCIS  5   Number of Blocks Examined  20   Lobular Carcinoma In Situ (LCIS)  Not identified   Lymphatic and / or Vascular Invasion  Present   Treatment Effect in the Breast  No definite response to presurgical therapy in the invasive carcinoma   Treatment Effect in the Lymph Nodes  No definite response to presurgical therapy in metastatic carcinoma   MARGINS   Margin Status for Invasive Carcinoma  All margins negative for invasive carcinoma   Distance from Invasive Carcinoma to Closest Margin  4 mm   Closest Margin(s) to Invasive Carcinoma  Posterior   Distance from Invasive Carcinoma to Superior Margin  8 mm   Margin Status for DCIS  All margins negative for DCIS   Distance from DCIS to Closest Margin  4 mm   Closest Margin(s) to DCIS  Posterior   REGIONAL LYMPH NODES   Regional Lymph Node Status  Tumor present in regional lymph node(s)   Number of Lymph Nodes with Macrometastases  3   Number of Lymph Nodes with Micrometastases  1   Size of Largest [place] Metastatic Deposit  18 mm   Extranodal Extension  Present, 2 mm or less   Total Number of Lymph Nodes Examined (sentinel and non-sentinel)  5   Number of [place] Nodes Examined  5   pTNM CLASSIFICATION (AJCC 8th Edition)   Reporting of pT, pN, and (when applicable) pM categories is based on information available to the pathologist at the time the report is issued. As per the AJCC (Chapter 1, 8th Ed.) it is the managing physician s responsibility to establish the final pathologic stage based upon all pertinent information, including but potentially not limited to this pathology report.   Modified Classification  y   pT Category  pT2   pN Category  pN2a   N Suffix  (sn)   SPECIAL STUDIES        Estrogen Receptor (ER) Status  Positive (greater than  10% of cells demonstrate nuclear positivity)   Percentage of Cells with Nuclear Positivity  >95% %        Progesterone Receptor (PgR) Status  Positive   Percentage of Cells with Nuclear Positivity  100 %        HER2 (by immunohistochemistry)  Equivocal (Score 2+)   Percentage of Cells with Uniform Intense Complete Membrane Staining  Cannot be determined        Ki-67 Percentage of Positive Nuclei  25 %   Testing Performed on Case Number  [id number]   Comment(s)  Representative blocks are B15 and B17 (primary), and C2 (metastatic lymph node).   .   Breast Biomarker Reporting Template   Protocol posted: 12/13/2023(Added in Addendum) BREAST BIOMARKER REPORTING TEMPLATE - B  Test(s) Performed     HER2 by Immunohistochemistry  Negative (Score 1+)   Test Type  Food and Drug Administration (FDA) cleared (test / vendor): [place]   Primary Antibody  4B5   Cold Ischemia and Fixation Times  Do not meet requirements specified in latest version of the ASCO / CAP Guidelines   Cold Ischemia Time (minutes)  83 min   Fixation Time (hours)  29.8 hours   Testing Performed on Block Number(s)  B17   METHODS   Fixative  Formalin   Image Analysis  Not performed   Comment(s)  invasive carcinoma in segmental mastectomy specimen   .   Breast Biomarker Reporting Template   Protocol posted: 12/13/2023(Added in Addendum) BREAST BIOMARKER REPORTING TEMPLATE - D  Test(s) Performed     HER2 by Immunohistochemistry  Equivocal (Score 2+)   Percentage of Cells with Uniform Intense Complete Membrane Staining  0 %   Test Type  Food and Drug Administration (FDA) cleared (test / vendor): [place]   Primary Antibody  4B5   Cold Ischemia and Fixation Times  Meet requirements specified in latest version of the ASCO / CAP Guidelines   Cold Ischemia Time (minutes)  0 min   Fixation Time (hours)  30.6 hours   Testing Performed on Block Number(s)  D1   METHODS   Fixative  Formalin   Image Analysis  Not performed   Comment(s)  metastatic carcinoma in left axillary  sentinel lymph node #2   .              Assessment    Ms. Izquierdo is a 39 year old female with a diagnosis of invasive ductal carcinoma of the left breast, clinical T2N1 status post neoadjuvant chemotherapy followed by lumpectomy and sentinel lymph node evaluation.  Final pathology demonstrated residual disease in the breast, grade 2, 22 mm in size, positive DCIS, positive LVSI, no definitive response to chemotherapy in the breast or lymph node regions, negative margins, 4 out of 5 lymph nodes positive (18 mm, positive GEORGIE, 3 lymph nodes with macrometastasis), ER positive, MS positive, HER2 negative, ypT2 N2.  Evaluate potential role for radiation therapy.    Plan   We discussed the role of radiotherapy following mastectomy for breast cancer. Based upon analysis of patients enrolled in NSABP B-18 and B-27, those with residual farhan disease after surgery 10 year recurrence risk of approximately 25-30% without radiotherapy (Rio et al, JCO, 2012).  Accordingly, we recommend adjuvant radiotherapy to the left breast and regional nodes.    We discussed the role of radiation as a component of breast conservation therapy. In general, radiation is recommended as a component of breast conservation therapy as the most recent update of the EBCTCG metaanalysis for early stage breast cancer showed that for patients with pathologically node negative disease, radiotherapy reduces the 5 year risk of any recurrence from 31% to 15% (Lancet, 2011). This translated into a 3.3% absolute survival benefit at 15 years for all-comers. Accordingly, we recommend whole breast radiotherapy.     Will return to the plan to treat to a total dose of 50 Gray in 25 fractions followed by 10 Gray in 5 fraction boost.      We also discussed side effects, including short term risks of fatigue and skin reaction. Long term risks include breast cosmetic changes, damage to the heart or lung, lymphedema, and secondary cancer. We described the use of  3D-conformal radiotherapy to minimize dose to the normal tissues, while adequately covering the target tissues, and the ability of this technique to decrease potential for toxicity. With regards to sparing of the heart, we discussed the use of deep inspiration breath hold for treatment planning and delivery. This method has been shown to significantly reduce dose to the lung and heart as compared to treatment with free breathing (Vish et al, AJCO, 2012). All questions were answered to her satisfaction.  The patient wishes to think about her options at this time.  She will get back to us on how she ultimately wishes to proceed.  She is leaning towards omission of radiation despite our recommendation to proceed with adjuvant radiation therapy.    60 minutes spent on the date of the encounter doing chart review, review of outside records, review of test results, interpretation of tests, patient visit, documentation, discussion, and plan.        Nicholas Velasquez MD  Department of Radiation Oncology  [facility]

## 2024-11-21 NOTE — LETTER
2024      Eliezer Izquierdo  [address] [facility] [address]      Dear Colleague,    Thank you for referring your patient, Eliezer Izquierdo, to the [facility] RADIATION THERAPY [facility]INIC. Please see a copy of my visit note below.       Department of Radiation Oncology  Radiation Therapy Center  [facility]  [address], [address]  [place] [address]  [phone]       Consultation Note    Name: Eliezer Izquierdo MRN: [id number]   : 1985   Date of Service: 2024  Referring: Dr. Nolan and Dr. Ford     Reason for consultation: Invasive ductal carcinoma of the left breast, clinical T2N1 status post neoadjuvant chemotherapy followed by lumpectomy and sentinel lymph node evaluation.  Final pathology demonstrated residual disease in the breast, grade 2, 22 mm in size, positive DCIS, positive LVSI, no definitive response to chemotherapy in the breast or lymph node regions, negative margins, 4 out of 5 lymph nodes positive (18 mm, positive GEORGIE, 3 lymph nodes with macrometastasis), ER positive, NV positive, HER2 negative, ypT2 N2.  Evaluate potential role for radiation therapy.    History of Present Illness   Ms. Izquierdo is a 39 year old female with a diagnosis of left breast cancer.    The patient initially palpated a left breast mass in early .  Mammogram and ultrasound demonstrated a 2.5 spiculated mass at the 2 o'clock position.  Biopsy obtained on 2024 demonstrated IDC, ER positive, NV positive, HER2 negative.  Ultrasound biopsy was obtained and demonstrated concern for axial lymphadenopathy.  Left axillary lymph node biopsy on 2024 demonstrated metastatic breast cancer.  Patient was treated with neoadjuvant chemotherapy with dose dense before meals and paclitaxel under the care of Dr. Nolan.  On 24 the patient underwent left breast lumpectomy and sentinel lymph node evaluation by Dr. Ford. Final pathology demonstrated residual disease in  "the breast, grade 2, 22 mm in size, positive DCIS, positive LVSI, no definitive response to chemotherapy in the breast or lymph node regions, negative margins, 4 out of 5 lymph nodes positive (18 mm, positive GEORGIE, 3 lymph nodes with macrometastasis), ER positive, ME positive, HER2 negative, ypT2 N2. The  patient underwent PET scan postoperatively on 9/13/2024 which demonstrated change in T5 as well as liver, and left axilla.  MRI of the spine and liver were negative.  Ultrasound axilla was negative.  Patient was referred to our clinic to discuss potential role of adjuvant radiation therapy.    Patient is overall doing okay.  No prior radiation.  No history of connective disorder.  No pacemaker.  Does have mild residual incisional pain.  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    Past Medical History:   Past Medical History:   Diagnosis Date     Breast cancer (H)      Carrier of high risk cancer gene mutation - MC1R increased risk variants 04/22/2024    Two MC1R \"increased risk\" variants - c.451C>T and c.478C>T  Molecular Diagnostics Laboratory 3/26/2024       Hypercholesteraemia      Irritable bowel      Migraines        Past Surgical History:   Past Surgical History:   Procedure Laterality Date     IR CHEST PORT PLACEMENT > 5 YRS OF AGE  03/11/2024     LEFT Radiofrequency Identification Seed-Localized Segmental Mastectomy (=\"Lumpectomy\"), LEFT Radiofrequency Identification Seed-Localized Axillary [place] Lymph Node Biopsy - Left  07/24/2024     LUMPECTOMY BREAST, SEED LOCALIZATION, SENTINEL NODE Left 7/24/2024    Procedure: LEFT Radiofrequency Identification Seed-Localized Segmental Mastectomy (=\"Lumpectomy\"), LEFT Radiofrequency Identification Seed-Localized Axillary [place] Lymph Node Biopsy;  Surgeon: Albina Ford MD;  [facility]     REMOVE PORT VASCULAR ACCESS Right 7/24/2024    Procedure: RIGHT vascular access port removal;  Surgeon: Albina Ford MD;  [facility] "       Chemotherapy History:  Per HPI    Radiation History:  None    Pregnant: Not Applicable  Implanted Cardiac Devices: No    Medications:  Current Outpatient Medications   Medication Sig Dispense Refill     acetaminophen (TYLENOL) 500 MG tablet Take 2 tablets (1,000 mg) by mouth every 8 hours as needed for mild pain       albuterol (PROAIR HFA/PROVENTIL HFA/VENTOLIN HFA) 108 (90 Base) MCG/ACT inhaler Inhale 2 puffs into the lungs every 4 hours as needed       amitriptyline (ELAVIL) 10 MG tablet Take 1-2 tablets (10-20 mg) by mouth every morning AND 5 tablets (50 mg) at bedtime.       amLODIPine (NORVASC) 10 MG tablet Take 10 mg by mouth at bedtime       anastrozole (ARIMIDEX) 1 MG tablet Take 1 tablet (1 mg) by mouth daily. 30 tablet 11     atorvastatin (LIPITOR) 10 MG tablet Take 10 mg by mouth at bedtime       Blood Glucose Monitoring Suppl (ONETOUCH VERIO FLEX SYSTEM) w/Device KIT        buprenorphine HCl-naloxone HCl (SUBOXONE) 8-2 MG per film Place 1 Film under the tongue every 6 hours. 120 Film 0     cetirizine (ZYRTEC) 10 MG tablet Take 5 mg by mouth as needed       EPINEPHrine (ANY BX GENERIC EQUIV) 0.3 MG/0.3ML injection 2-pack        esomeprazole (NEXIUM) 20 MG DR capsule Take 20 mg by mouth as needed       fluticasone-salmeterol (ADVAIR HFA) 115-21 MCG/ACT inhaler Inhale 2 puffs into the lungs 2 times daily       hydrochlorothiazide (HYDRODIURIL) 25 MG tablet Take 25 mg by mouth as needed       HYDROcodone-acetaminophen (NORCO)  MG per tablet Take 0.5-1 tablets by mouth every 4 hours as needed for severe pain. 84 tablet 0     [START ON 11/23/2024] HYDROmorphone (DILAUDID) 2 MG tablet Take 1-2 tablets (2-4 mg) by mouth every 6 hours as needed for pain. 84 tablet 0     ibuprofen (ADVIL/MOTRIN) 200 MG tablet Take 200 mg by mouth       insulin glargine (LANTUS PEN) 100 UNIT/ML pen Inject 35 Units subcutaneously at bedtime.       JANUVIA 100 MG tablet Take 100 mg by mouth at bedtime       LORazepam  (ATIVAN) 1 MG tablet Take 1 tablet (1 mg) by mouth every 6 hours as needed for anxiety, nausea or sleep. 30 tablet 1     methocarbamol (ROBAXIN) 500 MG tablet Take 3 tablets (1,500 mg) by mouth 3 times daily as needed for muscle spasms. 270 tablet 1     naloxone (NARCAN) 4 MG/0.1ML nasal spray Spray 4 mg into one nostril alternating nostrils as needed for opioid reversal every 2-3 minutes until assistance arrives       OLANZapine (ZYPREXA) 2.5 MG tablet Take 1-2 tablets (2.5-5 mg) by mouth 3 times daily as needed (Nausea or anxiety) 150 tablet 2     ONETOUCH VERIO IQ test strip        Pregabalin (LYRICA) 200 MG capsule Take 1 capsule (200 mg) by mouth at bedtime. 30 capsule 1     prochlorperazine (COMPAZINE) 10 MG tablet Take 1 tablet (10 mg) by mouth every 6 hours as needed for nausea or vomiting. 30 tablet 1     Prochlorperazine Maleate (COMPAZINE PO) Take 10 mg by mouth 3 times daily as needed for nausea       TRUE COMFORT PRO PEN NEEDLES 31G X 5 MM miscellaneous        No current facility-administered medications for this visit.         Allergies:     Allergies   Allergen Reactions     Ubrogepant Muscle Pain (Myalgia)     Other Reaction(s): Chest Pain, Chest Pain    Also caused blisters in mouth     Azithromycin Hives     Doxycycline Hives     Erythromycin Hives     Estrogens      Levofloxacin Hives     Maxalt [Rizatriptan]      Oxycodone-Acetaminophen      Burning mouth and lips with red sore taste buds and throat irritation    PER PATIENT NOT ALLERGIC TO      Penicillins      Propranolol Hcl Headache     Sulfa Antibiotics      Sumatriptan Muscle Pain (Myalgia)     Zofran [Ondansetron] Muscle Pain (Myalgia)     Hydromorphone Anxiety and Itching     Other Reaction(s): Tachycardia    Panic attacks     Ondansetron Hcl Anxiety, Other (See Comments) and Palpitations     Toradol [Ketorolac] Anxiety         Family History:  Family History   Problem Relation Age of Onset     Breast Cancer Mother      Genitourinary  Problems Mother         renal disease - minimal change, sponge kidney     Cancer Paternal Grandmother         GYN cancer     Cancer Paternal Grandfather         Stomach       Review of Systems   A 10-point review of systems was performed. Pertinent findings are noted in the HPI.    Physical Exam   ECOG Status: 1    Vitals:  /89 (BP Location: Right arm, Cuff Size: Adult Large)   Pulse 101   Resp 16   Wt 112 kg (247 lb)   BMI 36.48 kg/m      Gen: Alert, in NAD  Head: NC/AT  Eyes: PERRL, EOMI, sclera anicteric  Ears: No external auricular lesions  Nose/sinus: No rhinorrhea or epistaxis  Oral cavity/oropharynx: MMM, no visible oral cavity lesions, FOM and BOT are soft to palpation  Neck: Full ROM, supple, no palpable adenopathy  Pulm: No wheezing, stridor or respiratory distress  CV: Extremities are warm and well-perfused, no cyanosis, no pedal edema  Abdominal: Normal bowel sounds, soft, nontender, no masses  Musculoskeletal: No issues with ROM or lymphedema  Skin: Normal color and turgor  Neuro: A/Ox3, CN II-XII intact, normal gait    Imaging/Path/Labs   Imaging:   Per HPI    Path:   2/9/24  CASE FROM [place], [place] ([id number], OBTAINED 02/09/24):  LEFT BREAST, 2:00, 2 CM FROM NIPPLE, ULTRASOUND-GUIDED CORE BIOPSY:  -INVASIVE DUCTAL CARCINOMA, [place] grade 1, at least 13 mm in linear extent.  -Ductal carcinoma in-situ (DCIS), low nuclear grade, cribriform and solid types.  -Invasive carcinoma is positive for estrogen receptor (>95% nuclei, strong staining) and positive for progesterone receptor (100% nuclei, strong staining); Ki-67 proliferation index is approx. 25%.  -See comment.    2/22/24  LYMPH NODE, LEFT AXILLARY, BIOPSY:  -METASTATIC BREAST CARCINOMA, almost entirely effacing lymph node, at least 11 mm in linear extent.  -Metastatic carcinoma is positive for estrogen receptor (>95% nuclei, strong staining), positive for progesterone receptor (>95% nuclei, strong staining), and  equivocal for HER2 gene amplification by immunohistochemistry (score 2+); reflex HER2 FISH test is in progress and result will be provided separately.    7/24/24  A.  MEDICAL DEVICE, RIGHT VASCULAR ACCESS PORT, REMOVAL:  -See gross description for details (gross examination only).     B. LEFT BREAST, MASS, SEED-LOCALIZED SEGMENTAL MASTECTOMY:  -INVASIVE DUCTAL CARCINOMA, [place] grade 2, 22 mm in greatest dimension.  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  -Margins are negative; closest margins to invasive carcinoma are posterior at 4 mm and superior at 8 mm; all other margins are at >10 mm; closest uninvolved margin to DCIS is posterior at 4 mm; all other margins are >10 mm from DCIS.  -Calcifications associated with DCIS and invasive carcinoma.  -AJCC pathologic staging is ypT2 N2a(sn).  -See synoptic report.     C.  SENTINEL LYMPH NODE, LEFT AXILLARY #1 WITH RFID, EXCISION:  -METASTATIC BREAST CARCINOMA to three of three lymph nodes, 18 mm in greatest dimension, with 2 mm extranodal extension (3/3).  -Biopsy site changes.     D.  SENTINEL LYMPH NODE, LEFT AXILLARY #2, EXCISION:  -METASTATIC BREAST CARCINOMA to one lymph node, 5 mm in greatest dimension, with no extranodal extension (1/1).     E.  SENTINEL LYMPH NODE, LEFT AXILLARY #3, EXCISION:  -One lymph node, negative for malignancy (0/1).   Electronically signed by Juve Owen MD on 8/2/2024 at  2:59 PM   Synoptic Checklist   INVASIVE CARCINOMA OF THE BREAST: Resection   8th Edition - Protocol posted: 12/13/2023INVASIVE CARCINOMA OF THE BREAST: RESECTION - All Specimens  SPECIMEN   Procedure  Excision (less than total mastectomy)   Specimen Laterality  Left   TUMOR   Histologic Type  Invasive carcinoma of no special type (ductal)   Histologic Grade (Jeniffer Histologic Score)     Glandular (Acinar) / Tubular Differentiation  Score 1    Nuclear Pleomorphism  Score 3   Mitotic Rate  Score 2   Overall Grade  Grade 2 (scores of 6 or 7)   Tumor Size  Greatest dimension of largest invasive focus (Millimeters): 22 mm   Ductal Carcinoma In Situ (DCIS)  Present     Negative for extensive intraductal component (EIC)   Size (Extent) of DCIS  Estimated size (extent) of DCIS is at least (Millimeters): 15 mm   Architectural Patterns  Cribriform   Nuclear Grade  Grade III (high)   Necrosis  Present, focal (small foci or single cell necrosis)   Number of Blocks with DCIS  5   Number of Blocks Examined  20   Lobular Carcinoma In Situ (LCIS)  Not identified   Lymphatic and / or Vascular Invasion  Present   Treatment Effect in the Breast  No definite response to presurgical therapy in the invasive carcinoma   Treatment Effect in the Lymph Nodes  No definite response to presurgical therapy in metastatic carcinoma   MARGINS   Margin Status for Invasive Carcinoma  All margins negative for invasive carcinoma   Distance from Invasive Carcinoma to Closest Margin  4 mm   Closest Margin(s) to Invasive Carcinoma  Posterior   Distance from Invasive Carcinoma to Superior Margin  8 mm   Margin Status for DCIS  All margins negative for DCIS   Distance from DCIS to Closest Margin  4 mm   Closest Margin(s) to DCIS  Posterior   REGIONAL LYMPH NODES   Regional Lymph Node Status  Tumor present in regional lymph node(s)   Number of Lymph Nodes with Macrometastases  3   Number of Lymph Nodes with Micrometastases  1   Size of Largest [place] Metastatic Deposit  18 mm   Extranodal Extension  Present, 2 mm or less   Total Number of Lymph Nodes Examined (sentinel and non-sentinel)  5   Number of [place] Nodes Examined  5   pTNM CLASSIFICATION (AJCC 8th Edition)   Reporting of pT, pN, and (when applicable) pM categories is based on information available to the pathologist at the time the report is issued. As per the AJCC (Chapter 1, 8th Ed.) it is the managing physician s responsibility to  establish the final pathologic stage based upon all pertinent information, including but potentially not limited to this pathology report.   Modified Classification  y   pT Category  pT2   pN Category  pN2a   N Suffix  (sn)   SPECIAL STUDIES        Estrogen Receptor (ER) Status  Positive (greater than 10% of cells demonstrate nuclear positivity)   Percentage of Cells with Nuclear Positivity  >95% %        Progesterone Receptor (PgR) Status  Positive   Percentage of Cells with Nuclear Positivity  100 %        HER2 (by immunohistochemistry)  Equivocal (Score 2+)   Percentage of Cells with Uniform Intense Complete Membrane Staining  Cannot be determined        Ki-67 Percentage of Positive Nuclei  25 %   Testing Performed on Case Number  [id number]   Comment(s)  Representative blocks are B15 and B17 (primary), and C2 (metastatic lymph node).   .   Breast Biomarker Reporting Template   Protocol posted: 12/13/2023(Added in Addendum) BREAST BIOMARKER REPORTING TEMPLATE - B  Test(s) Performed     HER2 by Immunohistochemistry  Negative (Score 1+)   Test Type  Food and Drug Administration (FDA) cleared (test / vendor): [employer]   Primary Antibody  4B5   Cold Ischemia and Fixation Times  Do not meet requirements specified in latest version of the ASCO / CAP Guidelines   Cold Ischemia Time (minutes)  83 min   Fixation Time (hours)  29.8 hours   Testing Performed on Block Number(s)  B17   METHODS   Fixative  Formalin   Image Analysis  Not performed   Comment(s)  invasive carcinoma in segmental mastectomy specimen   .   Breast Biomarker Reporting Template   Protocol posted: 12/13/2023(Added in Addendum) BREAST BIOMARKER REPORTING TEMPLATE - D  Test(s) Performed     HER2 by Immunohistochemistry  Equivocal (Score 2+)   Percentage of Cells with Uniform Intense Complete Membrane Staining  0 %   Test Type  Food and Drug Administration (FDA) cleared (test / vendor): [employer]   Primary Antibody  4B5   Cold Ischemia and Fixation Times   Meet requirements specified in latest version of the ASCO / CAP Guidelines   Cold Ischemia Time (minutes)  0 min   Fixation Time (hours)  30.6 hours   Testing Performed on Block Number(s)  D1   METHODS   Fixative  Formalin   Image Analysis  Not performed   Comment(s)  metastatic carcinoma in left axillary sentinel lymph node #2   .              Assessment    Ms. Izquierdo is a 39 year old female with a diagnosis of invasive ductal carcinoma of the left breast, clinical T2N1 status post neoadjuvant chemotherapy followed by lumpectomy and sentinel lymph node evaluation.  Final pathology demonstrated residual disease in the breast, grade 2, 22 mm in size, positive DCIS, positive LVSI, no definitive response to chemotherapy in the breast or lymph node regions, negative margins, 4 out of 5 lymph nodes positive (18 mm, positive GEORGIE, 3 lymph nodes with macrometastasis), ER positive, MT positive, HER2 negative, ypT2 N2.  Evaluate potential role for radiation therapy.    Plan   We discussed the role of radiotherapy following mastectomy for breast cancer. Based upon analysis of patients enrolled in NSABP B-18 and B-27, those with residual farhan disease after surgery 10 year recurrence risk of approximately 25-30% without radiotherapy (Rio et al, JCO, 2012).  Accordingly, we recommend adjuvant radiotherapy to the left breast and regional nodes.    We discussed the role of radiation as a component of breast conservation therapy. In general, radiation is recommended as a component of breast conservation therapy as the most recent update of the EBCTCG metaanalysis for early stage breast cancer showed that for patients with pathologically node negative disease, radiotherapy reduces the 5 year risk of any recurrence from 31% to 15% (Lancet, 2011). This translated into a 3.3% absolute survival benefit at 15 years for all-comers. Accordingly, we recommend whole breast radiotherapy.     Will return to the plan to treat to a total  dose of 50 Gray in 25 fractions followed by 10 Gray in 5 fraction boost.      We also discussed side effects, including short term risks of fatigue and skin reaction. Long term risks include breast cosmetic changes, damage to the heart or lung, lymphedema, and secondary cancer. We described the use of 3D-conformal radiotherapy to minimize dose to the normal tissues, while adequately covering the target tissues, and the ability of this technique to decrease potential for toxicity. With regards to sparing of the heart, we discussed the use of deep inspiration breath hold for treatment planning and delivery. This method has been shown to significantly reduce dose to the lung and heart as compared to treatment with free breathing (Vish et al, AJCO, 2012). All questions were answered to her satisfaction.  The patient wishes to think about her options at this time.  She will get back to us on how she ultimately wishes to proceed.  She is leaning towards omission of radiation despite our recommendation to proceed with adjuvant radiation therapy.    60 minutes spent on the date of the encounter doing chart review, review of outside records, review of test results, interpretation of tests, patient visit, documentation, discussion, and plan.        Nicholas Velasquez MD  Department of Radiation Oncology  [facility]       Again, thank you for allowing me to participate in the care of your patient.        Sincerely,        Nicholas Velasquez MD

## 2024-11-21 NOTE — NURSING NOTE
REASON FOR APPOINTMENT   Left sided IDC breast cancer, s/p neoadjuvant chemo and surgery  Sent by Dr. Nolan to discuss radiation therapy  See epic for all details/providers    PERSONAL HISTORY OF CANCER   Previous Cancer ? no   Prior Radiation ? no   Prior Chemotherapy ? no   Prior Hormonal Therapy ? no     REFERRALS NEEDED  Lymphedema therapy    VITALS  /89 (BP Location: Right arm, Cuff Size: Adult Large)   Pulse 101   Resp 16   Wt 112 kg (247 lb)   BMI 36.48 kg/m      PACEMAKER/IMPLANTED CARDIAC DEVICE: No    PAIN  Joint pain, some breast pain, neuropathic pain  Working with palliative care providers    PSYCHOSOCIAL  Marital Status: single  Patient lives in [place], [place].  Number of children: 1 son, present today   Working status: not currently working  Do you feel safe in your home? Yes    REVIEW OF SYSTEMS  Skin: negative  Eyes: negative  Ears/Nose/Throat: bilateral ear infection, starting abx today  Respiratory: No shortness of breath, dyspnea on exertion, cough, or hemoptysis  Cardiovascular: negative  Gastrointestinal: IBS - stable, no meds or other treatment  Genitourinary: negative  Musculoskeletal: joint pain and joint stiffness  Neurologic: numbness or tingling of feet  Psychiatric: negative  Hematologic/Lymphatic/Immunologic: negative  Endocrine: negative    WOMEN ONLY  Any chance you may be pregnant: No - hysterectomy    Radiation Oncology Patient Teaching    Person involved with teaching: Patient and Son  Patient asked Questions: Yes  Patient was cooperative: Yes  Patient was receptive (willing to accept information given): Yes    Education Assessment  Comprehension ability: High  Knowledge level: Medium  Factors affecting teaching: None    Response To Teaching  Verbalizes understanding    Do you have an advanced directive or living will? No  Are you DNR/DNI? No

## 2024-11-26 ENCOUNTER — MYC REFILL (OUTPATIENT)
Dept: PALLIATIVE CARE | Facility: CLINIC | Age: 39
End: 2024-11-26
Payer: COMMERCIAL

## 2024-11-26 DIAGNOSIS — C50.412 MALIGNANT NEOPLASM OF UPPER-OUTER QUADRANT OF LEFT BREAST IN FEMALE, ESTROGEN RECEPTOR POSITIVE (H): ICD-10-CM

## 2024-11-26 DIAGNOSIS — Z17.0 MALIGNANT NEOPLASM OF UPPER-OUTER QUADRANT OF LEFT BREAST IN FEMALE, ESTROGEN RECEPTOR POSITIVE (H): ICD-10-CM

## 2024-11-26 RX ORDER — LORAZEPAM 1 MG/1
1 TABLET ORAL EVERY 6 HOURS PRN
Qty: 30 TABLET | Refills: 1 | Status: SHIPPED | OUTPATIENT
Start: 2024-11-29

## 2024-11-26 NOTE — TELEPHONE ENCOUNTER
Received Medityplust message from patient requesting refill of  lorazepam .     Last refill: 11/21/24  Last office visit: 10/31/24  Scheduled for follow up 1/16/25     Will route request to DO for review.     Reviewed [place]  [place].

## 2024-11-27 ENCOUNTER — MYC REFILL (OUTPATIENT)
Dept: PALLIATIVE CARE | Facility: CLINIC | Age: 39
End: 2024-11-27
Payer: COMMERCIAL

## 2024-11-27 DIAGNOSIS — G89.3 CANCER RELATED PAIN: ICD-10-CM

## 2024-11-27 DIAGNOSIS — C50.412 MALIGNANT NEOPLASM OF UPPER-OUTER QUADRANT OF LEFT BREAST IN FEMALE, ESTROGEN RECEPTOR POSITIVE (H): ICD-10-CM

## 2024-11-27 DIAGNOSIS — Z17.0 MALIGNANT NEOPLASM OF UPPER-OUTER QUADRANT OF LEFT BREAST IN FEMALE, ESTROGEN RECEPTOR POSITIVE (H): ICD-10-CM

## 2024-11-27 DIAGNOSIS — N64.4 BREAST PAIN: ICD-10-CM

## 2024-11-27 RX ORDER — METHOCARBAMOL 500 MG/1
1500 TABLET, FILM COATED ORAL 3 TIMES DAILY PRN
Qty: 270 TABLET | Refills: 1 | Status: SHIPPED | OUTPATIENT
Start: 2024-11-27

## 2024-11-27 NOTE — TELEPHONE ENCOUNTER
Received Intcomext message from patient requesting refill of Robaxin.       Last office visit: 10/31/2024  Scheduled for follow up 1/16/2025    Will route request to MD/ for review.     Reviewed [place]  Report.

## 2024-11-29 ENCOUNTER — MYC REFILL (OUTPATIENT)
Dept: PALLIATIVE CARE | Facility: CLINIC | Age: 39
End: 2024-11-29
Payer: COMMERCIAL

## 2024-11-29 DIAGNOSIS — T45.1X5A PERIPHERAL NEUROPATHY DUE TO CHEMOTHERAPY (H): ICD-10-CM

## 2024-11-29 DIAGNOSIS — Z51.5 ENCOUNTER FOR PALLIATIVE CARE: ICD-10-CM

## 2024-11-29 DIAGNOSIS — G89.3 CANCER RELATED PAIN: ICD-10-CM

## 2024-11-29 DIAGNOSIS — C50.412 MALIGNANT NEOPLASM OF UPPER-OUTER QUADRANT OF LEFT BREAST IN FEMALE, ESTROGEN RECEPTOR POSITIVE (H): ICD-10-CM

## 2024-11-29 DIAGNOSIS — G62.0 PERIPHERAL NEUROPATHY DUE TO CHEMOTHERAPY (H): ICD-10-CM

## 2024-11-29 DIAGNOSIS — N64.4 BREAST PAIN: ICD-10-CM

## 2024-11-29 DIAGNOSIS — Z17.0 MALIGNANT NEOPLASM OF UPPER-OUTER QUADRANT OF LEFT BREAST IN FEMALE, ESTROGEN RECEPTOR POSITIVE (H): ICD-10-CM

## 2024-12-02 ENCOUNTER — MYC REFILL (OUTPATIENT)
Dept: PALLIATIVE CARE | Facility: CLINIC | Age: 39
End: 2024-12-02
Payer: COMMERCIAL

## 2024-12-02 DIAGNOSIS — N64.4 BREAST PAIN: ICD-10-CM

## 2024-12-02 DIAGNOSIS — F11.90 OPIOID USE DISORDER: ICD-10-CM

## 2024-12-02 DIAGNOSIS — G89.3 CANCER RELATED PAIN: ICD-10-CM

## 2024-12-02 RX ORDER — HYDROMORPHONE HYDROCHLORIDE 2 MG/1
2-4 TABLET ORAL EVERY 6 HOURS PRN
Qty: 112 TABLET | Refills: 0 | Status: SHIPPED | OUTPATIENT
Start: 2024-12-02

## 2024-12-02 NOTE — TELEPHONE ENCOUNTER
Received SoFit message from patient requesting refill of hydromorphone. Pt requesting a 14 day supply.    Last refill: 11/23/24  Last office visit: 10/31/24  Scheduled for follow up 1/16/25     Will route request to MD/ for review.     Reviewed [place]  Report.

## 2024-12-03 RX ORDER — BUPRENORPHINE AND NALOXONE 8; 2 MG/1; MG/1
1 FILM, SOLUBLE BUCCAL; SUBLINGUAL EVERY 6 HOURS
Qty: 120 FILM | Refills: 0 | Status: SHIPPED | OUTPATIENT
Start: 2024-12-09

## 2024-12-03 NOTE — TELEPHONE ENCOUNTER
Received [employer] Medsystemst message from patient requesting refill of  Suboxone .     Last refill: 11/11/24  Last office visit: 10/31/24  Scheduled for follow up 1/16/25     Will route request to MD/ for review.     Reviewed [place]  Report.

## 2024-12-10 DIAGNOSIS — C50.412 MALIGNANT NEOPLASM OF UPPER-OUTER QUADRANT OF LEFT BREAST IN FEMALE, ESTROGEN RECEPTOR POSITIVE (H): Primary | ICD-10-CM

## 2024-12-10 DIAGNOSIS — Z17.0 MALIGNANT NEOPLASM OF UPPER-OUTER QUADRANT OF LEFT BREAST IN FEMALE, ESTROGEN RECEPTOR POSITIVE (H): Primary | ICD-10-CM

## 2024-12-16 DIAGNOSIS — C50.412 MALIGNANT NEOPLASM OF UPPER-OUTER QUADRANT OF LEFT BREAST IN FEMALE, ESTROGEN RECEPTOR POSITIVE (H): ICD-10-CM

## 2024-12-16 DIAGNOSIS — T45.1X5A PERIPHERAL NEUROPATHY DUE TO CHEMOTHERAPY (H): ICD-10-CM

## 2024-12-16 DIAGNOSIS — Z51.5 ENCOUNTER FOR PALLIATIVE CARE: ICD-10-CM

## 2024-12-16 DIAGNOSIS — G89.3 CANCER RELATED PAIN: ICD-10-CM

## 2024-12-16 DIAGNOSIS — G62.0 PERIPHERAL NEUROPATHY DUE TO CHEMOTHERAPY (H): ICD-10-CM

## 2024-12-16 DIAGNOSIS — Z17.0 MALIGNANT NEOPLASM OF UPPER-OUTER QUADRANT OF LEFT BREAST IN FEMALE, ESTROGEN RECEPTOR POSITIVE (H): ICD-10-CM

## 2024-12-16 DIAGNOSIS — N64.4 BREAST PAIN: ICD-10-CM

## 2024-12-16 RX ORDER — HYDROMORPHONE HYDROCHLORIDE 2 MG/1
2-4 TABLET ORAL EVERY 6 HOURS PRN
Qty: 112 TABLET | Refills: 0 | Status: SHIPPED | OUTPATIENT
Start: 2024-12-16

## 2024-12-16 NOTE — TELEPHONE ENCOUNTER
Resending hydromorphone prescription to a different pharmacy who has it in stock.    KEYONNA KulkarniN, RN  Palliative Care Nurse Clinician    [phone] (Direct)  [phone] (Main)  [phone] (Appointment Scheduling)

## 2024-12-24 ENCOUNTER — MYC REFILL (OUTPATIENT)
Dept: PALLIATIVE CARE | Facility: CLINIC | Age: 39
End: 2024-12-24
Payer: COMMERCIAL

## 2024-12-24 DIAGNOSIS — N64.4 BREAST PAIN: ICD-10-CM

## 2024-12-24 DIAGNOSIS — Z17.0 MALIGNANT NEOPLASM OF UPPER-OUTER QUADRANT OF LEFT BREAST IN FEMALE, ESTROGEN RECEPTOR POSITIVE (H): ICD-10-CM

## 2024-12-24 DIAGNOSIS — F11.90 OPIOID USE DISORDER: ICD-10-CM

## 2024-12-24 DIAGNOSIS — C50.412 MALIGNANT NEOPLASM OF UPPER-OUTER QUADRANT OF LEFT BREAST IN FEMALE, ESTROGEN RECEPTOR POSITIVE (H): ICD-10-CM

## 2024-12-24 DIAGNOSIS — G62.0 PERIPHERAL NEUROPATHY DUE TO CHEMOTHERAPY (H): ICD-10-CM

## 2024-12-24 DIAGNOSIS — G89.3 CANCER RELATED PAIN: ICD-10-CM

## 2024-12-24 DIAGNOSIS — Z51.5 ENCOUNTER FOR PALLIATIVE CARE: ICD-10-CM

## 2024-12-24 DIAGNOSIS — T45.1X5A PERIPHERAL NEUROPATHY DUE TO CHEMOTHERAPY (H): ICD-10-CM

## 2024-12-24 RX ORDER — HYDROMORPHONE HYDROCHLORIDE 2 MG/1
2-4 TABLET ORAL EVERY 6 HOURS PRN
Qty: 112 TABLET | Refills: 0 | Status: SHIPPED | OUTPATIENT
Start: 2024-12-29

## 2024-12-24 RX ORDER — BUPRENORPHINE AND NALOXONE 8; 2 MG/1; MG/1
1 FILM, SOLUBLE BUCCAL; SUBLINGUAL EVERY 6 HOURS
Qty: 120 FILM | Refills: 0 | Status: SHIPPED | OUTPATIENT
Start: 2024-12-31

## 2024-12-24 NOTE — TELEPHONE ENCOUNTER
Received Outlistent message from patient requesting refill of Dilaudid and Suboxone.     Last refill: Dilaudid 12/16/2024  Last office visit: Suboxone 12/3/2024  Scheduled for follow up 1/16/2025    Will route request to MD/ for review.     Reviewed [place]  Report.

## 2024-12-29 ENCOUNTER — MYC REFILL (OUTPATIENT)
Dept: PALLIATIVE CARE | Facility: CLINIC | Age: 39
End: 2024-12-29
Payer: COMMERCIAL

## 2024-12-29 DIAGNOSIS — Z17.0 MALIGNANT NEOPLASM OF UPPER-OUTER QUADRANT OF LEFT BREAST IN FEMALE, ESTROGEN RECEPTOR POSITIVE (H): ICD-10-CM

## 2024-12-29 DIAGNOSIS — G89.3 CANCER RELATED PAIN: ICD-10-CM

## 2024-12-29 DIAGNOSIS — R11.2 CHEMOTHERAPY INDUCED NAUSEA AND VOMITING: ICD-10-CM

## 2024-12-29 DIAGNOSIS — C50.412 MALIGNANT NEOPLASM OF UPPER-OUTER QUADRANT OF LEFT BREAST IN FEMALE, ESTROGEN RECEPTOR POSITIVE (H): ICD-10-CM

## 2024-12-29 DIAGNOSIS — F41.9 ANXIETY: ICD-10-CM

## 2024-12-29 DIAGNOSIS — T45.1X5A CHEMOTHERAPY INDUCED NAUSEA AND VOMITING: ICD-10-CM

## 2024-12-29 DIAGNOSIS — N64.4 BREAST PAIN: ICD-10-CM

## 2024-12-30 RX ORDER — OLANZAPINE 2.5 MG/1
2.5-5 TABLET, FILM COATED ORAL 3 TIMES DAILY PRN
Qty: 150 TABLET | Refills: 2 | Status: SHIPPED | OUTPATIENT
Start: 2024-12-30

## 2024-12-30 RX ORDER — METHOCARBAMOL 500 MG/1
1500 TABLET, FILM COATED ORAL 3 TIMES DAILY PRN
Qty: 270 TABLET | Refills: 1 | Status: SHIPPED | OUTPATIENT
Start: 2024-12-30

## 2024-12-30 RX ORDER — LORAZEPAM 1 MG/1
1 TABLET ORAL EVERY 6 HOURS PRN
Qty: 30 TABLET | Refills: 1 | Status: SHIPPED | OUTPATIENT
Start: 2024-12-30

## 2024-12-30 NOTE — TELEPHONE ENCOUNTER
Received linkedÃ¼ message from patient requesting refill of olanzapine.    Last office visit: 12/3/24  Scheduled for follow up 1/16/25     Will route request to MD/DO for review.

## 2024-12-30 NOTE — TELEPHONE ENCOUNTER
Received Travadort message from patient requesting refill of  lorazepam .     Last refill: 12/13/24  Last office visit: 12/3/24  Scheduled for follow up 1/16/25     Will route request to MD/ for review.     Reviewed [place]  Report.

## 2024-12-30 NOTE — TELEPHONE ENCOUNTER
Received Peppercornt message from patient requesting refill of methocarbamol.    Last office visit: 12/3/24  Scheduled for follow up 1/16/25     Will route request to MD/ for review.

## 2025-01-02 ENCOUNTER — MYC REFILL (OUTPATIENT)
Dept: PALLIATIVE CARE | Facility: CLINIC | Age: 40
End: 2025-01-02
Payer: COMMERCIAL

## 2025-01-02 DIAGNOSIS — G89.3 CANCER RELATED PAIN: ICD-10-CM

## 2025-01-02 DIAGNOSIS — F11.90 OPIOID USE DISORDER: ICD-10-CM

## 2025-01-02 DIAGNOSIS — N64.4 BREAST PAIN: ICD-10-CM

## 2025-01-02 RX ORDER — BUPRENORPHINE AND NALOXONE 8; 2 MG/1; MG/1
1 FILM, SOLUBLE BUCCAL; SUBLINGUAL EVERY 6 HOURS
Qty: 120 FILM | Refills: 0 | Status: SHIPPED | OUTPATIENT
Start: 2025-01-02

## 2025-01-02 NOTE — TELEPHONE ENCOUNTER
Resending suboxone prescription to a different pharmacy per pt request.    KEYONNA KulkarniN, RN  Palliative Care Nurse Clinician    [phone] (Direct)  [phone] (Main)  [phone] (Appointment Scheduling)

## 2025-01-07 ENCOUNTER — MYC REFILL (OUTPATIENT)
Dept: PALLIATIVE CARE | Facility: CLINIC | Age: 40
End: 2025-01-07
Payer: COMMERCIAL

## 2025-01-07 DIAGNOSIS — Z51.5 ENCOUNTER FOR PALLIATIVE CARE: ICD-10-CM

## 2025-01-07 DIAGNOSIS — C50.412 MALIGNANT NEOPLASM OF UPPER-OUTER QUADRANT OF LEFT BREAST IN FEMALE, ESTROGEN RECEPTOR POSITIVE (H): ICD-10-CM

## 2025-01-07 DIAGNOSIS — Z17.0 MALIGNANT NEOPLASM OF UPPER-OUTER QUADRANT OF LEFT BREAST IN FEMALE, ESTROGEN RECEPTOR POSITIVE (H): ICD-10-CM

## 2025-01-07 DIAGNOSIS — G62.0 PERIPHERAL NEUROPATHY DUE TO CHEMOTHERAPY: ICD-10-CM

## 2025-01-07 DIAGNOSIS — G89.3 CANCER RELATED PAIN: ICD-10-CM

## 2025-01-07 DIAGNOSIS — T45.1X5A PERIPHERAL NEUROPATHY DUE TO CHEMOTHERAPY: ICD-10-CM

## 2025-01-07 DIAGNOSIS — N64.4 BREAST PAIN: ICD-10-CM

## 2025-01-07 RX ORDER — HYDROMORPHONE HYDROCHLORIDE 4 MG/1
2-4 TABLET ORAL EVERY 6 HOURS PRN
Qty: 56 TABLET | Refills: 0 | Status: SHIPPED | OUTPATIENT
Start: 2025-01-12

## 2025-01-07 RX ORDER — HYDROMORPHONE HYDROCHLORIDE 2 MG/1
2-4 TABLET ORAL EVERY 6 HOURS PRN
Qty: 112 TABLET | Refills: 0 | Status: CANCELLED | OUTPATIENT
Start: 2025-01-07

## 2025-01-07 NOTE — PROGRESS NOTES
[facility] Medical Oncology Note  Date of visit: Jan 9, 2025    Assessment:     Stage IB Luminal A invasive breast cancer, status post neoadjuvant chemotherapy followed by lumpectomy and sentinel lymph node biopsies.  While there was obvious response by exam, pathology did not show any significant response to neoadjuvant treatment, confirming that this is a luminal a breast cancer.  Her KI-67 was 24%, and so neoadjuvant chemotherapy was a reasonable strategy here.  And with all the nodes positive, the recommendation would have been for adjuvant chemotherapy.  This has been already been completed.    Because of her persistent description of diffuse pain, we ordered studies to rule out distant metastasis as an etiology. Her PET showed a slightly enlarged left axillary node, and equivocal solitary lesions in T5 and the left lobe for the liver. But subsequent MRIs showed no lesion in the thoracic spine, and no suspicious hepatic lesions. There was hepatic steatosis and stable minimally enlarged port hepatis nodes. So happily there is no evidence of metastatic disease. The left axillary node will be covered by her adjuvant radiotherapy.  Currently on OFS and on anstrozole with a little better tolerance. [place] of care is to offer ribociclib based on the MONY trial. I would usually wait until she has received radiotherapy.  Given her poor tolerance of all of her treatments, I really doubt this is a medication that she will stay on.  And in terms of getting a BSO, she would rather stay on goserelin.  My approach with Eliezer, given her significant other issues unrelated to (and predated to) her cancer, I am just focusing on her adjuvant therapy.  PMRT is standard in this setting. Eliezer has chosen against this. Sigh.  She continues to have a surprising amount of side effects.  She has profound fatigue from anastrozole.  Kathy has always been challenging to care for and nothing has changed in that regard  today.  Hopefully she will just hang in there with it.  Significant side effects of treatment in the context of chronic pain requiring narcotics.  I am so happy for the involvement of palliative care.  Ongoing smoking.  She has no plans on quitting despite my encouragement.  It is bad for you.  It has been in all of the newspapers.      Plan:     Continue anastrozole  Continue OFS with goserelin, next scheduled for 2/28/2024.  Today's appointment can serve as the provider visit prior to that.  Return to clinic with me in 3 months for another virtual visit..   Continue follow-up with Palliative care    The longitudinal plan of care for the diagnosis(es)/condition(s) as documented were addressed during this visit. Due to the added complexity in care, I will continue to support Eliezer in the subsequent management and with ongoing continuity of care.      30 minutes spent on the date of the encounter doing chart review, review of test results, interpretation of tests, patient visit, and documentation.      Zachary Nolan MD, MSc  Associate Professor of Medicine  [facility] [place] [address]  [phone]    __________________________________________________________________    DIAGNOSES     Pathologic prognostic stage IB ([id number](sn)) Jeniffer grade 2 invasive ductal breast cancer, diagnosed definitively at lumpectomy and sentinel lymph node biopsy 7/24/2024.  Kathy had a 2.2 cm moderately differentiated tumor with 4 out of 5 sentinel nodes positive for involvement, 1 of which showed a 2 mm area of extracapsular extension.  The tumor was 91 to 100% strongly positive for both ER and [place].  It was negative for HER2 amplification by FISH.  There is no obvious tumor response to neoadjuvant treatment in both the breast and the nodes.  Eliezer presented with  a clinical T2N1 invasive breast cancer diagnosed at breast biopsy 2/9/2024. Eliezer  "palpated a left breast lump. Mammogram and US showed a 2.5 x 2.5 x 2.0 cm spiculated mass at 2:00 position.   Biopsy showed a [place] grade I IDC, 97% strongly positive for ER, 99% strongly positive for ND, and negative for HER2 by FISH (IHC 2+). KI-67 is 24%. Mammaprint was high risk Luminal B. Left axillary US that showed three abnormal axillary nodes, biopsy positive for IDC. Biopsy showed METASTATIC BREAST CARCINOMA, almost entirely effacing lymph node, at least 11 mm in linear extent.  My first exam prior to neoadjuvant therapy showed a palpable deep left breast mass in the 2 o'clock position measuring roughly 4 cm x 4 cm.   Chronic pain with history of opiate prescriptions from multiple providers. She is currently seeing palliative care (Dr. Kylie Rodriguez), and tried buprenorphine, started on 4/11/2024, but reacted to the adhesive.  Then didn't tolerate fentanyl.  Current recommendation is suboxone.   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  But it was negative for everything else.  History of N/V from migraines, thus managing nausea during therapy has been challenging.         History of Present Illness/Therapy to dte:     Neoadjuvant ddAC initiated 3/15/2024 through 4/26/2024.   Weekly paclitaxel 5/10/2026-.6/14/2026.  It was then discontinued due to crippling side effects.   Lumpectomy and sentinel lymph node biopsy, 7/24/2024.    Goserelin 10.8 mg initiated 9/12/2024. Last given 12/6/2024.  Letrozole 10/10-22/24. This was held due to nausea and vomiting. Anastrozole was substituted 10/22/2024.    Interval History     Eliezer is back with Saul   Still follows with palliative care for pain meds.  Still in a lot of pain.  Did not get radiotherapy.  Profoundly fatigued with therapies.  She still smoking.  She has no plans to quit.  I should save my breath, she tells me.  She really does not want to do radiation.  She does not understand why she should " "have to now that she is cancer free.  Discussed previously    Past Medical History:   Melanoma of right foot???     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  She thought about being a , but then got pregnant with her son Homero.  She had a lot of different for jobs.  She spent some time in [place] where her mom and other family member lives.  She then came back to [place].  She has a fiancé of 6 years named Saul and they currently live in [place], [place].  She is an avid reader, and likes romance novels, but really anything.  She says she read 250 books last year.  She does smoke and has no plans on quitting.  She is currently on a low-carb diet.    Family History:     Family History   Problem Relation Age of Onset    Breast Cancer Mother     Genitourinary Problems Mother         renal disease - minimal change, sponge kidney    Cancer Paternal Grandmother         GYN cancer    Cancer Paternal Grandfather         Stomach            Medications:     Current Outpatient Medications   Medication Sig Dispense Refill    acetaminophen (TYLENOL) 500 MG tablet Take 2 tablets (1,000 mg) by mouth every 8 hours as needed for mild pain      albuterol (PROAIR HFA/PROVENTIL HFA/VENTOLIN HFA) 108 (90 Base) MCG/ACT inhaler Inhale 2 puffs into the lungs every 4 hours as needed      amitriptyline (ELAVIL) 10 MG tablet Take 1-2 tablets " (10-20 mg) by mouth every morning AND 5 tablets (50 mg) at bedtime.      amLODIPine (NORVASC) 10 MG tablet Take 10 mg by mouth at bedtime      anastrozole (ARIMIDEX) 1 MG tablet Take 1 tablet (1 mg) by mouth daily. 30 tablet 11    atorvastatin (LIPITOR) 10 MG tablet Take 10 mg by mouth at bedtime      Blood Glucose Monitoring Suppl (ONETOUCH VERIO FLEX SYSTEM) w/Device KIT       buprenorphine HCl-naloxone HCl (SUBOXONE) 8-2 MG per film Place 1 Film under the tongue every 6 hours. 120 Film 0    cetirizine (ZYRTEC) 10 MG tablet Take 5 mg by mouth as needed      EPINEPHrine (ANY BX GENERIC EQUIV) 0.3 MG/0.3ML injection 2-pack       esomeprazole (NEXIUM) 20 MG DR capsule Take 20 mg by mouth as needed      fluticasone-salmeterol (ADVAIR HFA) 115-21 MCG/ACT inhaler Inhale 2 puffs into the lungs 2 times daily      hydrochlorothiazide (HYDRODIURIL) 25 MG tablet Take 25 mg by mouth as needed      HYDROcodone-acetaminophen (NORCO)  MG per tablet Take 0.5-1 tablets by mouth every 4 hours as needed for severe pain. 84 tablet 0    [START ON 1/12/2025] HYDROmorphone (DILAUDID) 4 MG tablet Take 0.5-1 tablets (2-4 mg) by mouth every 6 hours as needed for severe pain. 56 tablet 0    ibuprofen (ADVIL/MOTRIN) 200 MG tablet Take 200 mg by mouth      insulin glargine (LANTUS PEN) 100 UNIT/ML pen Inject 35 Units subcutaneously at bedtime.      JANUVIA 100 MG tablet Take 100 mg by mouth at bedtime      LORazepam (ATIVAN) 1 MG tablet Take 1 tablet (1 mg) by mouth every 6 hours as needed for anxiety, nausea or sleep. 30 tablet 1    methocarbamol (ROBAXIN) 500 MG tablet Take 3 tablets (1,500 mg) by mouth 3 times daily as needed for muscle spasms. 270 tablet 1    naloxone (NARCAN) 4 MG/0.1ML nasal spray Spray 4 mg into one nostril alternating nostrils as needed for opioid reversal every 2-3 minutes until assistance arrives      OLANZapine (ZYPREXA) 2.5 MG tablet Take 1-2 tablets (2.5-5 mg) by mouth 3 times daily as needed (Nausea or  anxiety). 150 tablet 2    ONETOUCH VERIO IQ test strip       Pregabalin (LYRICA) 200 MG capsule Take 1 capsule (200 mg) by mouth at bedtime. 30 capsule 1    prochlorperazine (COMPAZINE) 10 MG tablet Take 1 tablet (10 mg) by mouth every 6 hours as needed for nausea or vomiting. 30 tablet 1    Prochlorperazine Maleate (COMPAZINE PO) Take 10 mg by mouth 3 times daily as needed for nausea      TRUE COMFORT PRO PEN NEEDLES 31G X 5 MM miscellaneous                 Physical Exam:   not currently breastfeeding.    No exam could be done today because this was a virtual visit.    Data:     CT/PET 9/13/2024  IMPRESSION:      1. Postoperative changes of left breast lumpectomy and left axillary  farhan dissection with presumed postprocedural inflammation and seroma.  Mildly FDG avid probable 1.2 cm left axillary node with adjacent  inflammatory stranding, concerning for possible residual farhan  metastasis.      2. Indeterminate mildly hypermetabolic foci most pronounced within the  left hepatic lobe without CT correlate. Recommend further evaluation  with liver MRI with contrast to exclude hepatic metastases.     3. Mildly FDG avid subtle sclerotic focus within the T5 vertebral  body. Consider further evaluation with thoracic spine MRI with  contrast to exclude osseous metastasis.      MRI THRACIS SPINE 10/20/2024  IMPRESSION: No evidence of metastatic disease in the thoracic spine on  this noncontrast MRI. Specifically, there is no lesion in the T5  vertebral body.    PATHOLOGY 7/24/2024  B. LEFT BREAST, MASS, SEED-LOCALIZED SEGMENTAL MASTECTOMY:  -INVASIVE DUCTAL CARCINOMA, [place] grade 2, 22 mm in greatest dimension.  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  -Margins are negative; closest margins to invasive carcinoma are posterior at 4 mm and superior at 8 mm; all other margins are at >10 mm; closest uninvolved  margin to DCIS is posterior at 4 mm; all other margins are >10 mm from DCIS.  -Calcifications associated with DCIS and invasive carcinoma.  -AJCC pathologic staging is ypT2 N2a(sn).  -See synoptic report.     C.  SENTINEL LYMPH NODE, LEFT AXILLARY #1 WITH RFID, EXCISION:  -METASTATIC BREAST CARCINOMA to three of three lymph nodes, 18 mm in greatest dimension, with 2 mm extranodal extension (3/3).  -Biopsy site changes.     D.  SENTINEL LYMPH NODE, LEFT AXILLARY #2, EXCISION:  -METASTATIC BREAST CARCINOMA to one lymph node, 5 mm in greatest dimension, with no extranodal extension (1/1).     E.  SENTINEL LYMPH NODE, LEFT AXILLARY #3, EXCISION:  -One lymph node, negative for malignancy (0/1).          REGIONAL LYMPH NODES   Regional Lymph Node Status  Tumor present in regional lymph node(s)   Number of Lymph Nodes with Macrometastases  3   Number of Lymph Nodes with Micrometastases  1   Size of Largest [place] Metastatic Deposit  18 mm   Extranodal Extension  Present, 2 mm or less   Total Number of Lymph Nodes Examined (sentinel and non-sentinel)  5   Number of [place] Nodes Examined  5   pTNM CLASSIFICATION (AJCC 8th Edition)   Reporting of pT, pN, and (when applicable) pM categories is based on information available to the pathologist at the time the report is issued. As per the AJCC (Chapter 1, 8th Ed.) it is the managing physician s responsibility to establish the final pathologic stage based upon all pertinent information, including but potentially not limited to this pathology report.   Modified Classification  y   pT Category  pT2   pN Category  pN2a   N Suffix  (sn)   SPECIAL STUDIES        Estrogen Receptor (ER) Status  Positive (greater than 10% of cells demonstrate nuclear positivity)   Percentage of Cells with Nuclear Positivity  >95% %        Progesterone Receptor (PgR) Status  Positive   Percentage of Cells with Nuclear Positivity  100 %        HER2 (by immunohistochemistry)  Equivocal (Score 2+)    Percentage of Cells with Uniform Intense Complete Membrane Staining  Cannot be determined        Ki-67 Percentage of Positive Nuclei  25 %                         Addendum   B. LEFT BREAST, SEED-LOCALIZED SEGMENTAL MASTECTOMY:  -Invasive carcinoma is HER2 negative (score 1+) by immunohistochemistry (performed on this specimen, see first biomarker reporting template below)     D. LYMPH NODE, LEFT AXILLARY, SENTINEL #2, EXCISION:  -Metastatic carcinoma is HER2 equivocal (score 2+) by immunohistochemistry (performed on this specimen, see second biomarker reporting template below)  -HER2 FISH results will be reported separately by cytogenetics               Left Breast ultrasound 6/24 (compared to 5/10) and personally reviewed by me.    FINDINGS: At the 2 o'clock position, 10 cm from the nipple, there is a  heterogenous hypoechoic shadowing mass measuring 14 x 13 x 9 mm, which has mildly decreased in size, most recently measuring 20 x 19 x 18 mm.          Most Recent 3 CBC's:  Recent Labs   Lab Test 10/22/24  1432 07/23/24  1214 06/21/24  0954 06/14/24  1028   WBC 8.3 8.2 7.3 8.6   HGB 16.1* 15.4 11.8 13.0   MCV 90 94 99 99    337 342 336   ANEUTAUTO 5.0  --  4.9 6.2     Most Recent 3 BMP's:  Recent Labs   Lab Test 10/22/24  1432 07/24/24  0606 06/07/24  1046 05/10/24  1006 04/26/24  1134 04/12/24  0734     --   --   --  136 138   POTASSIUM 4.1  --   --   --  3.9 3.5   CHLORIDE 99  --   --   --  98 102   CO2 24  --   --   --  24 23   BUN 4.6*  --   --   --  7.7 7.0   CR 0.60  --   --   --  0.49* 0.44*   ANIONGAP 12  --   --   --  14 13   REMI 9.5  --   --   --  9.2 9.3   * 298*  --   --  415* 206*   PROTTOTAL 7.2  --  7.1 6.6 6.7 7.0   ALBUMIN 3.9  --  4.2 3.9 3.7 4.0    Most Recent 3 LFT's:  Recent Labs   Lab Test 10/22/24  1432 06/07/24  1046 05/10/24  1006   AST 28 39 28   ALT 24 34 23   ALKPHOS 81 70 67   BILITOTAL 0.3 0.3 0.2    Most Recent 2 TSH and T4:No lab results found.   I reviewed the  above labs today.     Other Data     Most Recent 3 CBC's:  Recent Labs   Lab Test 10/22/24  1432 07/23/24  1214 06/21/24  0954 06/14/24  1028   WBC 8.3 8.2 7.3 8.6   HGB 16.1* 15.4 11.8 13.0   MCV 90 94 99 99    337 342 336   ANEUTAUTO 5.0  --  4.9 6.2     Most Recent 3 BMP's:  Recent Labs   Lab Test 10/22/24  1432 07/24/24  0606 06/07/24  1046 05/10/24  1006 04/26/24  1134 04/12/24  0734     --   --   --  136 138   POTASSIUM 4.1  --   --   --  3.9 3.5   CHLORIDE 99  --   --   --  98 102   CO2 24  --   --   --  24 23   BUN 4.6*  --   --   --  7.7 7.0   CR 0.60  --   --   --  0.49* 0.44*   ANIONGAP 12  --   --   --  14 13   REMI 9.5  --   --   --  9.2 9.3   * 298*  --   --  415* 206*   PROTTOTAL 7.2  --  7.1 6.6 6.7 7.0   ALBUMIN 3.9  --  4.2 3.9 3.7 4.0    Most Recent 3 LFT's:  Recent Labs   Lab Test 10/22/24  1432 06/07/24  1046 05/10/24  1006   AST 28 39 28   ALT 24 34 23   ALKPHOS 81 70 67   BILITOTAL 0.3 0.3 0.2    Most Recent 2 TSH and T4:No lab results found.  I reviewed the above labs today.

## 2025-01-07 NOTE — TELEPHONE ENCOUNTER
Received [employer] message from patient requesting refill of hydromorphone. Pt asking to change to 4 mg tabs. Her pharmacy is unable to get 2 mg tabs due to backorder, however they have 4 mg tabs.    Last refill: 12/29/24  Last office visit: 12/3/24  Scheduled for follow up 1/16/25     Will route request to MD/ for review.     Reviewed [place]  Report.

## 2025-01-09 ENCOUNTER — VIRTUAL VISIT (OUTPATIENT)
Dept: ONCOLOGY | Facility: CLINIC | Age: 40
End: 2025-01-09
Attending: INTERNAL MEDICINE
Payer: COMMERCIAL

## 2025-01-09 DIAGNOSIS — Z17.0 MALIGNANT NEOPLASM OF UPPER-OUTER QUADRANT OF LEFT BREAST IN FEMALE, ESTROGEN RECEPTOR POSITIVE (H): Primary | ICD-10-CM

## 2025-01-09 DIAGNOSIS — C50.412 MALIGNANT NEOPLASM OF UPPER-OUTER QUADRANT OF LEFT BREAST IN FEMALE, ESTROGEN RECEPTOR POSITIVE (H): Primary | ICD-10-CM

## 2025-01-09 NOTE — LETTER
1/9/2025      Eliezer Izquierdo  [id number] [facility] [id number]      Dear Colleague,    Thank you for referring your patient, Eliezer Izquierdo, to the [facility]. Please see a copy of my visit note below.        Critical access hospital Medical Oncology Note  Date of visit: Jan 9, 2025    Assessment:     Stage IB Luminal A invasive breast cancer, status post neoadjuvant chemotherapy followed by lumpectomy and sentinel lymph node biopsies.  While there was obvious response by exam, pathology did not show any significant response to neoadjuvant treatment, confirming that this is a luminal a breast cancer.  Her KI-67 was 24%, and so neoadjuvant chemotherapy was a reasonable strategy here.  And with all the nodes positive, the recommendation would have been for adjuvant chemotherapy.  This has been already been completed.    Because of her persistent description of diffuse pain, we ordered studies to rule out distant metastasis as an etiology. Her PET showed a slightly enlarged left axillary node, and equivocal solitary lesions in T5 and the left lobe for the liver. But subsequent MRIs showed no lesion in the thoracic spine, and no suspicious hepatic lesions. There was hepatic steatosis and stable minimally enlarged port hepatis nodes. So happily there is no evidence of metastatic disease. The left axillary node will be covered by her adjuvant radiotherapy.  Currently on OFS and on anstrozole with a little better tolerance. [place] of care is to offer ribociclib based on the MONY trial. I would usually wait until she has received radiotherapy.  Given her poor tolerance of all of her treatments, I really doubt this is a medication that she will stay on.  And in terms of getting a BSO, she would rather stay on goserelin.  My approach with Eliezer, given her significant other issues unrelated to (and predated to) her cancer, I am just focusing on her adjuvant therapy.  PMRT is standard  in this setting. Eliezer has chosen against this. Sigh.  She continues to have a surprising amount of side effects.  She has profound fatigue from anastrozole.  Kathy has always been challenging to care for and nothing has changed in that regard today.  Hopefully she will just hang in there with it.  Significant side effects of treatment in the context of chronic pain requiring narcotics.  I am so happy for the involvement of palliative care.  Ongoing smoking.  She has no plans on quitting despite my encouragement.  It is bad for you.  It has been in all of the newspapers.      Plan:     Continue anastrozole  Continue OFS with goserelin, next scheduled for 2/28/2024.  Today's appointment can serve as the provider visit prior to that.  Return to clinic with me in 3 months for another virtual visit..   Continue follow-up with Palliative care    The longitudinal plan of care for the diagnosis(es)/condition(s) as documented were addressed during this visit. Due to the added complexity in care, I will continue to support Eliezer in the subsequent management and with ongoing continuity of care.      30 minutes spent on the date of the encounter doing chart review, review of test results, interpretation of tests, patient visit, and documentation.      Zachary Nolan MD, MSc  Associate Professor of Medicine  [facility] [employer]  [facility]  [address] [place] [address]  [phone]    __________________________________________________________________    DIAGNOSES     Pathologic prognostic stage IB ([id number](sn)) [place] grade 2 invasive ductal breast cancer, diagnosed definitively at lumpectomy and sentinel lymph node biopsy 7/24/2024.  Kathy had a 2.2 cm moderately differentiated tumor with 4 out of 5 sentinel nodes positive for involvement, 1 of which showed a 2 mm area of extracapsular extension.  The tumor was 91 to 100% strongly positive for both ER and PA.  It  "was negative for HER2 amplification by FISH.  There is no obvious tumor response to neoadjuvant treatment in both the breast and the nodes.  Eliezer presented with  a clinical T2N1 invasive breast cancer diagnosed at breast biopsy 2/9/2024. Eliezer palpated a left breast lump. Mammogram and US showed a 2.5 x 2.5 x 2.0 cm spiculated mass at 2:00 position.   Biopsy showed a Jeniffer grade I IDC, 97% strongly positive for ER, 99% strongly positive for CT, and negative for HER2 by FISH (IHC 2+). KI-67 is 24%. Mammaprint was high risk Luminal B. Left axillary US that showed three abnormal axillary nodes, biopsy positive for IDC. Biopsy showed METASTATIC BREAST CARCINOMA, almost entirely effacing lymph node, at least 11 mm in linear extent.  My first exam prior to neoadjuvant therapy showed a palpable deep left breast mass in the 2 o'clock position measuring roughly 4 cm x 4 cm.   Chronic pain with history of opiate prescriptions from multiple providers. She is currently seeing palliative care (Dr. Kylie Rodriguez), and tried buprenorphine, started on 4/11/2024, but reacted to the adhesive.  Then didn't tolerate fentanyl.  Current recommendation is suboxone.   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  But it was negative for everything else.  History of N/V from migraines, thus managing nausea during therapy has been challenging.         History of Present Illness/Therapy to dte:     Neoadjuvant ddAC initiated 3/15/2024 through 4/26/2024.   Weekly paclitaxel 5/10/2026-.6/14/2026.  It was then discontinued due to crippling side effects.   Lumpectomy and sentinel lymph node biopsy, 7/24/2024.    Goserelin 10.8 mg initiated 9/12/2024. Last given 12/6/2024.  Letrozole 10/10-22/24. This was held due to nausea and vomiting. Anastrozole was substituted 10/22/2024.    Interval History     Eliezer is back with Saul   Still follows with palliative care for pain meds.  Still in " "a lot of pain.  Did not get radiotherapy.  Profoundly fatigued with therapies.  She still smoking.  She has no plans to quit.  I should save my breath, she tells me.  She really does not want to do radiation.  She does not understand why she should have to now that she is cancer free.  Discussed previously    Past Medical History:   Melanoma of right foot???     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  She thought about being a , but then got pregnant with her son Homero.  She had a lot of different for jobs.  She spent some time in [place] where her mom and other family member lives.  She then came back to [place].  She has a fiancé of 6 years named Saul and they currently live in [place], [place].  She is an avid reader, and likes romance novels, but really anything.  She says she read 250 books last year.  She does smoke and has no plans on quitting.  She is currently on a low-carb diet.    Family History:     Family History   Problem Relation Age of Onset     Breast Cancer Mother      Genitourinary Problems Mother         renal disease - minimal change, sponge kidney     Cancer Paternal Grandmother         GYN cancer     Cancer Paternal Grandfather         Stomach            Medications:     Current Outpatient Medications   Medication Sig Dispense Refill     acetaminophen (TYLENOL) 500 MG tablet Take 2 tablets " (1,000 mg) by mouth every 8 hours as needed for mild pain       albuterol (PROAIR HFA/PROVENTIL HFA/VENTOLIN HFA) 108 (90 Base) MCG/ACT inhaler Inhale 2 puffs into the lungs every 4 hours as needed       amitriptyline (ELAVIL) 10 MG tablet Take 1-2 tablets (10-20 mg) by mouth every morning AND 5 tablets (50 mg) at bedtime.       amLODIPine (NORVASC) 10 MG tablet Take 10 mg by mouth at bedtime       anastrozole (ARIMIDEX) 1 MG tablet Take 1 tablet (1 mg) by mouth daily. 30 tablet 11     atorvastatin (LIPITOR) 10 MG tablet Take 10 mg by mouth at bedtime       Blood Glucose Monitoring Suppl (ONETOUCH VERIO FLEX SYSTEM) w/Device KIT        buprenorphine HCl-naloxone HCl (SUBOXONE) 8-2 MG per film Place 1 Film under the tongue every 6 hours. 120 Film 0     cetirizine (ZYRTEC) 10 MG tablet Take 5 mg by mouth as needed       EPINEPHrine (ANY BX GENERIC EQUIV) 0.3 MG/0.3ML injection 2-pack        esomeprazole (NEXIUM) 20 MG DR capsule Take 20 mg by mouth as needed       fluticasone-salmeterol (ADVAIR HFA) 115-21 MCG/ACT inhaler Inhale 2 puffs into the lungs 2 times daily       hydrochlorothiazide (HYDRODIURIL) 25 MG tablet Take 25 mg by mouth as needed       HYDROcodone-acetaminophen (NORCO)  MG per tablet Take 0.5-1 tablets by mouth every 4 hours as needed for severe pain. 84 tablet 0     [START ON 1/12/2025] HYDROmorphone (DILAUDID) 4 MG tablet Take 0.5-1 tablets (2-4 mg) by mouth every 6 hours as needed for severe pain. 56 tablet 0     ibuprofen (ADVIL/MOTRIN) 200 MG tablet Take 200 mg by mouth       insulin glargine (LANTUS PEN) 100 UNIT/ML pen Inject 35 Units subcutaneously at bedtime.       JANUVIA 100 MG tablet Take 100 mg by mouth at bedtime       LORazepam (ATIVAN) 1 MG tablet Take 1 tablet (1 mg) by mouth every 6 hours as needed for anxiety, nausea or sleep. 30 tablet 1     methocarbamol (ROBAXIN) 500 MG tablet Take 3 tablets (1,500 mg) by mouth 3 times daily as needed for muscle spasms. 270 tablet 1      naloxone (NARCAN) 4 MG/0.1ML nasal spray Spray 4 mg into one nostril alternating nostrils as needed for opioid reversal every 2-3 minutes until assistance arrives       OLANZapine (ZYPREXA) 2.5 MG tablet Take 1-2 tablets (2.5-5 mg) by mouth 3 times daily as needed (Nausea or anxiety). 150 tablet 2     ONETOUCH VERIO IQ test strip        Pregabalin (LYRICA) 200 MG capsule Take 1 capsule (200 mg) by mouth at bedtime. 30 capsule 1     prochlorperazine (COMPAZINE) 10 MG tablet Take 1 tablet (10 mg) by mouth every 6 hours as needed for nausea or vomiting. 30 tablet 1     Prochlorperazine Maleate (COMPAZINE PO) Take 10 mg by mouth 3 times daily as needed for nausea       TRUE COMFORT PRO PEN NEEDLES 31G X 5 MM miscellaneous                 Physical Exam:   not currently breastfeeding.    No exam could be done today because this was a virtual visit.    Data:     CT/PET 9/13/2024  IMPRESSION:      1. Postoperative changes of left breast lumpectomy and left axillary  farhan dissection with presumed postprocedural inflammation and seroma.  Mildly FDG avid probable 1.2 cm left axillary node with adjacent  inflammatory stranding, concerning for possible residual farhan  metastasis.      2. Indeterminate mildly hypermetabolic foci most pronounced within the  left hepatic lobe without CT correlate. Recommend further evaluation  with liver MRI with contrast to exclude hepatic metastases.     3. Mildly FDG avid subtle sclerotic focus within the T5 vertebral  body. Consider further evaluation with thoracic spine MRI with  contrast to exclude osseous metastasis.      MRI [facility] SPINE 10/20/2024  IMPRESSION: No evidence of metastatic disease in the thoracic spine on  this noncontrast MRI. Specifically, there is no lesion in the T5  vertebral body.    PATHOLOGY 7/24/2024  B. LEFT BREAST, MASS, SEED-LOCALIZED SEGMENTAL MASTECTOMY:  -INVASIVE DUCTAL CARCINOMA, Jeniffer grade 2, 22 mm in greatest dimension.  -Ductal carcinoma in-situ  (DCIS), high nuclear grade, cribriform type with focal central necrosis, approx 15 mm in linear extent (negative for EIC).  -Margins are negative; closest margins to invasive carcinoma are posterior at 4 mm and superior at 8 mm; all other margins are at >10 mm; closest uninvolved margin to DCIS is posterior at 4 mm; all other margins are >10 mm from DCIS.  -Calcifications associated with DCIS and invasive carcinoma.  -AJCC pathologic staging is ypT2 N2a(sn).  -See synoptic report.     C.  SENTINEL LYMPH NODE, LEFT AXILLARY #1 WITH RFID, EXCISION:  -METASTATIC BREAST CARCINOMA to three of three lymph nodes, 18 mm in greatest dimension, with 2 mm extranodal extension (3/3).  -Biopsy site changes.     D.  SENTINEL LYMPH NODE, LEFT AXILLARY #2, EXCISION:  -METASTATIC BREAST CARCINOMA to one lymph node, 5 mm in greatest dimension, with no extranodal extension (1/1).     E.  SENTINEL LYMPH NODE, LEFT AXILLARY #3, EXCISION:  -One lymph node, negative for malignancy (0/1).          REGIONAL LYMPH NODES   Regional Lymph Node Status  Tumor present in regional lymph node(s)   Number of Lymph Nodes with Macrometastases  3   Number of Lymph Nodes with Micrometastases  1   Size of Largest [place] Metastatic Deposit  18 mm   Extranodal Extension  Present, 2 mm or less   Total Number of Lymph Nodes Examined (sentinel and non-sentinel)  5   Number of [place] Nodes Examined  5   pTNM CLASSIFICATION (AJCC 8th Edition)   Reporting of pT, pN, and (when applicable) pM categories is based on information available to the pathologist at the time the report is issued. As per the AJCC (Chapter 1, 8th Ed.) it is the managing physician s responsibility to establish the final pathologic stage based upon all pertinent information, including but potentially not limited to this pathology report.   Modified Classification  y   pT Category  pT2   pN Category  pN2a   N Suffix  (sn)   SPECIAL STUDIES        Estrogen Receptor (ER) Status  Positive  (greater than 10% of cells demonstrate nuclear positivity)   Percentage of Cells with Nuclear Positivity  >95% %        Progesterone Receptor (PgR) Status  Positive   Percentage of Cells with Nuclear Positivity  100 %        HER2 (by immunohistochemistry)  Equivocal (Score 2+)   Percentage of Cells with Uniform Intense Complete Membrane Staining  Cannot be determined        Ki-67 Percentage of Positive Nuclei  25 %                         Addendum   B. LEFT BREAST, SEED-LOCALIZED SEGMENTAL MASTECTOMY:  -Invasive carcinoma is HER2 negative (score 1+) by immunohistochemistry (performed on this specimen, see first biomarker reporting template below)     D. LYMPH NODE, LEFT AXILLARY, SENTINEL #2, EXCISION:  -Metastatic carcinoma is HER2 equivocal (score 2+) by immunohistochemistry (performed on this specimen, see second biomarker reporting template below)  -HER2 FISH results will be reported separately by cytogenetics               Left Breast ultrasound 6/24 (compared to 5/10) and personally reviewed by me.    FINDINGS: At the 2 o'clock position, 10 cm from the nipple, there is a  heterogenous hypoechoic shadowing mass measuring 14 x 13 x 9 mm, which has mildly decreased in size, most recently measuring 20 x 19 x 18 mm.          Most Recent 3 CBC's:  Recent Labs   Lab Test 10/22/24  1432 07/23/24  1214 06/21/24  0954 06/14/24  1028   WBC 8.3 8.2 7.3 8.6   HGB 16.1* 15.4 11.8 13.0   MCV 90 94 99 99    337 342 336   ANEUTAUTO 5.0  --  4.9 6.2     Most Recent 3 BMP's:  Recent Labs   Lab Test 10/22/24  1432 07/24/24  0606 06/07/24  1046 05/10/24  1006 04/26/24  1134 04/12/24  0734     --   --   --  136 138   POTASSIUM 4.1  --   --   --  3.9 3.5   CHLORIDE 99  --   --   --  98 102   CO2 24  --   --   --  24 23   BUN 4.6*  --   --   --  7.7 7.0   CR 0.60  --   --   --  0.49* 0.44*   ANIONGAP 12  --   --   --  14 13   REMI 9.5  --   --   --  9.2 9.3   * 298*  --   --  415* 206*   PROTTOTAL 7.2  --  7.1 6.6  6.7 7.0   ALBUMIN 3.9  --  4.2 3.9 3.7 4.0    Most Recent 3 LFT's:  Recent Labs   Lab Test 10/22/24  1432 06/07/24  1046 05/10/24  1006   AST 28 39 28   ALT 24 34 23   ALKPHOS 81 70 67   BILITOTAL 0.3 0.3 0.2    Most Recent 2 TSH and T4:No lab results found.   I reviewed the above labs today.     Other Data     Most Recent 3 CBC's:  Recent Labs   Lab Test 10/22/24  1432 07/23/24  1214 06/21/24  0954 06/14/24  1028   WBC 8.3 8.2 7.3 8.6   HGB 16.1* 15.4 11.8 13.0   MCV 90 94 99 99    337 342 336   ANEUTAUTO 5.0  --  4.9 6.2     Most Recent 3 BMP's:  Recent Labs   Lab Test 10/22/24  1432 07/24/24  0606 06/07/24  1046 05/10/24  1006 04/26/24  1134 04/12/24  0734     --   --   --  136 138   POTASSIUM 4.1  --   --   --  3.9 3.5   CHLORIDE 99  --   --   --  98 102   CO2 24  --   --   --  24 23   BUN 4.6*  --   --   --  7.7 7.0   CR 0.60  --   --   --  0.49* 0.44*   ANIONGAP 12  --   --   --  14 13   REMI 9.5  --   --   --  9.2 9.3   * 298*  --   --  415* 206*   PROTTOTAL 7.2  --  7.1 6.6 6.7 7.0   ALBUMIN 3.9  --  4.2 3.9 3.7 4.0    Most Recent 3 LFT's:  Recent Labs   Lab Test 10/22/24  1432 06/07/24  1046 05/10/24  1006   AST 28 39 28   ALT 24 34 23   ALKPHOS 81 70 67   BILITOTAL 0.3 0.3 0.2    Most Recent 2 TSH and T4:No lab results found.  I reviewed the above labs today.              Again, thank you for allowing me to participate in the care of your patient.        Sincerely,        Zachary Nolan MD    Electronically signed

## 2025-01-12 ENCOUNTER — MYC REFILL (OUTPATIENT)
Dept: PALLIATIVE CARE | Facility: CLINIC | Age: 40
End: 2025-01-12
Payer: COMMERCIAL

## 2025-01-12 DIAGNOSIS — Z17.0 MALIGNANT NEOPLASM OF UPPER-OUTER QUADRANT OF LEFT BREAST IN FEMALE, ESTROGEN RECEPTOR POSITIVE (H): ICD-10-CM

## 2025-01-12 DIAGNOSIS — C50.412 MALIGNANT NEOPLASM OF UPPER-OUTER QUADRANT OF LEFT BREAST IN FEMALE, ESTROGEN RECEPTOR POSITIVE (H): ICD-10-CM

## 2025-01-13 RX ORDER — LORAZEPAM 1 MG/1
1 TABLET ORAL EVERY 6 HOURS PRN
Qty: 56 TABLET | Refills: 1 | Status: SHIPPED | OUTPATIENT
Start: 2025-01-13

## 2025-01-13 NOTE — TELEPHONE ENCOUNTER
Received [employer] message from patient requesting refill of  lorazepam. Pt requesting increase from 30 tabs to 52 tabs so she has less frequent trips to the pharmacy .     Last refill: 12/30/24  Last office visit: 12/3/24  Scheduled for follow up 1/16/25     Will route request to MD/ for review.     Reviewed [place]  Report.

## 2025-01-16 ENCOUNTER — VIRTUAL VISIT (OUTPATIENT)
Dept: RADIATION ONCOLOGY | Facility: CLINIC | Age: 40
End: 2025-01-16
Attending: FAMILY MEDICINE
Payer: COMMERCIAL

## 2025-01-16 VITALS — BODY MASS INDEX: 34.07 KG/M2 | WEIGHT: 230 LBS | HEIGHT: 69 IN

## 2025-01-16 DIAGNOSIS — F17.200 TOBACCO USE DISORDER, MODERATE, DEPENDENCE: ICD-10-CM

## 2025-01-16 DIAGNOSIS — F41.1 GAD (GENERALIZED ANXIETY DISORDER): ICD-10-CM

## 2025-01-16 DIAGNOSIS — Z51.5 PALLIATIVE CARE PATIENT: Primary | ICD-10-CM

## 2025-01-16 DIAGNOSIS — C50.919 MALIGNANT NEOPLASM OF FEMALE BREAST, UNSPECIFIED ESTROGEN RECEPTOR STATUS, UNSPECIFIED LATERALITY, UNSPECIFIED SITE OF BREAST (H): ICD-10-CM

## 2025-01-16 DIAGNOSIS — G89.3 CANCER ASSOCIATED PAIN: ICD-10-CM

## 2025-01-16 RX ORDER — CLONAZEPAM 1 MG/1
1 TABLET ORAL 2 TIMES DAILY
Qty: 60 TABLET | Refills: 3 | Status: SHIPPED | OUTPATIENT
Start: 2025-01-16

## 2025-01-16 NOTE — PROGRESS NOTES
Virtual Visit Details    Type of service:  Video Visit   Video Start Time: 10:30 AM  Video End Time:10:51 AM    Originating Location (pt. Location): Home    Distant Location (provider location):  On-site  Platform used for Video Visit: [facility]    Palliative Care Outpatient Clinic Progress Note    Patient Name: Eliezer Izquierdo  Primary Provider: Madison Roach    Impression & Recommendations & Counseling:  Eliezer Izquierdo is a 39 year old female with history of left-sided breast cancer.      Pain   OUD - See Dr. Rodriguez's note from 5/7/2024 regarding full conversation about opioid use disorder/dependence and chronic pain in.  It was at this visit that we initiated Suboxone.  This seems to have been a good fit for her, as she is tolerating it well and has noticed some improvement in pain.   -Continue Suboxone 8 mg Q6H.  -Continue Norco and hydromorphone prn for moderate or severe BTP, respectively  -We did discuss possibly considering a prescription for a different NSAID and discontinuing ibuprofen, however decided to change amitriptyline as below instead.  -Also discussed trying to address anxiety/stress and consideration of resuming an SSRI.  However, she recently had a change to her diabetes medications, and with all other changes recently, I was hesitant about starting another medication.  I do think this is worthwhile considering in the future, as I do suspect there is a high amount of her pain that is worsened by anxiety and stress.     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  Further chemotherapy was placed on hold due to severity of neuropathy side effects.  No benefit from Gabapentin or Lyrica. Trial of Mirapex with no clear benefit at 3mg   -Increased range on amitriptyline to 50 to 75 mg nightly.  She knows she can take less, for example 37.5 mg, if she thinks that 50 is too much for her.  10/31/2024--Eliezer stopped amitriptyline due to ineffectiveness and asked to remove it from her med  list.  -Previously advised to look into acupuncture.  -She can continue compression stockings and gloves if she thinks they are helpful.  Discussed she might get more benefit from the compression stockings if she wears them longer than 1-2 hours/day.  - Patient counseled to wear gloves and warm stockings now that winter has arrived and when in the refrigerator or freezer.     Anxiety  Stress - Multifactorial from her health-related concerns as well as social and economic factors including her relationship with her fiancé and the property in which she is living.  It is unfortunate there are no therapist in her area who are taking her insurance right now.  Encouraged ongoing journaling, and I do think consideration for daily controller medication for anxiety would be beneficial in the near future.    On 1/16/2025 we discussed a trial of clonazepam 1 mg po BID to replace the 4 mg total Ativan she uses a day and allow infrequent use of prn Ativan for breakthrough anxiety; Eliezer is very open to this and reports she has an acquaintance who uses Klonopin with good effect.     Nausea  Comes out of the blue--no response to po zofran, compazine or phenergan; ativan does help.  Has not tried olanzapine at HS and is willing to try it 10 mg po at HS    OIC-I encouraged increasing po fluids to work with fiber from increased dietary fruits as well as the mag citrate she is planning to use today.    Tobacco use disorder--I offered smoking cessation counseling and she declined it politely.     Chief Complaint/Patient ID: Eliezer Izquierdo 39 year old female with PMHx of left breast cancer; cancer associated pain and OUD and anxiety.    Last Palliative care appointment: 10/31/2024 with me     Reviewed:  Yes:   reviewed - controlled substances reflected in medication list.    Interim History:  Eliezer Izquierdo is a 39 year old female who is seen today for follow up with Palliative Care via billable video visit.       Pain:  Some acute worsening due to a fall she had --got tangled up with her dog a couple of weeks ago and has a sore hip. She fell flat onto her face.  It is gradually improving and she is walking and bearing weight.     Appetite/Nausea: no concerns     Bowels: constipated currently and doesn't get relief with Miralax; she has increased fresh fruits but not fluids and she'll try to focus on that and use a bottle of Mag Citrate 'to get her system going.'     Sleep: OK with 2 mg Ativan at HS     Mood: anxiety is OK as long as she uses 4 mg ativan/day     Coping:  overall OK    Family History- Reviewed in Epic.    Allergies   Allergen Reactions    Ubrogepant Muscle Pain (Myalgia)     Other Reaction(s): Chest Pain, Chest Pain    Also caused blisters in mouth    Azithromycin Hives    Doxycycline Hives    Erythromycin Hives    Estrogens     Levofloxacin Hives    Maxalt [Rizatriptan]     Oxycodone-Acetaminophen      Burning mouth and lips with red sore taste buds and throat irritation    PER PATIENT NOT ALLERGIC TO     Penicillins     Propranolol Hcl Headache    Sulfa Antibiotics     Sumatriptan Muscle Pain (Myalgia)    Zofran [Ondansetron] Muscle Pain (Myalgia)    Hydromorphone Anxiety and Itching     Other Reaction(s): Tachycardia    Panic attacks    Ondansetron Hcl Anxiety, Other (See Comments) and Palpitations    Toradol [Ketorolac] Anxiety       Social History:  Pertinent changes to social history/social situation since last visit: none  Key support resources: family  Advance Directive Status:  no ACP documents in Epic    Social History     Tobacco Use    Smoking status: Every Day     Types: Cigarettes     Passive exposure: Current    Smokeless tobacco: Never    Tobacco comments:     Has smoked 1ppd since age 12, currently still smoking 1 ppd currently    Vaping Use    Vaping status: Never Used   Substance Use Topics    Alcohol use: No    Drug use: No         Allergies   Allergen Reactions    Ubrogepant Muscle Pain  (Myalgia)     Other Reaction(s): Chest Pain, Chest Pain    Also caused blisters in mouth    Azithromycin Hives    Doxycycline Hives    Erythromycin Hives    Estrogens     Levofloxacin Hives    Maxalt [Rizatriptan]     Oxycodone-Acetaminophen      Burning mouth and lips with red sore taste buds and throat irritation    PER PATIENT NOT ALLERGIC TO     Penicillins     Propranolol Hcl Headache    Sulfa Antibiotics     Sumatriptan Muscle Pain (Myalgia)    Zofran [Ondansetron] Muscle Pain (Myalgia)    Hydromorphone Anxiety and Itching     Other Reaction(s): Tachycardia    Panic attacks    Ondansetron Hcl Anxiety, Other (See Comments) and Palpitations    Toradol [Ketorolac] Anxiety     Current Outpatient Medications   Medication Sig Dispense Refill    acetaminophen (TYLENOL) 500 MG tablet Take 2 tablets (1,000 mg) by mouth every 8 hours as needed for mild pain      albuterol (PROAIR HFA/PROVENTIL HFA/VENTOLIN HFA) 108 (90 Base) MCG/ACT inhaler Inhale 2 puffs into the lungs every 4 hours as needed      amitriptyline (ELAVIL) 10 MG tablet Take 1-2 tablets (10-20 mg) by mouth every morning AND 5 tablets (50 mg) at bedtime.      amLODIPine (NORVASC) 10 MG tablet Take 10 mg by mouth at bedtime      anastrozole (ARIMIDEX) 1 MG tablet Take 1 tablet (1 mg) by mouth daily. 30 tablet 11    atorvastatin (LIPITOR) 10 MG tablet Take 10 mg by mouth at bedtime      Blood Glucose Monitoring Suppl (ONETOUCH VERIO FLEX SYSTEM) w/Device KIT       buprenorphine HCl-naloxone HCl (SUBOXONE) 8-2 MG per film Place 1 Film under the tongue every 6 hours. 120 Film 0    cetirizine (ZYRTEC) 10 MG tablet Take 5 mg by mouth as needed      EPINEPHrine (ANY BX GENERIC EQUIV) 0.3 MG/0.3ML injection 2-pack       esomeprazole (NEXIUM) 20 MG DR capsule Take 20 mg by mouth as needed      fluticasone-salmeterol (ADVAIR HFA) 115-21 MCG/ACT inhaler Inhale 2 puffs into the lungs 2 times daily      hydrochlorothiazide (HYDRODIURIL) 25 MG tablet Take 25 mg by mouth  "as needed      HYDROcodone-acetaminophen (NORCO)  MG per tablet Take 0.5-1 tablets by mouth every 4 hours as needed for severe pain. 84 tablet 0    HYDROmorphone (DILAUDID) 4 MG tablet Take 0.5-1 tablets (2-4 mg) by mouth every 6 hours as needed for severe pain. 56 tablet 0    ibuprofen (ADVIL/MOTRIN) 200 MG tablet Take 200 mg by mouth      insulin glargine (LANTUS PEN) 100 UNIT/ML pen Inject 35 Units subcutaneously at bedtime.      JANUVIA 100 MG tablet Take 100 mg by mouth at bedtime      LORazepam (ATIVAN) 1 MG tablet Take 1 tablet (1 mg) by mouth every 6 hours as needed for anxiety, nausea or sleep. 56 tablet 1    methocarbamol (ROBAXIN) 500 MG tablet Take 3 tablets (1,500 mg) by mouth 3 times daily as needed for muscle spasms. 270 tablet 1    naloxone (NARCAN) 4 MG/0.1ML nasal spray Spray 4 mg into one nostril alternating nostrils as needed for opioid reversal every 2-3 minutes until assistance arrives      OLANZapine (ZYPREXA) 2.5 MG tablet Take 1-2 tablets (2.5-5 mg) by mouth 3 times daily as needed (Nausea or anxiety). 150 tablet 2    ONETOUCH VERIO IQ test strip       Pregabalin (LYRICA) 200 MG capsule Take 1 capsule (200 mg) by mouth at bedtime. 30 capsule 1    prochlorperazine (COMPAZINE) 10 MG tablet Take 1 tablet (10 mg) by mouth every 6 hours as needed for nausea or vomiting. 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 04/22/2024    Two MC1R \"increased risk\" variants - c.451C>T and c.478C>T  [facility] 3/26/2024      Hypercholesteraemia     Irritable bowel     Migraines      Past Surgical History:   Procedure Laterality Date    IR CHEST PORT PLACEMENT > 5 YRS OF AGE  03/11/2024    LEFT Radiofrequency " "Identification Seed-Localized Segmental Mastectomy (=\"Lumpectomy\"), LEFT Radiofrequency Identification Seed-Localized Axillary [place] Lymph Node Biopsy - Left  2024    LUMPECTOMY BREAST, SEED LOCALIZATION, SENTINEL NODE Left 2024    Procedure: LEFT Radiofrequency Identification Seed-Localized Segmental Mastectomy (=\"Lumpectomy\"), LEFT Radiofrequency Identification Seed-Localized Axillary [place] Lymph Node Biopsy;  Surgeon: Albina Ford MD;  Location: [facility] OR    REMOVE PORT VASCULAR ACCESS Right 2024    Procedure: RIGHT vascular access port removal;  Surgeon: Albina Ford MD;  Location: [facility] OR       Physical Exam:   GENERAL APPEARANCE: robust young woman, no evident facial trauma, smoking while we visited, alert and no distress; neatly groomed  EYES: Eyes grossly normal to inspection, PERRLA, conjunctivae and sclerae without injection or discharge, EOM intact   RESP:  no increased work of breathing; speaks in complete sentences;   MS: No musculoskeletal defects are noted  SKIN: No suspicious lesions or rashes, hydration status appears adequate with normal skin turgor   PSYCH: Alert and oriented x3; speech- coherent , normal rate and volume; able to articulate logical thoughts, able to abstract reason, no tangential thoughts, no hallucinations or delusions, mentation appears normal, Mood is euthymic. Affect is appropriate for this mood state and bright. Thought content is free of suicidal ideation, hallucinations, and delusions.  Eye contact is good during conversation.       Key Data Reviewed:  LABS: no recent labs available for review    [id number]  US AXILLA LEFT was reported as showing post surgical changes and no masses or lymphadenopathy.    32 minutes spent on the date of the encounter doing chart review, history and exam, patient education & counseling, documentation and other activities as noted above.    The longitudinal plan of care for the " diagnosis(es)/condition(s) as documented were addressed during this visit. Due to the added complexity in care, I will continue to support Eliezer in the subsequent management and with ongoing continuity of care.    Estevan Singh MD MS FAAFP CAQHPM  [facility]  Office [phone]  Fax [phone]

## 2025-01-16 NOTE — PATIENT INSTRUCTIONS
It was good to see you today, Eliezer.    Here are the things we talked about:  Use Klonopin 1 mg tablet two days a day routinely and then use the Ativan 1/2 to 1 tablet if needed for breakthrough anxiety    No change to pain medicines.    Keep active and using heat to your sore hip.    Someone from the team will reach out to schedule a follow up appointment in 2 months.  Cecy will call in 2 weeks to see how the Klonopin is working.     How to get a hold of us:  For non-urgent matters, MyChart works best.    For more urgent matters, or if you prefer not to use MyChart, call our clinic nurse coordinator Cecy Ghotra RN at [phone]    We have an on-call number for evenings and weekends. Please call this only if you are having uncontrolled symptoms or serious side effects from your medicines: [phone].     For refills, please give us a week (5 working days) notice. We don't always have providers available everyday to do refills. If you call the day you run out of your medicine, we may not be able to refill it in time, so call 5 days in advance!    Estevan Singh MD MS FAAFP CAQHPM  [facility]  Office [phone]  Fax [phone]

## 2025-01-16 NOTE — NURSING NOTE
Current patient location: [address] [place] [address]    Is the patient currently in the state of [place]? YES    Visit mode: VIDEO    If the visit is dropped, the patient can be reconnected by:TELEPHONE VISIT: Phone number: [phone]    Will anyone else be joining the visit? NO  (If patient encounters technical issues they should call [phone]    Are changes needed to the allergy or medication list? No    Are refills needed on medications prescribed by this physician? NO    Rooming Documentation:  Questionnaire(s) completed    Reason for visit: RECHECK    Itzel VILLAGOMEZ

## 2025-02-04 DIAGNOSIS — C50.412 MALIGNANT NEOPLASM OF UPPER-OUTER QUADRANT OF LEFT BREAST IN FEMALE, ESTROGEN RECEPTOR POSITIVE (H): ICD-10-CM

## 2025-02-04 DIAGNOSIS — Z17.0 MALIGNANT NEOPLASM OF UPPER-OUTER QUADRANT OF LEFT BREAST IN FEMALE, ESTROGEN RECEPTOR POSITIVE (H): ICD-10-CM

## 2025-02-04 RX ORDER — HYDROCODONE BITARTRATE AND ACETAMINOPHEN 10; 325 MG/1; MG/1
.5-1 TABLET ORAL EVERY 4 HOURS PRN
Qty: 84 TABLET | Refills: 0 | Status: SHIPPED | OUTPATIENT
Start: 2025-02-04

## 2025-02-04 NOTE — TELEPHONE ENCOUNTER
"Received telephone call from patient requesting refill of  Norco. Pt would like to go back to [place] for BTP now that her pharmacy has it back in stock. She feels \"drugged\" with hydrmorphone and prefers Norco .     Last refill: 10/31/24 per chart review  Last fill of hydrmorphone: 1/26/25 per chart review  Last office visit: 1/16/25  Scheduled for follow up 3/18/25     Will route request to MD/ for review.     Unable to pull [place]MP data.    "

## 2025-02-09 ENCOUNTER — MYC REFILL (OUTPATIENT)
Dept: PALLIATIVE CARE | Facility: CLINIC | Age: 40
End: 2025-02-09
Payer: COMMERCIAL

## 2025-02-09 DIAGNOSIS — G43.719 INTRACTABLE CHRONIC MIGRAINE WITHOUT AURA AND WITHOUT STATUS MIGRAINOSUS: ICD-10-CM

## 2025-02-09 DIAGNOSIS — G62.0 PERIPHERAL NEUROPATHY DUE TO CHEMOTHERAPY: ICD-10-CM

## 2025-02-09 DIAGNOSIS — T45.1X5A PERIPHERAL NEUROPATHY DUE TO CHEMOTHERAPY: ICD-10-CM

## 2025-02-10 RX ORDER — PREGABALIN 200 MG/1
200 CAPSULE ORAL AT BEDTIME
Qty: 30 CAPSULE | Refills: 1 | Status: SHIPPED | OUTPATIENT
Start: 2025-02-10

## 2025-02-10 NOTE — TELEPHONE ENCOUNTER
Received [employer] message from patient requesting refill of  pregabalin .     Last refill: Around 1/13/25 per chart review, unable to pull [place]MP data  Last office visit: 1/16/25  Scheduled for follow up 3/18/25     Will route request to MD/DO for review.     Unable to review [place]MP.

## 2025-02-17 DIAGNOSIS — C50.919 MALIGNANT NEOPLASM OF FEMALE BREAST, UNSPECIFIED ESTROGEN RECEPTOR STATUS, UNSPECIFIED LATERALITY, UNSPECIFIED SITE OF BREAST (H): ICD-10-CM

## 2025-02-17 DIAGNOSIS — F41.1 GAD (GENERALIZED ANXIETY DISORDER): ICD-10-CM

## 2025-02-17 RX ORDER — CLONAZEPAM 1 MG/1
1 TABLET ORAL 3 TIMES DAILY
Qty: 90 TABLET | Refills: 3 | Status: SHIPPED | OUTPATIENT
Start: 2025-02-17

## 2025-02-17 NOTE — TELEPHONE ENCOUNTER
Received [employer] message from patient requesting refill of  clonazepam .     Last refill: 2/12/25  Last office visit: 1/16/25  Scheduled for follow up 3/18/25     Will route request to MD/ for review.     Reviewed [place]  Report.

## 2025-02-27 ENCOUNTER — MYC REFILL (OUTPATIENT)
Dept: PALLIATIVE CARE | Facility: CLINIC | Age: 40
End: 2025-02-27
Payer: COMMERCIAL

## 2025-02-27 DIAGNOSIS — C50.412 MALIGNANT NEOPLASM OF UPPER-OUTER QUADRANT OF LEFT BREAST IN FEMALE, ESTROGEN RECEPTOR POSITIVE (H): ICD-10-CM

## 2025-02-27 DIAGNOSIS — Z17.0 MALIGNANT NEOPLASM OF UPPER-OUTER QUADRANT OF LEFT BREAST IN FEMALE, ESTROGEN RECEPTOR POSITIVE (H): ICD-10-CM

## 2025-02-27 RX ORDER — HYDROCODONE BITARTRATE AND ACETAMINOPHEN 10; 325 MG/1; MG/1
.5-1 TABLET ORAL EVERY 4 HOURS PRN
Qty: 84 TABLET | Refills: 0 | Status: SHIPPED | OUTPATIENT
Start: 2025-02-27

## 2025-03-12 ENCOUNTER — MYC REFILL (OUTPATIENT)
Dept: PALLIATIVE CARE | Facility: CLINIC | Age: 40
End: 2025-03-12
Payer: COMMERCIAL

## 2025-03-12 DIAGNOSIS — C50.412 MALIGNANT NEOPLASM OF UPPER-OUTER QUADRANT OF LEFT BREAST IN FEMALE, ESTROGEN RECEPTOR POSITIVE (H): ICD-10-CM

## 2025-03-12 DIAGNOSIS — Z17.0 MALIGNANT NEOPLASM OF UPPER-OUTER QUADRANT OF LEFT BREAST IN FEMALE, ESTROGEN RECEPTOR POSITIVE (H): ICD-10-CM

## 2025-03-13 RX ORDER — HYDROCODONE BITARTRATE AND ACETAMINOPHEN 10; 325 MG/1; MG/1
.5-1 TABLET ORAL EVERY 4 HOURS PRN
Qty: 84 TABLET | Refills: 0 | Status: SHIPPED | OUTPATIENT
Start: 2025-03-13

## 2025-03-13 NOTE — TELEPHONE ENCOUNTER
Received [employer] message from patient requesting refill of  Norco .     Last refill: 2/27/25  Last office visit: 1/16/25  Scheduled for follow up 3/18/25     Will route request to MD/ for review.     Reviewed [place]  Report.

## 2025-03-17 ENCOUNTER — MYC REFILL (OUTPATIENT)
Dept: PALLIATIVE CARE | Facility: CLINIC | Age: 40
End: 2025-03-17
Payer: COMMERCIAL

## 2025-03-17 DIAGNOSIS — F11.90 OPIOID USE DISORDER: ICD-10-CM

## 2025-03-17 DIAGNOSIS — G89.3 CANCER RELATED PAIN: ICD-10-CM

## 2025-03-17 DIAGNOSIS — N64.4 BREAST PAIN: ICD-10-CM

## 2025-03-17 RX ORDER — BUPRENORPHINE AND NALOXONE 8; 2 MG/1; MG/1
1 FILM, SOLUBLE BUCCAL; SUBLINGUAL EVERY 6 HOURS
Qty: 120 FILM | Refills: 0 | Status: SHIPPED | OUTPATIENT
Start: 2025-03-17

## 2025-03-17 NOTE — TELEPHONE ENCOUNTER
Received [employer] message from patient requesting refill of  suboxone .     Last refill: 2/14/25  Last office visit: 1/16/25  Scheduled for follow up 3/18/25     Will route request to MD/ for review.     Reviewed [place]  Report.

## 2025-03-18 ENCOUNTER — VIRTUAL VISIT (OUTPATIENT)
Dept: RADIATION ONCOLOGY | Facility: HOSPITAL | Age: 40
End: 2025-03-18
Attending: FAMILY MEDICINE
Payer: COMMERCIAL

## 2025-03-18 VITALS — BODY MASS INDEX: 32.58 KG/M2 | WEIGHT: 220 LBS | HEIGHT: 69 IN

## 2025-03-18 DIAGNOSIS — G89.3 CANCER ASSOCIATED PAIN: ICD-10-CM

## 2025-03-18 DIAGNOSIS — Z17.0 MALIGNANT NEOPLASM OF UPPER-OUTER QUADRANT OF LEFT BREAST IN FEMALE, ESTROGEN RECEPTOR POSITIVE (H): ICD-10-CM

## 2025-03-18 DIAGNOSIS — F41.1 GAD (GENERALIZED ANXIETY DISORDER): ICD-10-CM

## 2025-03-18 DIAGNOSIS — C50.412 MALIGNANT NEOPLASM OF UPPER-OUTER QUADRANT OF LEFT BREAST IN FEMALE, ESTROGEN RECEPTOR POSITIVE (H): ICD-10-CM

## 2025-03-18 DIAGNOSIS — Z51.5 PALLIATIVE CARE PATIENT: Primary | ICD-10-CM

## 2025-03-18 PROCEDURE — 98006 SYNCH AUDIO-VIDEO EST MOD 30: CPT | Mod: 95 | Performed by: FAMILY MEDICINE

## 2025-03-18 PROCEDURE — 1125F AMNT PAIN NOTED PAIN PRSNT: CPT | Mod: 95 | Performed by: FAMILY MEDICINE

## 2025-03-18 RX ORDER — CITALOPRAM HYDROBROMIDE 20 MG/1
20 TABLET ORAL DAILY
COMMUNITY
Start: 2025-03-18

## 2025-03-18 RX ORDER — LORAZEPAM 1 MG/1
.5-1 TABLET ORAL EVERY 6 HOURS PRN
Qty: 100 TABLET | Refills: 3 | Status: SHIPPED | OUTPATIENT
Start: 2025-03-18

## 2025-03-18 RX ORDER — HYDROCODONE BITARTRATE AND ACETAMINOPHEN 10; 325 MG/1; MG/1
.5-1 TABLET ORAL EVERY 4 HOURS PRN
Qty: 84 TABLET | Refills: 0 | Status: SHIPPED | OUTPATIENT
Start: 2025-03-28

## 2025-03-18 ASSESSMENT — PAIN SCALES - GENERAL: PAINLEVEL_OUTOF10: MODERATE PAIN (6)

## 2025-03-18 NOTE — NURSING NOTE
Patient confirms medications and allergies are accurate via patients echeck in completion, and or denies any changes since last reviewed/verified.     Current patient location: [address] [place] [address]    Is the patient currently in the state of [place]? YES    Visit mode: VIDEO    If the visit is dropped, the patient can be reconnected by:VIDEO VISIT: Text to cell phone:   Telephone Information:   Mobile [phone]       Will anyone else be joining the visit? NO  (If patient encounters technical issues they should call [phone]    Are changes needed to the allergy or medication list? No    Are refills needed on medications prescribed by this physician? Discuss with provider    Rooming Documentation:  Questionnaire(s) completed    Reason for visit: RECHECK    Zainab VILLAGOMEZ

## 2025-03-18 NOTE — PROGRESS NOTES
Virtual Visit Details    Type of service:  Video Visit   Video Start Time: 2:11 PM  Video End Time:2:41 PM    Originating Location (pt. Location): Home    Distant Location (provider location):  On-site  Platform used for Video Visit: [facility]    Palliative Care Outpatient Clinic Progress Note    Patient Name: Eliezer Izquierdo  Primary Provider: Madison Roach    Impression & Recommendations & Counseling:  Eliezer Izquierdo is a 39 year old female with history of left-sided breast cancer.      Pain   OUD - See Dr. Rodriguez's note from 5/7/2024 regarding full conversation about opioid use disorder/dependence and chronic pain in.  It was at this visit that we initiated Suboxone.  This seems to have been a good fit for her, as she is tolerating it well and has noticed some improvement in pain.   -Continue Suboxone 8 mg Q6H.  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  Further chemotherapy was placed on hold due to severity of neuropathy side effects.  No benefit from Gabapentin or Lyrica. Trial of Mirapex with no clear benefit at 3mg   -continue off amitriptyline  -Previously advised to look into acupuncture.  -She can continue compression stockings and gloves if she thinks they are helpful.  Discussed she might get more benefit from the compression stockings if she wears them longer than 1-2 hours/day.  - Patient counseled to wear gloves and warm stockings now that winter has arrived and when in the refrigerator or freezer.     Anxiety  Stress - Multifactorial from her health-related concerns as well as social and economic factors including her relationship with her fiancé and the property in which she is living.  It is unfortunate there are no therapist in her  area who are taking her insurance right now.  Encouraged ongoing journaling, and I do think consideration for daily controller medication for anxiety would be beneficial in the near future.  Continue celexa 20 mg po q day    STOP clonazepam(was already done by PCP) and use Lorazepam 0.5 to 1 mg po Q6 hours prn; new rx for #100 tabs for 30 days sent.     Nausea  Comes out of the blue--no response to po zofran, compazine or phenergan; ativan does help.  Has not tried olanzapine at HS and is willing to try it 10 mg po at HS     OIC-I encouraged increasing po fluids to work with fiber from increased dietary fruits as well as the mag citrate she is planning to use today.     Tobacco use disorder--Eliezer is down to 3 cigarettes/day and is using Chantix with good relief.       Chief Complaint/Patient ID: Eliezer Izquierdo 39 year old female with PMHx of left breast cancer; cancer associated pain and OUD and anxiety.    Last Palliative care appointment: 01/16/2025 with me     Reviewed:  Yes:   reviewed - controlled substances reflected in medication list.    Interim History:  Eliezer Izquierdo is a 40 year old female who is seen today for follow up with Palliative Care via  billable video visit. Her   PCP decided to place her back on ativan for sleep, nausea and anxiety and started at 2 mg po TID.  We agreed to continue it but at 0.5 to 1 mg q 6 hours prn.  New rx sent for #100 tabs.     Pain:  neuropathy a little better but Eliezer stopped her Lyrica and amitriptyline as she didn't find them effective.    Appetite/Nausea: good     Bowels: no concerns     Sleep: OK with lorazepam     Mood: feels like Celexa has helped her mood a lot     Coping:  overall well; she is proud she has been able to reduce her tobacco use so much and looks forward to stopping completely.    Family History- Reviewed in Epic.    Allergies   Allergen Reactions    Ubrogepant Muscle Pain (Myalgia)     Other Reaction(s): Chest Pain, Chest  Pain    Also caused blisters in mouth    Azithromycin Hives    Doxycycline Hives    Erythromycin Hives    Estrogens     Levofloxacin Hives    Maxalt [Rizatriptan]     Oxycodone-Acetaminophen      Burning mouth and lips with red sore taste buds and throat irritation    PER PATIENT NOT ALLERGIC TO     Penicillins     Propranolol Hcl Headache    Sulfa Antibiotics     Sumatriptan Muscle Pain (Myalgia)    Zofran [Ondansetron] Muscle Pain (Myalgia)    Hydromorphone Anxiety and Itching     Other Reaction(s): Tachycardia    Panic attacks    Ondansetron Hcl Anxiety, Other (See Comments) and Palpitations    Toradol [Ketorolac] Anxiety       Social History:  Pertinent changes to social history/social situation since last visit: cut down to 3 cigarettes  Key support resources: family  Advance Directive Status:  no ACP documents in Epic.    Social History     Tobacco Use    Smoking status: Every Day     Types: Cigarettes     Passive exposure: Current    Smokeless tobacco: Never    Tobacco comments:     Has smoked 1ppd since age 12, currently still smoking 1 ppd currently    Vaping Use    Vaping status: Never Used   Substance Use Topics    Alcohol use: No    Drug use: No         Allergies   Allergen Reactions    Ubrogepant Muscle Pain (Myalgia)     Other Reaction(s): Chest Pain, Chest Pain    Also caused blisters in mouth    Azithromycin Hives    Doxycycline Hives    Erythromycin Hives    Estrogens     Levofloxacin Hives    Maxalt [Rizatriptan]     Oxycodone-Acetaminophen      Burning mouth and lips with red sore taste buds and throat irritation    PER PATIENT NOT ALLERGIC TO     Penicillins     Propranolol Hcl Headache    Sulfa Antibiotics     Sumatriptan Muscle Pain (Myalgia)    Zofran [Ondansetron] Muscle Pain (Myalgia)    Hydromorphone Anxiety and Itching     Other Reaction(s): Tachycardia    Panic attacks    Ondansetron Hcl Anxiety, Other (See Comments) and Palpitations    Toradol [Ketorolac] Anxiety     Current Outpatient  Medications   Medication Sig Dispense Refill    acetaminophen (TYLENOL) 500 MG tablet Take 2 tablets (1,000 mg) by mouth every 8 hours as needed for mild pain      albuterol (PROAIR HFA/PROVENTIL HFA/VENTOLIN HFA) 108 (90 Base) MCG/ACT inhaler Inhale 2 puffs into the lungs every 4 hours as needed      amitriptyline (ELAVIL) 10 MG tablet Take 1-2 tablets (10-20 mg) by mouth every morning AND 5 tablets (50 mg) at bedtime.      amLODIPine (NORVASC) 10 MG tablet Take 10 mg by mouth at bedtime      anastrozole (ARIMIDEX) 1 MG tablet Take 1 tablet (1 mg) by mouth daily. 30 tablet 11    atorvastatin (LIPITOR) 10 MG tablet Take 10 mg by mouth at bedtime      Blood Glucose Monitoring Suppl (ONETOUCH VERIO FLEX SYSTEM) w/Device KIT       buprenorphine HCl-naloxone HCl (SUBOXONE) 8-2 MG per film Place 1 Film under the tongue every 6 hours. 120 Film 0    cetirizine (ZYRTEC) 10 MG tablet Take 5 mg by mouth as needed      clonazePAM (KLONOPIN) 1 MG tablet Take 1 tablet (1 mg) by mouth 3 times daily. 90 tablet 3    EPINEPHrine (ANY BX GENERIC EQUIV) 0.3 MG/0.3ML injection 2-pack       esomeprazole (NEXIUM) 20 MG DR capsule Take 20 mg by mouth as needed      fluticasone-salmeterol (ADVAIR HFA) 115-21 MCG/ACT inhaler Inhale 2 puffs into the lungs 2 times daily      hydrochlorothiazide (HYDRODIURIL) 25 MG tablet Take 25 mg by mouth as needed      HYDROcodone-acetaminophen (NORCO)  MG per tablet Take 0.5-1 tablets by mouth every 4 hours as needed for severe pain. 84 tablet 0    ibuprofen (ADVIL/MOTRIN) 200 MG tablet Take 200 mg by mouth      insulin glargine (LANTUS PEN) 100 UNIT/ML pen Inject 35 Units subcutaneously at bedtime.      JANUVIA 100 MG tablet Take 100 mg by mouth at bedtime      LORazepam (ATIVAN) 1 MG tablet Take 1 tablet (1 mg) by mouth every 6 hours as needed for anxiety, nausea or sleep. 56 tablet 1    methocarbamol (ROBAXIN) 500 MG tablet Take 3 tablets (1,500 mg) by mouth 3 times daily as needed for muscle  "spasms. 270 tablet 1    naloxone (NARCAN) 4 MG/0.1ML nasal spray Spray 4 mg into one nostril alternating nostrils as needed for opioid reversal every 2-3 minutes until assistance arrives      OLANZapine (ZYPREXA) 2.5 MG tablet Take 1-2 tablets (2.5-5 mg) by mouth 3 times daily as needed (Nausea or anxiety). 150 tablet 2    ONETOUCH VERIO IQ test strip       Pregabalin (LYRICA) 200 MG capsule Take 1 capsule (200 mg) by mouth at bedtime. 30 capsule 1    prochlorperazine (COMPAZINE) 10 MG tablet Take 1 tablet (10 mg) by mouth every 6 hours as needed for nausea or vomiting. 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 04/22/2024    Two MC1R \"increased risk\" variants - c.451C>T and c.478C>T  Molecular Diagnostics Laboratory 3/26/2024      Hypercholesteraemia     Irritable bowel     Migraines      Past Surgical History:   Procedure Laterality Date    IR CHEST PORT PLACEMENT > 5 YRS OF AGE  03/11/2024    LEFT Radiofrequency Identification Seed-Localized Segmental Mastectomy (=\"Lumpectomy\"), LEFT Radiofrequency Identification Seed-Localized Axillary [place] Lymph Node Biopsy - Left  07/24/2024    LUMPECTOMY BREAST, SEED LOCALIZATION, SENTINEL NODE Left 7/24/2024    Procedure: LEFT Radiofrequency Identification Seed-Localized Segmental Mastectomy (=\"Lumpectomy\"), LEFT Radiofrequency Identification Seed-Localized Axillary [place] Lymph Node Biopsy;  Surgeon: Albina Ford MD;  Location: [facility]    REMOVE PORT VASCULAR ACCESS Right 7/24/2024    Procedure: RIGHT vascular access port removal;  Surgeon: Albina Ford MD;  Location: [facility]       Physical Exam:   GENERAL APPEARANCE: robust appearing, alert and no distress; neatly groomed  EYES: Eyes " grossly normal to inspection, PERRLA, conjunctivae and sclerae without injection or discharge, EOM intact   RESP:  no increased work of breathing; speaks in very complete sentences;   MS: No musculoskeletal defects are noted  SKIN: No suspicious lesions or rashes, hydration status appears adequate with normal skin turgor   PSYCH: Alert and oriented x3; speech- coherent , normal rate and volume; able to articulate logical thoughts, able to abstract reason, no tangential thoughts, no hallucinations or delusions, mentation appears normal, Mood is euthymic. Affect is appropriate for this mood state and bright. Thought content is free of suicidal ideation, hallucinations, and delusions.  Eye contact is good during conversation.       Key Data Reviewed:  No recent results for review.    30 minutes spent on the date of the encounter doing chart review, history and exam, patient education & counseling, documentation and other activities as noted above.    The longitudinal plan of care for the diagnosis(es)/condition(s) as documented were addressed during this visit. Due to the added complexity in care, I will continue to support Eliezer in the subsequent management and with ongoing continuity of care.    Estevan Singh MD MS FAAFP CAQHPM  [facility]  Office [phone]  Fax [phone]

## 2025-03-18 NOTE — PATIENT INSTRUCTIONS
It was good to see you today, Eliezer.  Congratulations on cutting back on your smoking so much.    Here are the things we talked about:  I refilled the lorazepam for #100 1 mg tablets to use 1/2 to 1 tablet by mouth every six hours as needed    I sent the refill for the hydrocodone and you can pick it up on March 28th.    Someone from the team will reach out to schedule a follow up appointment in 3 months.       How to get a hold of us:  For non-urgent matters, MyChart works best.    For more urgent matters, or if you prefer not to use MyChart, call our clinic nurse coordinator Cecy Ghotra RN at [phone]    We have an on-call number for evenings and weekends. Please call this only if you are having uncontrolled symptoms or serious side effects from your medicines: [phone].     For refills, please give us a week (5 working days) notice. We don't always have providers available everyday to do refills. If you call the day you run out of your medicine, we may not be able to refill it in time, so call 5 days in advance!    Estevan Singh MD MS FAAFP CAQHPM  [facility]  Office [phone]  Fax [phone]

## 2025-04-01 ENCOUNTER — MYC REFILL (OUTPATIENT)
Dept: RADIATION ONCOLOGY | Facility: HOSPITAL | Age: 40
End: 2025-04-01
Payer: COMMERCIAL

## 2025-04-01 DIAGNOSIS — Z17.0 MALIGNANT NEOPLASM OF UPPER-OUTER QUADRANT OF LEFT BREAST IN FEMALE, ESTROGEN RECEPTOR POSITIVE (H): ICD-10-CM

## 2025-04-01 DIAGNOSIS — C50.412 MALIGNANT NEOPLASM OF UPPER-OUTER QUADRANT OF LEFT BREAST IN FEMALE, ESTROGEN RECEPTOR POSITIVE (H): ICD-10-CM

## 2025-04-01 RX ORDER — HYDROCODONE BITARTRATE AND ACETAMINOPHEN 10; 325 MG/1; MG/1
.5-1 TABLET ORAL EVERY 4 HOURS PRN
Qty: 84 TABLET | Refills: 0 | Status: SHIPPED | OUTPATIENT
Start: 2025-04-01

## 2025-04-01 NOTE — TELEPHONE ENCOUNTER
Received [employer] message from patient requesting refill of Norco.     Last refill: 3/18/2025  Last office visit: 3/18/2025  Scheduled for follow up 6/18/2025    Will route request to MD/ for review.     Reviewed [place]  Report.

## 2025-04-02 ENCOUNTER — TELEPHONE (OUTPATIENT)
Dept: PALLIATIVE CARE | Facility: CLINIC | Age: 40
End: 2025-04-02
Payer: COMMERCIAL

## 2025-04-02 NOTE — TELEPHONE ENCOUNTER
Writer received call from patient.  Patient reports she would like to switch back to Dilaudid because Norco does not work as well.    Will route to Dr. Rodriguez    Patient verbalized understanding and had no further questions.    Angeline Vides RN  Palliative Care Nurse Clinician    [phone] (Direct)  [phone] (Main)  [phone] (Appointment Scheduling)

## 2025-04-08 ENCOUNTER — MYC REFILL (OUTPATIENT)
Dept: PALLIATIVE CARE | Facility: CLINIC | Age: 40
End: 2025-04-08
Payer: COMMERCIAL

## 2025-04-08 DIAGNOSIS — G89.3 CANCER RELATED PAIN: ICD-10-CM

## 2025-04-08 DIAGNOSIS — C50.412 MALIGNANT NEOPLASM OF UPPER-OUTER QUADRANT OF LEFT BREAST IN FEMALE, ESTROGEN RECEPTOR POSITIVE (H): ICD-10-CM

## 2025-04-08 DIAGNOSIS — Z17.0 MALIGNANT NEOPLASM OF UPPER-OUTER QUADRANT OF LEFT BREAST IN FEMALE, ESTROGEN RECEPTOR POSITIVE (H): ICD-10-CM

## 2025-04-08 DIAGNOSIS — N64.4 BREAST PAIN: ICD-10-CM

## 2025-04-08 RX ORDER — HYDROMORPHONE HYDROCHLORIDE 4 MG/1
2-4 TABLET ORAL EVERY 6 HOURS PRN
Qty: 112 TABLET | Refills: 0 | Status: SHIPPED | OUTPATIENT
Start: 2025-04-20

## 2025-04-08 RX ORDER — HYDROCODONE BITARTRATE AND ACETAMINOPHEN 10; 325 MG/1; MG/1
.5-1 TABLET ORAL EVERY 4 HOURS PRN
COMMUNITY
Start: 2025-04-08

## 2025-04-08 RX ORDER — METHOCARBAMOL 500 MG/1
1500 TABLET, FILM COATED ORAL 3 TIMES DAILY PRN
Qty: 270 TABLET | Refills: 1 | Status: SHIPPED | OUTPATIENT
Start: 2025-04-08

## 2025-04-08 NOTE — TELEPHONE ENCOUNTER
Received [employer] message from patient requesting refill of  robaxin. Pt also requesting to change from Norco back to dilaudid when she is due for her next opiate refill .     Last refill of [place]: 4/6/25  Last office visit: 3/18/25  Scheduled for follow up 6/18/25     Will route request to MD/ for review.     Reviewed [place]  Report.

## 2025-04-15 DIAGNOSIS — C50.412 MALIGNANT NEOPLASM OF UPPER-OUTER QUADRANT OF LEFT BREAST IN FEMALE, ESTROGEN RECEPTOR POSITIVE (H): ICD-10-CM

## 2025-04-15 DIAGNOSIS — G89.3 CANCER RELATED PAIN: ICD-10-CM

## 2025-04-15 DIAGNOSIS — Z17.0 MALIGNANT NEOPLASM OF UPPER-OUTER QUADRANT OF LEFT BREAST IN FEMALE, ESTROGEN RECEPTOR POSITIVE (H): ICD-10-CM

## 2025-04-15 DIAGNOSIS — N64.4 BREAST PAIN: ICD-10-CM

## 2025-04-15 RX ORDER — HYDROMORPHONE HYDROCHLORIDE 4 MG/1
2-4 TABLET ORAL EVERY 6 HOURS PRN
Qty: 112 TABLET | Refills: 0 | Status: SHIPPED | OUTPATIENT
Start: 2025-04-19 | End: 2025-04-15

## 2025-04-15 RX ORDER — HYDROMORPHONE HYDROCHLORIDE 4 MG/1
2-4 TABLET ORAL EVERY 6 HOURS PRN
Qty: 112 TABLET | Refills: 0 | Status: SHIPPED | OUTPATIENT
Start: 2025-04-15

## 2025-04-19 ENCOUNTER — HEALTH MAINTENANCE LETTER (OUTPATIENT)
Age: 40
End: 2025-04-19

## 2025-04-20 NOTE — PROGRESS NOTES
[facility] Medical Oncology Note  Date of visit: Apr 21, 2025    Assessment:     Stage IB Luminal A invasive breast cancer, status post neoadjuvant chemotherapy followed by lumpectomy and sentinel lymph node biopsies.  While there was obvious response by exam, pathology did not show any significant response to neoadjuvant treatment, confirming that this is a Luminal A breast cancer.  Her KI-67 was 24%, and so neoadjuvant chemotherapy was a reasonable strategy here.  And with the multiple nodes positive, the recommendation would have been for adjuvant chemotherapy.  This has been already been completed.    Clearly adjuvant RT is recommended in this setting. Eliezer refused.  Because of her persistent description of diffuse pain, we ordered studies to rule out distant metastasis as an etiology. Her PET showed a slightly enlarged left axillary node, and equivocal solitary lesions in T5 and the left lobe for the liver. But subsequent MRIs showed no lesion in the thoracic spine, and no suspicious hepatic lesions.   Currently on OFS and on anstrozole with a little better tolerance. [facility] of care is to offer ribociclib based on the MONY trial. I would usually wait until she has received radiotherapy.  Given her poor tolerance of all of her treatments, I really doubt this is a medication that she will stay on.  And in terms of getting a BSO, she would rather stay on goserelin.  1 cm hyperpigmented macule in the suprapubic area.  It does not look obviously like a seborrheic keratosis.  This really does need assessment ASAP since the patient says a look just like her melanoma.  To me it is not obvious.  The size is certainly concerning and the borders are somewhat indistinct.  I will refer her onto dermatology for further management including a biopsy.  Significant side effects of treatment in the context of chronic pain requiring narcotics.  I am so happy for the involvement of palliative care.  Ongoing  smoking.  She has no plans on quitting despite my encouragement.  It is bad for you.  It has been in all of the newspapers.      Plan:     ASAP referral to dermatology for further assessment and probable biopsy of her suprapubic hyperpigmented lesion.  Continue anastrozole  Continue OFS with goserelin, next scheduled for mid May.  Today's appointment can serve as the provider visit prior to that.  Return to clinic with me in 3 months for another virtual visit..   Continue follow-up with Palliative care    The longitudinal plan of care for the diagnosis(es)/condition(s) as documented were addressed during this visit. Due to the added complexity in care, I will continue to support Eliezer in the subsequent management and with ongoing continuity of care.      30 minutes spent on the date of the encounter doing chart review, review of test results, interpretation of tests, patient visit, and documentation.      Zachary Nolan MD, MSc  Associate Professor of Medicine  [facility] [place] [address]  [phone]    __________________________________________________________________    DIAGNOSES     Pathologic prognostic stage IB ([id number](sn)) Jeniffer grade 2 invasive ductal breast cancer, diagnosed definitively at lumpectomy and sentinel lymph node biopsy 7/24/2024.  Kathy had a 2.2 cm moderately differentiated tumor with 4 out of 5 sentinel nodes positive for involvement, 1 of which showed a 2 mm area of extracapsular extension.  The tumor was 91 to 100% strongly positive for both ER and PA.  It was negative for HER2 amplification by FISH.  There is no obvious tumor response to neoadjuvant treatment in both the breast and the nodes.  Eliezer presented with  a clinical T2N1 invasive breast cancer diagnosed at breast biopsy 2/9/2024. Eliezer palpated a left breast lump. Mammogram and US showed a 2.5 x 2.5 x 2.0 cm spiculated mass at 2:00  "position.   Biopsy showed a Jeniffer grade I IDC, 97% strongly positive for ER, 99% strongly positive for RI, and negative for HER2 by FISH (IHC 2+). KI-67 is 24%. Mammaprint was high risk Luminal B. Left axillary US that showed three abnormal axillary nodes, biopsy positive for IDC. Biopsy showed METASTATIC BREAST CARCINOMA, almost entirely effacing lymph node, at least 11 mm in linear extent.  My first exam prior to neoadjuvant therapy showed a palpable deep left breast mass in the 2 o'clock position measuring roughly 4 cm x 4 cm.   Chronic pain with history of opiate prescriptions from multiple providers. She is currently seeing palliative care (Dr. Kylie Rodriguez), and tried buprenorphine, started on 4/11/2024, but reacted to the adhesive.  Then didn't tolerate fentanyl.  Current recommendation is suboxone.   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  But it was negative for everything else.  History of melanoma removed from the right foot.  I do not have any of the pathology reports to understand this any further.        History of Present Illness/Therapy to dte:     Neoadjuvant ddAC initiated 3/15/2024 through 4/26/2024.   Weekly paclitaxel 5/10/2026-.6/14/2026.  It was then discontinued due to crippling side effects.   Lumpectomy and sentinel lymph node biopsy, 7/24/2024.    Eliezer refused recommended adjuvant RT.  Goserelin 10.8 mg initiated 9/12/2024. Last given 2/28/2025.  Letrozole 10/10-22/24. This was held due to nausea and vomiting. Anastrozole was substituted 10/22/2024.    Interval History     Eliezer is back with Saul in the waiting room.  She is very concerned about a lesion in the skin above her pubic area.  She has always had a small hyperpigmented macule in the area, but now this has grown.  She thinks it looks a lot like the melanoma that she had previously.  She has a referral to dermatology but it has not been placed yet.  She is \"100% " "sure\" that it is melanoma.  Still has her usual aches and pains.  She is still following palliative care who prescribed her pain medications.  Remains on anastrozole.  Remains good with her decision not to do postmastectomy radiotherapy.  She still smoking.  Says she is on Chantix \"for you\".  Still very fatigued.    Past Medical History:   Melanoma of right foot???     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  She thought about being a , but then got pregnant with her son Homero.  She had a lot of different for jobs.  She spent some time in [place] where her mom and other family member lives.  She then came back to [place].  She has a fiancé of 6 years named Saul and they currently live in [place], [place].  She is an avid reader, and likes romance novels, but really anything.  She says she read 250 books last year.  She does smoke and has no plans on quitting.  She is currently on a low-carb diet.    Family History:     Family History   Problem Relation Age of Onset    Breast Cancer Mother     Genitourinary Problems Mother         renal disease - minimal change, sponge kidney    Cancer Paternal Grandmother         GYN cancer    Cancer Paternal Grandfather         Stomach            Medications:     Current Outpatient Medications   Medication Sig Dispense Refill    acetaminophen (TYLENOL) 500 MG tablet Take 2 tablets " (1,000 mg) by mouth every 8 hours as needed for mild pain      albuterol (PROAIR HFA/PROVENTIL HFA/VENTOLIN HFA) 108 (90 Base) MCG/ACT inhaler Inhale 2 puffs into the lungs every 4 hours as needed      amLODIPine (NORVASC) 10 MG tablet Take 10 mg by mouth at bedtime      anastrozole (ARIMIDEX) 1 MG tablet Take 1 tablet (1 mg) by mouth daily. 30 tablet 11    atorvastatin (LIPITOR) 10 MG tablet Take 10 mg by mouth at bedtime      Blood Glucose Monitoring Suppl (ONETOUCH VERIO FLEX SYSTEM) w/Device KIT       buprenorphine HCl-naloxone HCl (SUBOXONE) 8-2 MG per film Place 1 Film under the tongue every 6 hours. 120 Film 0    cetirizine (ZYRTEC) 10 MG tablet Take 5 mg by mouth as needed      citalopram (CELEXA) 20 MG tablet Take 1 tablet (20 mg) by mouth daily.      EPINEPHrine (ANY BX GENERIC EQUIV) 0.3 MG/0.3ML injection 2-pack       esomeprazole (NEXIUM) 20 MG DR capsule Take 20 mg by mouth as needed      fluticasone-salmeterol (ADVAIR HFA) 115-21 MCG/ACT inhaler Inhale 2 puffs into the lungs 2 times daily      hydrochlorothiazide (HYDRODIURIL) 25 MG tablet Take 25 mg by mouth as needed      HYDROcodone-acetaminophen (NORCO)  MG per tablet Take 0.5-1 tablets by mouth every 4 hours as needed for severe pain.      HYDROmorphone (DILAUDID) 4 MG tablet Take 0.5-1 tablets (2-4 mg) by mouth every 6 hours as needed for severe pain. 112 tablet 0    ibuprofen (ADVIL/MOTRIN) 200 MG tablet Take 200 mg by mouth      insulin glargine (LANTUS PEN) 100 UNIT/ML pen Inject 35 Units subcutaneously at bedtime.      JANUVIA 100 MG tablet Take 100 mg by mouth at bedtime      LORazepam (ATIVAN) 1 MG tablet Take 0.5-1 tablets (0.5-1 mg) by mouth every 6 hours as needed for anxiety, nausea or sleep. 100 tablet 3    methocarbamol (ROBAXIN) 500 MG tablet Take 3 tablets (1,500 mg) by mouth 3 times daily as needed for muscle spasms. 270 tablet 1    naloxone (NARCAN) 4 MG/0.1ML nasal spray Spray 4 mg into one nostril alternating nostrils as  needed for opioid reversal every 2-3 minutes until assistance arrives      OLANZapine (ZYPREXA) 2.5 MG tablet Take 1-2 tablets (2.5-5 mg) by mouth 3 times daily as needed (Nausea or anxiety). 150 tablet 2    ONETOUCH VERIO IQ test strip       prochlorperazine (COMPAZINE) 10 MG tablet Take 1 tablet (10 mg) by mouth every 6 hours as needed for nausea or vomiting. 30 tablet 1    Prochlorperazine Maleate (COMPAZINE PO) Take 10 mg by mouth 3 times daily as needed for nausea      TRUE COMFORT PRO PEN NEEDLES 31G X 5 MM miscellaneous                 Physical Exam:   not currently breastfeeding.    Constitutional: WDWN female in NAD, pleasant and appropriate. Smells of tobacco  HEENT:  NC/AT, no icterus, OP clear, MMM  Skin: There is a 1 cm brown macule in the suprapubic area.  The borders are minimally irregular.  The color itself is pretty homogeneous.  It is not palpable.  There is no ulceration.  Lungs: Bilateral wheezes no w/r/r, nonlabored breathing  Cardiovascular: RRR, S1, S2, no m/r/g  Abdomen: +BS, soft, nontender, nondistended, no organomegaly nor masses  MSK/Extremities: Warm, well perfused. No edema  LN: no cervical, supraclavicular, axillary, nor inguinal lymphadenopathy  Neurologic: alert, answering questions appropriately, moving all extremities spontaneously. CN 2-12 grossly intact.  Psych: appropriate affect    Data:     CT/PET 9/13/2024  IMPRESSION:      1. Postoperative changes of left breast lumpectomy and left axillary  farhan dissection with presumed postprocedural inflammation and seroma.  Mildly FDG avid probable 1.2 cm left axillary node with adjacent  inflammatory stranding, concerning for possible residual farhan  metastasis.      2. Indeterminate mildly hypermetabolic foci most pronounced within the  left hepatic lobe without CT correlate. Recommend further evaluation  with liver MRI with contrast to exclude hepatic metastases.     3. Mildly FDG avid subtle sclerotic focus within the T5  vertebral  body. Consider further evaluation with thoracic spine MRI with  contrast to exclude osseous metastasis.      MRI THRACIS SPINE 10/20/2024  IMPRESSION: No evidence of metastatic disease in the thoracic spine on  this noncontrast MRI. Specifically, there is no lesion in the T5  vertebral body.    PATHOLOGY 7/24/2024  B. LEFT BREAST, MASS, SEED-LOCALIZED SEGMENTAL MASTECTOMY:  -INVASIVE DUCTAL CARCINOMA, Jeniffer grade 2, 22 mm in greatest dimension.  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  -Margins are negative; closest margins to invasive carcinoma are posterior at 4 mm and superior at 8 mm; all other margins are at >10 mm; closest uninvolved margin to DCIS is posterior at 4 mm; all other margins are >10 mm from DCIS.  -Calcifications associated with DCIS and invasive carcinoma.  -AJCC pathologic staging is ypT2 N2a(sn).  -See synoptic report.     C.  SENTINEL LYMPH NODE, LEFT AXILLARY #1 WITH RFID, EXCISION:  -METASTATIC BREAST CARCINOMA to three of three lymph nodes, 18 mm in greatest dimension, with 2 mm extranodal extension (3/3).  -Biopsy site changes.     D.  SENTINEL LYMPH NODE, LEFT AXILLARY #2, EXCISION:  -METASTATIC BREAST CARCINOMA to one lymph node, 5 mm in greatest dimension, with no extranodal extension (1/1).     E.  SENTINEL LYMPH NODE, LEFT AXILLARY #3, EXCISION:  -One lymph node, negative for malignancy (0/1).          REGIONAL LYMPH NODES   Regional Lymph Node Status  Tumor present in regional lymph node(s)   Number of Lymph Nodes with Macrometastases  3   Number of Lymph Nodes with Micrometastases  1   Size of Largest [place] Metastatic Deposit  18 mm   Extranodal Extension  Present, 2 mm or less   Total Number of Lymph Nodes Examined (sentinel and non-sentinel)  5   Number of [place] Nodes Examined  5   pTNM CLASSIFICATION (AJCC 8th Edition)   Reporting of pT, pN, and (when  applicable) pM categories is based on information available to the pathologist at the time the report is issued. As per the AJCC (Chapter 1, 8th Ed.) it is the managing physician s responsibility to establish the final pathologic stage based upon all pertinent information, including but potentially not limited to this pathology report.   Modified Classification  y   pT Category  pT2   pN Category  pN2a   N Suffix  (sn)   SPECIAL STUDIES        Estrogen Receptor (ER) Status  Positive (greater than 10% of cells demonstrate nuclear positivity)   Percentage of Cells with Nuclear Positivity  >95% %        Progesterone Receptor (PgR) Status  Positive   Percentage of Cells with Nuclear Positivity  100 %        HER2 (by immunohistochemistry)  Equivocal (Score 2+)   Percentage of Cells with Uniform Intense Complete Membrane Staining  Cannot be determined        Ki-67 Percentage of Positive Nuclei  25 %                         Addendum   B. LEFT BREAST, SEED-LOCALIZED SEGMENTAL MASTECTOMY:  -Invasive carcinoma is HER2 negative (score 1+) by immunohistochemistry (performed on this specimen, see first biomarker reporting template below)     D. LYMPH NODE, LEFT AXILLARY, SENTINEL #2, EXCISION:  -Metastatic carcinoma is HER2 equivocal (score 2+) by immunohistochemistry (performed on this specimen, see second biomarker reporting template below)  -HER2 FISH results will be reported separately by cytogenetics               Left Breast ultrasound 6/24 (compared to 5/10) and personally reviewed by me.    FINDINGS: At the 2 o'clock position, 10 cm from the nipple, there is a  heterogenous hypoechoic shadowing mass measuring 14 x 13 x 9 mm, which has mildly decreased in size, most recently measuring 20 x 19 x 18 mm.          Most Recent 3 CBC's:  Recent Labs   Lab Test 10/22/24  1432 07/23/24  1214 06/21/24  0954 06/14/24  1028   WBC 8.3 8.2 7.3 8.6   HGB 16.1* 15.4 11.8 13.0   MCV 90 94 99 99    337 342 336   ANEUTAUTO 5.0  --   4.9 6.2     Most Recent 3 BMP's:  Recent Labs   Lab Test 10/22/24  1432 07/24/24  0606 06/07/24  1046 05/10/24  1006 04/26/24  1134 04/12/24  0734     --   --   --  136 138   POTASSIUM 4.1  --   --   --  3.9 3.5   CHLORIDE 99  --   --   --  98 102   CO2 24  --   --   --  24 23   BUN 4.6*  --   --   --  7.7 7.0   CR 0.60  --   --   --  0.49* 0.44*   ANIONGAP 12  --   --   --  14 13   REMI 9.5  --   --   --  9.2 9.3   * 298*  --   --  415* 206*   PROTTOTAL 7.2  --  7.1 6.6 6.7 7.0   ALBUMIN 3.9  --  4.2 3.9 3.7 4.0    Most Recent 3 LFT's:  Recent Labs   Lab Test 10/22/24  1432 06/07/24  1046 05/10/24  1006   AST 28 39 28   ALT 24 34 23   ALKPHOS 81 70 67   BILITOTAL 0.3 0.3 0.2    Most Recent 2 TSH and T4:No lab results found.   I reviewed the above labs today.     Other Data     Most Recent 3 CBC's:  Recent Labs   Lab Test 10/22/24  1432 07/23/24  1214 06/21/24  0954 06/14/24  1028   WBC 8.3 8.2 7.3 8.6   HGB 16.1* 15.4 11.8 13.0   MCV 90 94 99 99    337 342 336   ANEUTAUTO 5.0  --  4.9 6.2     Most Recent 3 BMP's:  Recent Labs   Lab Test 10/22/24  1432 07/24/24  0606 06/07/24  1046 05/10/24  1006 04/26/24  1134 04/12/24  0734     --   --   --  136 138   POTASSIUM 4.1  --   --   --  3.9 3.5   CHLORIDE 99  --   --   --  98 102   CO2 24  --   --   --  24 23   BUN 4.6*  --   --   --  7.7 7.0   CR 0.60  --   --   --  0.49* 0.44*   ANIONGAP 12  --   --   --  14 13   REMI 9.5  --   --   --  9.2 9.3   * 298*  --   --  415* 206*   PROTTOTAL 7.2  --  7.1 6.6 6.7 7.0   ALBUMIN 3.9  --  4.2 3.9 3.7 4.0    Most Recent 3 LFT's:  Recent Labs   Lab Test 10/22/24  1432 06/07/24  1046 05/10/24  1006   AST 28 39 28   ALT 24 34 23   ALKPHOS 81 70 67   BILITOTAL 0.3 0.3 0.2    Most Recent 2 TSH and T4:No lab results found.  I reviewed the above labs today.

## 2025-04-21 ENCOUNTER — ONCOLOGY VISIT (OUTPATIENT)
Dept: ONCOLOGY | Facility: CLINIC | Age: 40
End: 2025-04-21
Attending: INTERNAL MEDICINE
Payer: COMMERCIAL

## 2025-04-21 VITALS
RESPIRATION RATE: 18 BRPM | WEIGHT: 248.9 LBS | BODY MASS INDEX: 36.76 KG/M2 | TEMPERATURE: 99.5 F | HEART RATE: 65 BPM | DIASTOLIC BLOOD PRESSURE: 92 MMHG | SYSTOLIC BLOOD PRESSURE: 141 MMHG | OXYGEN SATURATION: 92 %

## 2025-04-21 DIAGNOSIS — Z17.0 MALIGNANT NEOPLASM OF UPPER-OUTER QUADRANT OF LEFT BREAST IN FEMALE, ESTROGEN RECEPTOR POSITIVE (H): Primary | ICD-10-CM

## 2025-04-21 DIAGNOSIS — C50.412 MALIGNANT NEOPLASM OF UPPER-OUTER QUADRANT OF LEFT BREAST IN FEMALE, ESTROGEN RECEPTOR POSITIVE (H): Primary | ICD-10-CM

## 2025-04-21 DIAGNOSIS — C43.71: ICD-10-CM

## 2025-04-21 PROCEDURE — G0463 HOSPITAL OUTPT CLINIC VISIT: HCPCS | Performed by: INTERNAL MEDICINE

## 2025-04-21 ASSESSMENT — PAIN SCALES - GENERAL: PAINLEVEL_OUTOF10: MODERATE PAIN (5)

## 2025-04-21 NOTE — LETTER
4/21/2025      Eliezer Izquierdo  [address] [facility] [address]      Dear Colleague,    Thank you for referring your patient, Eliezer Izquierdo, to the [facility]. Please see a copy of my visit note below.        [facility] Medical Oncology Note  Date of visit: Apr 21, 2025    Assessment:     Stage IB Luminal A invasive breast cancer, status post neoadjuvant chemotherapy followed by lumpectomy and sentinel lymph node biopsies.  While there was obvious response by exam, pathology did not show any significant response to neoadjuvant treatment, confirming that this is a Luminal A breast cancer.  Her KI-67 was 24%, and so neoadjuvant chemotherapy was a reasonable strategy here.  And with the multiple nodes positive, the recommendation would have been for adjuvant chemotherapy.  This has been already been completed.    Clearly adjuvant RT is recommended in this setting. Eliezer refused.  Because of her persistent description of diffuse pain, we ordered studies to rule out distant metastasis as an etiology. Her PET showed a slightly enlarged left axillary node, and equivocal solitary lesions in T5 and the left lobe for the liver. But subsequent MRIs showed no lesion in the thoracic spine, and no suspicious hepatic lesions.   Currently on OFS and on anstrozole with a little better tolerance. [place] of care is to offer ribociclib based on the MONY trial. I would usually wait until she has received radiotherapy.  Given her poor tolerance of all of her treatments, I really doubt this is a medication that she will stay on.  And in terms of getting a BSO, she would rather stay on goserelin.  1 cm hyperpigmented macule in the suprapubic area.  It does not look obviously like a seborrheic keratosis.  This really does need assessment ASAP since the patient says a look just like her melanoma.  To me it is not obvious.  The size is certainly concerning and the borders are somewhat  indistinct.  I will refer her onto dermatology for further management including a biopsy.  Significant side effects of treatment in the context of chronic pain requiring narcotics.  I am so happy for the involvement of palliative care.  Ongoing smoking.  She has no plans on quitting despite my encouragement.  It is bad for you.  It has been in all of the newspapers.      Plan:     ASAP referral to dermatology for further assessment and probable biopsy of her suprapubic hyperpigmented lesion.  Continue anastrozole  Continue OFS with goserelin, next scheduled for mid May.  Today's appointment can serve as the provider visit prior to that.  Return to clinic with me in 3 months for another virtual visit..   Continue follow-up with Palliative care    The longitudinal plan of care for the diagnosis(es)/condition(s) as documented were addressed during this visit. Due to the added complexity in care, I will continue to support Eliezer in the subsequent management and with ongoing continuity of care.      30 minutes spent on the date of the encounter doing chart review, review of test results, interpretation of tests, patient visit, and documentation.      Zachary Nolan MD, MSc  Associate Professor of Medicine  [facility] [employer]  [facility]  [address] [place] [address]  [phone]    __________________________________________________________________    DIAGNOSES     Pathologic prognostic stage IB ([id number](sn)) Jeniffer grade 2 invasive ductal breast cancer, diagnosed definitively at lumpectomy and sentinel lymph node biopsy 7/24/2024.  Kathy had a 2.2 cm moderately differentiated tumor with 4 out of 5 sentinel nodes positive for involvement, 1 of which showed a 2 mm area of extracapsular extension.  The tumor was 91 to 100% strongly positive for both ER and AR.  It was negative for HER2 amplification by FISH.  There is no obvious tumor response to neoadjuvant  "treatment in both the breast and the nodes.  Eliezer presented with  a clinical T2N1 invasive breast cancer diagnosed at breast biopsy 2/9/2024. Eliezer palpated a left breast lump. Mammogram and US showed a 2.5 x 2.5 x 2.0 cm spiculated mass at 2:00 position.   Biopsy showed a Jeniffer grade I IDC, 97% strongly positive for ER, 99% strongly positive for CO, and negative for HER2 by FISH (IHC 2+). KI-67 is 24%. Mammaprint was high risk Luminal B. Left axillary US that showed three abnormal axillary nodes, biopsy positive for IDC. Biopsy showed METASTATIC BREAST CARCINOMA, almost entirely effacing lymph node, at least 11 mm in linear extent.  My first exam prior to neoadjuvant therapy showed a palpable deep left breast mass in the 2 o'clock position measuring roughly 4 cm x 4 cm.   Chronic pain with history of opiate prescriptions from multiple providers. She is currently seeing palliative care (Dr. Kylie Rodriguez), and tried buprenorphine, started on 4/11/2024, but reacted to the adhesive.  Then didn't tolerate fentanyl.  Current recommendation is suboxone.   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  But it was negative for everything else.  History of melanoma removed from the right foot.  I do not have any of the pathology reports to understand this any further.        History of Present Illness/Therapy to dte:     Neoadjuvant ddAC initiated 3/15/2024 through 4/26/2024.   Weekly paclitaxel 5/10/2026-.6/14/2026.  It was then discontinued due to crippling side effects.   Lumpectomy and sentinel lymph node biopsy, 7/24/2024.    Eliezer refused recommended adjuvant RT.  Goserelin 10.8 mg initiated 9/12/2024. Last given 2/28/2025.  Letrozole 10/10-22/24. This was held due to nausea and vomiting. Anastrozole was substituted 10/22/2024.    Interval History     Eliezer is back with Saul in the waiting room.  She is very concerned about a lesion in the skin above " "her pubic area.  She has always had a small hyperpigmented macule in the area, but now this has grown.  She thinks it looks a lot like the melanoma that she had previously.  She has a referral to dermatology but it has not been placed yet.  She is \"100% sure\" that it is melanoma.  Still has her usual aches and pains.  She is still following palliative care who prescribed her pain medications.  Remains on anastrozole.  Remains good with her decision not to do postmastectomy radiotherapy.  She still smoking.  Says she is on Chantix \"for you\".  Still very fatigued.    Past Medical History:   Melanoma of right foot???     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  She thought about being a , but then got pregnant with her son Homero.  She had a lot of different for jobs.  She spent some time in [place] where her mom and other family member lives.  She then came back to [place].  She has a fiancé of 6 years named Saul and they currently live in [place], [place].  She is an avid reader, and likes romance novels, but really anything.  She says she read 250 books last year.  She does smoke and has no plans on quitting.  She is currently on a low-carb diet.    Family History:     Family History   Problem Relation Age of Onset     Breast Cancer Mother      Genitourinary Problems Mother         renal disease - minimal change, " sponge kidney     Cancer Paternal Grandmother         GYN cancer     Cancer Paternal Grandfather         Stomach            Medications:     Current Outpatient Medications   Medication Sig Dispense Refill     acetaminophen (TYLENOL) 500 MG tablet Take 2 tablets (1,000 mg) by mouth every 8 hours as needed for mild pain       albuterol (PROAIR HFA/PROVENTIL HFA/VENTOLIN HFA) 108 (90 Base) MCG/ACT inhaler Inhale 2 puffs into the lungs every 4 hours as needed       amLODIPine (NORVASC) 10 MG tablet Take 10 mg by mouth at bedtime       anastrozole (ARIMIDEX) 1 MG tablet Take 1 tablet (1 mg) by mouth daily. 30 tablet 11     atorvastatin (LIPITOR) 10 MG tablet Take 10 mg by mouth at bedtime       Blood Glucose Monitoring Suppl (ONETOUCH VERIO FLEX SYSTEM) w/Device KIT        buprenorphine HCl-naloxone HCl (SUBOXONE) 8-2 MG per film Place 1 Film under the tongue every 6 hours. 120 Film 0     cetirizine (ZYRTEC) 10 MG tablet Take 5 mg by mouth as needed       citalopram (CELEXA) 20 MG tablet Take 1 tablet (20 mg) by mouth daily.       EPINEPHrine (ANY BX GENERIC EQUIV) 0.3 MG/0.3ML injection 2-pack        esomeprazole (NEXIUM) 20 MG DR capsule Take 20 mg by mouth as needed       fluticasone-salmeterol (ADVAIR HFA) 115-21 MCG/ACT inhaler Inhale 2 puffs into the lungs 2 times daily       hydrochlorothiazide (HYDRODIURIL) 25 MG tablet Take 25 mg by mouth as needed       HYDROcodone-acetaminophen (NORCO)  MG per tablet Take 0.5-1 tablets by mouth every 4 hours as needed for severe pain.       HYDROmorphone (DILAUDID) 4 MG tablet Take 0.5-1 tablets (2-4 mg) by mouth every 6 hours as needed for severe pain. 112 tablet 0     ibuprofen (ADVIL/MOTRIN) 200 MG tablet Take 200 mg by mouth       insulin glargine (LANTUS PEN) 100 UNIT/ML pen Inject 35 Units subcutaneously at bedtime.       JANUVIA 100 MG tablet Take 100 mg by mouth at bedtime       LORazepam (ATIVAN) 1 MG tablet Take 0.5-1 tablets (0.5-1 mg) by mouth every 6 hours  as needed for anxiety, nausea or sleep. 100 tablet 3     methocarbamol (ROBAXIN) 500 MG tablet Take 3 tablets (1,500 mg) by mouth 3 times daily as needed for muscle spasms. 270 tablet 1     naloxone (NARCAN) 4 MG/0.1ML nasal spray Spray 4 mg into one nostril alternating nostrils as needed for opioid reversal every 2-3 minutes until assistance arrives       OLANZapine (ZYPREXA) 2.5 MG tablet Take 1-2 tablets (2.5-5 mg) by mouth 3 times daily as needed (Nausea or anxiety). 150 tablet 2     ONETOUCH VERIO IQ test strip        prochlorperazine (COMPAZINE) 10 MG tablet Take 1 tablet (10 mg) by mouth every 6 hours as needed for nausea or vomiting. 30 tablet 1     Prochlorperazine Maleate (COMPAZINE PO) Take 10 mg by mouth 3 times daily as needed for nausea       TRUE COMFORT PRO PEN NEEDLES 31G X 5 MM miscellaneous                 Physical Exam:   not currently breastfeeding.    Constitutional: WDWN female in NAD, pleasant and appropriate. Smells of tobacco  HEENT:  NC/AT, no icterus, OP clear, MMM  Skin: There is a 1 cm brown macule in the suprapubic area.  The borders are minimally irregular.  The color itself is pretty homogeneous.  It is not palpable.  There is no ulceration.  Lungs: Bilateral wheezes no w/r/r, nonlabored breathing  Cardiovascular: RRR, S1, S2, no m/r/g  Abdomen: +BS, soft, nontender, nondistended, no organomegaly nor masses  MSK/Extremities: Warm, well perfused. No edema  LN: no cervical, supraclavicular, axillary, nor inguinal lymphadenopathy  Neurologic: alert, answering questions appropriately, moving all extremities spontaneously. CN 2-12 grossly intact.  Psych: appropriate affect    Data:     CT/PET 9/13/2024  IMPRESSION:      1. Postoperative changes of left breast lumpectomy and left axillary  farhan dissection with presumed postprocedural inflammation and seroma.  Mildly FDG avid probable 1.2 cm left axillary node with adjacent  inflammatory stranding, concerning for possible residual  farhan  metastasis.      2. Indeterminate mildly hypermetabolic foci most pronounced within the  left hepatic lobe without CT correlate. Recommend further evaluation  with liver MRI with contrast to exclude hepatic metastases.     3. Mildly FDG avid subtle sclerotic focus within the T5 vertebral  body. Consider further evaluation with thoracic spine MRI with  contrast to exclude osseous metastasis.      MRI THRACIS SPINE 10/20/2024  IMPRESSION: No evidence of metastatic disease in the thoracic spine on  this noncontrast MRI. Specifically, there is no lesion in the T5  vertebral body.    PATHOLOGY 7/24/2024  B. LEFT BREAST, MASS, SEED-LOCALIZED SEGMENTAL MASTECTOMY:  -INVASIVE DUCTAL CARCINOMA, [place] grade 2, 22 mm in greatest dimension.  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  -Margins are negative; closest margins to invasive carcinoma are posterior at 4 mm and superior at 8 mm; all other margins are at >10 mm; closest uninvolved margin to DCIS is posterior at 4 mm; all other margins are >10 mm from DCIS.  -Calcifications associated with DCIS and invasive carcinoma.  -AJCC pathologic staging is ypT2 N2a(sn).  -See synoptic report.     C.  SENTINEL LYMPH NODE, LEFT AXILLARY #1 WITH RFID, EXCISION:  -METASTATIC BREAST CARCINOMA to three of three lymph nodes, 18 mm in greatest dimension, with 2 mm extranodal extension (3/3).  -Biopsy site changes.     D.  SENTINEL LYMPH NODE, LEFT AXILLARY #2, EXCISION:  -METASTATIC BREAST CARCINOMA to one lymph node, 5 mm in greatest dimension, with no extranodal extension (1/1).     E.  SENTINEL LYMPH NODE, LEFT AXILLARY #3, EXCISION:  -One lymph node, negative for malignancy (0/1).          REGIONAL LYMPH NODES   Regional Lymph Node Status  Tumor present in regional lymph node(s)   Number of Lymph Nodes with Macrometastases  3   Number of Lymph Nodes with  Micrometastases  1   Size of Largest [place] Metastatic Deposit  18 mm   Extranodal Extension  Present, 2 mm or less   Total Number of Lymph Nodes Examined (sentinel and non-sentinel)  5   Number of [place] Nodes Examined  5   pTNM CLASSIFICATION (AJCC 8th Edition)   Reporting of pT, pN, and (when applicable) pM categories is based on information available to the pathologist at the time the report is issued. As per the AJCC (Chapter 1, 8th Ed.) it is the managing physician s responsibility to establish the final pathologic stage based upon all pertinent information, including but potentially not limited to this pathology report.   Modified Classification  y   pT Category  pT2   pN Category  pN2a   N Suffix  (sn)   SPECIAL STUDIES        Estrogen Receptor (ER) Status  Positive (greater than 10% of cells demonstrate nuclear positivity)   Percentage of Cells with Nuclear Positivity  >95% %        Progesterone Receptor (PgR) Status  Positive   Percentage of Cells with Nuclear Positivity  100 %        HER2 (by immunohistochemistry)  Equivocal (Score 2+)   Percentage of Cells with Uniform Intense Complete Membrane Staining  Cannot be determined        Ki-67 Percentage of Positive Nuclei  25 %                         Addendum   B. LEFT BREAST, SEED-LOCALIZED SEGMENTAL MASTECTOMY:  -Invasive carcinoma is HER2 negative (score 1+) by immunohistochemistry (performed on this specimen, see first biomarker reporting template below)     D. LYMPH NODE, LEFT AXILLARY, SENTINEL #2, EXCISION:  -Metastatic carcinoma is HER2 equivocal (score 2+) by immunohistochemistry (performed on this specimen, see second biomarker reporting template below)  -HER2 FISH results will be reported separately by cytogenetics               Left Breast ultrasound 6/24 (compared to 5/10) and personally reviewed by me.    FINDINGS: At the 2 o'clock position, 10 cm from the nipple, there is a  heterogenous hypoechoic shadowing mass measuring 14 x 13 x 9 mm,  which has mildly decreased in size, most recently measuring 20 x 19 x 18 mm.          Most Recent 3 CBC's:  Recent Labs   Lab Test 10/22/24  1432 07/23/24  1214 06/21/24  0954 06/14/24  1028   WBC 8.3 8.2 7.3 8.6   HGB 16.1* 15.4 11.8 13.0   MCV 90 94 99 99    337 342 336   ANEUTAUTO 5.0  --  4.9 6.2     Most Recent 3 BMP's:  Recent Labs   Lab Test 10/22/24  1432 07/24/24  0606 06/07/24  1046 05/10/24  1006 04/26/24  1134 04/12/24  0734     --   --   --  136 138   POTASSIUM 4.1  --   --   --  3.9 3.5   CHLORIDE 99  --   --   --  98 102   CO2 24  --   --   --  24 23   BUN 4.6*  --   --   --  7.7 7.0   CR 0.60  --   --   --  0.49* 0.44*   ANIONGAP 12  --   --   --  14 13   REMI 9.5  --   --   --  9.2 9.3   * 298*  --   --  415* 206*   PROTTOTAL 7.2  --  7.1 6.6 6.7 7.0   ALBUMIN 3.9  --  4.2 3.9 3.7 4.0    Most Recent 3 LFT's:  Recent Labs   Lab Test 10/22/24  1432 06/07/24  1046 05/10/24  1006   AST 28 39 28   ALT 24 34 23   ALKPHOS 81 70 67   BILITOTAL 0.3 0.3 0.2    Most Recent 2 TSH and T4:No lab results found.   I reviewed the above labs today.     Other Data     Most Recent 3 CBC's:  Recent Labs   Lab Test 10/22/24  1432 07/23/24  1214 06/21/24  0954 06/14/24  1028   WBC 8.3 8.2 7.3 8.6   HGB 16.1* 15.4 11.8 13.0   MCV 90 94 99 99    337 342 336   ANEUTAUTO 5.0  --  4.9 6.2     Most Recent 3 BMP's:  Recent Labs   Lab Test 10/22/24  1432 07/24/24  0606 06/07/24  1046 05/10/24  1006 04/26/24  1134 04/12/24  0734     --   --   --  136 138   POTASSIUM 4.1  --   --   --  3.9 3.5   CHLORIDE 99  --   --   --  98 102   CO2 24  --   --   --  24 23   BUN 4.6*  --   --   --  7.7 7.0   CR 0.60  --   --   --  0.49* 0.44*   ANIONGAP 12  --   --   --  14 13   REMI 9.5  --   --   --  9.2 9.3   * 298*  --   --  415* 206*   PROTTOTAL 7.2  --  7.1 6.6 6.7 7.0   ALBUMIN 3.9  --  4.2 3.9 3.7 4.0    Most Recent 3 LFT's:  Recent Labs   Lab Test 10/22/24  1432 06/07/24  1046 05/10/24  1006   AST 28  39 28   ALT 24 34 23   ALKPHOS 81 70 67   BILITOTAL 0.3 0.3 0.2    Most Recent 2 TSH and T4:No lab results found.  I reviewed the above labs today.              Again, thank you for allowing me to participate in the care of your patient.        Sincerely,        Zachary Nolan MD    Electronically signed

## 2025-04-21 NOTE — NURSING NOTE
"Oncology Rooming Note    April 21, 2025 4:06 PM   Eliezer Izquierdo is a 40 year old female who presents for:    Chief Complaint   Patient presents with    Oncology Clinic Visit     Malignant Neoplasm of Left Breast     Initial Vitals: BP (!) 141/92 (BP Location: Left arm, Patient Position: Sitting, Cuff Size: Adult Large)   Pulse 65   Temp 99.5  F (37.5  C) (Oral)   Resp 18   Wt 112.9 kg (248 lb 14.4 oz)   SpO2 92%   BMI 36.76 kg/m   Estimated body mass index is 36.76 kg/m  as calculated from the following:    Height as of 3/18/25: 1.753 m (5' 9\").    Weight as of this encounter: 112.9 kg (248 lb 14.4 oz). Body surface area is 2.34 meters squared.  Moderate Pain (5) Comment: Data Unavailable   No LMP recorded. Patient has had a hysterectomy.  Allergies reviewed: Yes  Medications reviewed: Yes    Medications: Medication refills not needed today.  Pharmacy name entered into EPIC:    [facility] - [place], [place] - [address] [place], [place] - [address] [facility] [id number]  [facility] [place], [place] - [address] [place] [address] PHARMACY [place], [place] - [address] [place] [address] PHARMACY [facility] - [facility] [address]    Frailty Screening:   Is the patient here for a new oncology consult visit in cancer care? 2. No    PHQ9:  Did this patient require a PHQ9?: Yes   If the patient required a PHQ9 assessment, did the results require a follow up with the Provider/Nurse?: No      Clinical concerns: Breast pain/R sided chest pain.       Franchesca Oneal LPN  4/21/2025              "

## 2025-04-28 ENCOUNTER — TELEPHONE (OUTPATIENT)
Dept: DERMATOLOGY | Facility: CLINIC | Age: 40
End: 2025-04-28
Payer: COMMERCIAL

## 2025-04-28 ENCOUNTER — TELEPHONE (OUTPATIENT)
Dept: PALLIATIVE CARE | Facility: CLINIC | Age: 40
End: 2025-04-28
Payer: COMMERCIAL

## 2025-04-28 NOTE — TELEPHONE ENCOUNTER
This encounter is being sent to inform the clinic that this patient has a referral from Zachary Nolan for the diagnoses of Malignant neoplasm of upper-outer quadrant of left breast in female, estrogen re... and Malignant melanoma of plantar aspect of right foot (H) [C43.71] and has requested that this patient be seen within Priority: 1-2 Weeks.  Based on the availability of our provider(s), we are unable to accommodate this request.    Were all sites offered this patient?  Yes    Does scheduling algorithm request to schedule next available?  Patient has been scheduled for the first available opening with Dr. Mandeep Hughes on 12/02/25.  We have informed the patient that the clinic will review their referral and reach out if a sooner appointment is medically necessary.

## 2025-04-28 NOTE — TELEPHONE ENCOUNTER
Called patient- concerning mole on pubic bone. Has changed in size  Appointment scheduled as an add on for a spot only check.    Thank you,    Jessa WALKER RN BSN  [facility]- [phone]
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
## 2025-04-30 ENCOUNTER — VIRTUAL VISIT (OUTPATIENT)
Dept: PALLIATIVE CARE | Facility: CLINIC | Age: 40
End: 2025-04-30
Attending: FAMILY MEDICINE
Payer: COMMERCIAL

## 2025-04-30 VITALS — HEIGHT: 69 IN | BODY MASS INDEX: 34.07 KG/M2 | WEIGHT: 230 LBS

## 2025-04-30 DIAGNOSIS — N64.4 BREAST PAIN: ICD-10-CM

## 2025-04-30 DIAGNOSIS — G89.18 ACUTE POST-OPERATIVE PAIN: Primary | ICD-10-CM

## 2025-04-30 DIAGNOSIS — S32.012D OPEN UNSTABLE BURST FRACTURE OF FIRST LUMBAR VERTEBRA WITH ROUTINE HEALING, SUBSEQUENT ENCOUNTER: ICD-10-CM

## 2025-04-30 DIAGNOSIS — G89.3 CANCER RELATED PAIN: ICD-10-CM

## 2025-04-30 DIAGNOSIS — G89.3 CANCER RELATED PAIN: Primary | ICD-10-CM

## 2025-04-30 DIAGNOSIS — Z17.0 MALIGNANT NEOPLASM OF UPPER-OUTER QUADRANT OF LEFT BREAST IN FEMALE, ESTROGEN RECEPTOR POSITIVE (H): ICD-10-CM

## 2025-04-30 DIAGNOSIS — F11.90 OPIOID USE DISORDER: ICD-10-CM

## 2025-04-30 DIAGNOSIS — C50.412 MALIGNANT NEOPLASM OF UPPER-OUTER QUADRANT OF LEFT BREAST IN FEMALE, ESTROGEN RECEPTOR POSITIVE (H): ICD-10-CM

## 2025-04-30 PROCEDURE — 1125F AMNT PAIN NOTED PAIN PRSNT: CPT | Mod: 95 | Performed by: FAMILY MEDICINE

## 2025-04-30 PROCEDURE — G2211 COMPLEX E/M VISIT ADD ON: HCPCS | Performed by: FAMILY MEDICINE

## 2025-04-30 PROCEDURE — 98007 SYNCH AUDIO-VIDEO EST HI 40: CPT | Performed by: FAMILY MEDICINE

## 2025-04-30 RX ORDER — HYDROCODONE BITARTRATE AND ACETAMINOPHEN 10; 325 MG/1; MG/1
.5-1 TABLET ORAL EVERY 4 HOURS PRN
Qty: 84 TABLET | Refills: 0 | Status: SHIPPED | OUTPATIENT
Start: 2025-04-30

## 2025-04-30 RX ORDER — BUPRENORPHINE AND NALOXONE 8; 2 MG/1; MG/1
1 FILM, SOLUBLE BUCCAL; SUBLINGUAL EVERY 6 HOURS
Qty: 120 FILM | Refills: 0 | Status: SHIPPED | OUTPATIENT
Start: 2025-05-06

## 2025-04-30 ASSESSMENT — PAIN SCALES - GENERAL: PAINLEVEL_OUTOF10: SEVERE PAIN (9)
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
## 2025-04-30 NOTE — NURSING NOTE
Current patient location: [address] [place] [address]    Is the patient currently in the state of [place]? NO    Visit mode: VIDEO    If the visit is dropped, the patient can be reconnected by:VIDEO VISIT: Text to cell phone:   Telephone Information:   Mobile [phone]       Will anyone else be joining the visit? NO  (If patient encounters technical issues they should call [phone]    Are changes needed to the allergy or medication list? No    Are refills needed on medications prescribed by this physician? NO    Rooming Documentation:  Questionnaire(s) not done per department protocol    Reason for visit: WILI VILLAGOMEZ

## 2025-05-07 DIAGNOSIS — C50.412 MALIGNANT NEOPLASM OF UPPER-OUTER QUADRANT OF LEFT BREAST IN FEMALE, ESTROGEN RECEPTOR POSITIVE (H): ICD-10-CM

## 2025-05-07 DIAGNOSIS — S32.012D OPEN UNSTABLE BURST FRACTURE OF FIRST LUMBAR VERTEBRA WITH ROUTINE HEALING, SUBSEQUENT ENCOUNTER: ICD-10-CM

## 2025-05-07 DIAGNOSIS — G89.18 ACUTE POST-OPERATIVE PAIN: ICD-10-CM

## 2025-05-07 DIAGNOSIS — N64.4 BREAST PAIN: ICD-10-CM

## 2025-05-07 DIAGNOSIS — G89.3 CANCER RELATED PAIN: ICD-10-CM

## 2025-05-07 DIAGNOSIS — Z17.0 MALIGNANT NEOPLASM OF UPPER-OUTER QUADRANT OF LEFT BREAST IN FEMALE, ESTROGEN RECEPTOR POSITIVE (H): ICD-10-CM

## 2025-05-07 RX ORDER — HYDROMORPHONE HYDROCHLORIDE 4 MG/1
2-4 TABLET ORAL EVERY 6 HOURS PRN
Qty: 56 TABLET | Refills: 0 | Status: SHIPPED | OUTPATIENT
Start: 2025-05-12

## 2025-05-07 RX ORDER — HYDROCODONE BITARTRATE AND ACETAMINOPHEN 10; 325 MG/1; MG/1
.5-1 TABLET ORAL EVERY 4 HOURS PRN
Qty: 84 TABLET | Refills: 0 | Status: CANCELLED | OUTPATIENT
Start: 2025-05-07

## 2025-05-07 NOTE — TELEPHONE ENCOUNTER
Received telephone call from patient requesting refill of hydromorphone.     Last refill: 4/15/25  Last office visit: 4/30/25  Scheduled for follow up 6/18/25     Will route request to MD/ for review.     Reviewed [place]  Report.

## 2025-05-12 ENCOUNTER — MYC REFILL (OUTPATIENT)
Dept: PALLIATIVE CARE | Facility: CLINIC | Age: 40
End: 2025-05-12
Payer: COMMERCIAL

## 2025-05-12 DIAGNOSIS — C50.412 MALIGNANT NEOPLASM OF UPPER-OUTER QUADRANT OF LEFT BREAST IN FEMALE, ESTROGEN RECEPTOR POSITIVE (H): ICD-10-CM

## 2025-05-12 DIAGNOSIS — G89.3 CANCER RELATED PAIN: ICD-10-CM

## 2025-05-12 DIAGNOSIS — Z17.0 MALIGNANT NEOPLASM OF UPPER-OUTER QUADRANT OF LEFT BREAST IN FEMALE, ESTROGEN RECEPTOR POSITIVE (H): ICD-10-CM

## 2025-05-12 DIAGNOSIS — N64.4 BREAST PAIN: ICD-10-CM

## 2025-05-12 RX ORDER — METHOCARBAMOL 500 MG/1
1500 TABLET, FILM COATED ORAL 3 TIMES DAILY PRN
Qty: 270 TABLET | Refills: 1 | Status: SHIPPED | OUTPATIENT
Start: 2025-05-12

## 2025-05-12 NOTE — TELEPHONE ENCOUNTER
Received [employer] message from patient requesting refill of methocarbamol.     Last office visit: 4/30/25  Scheduled for follow up 6/18/25     Will route request to MD/ for review.

## 2025-05-16 ENCOUNTER — MYC REFILL (OUTPATIENT)
Dept: PALLIATIVE CARE | Facility: CLINIC | Age: 40
End: 2025-05-16
Payer: COMMERCIAL

## 2025-05-16 DIAGNOSIS — G89.3 CANCER RELATED PAIN: ICD-10-CM

## 2025-05-16 DIAGNOSIS — N64.4 BREAST PAIN: ICD-10-CM

## 2025-05-16 DIAGNOSIS — F11.90 OPIOID USE DISORDER: ICD-10-CM

## 2025-05-16 RX ORDER — BUPRENORPHINE AND NALOXONE 8; 2 MG/1; MG/1
1 FILM, SOLUBLE BUCCAL; SUBLINGUAL EVERY 6 HOURS
Qty: 120 FILM | Refills: 0 | Status: CANCELLED | OUTPATIENT
Start: 2025-05-16

## 2025-05-19 ENCOUNTER — VIRTUAL VISIT (OUTPATIENT)
Dept: PALLIATIVE CARE | Facility: CLINIC | Age: 40
End: 2025-05-19
Attending: FAMILY MEDICINE
Payer: COMMERCIAL

## 2025-05-19 VITALS — HEIGHT: 69 IN | WEIGHT: 227 LBS | BODY MASS INDEX: 33.62 KG/M2

## 2025-05-19 DIAGNOSIS — C50.412 MALIGNANT NEOPLASM OF UPPER-OUTER QUADRANT OF LEFT BREAST IN FEMALE, ESTROGEN RECEPTOR POSITIVE (H): ICD-10-CM

## 2025-05-19 DIAGNOSIS — Z51.5 ENCOUNTER FOR PALLIATIVE CARE: Primary | ICD-10-CM

## 2025-05-19 DIAGNOSIS — C43.71: ICD-10-CM

## 2025-05-19 DIAGNOSIS — Z17.0 MALIGNANT NEOPLASM OF UPPER-OUTER QUADRANT OF LEFT BREAST IN FEMALE, ESTROGEN RECEPTOR POSITIVE (H): Primary | ICD-10-CM

## 2025-05-19 DIAGNOSIS — G89.18 ACUTE POST-OPERATIVE PAIN: ICD-10-CM

## 2025-05-19 DIAGNOSIS — Z17.0 MALIGNANT NEOPLASM OF UPPER-OUTER QUADRANT OF LEFT BREAST IN FEMALE, ESTROGEN RECEPTOR POSITIVE (H): ICD-10-CM

## 2025-05-19 DIAGNOSIS — G89.3 CANCER RELATED PAIN: ICD-10-CM

## 2025-05-19 DIAGNOSIS — F11.90 OPIOID USE DISORDER: ICD-10-CM

## 2025-05-19 DIAGNOSIS — C50.412 MALIGNANT NEOPLASM OF UPPER-OUTER QUADRANT OF LEFT BREAST IN FEMALE, ESTROGEN RECEPTOR POSITIVE (H): Primary | ICD-10-CM

## 2025-05-19 DIAGNOSIS — Z79.891 ENCOUNTER FOR LONG-TERM USE OF OPIATE ANALGESIC: ICD-10-CM

## 2025-05-19 PROCEDURE — G2211 COMPLEX E/M VISIT ADD ON: HCPCS | Performed by: FAMILY MEDICINE

## 2025-05-19 PROCEDURE — 98007 SYNCH AUDIO-VIDEO EST HI 40: CPT | Performed by: FAMILY MEDICINE

## 2025-05-19 PROCEDURE — 1125F AMNT PAIN NOTED PAIN PRSNT: CPT | Mod: 95 | Performed by: FAMILY MEDICINE

## 2025-05-19 PROCEDURE — 99417 PROLNG OP E/M EACH 15 MIN: CPT | Performed by: FAMILY MEDICINE

## 2025-05-19 RX ORDER — MORPHINE SULFATE 15 MG/1
15 TABLET ORAL EVERY 6 HOURS PRN
Qty: 28 TABLET | Refills: 0 | Status: SHIPPED | OUTPATIENT
Start: 2025-05-19 | End: 2025-05-22

## 2025-05-19 RX ORDER — BUPRENORPHINE HYDROCHLORIDE AND NALOXONE HYDROCHLORIDE DIHYDRATE 8; 2 MG/1; MG/1
1 TABLET SUBLINGUAL 4 TIMES DAILY
Qty: 56 TABLET | Refills: 5 | Status: SHIPPED | OUTPATIENT
Start: 2025-05-19

## 2025-05-19 ASSESSMENT — PAIN SCALES - GENERAL: PAINLEVEL_OUTOF10: SEVERE PAIN (10)

## 2025-05-19 NOTE — NURSING NOTE
Current patient location: [address] [place] [id number]    Is the patient currently in the state of [place]? NO    Visit mode: VIDEO    If the visit is dropped, the patient can be reconnected by:VIDEO VISIT: Text to cell phone:   Telephone Information:   Mobile [phone]       Will anyone else be joining the visit? NO  (If patient encounters technical issues they should call [phone]    Are changes needed to the allergy or medication list? Pt confirms medications and allergies are correct, echeck-in completed.     Are refills needed on medications prescribed by this physician? Discuss with provider    Rooming Documentation:  Questionnaire(s) not done per department protocol    Reason for visit: WILI VILLAGOMEZ

## 2025-05-19 NOTE — TELEPHONE ENCOUNTER
Refusing refill request for suboxone. Too early to fill.    Cecy PLUMMER BSN, RN  Palliative Care Nurse Clinician    [phone] (Direct)  [phone] (Main)  [phone] (Appointment Scheduling)
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
## 2025-05-19 NOTE — PATIENT INSTRUCTIONS
It was good to see you today, Eliezer.    Here are the things we talked about:  Stop using Dilaudid and I won't prescribe more since it doesn't seem to work.  Stay off the lyrica you already stopped  Use 2 of the ketamine troches three times a day as needed for pain--I started at a low dose and we need to increase the dose.  I sent a new prescription for #28 morphine immediate release tablets --use 1 by mouth up to four times a day as needed.  The 28 pills should last a week. I will refill this every Monday.  I also sent a new prescription for the suboxone in a sublingual tablet form.  Until it is dispensed please keep using the films.  Don't use the Tramadol.  Don't use the lorazepam as we adjust the ketamine.  Cecy will call on Thursday or Friday to see how this new plan is working. Please keep your next appointment which is in about a month.     How to get a hold of us:  For non-urgent matters, MyChart works best.    For more urgent matters, or if you prefer not to use MyChart, call our clinic nurse coordinator Cecy BREEN at [phone]    We have an on-call number for evenings and weekends. Please call this only if you are having uncontrolled symptoms or serious side effects from your medicines: [phone].     For refills, please give us a week (5 working days) notice. We don't always have providers available everyday to do refills. If you call the day you run out of your medicine, we may not be able to refill it in time, so call 5 days in advance!    Estevan Singh MD MS FAAFP CAQHPM  [facility]  Office [phone]  Fax [phone]

## 2025-05-20 ENCOUNTER — TELEPHONE (OUTPATIENT)
Dept: DERMATOLOGY | Facility: CLINIC | Age: 40
End: 2025-05-20
Payer: COMMERCIAL

## 2025-05-20 DIAGNOSIS — G89.3 CANCER RELATED PAIN: ICD-10-CM

## 2025-05-20 DIAGNOSIS — S32.012D OPEN UNSTABLE BURST FRACTURE OF FIRST LUMBAR VERTEBRA WITH ROUTINE HEALING, SUBSEQUENT ENCOUNTER: ICD-10-CM

## 2025-05-20 DIAGNOSIS — N64.4 BREAST PAIN: ICD-10-CM

## 2025-05-20 DIAGNOSIS — C50.412 MALIGNANT NEOPLASM OF UPPER-OUTER QUADRANT OF LEFT BREAST IN FEMALE, ESTROGEN RECEPTOR POSITIVE (H): Primary | ICD-10-CM

## 2025-05-20 DIAGNOSIS — Z17.0 MALIGNANT NEOPLASM OF UPPER-OUTER QUADRANT OF LEFT BREAST IN FEMALE, ESTROGEN RECEPTOR POSITIVE (H): Primary | ICD-10-CM

## 2025-05-20 NOTE — TELEPHONE ENCOUNTER
[facility] Call Center    Phone Message    May a detailed message be left on voicemail: yes     Reason for Call: Other: pt is returning call from the priority referral. Pt missed call from Lula in clinic. Please call pt back to discuss. Thanks     Action Taken: Message routed to:  Other: [place] derm    Travel Screening: Not Applicable     Date of Service:

## 2025-05-20 NOTE — TELEPHONE ENCOUNTER
Received telephone call from patient requesting refill of ketamine troches. Pt was instructed to double her current dose, new dose 20 mg TID PRN. She is requesting to have a new order sent to the compounding pharmacy anticipating it'll be less expensive to pay for 20 mg troches instead of continuing with 2-10 mg troches.    Last refill: 5/2/25  Last office visit: 5/19/25  Scheduled for follow up 6/18/25     Will route request to MD/ for review.     Reviewed [place]  Report.

## 2025-05-21 NOTE — TELEPHONE ENCOUNTER
Patient scheduled for a spot check only, understands the parameters of spot check. Will keep later apt for FSE.    Jesica Farrell RN

## 2025-05-22 DIAGNOSIS — Z51.5 ENCOUNTER FOR PALLIATIVE CARE: ICD-10-CM

## 2025-05-22 DIAGNOSIS — G89.18 ACUTE POST-OPERATIVE PAIN: ICD-10-CM

## 2025-05-22 DIAGNOSIS — G89.3 CANCER RELATED PAIN: ICD-10-CM

## 2025-05-22 DIAGNOSIS — F11.90 OPIOID USE DISORDER: ICD-10-CM

## 2025-05-22 DIAGNOSIS — Z79.891 ENCOUNTER FOR LONG-TERM USE OF OPIATE ANALGESIC: ICD-10-CM

## 2025-05-22 RX ORDER — MORPHINE SULFATE 15 MG/1
15 TABLET ORAL EVERY 6 HOURS PRN
Qty: 28 TABLET | Refills: 0 | Status: SHIPPED | OUTPATIENT
Start: 2025-05-25

## 2025-05-22 RX ORDER — PAROXETINE 20 MG/1
20 TABLET, FILM COATED ORAL 2 TIMES DAILY
COMMUNITY
Start: 2025-05-22

## 2025-05-22 NOTE — TELEPHONE ENCOUNTER
Received [employer] message from patient requesting refill of morphine IR tabs.     Last refill: 5/19/25  Last office visit: 5/19/25  Scheduled for follow up 6/18/25     Will route request to MD/ for review.     Reviewed [place]  Report.

## 2025-05-28 ENCOUNTER — TELEPHONE (OUTPATIENT)
Dept: DERMATOLOGY | Facility: CLINIC | Age: 40
End: 2025-05-28
Payer: COMMERCIAL

## 2025-05-28 NOTE — TELEPHONE ENCOUNTER
Called patient- appointment scheduled with Catalina for a spot check.  Thank you,    Jessa WALKERRN BSN  [facility]- [phone]
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
## 2025-05-29 DIAGNOSIS — C50.412 MALIGNANT NEOPLASM OF UPPER-OUTER QUADRANT OF LEFT BREAST IN FEMALE, ESTROGEN RECEPTOR POSITIVE (H): Primary | ICD-10-CM

## 2025-05-29 DIAGNOSIS — Z17.0 MALIGNANT NEOPLASM OF UPPER-OUTER QUADRANT OF LEFT BREAST IN FEMALE, ESTROGEN RECEPTOR POSITIVE (H): Primary | ICD-10-CM

## 2025-06-09 DIAGNOSIS — S32.012D OPEN UNSTABLE BURST FRACTURE OF FIRST LUMBAR VERTEBRA WITH ROUTINE HEALING, SUBSEQUENT ENCOUNTER: ICD-10-CM

## 2025-06-09 DIAGNOSIS — N64.4 BREAST PAIN: ICD-10-CM

## 2025-06-09 DIAGNOSIS — G89.3 CANCER RELATED PAIN: ICD-10-CM

## 2025-06-09 NOTE — TELEPHONE ENCOUNTER
Received telephone call from patient requesting refill of ketamine. Pt calling to get the refill process going because this is mailed to her and takes several days to get.     Last refill: 5/21/25  Last office visit: 5/19/25  Scheduled for follow up 6/18/25     Will route request to MD/ for review.     Reviewed [place]  Report.

## 2025-06-18 ENCOUNTER — VIRTUAL VISIT (OUTPATIENT)
Dept: PALLIATIVE CARE | Facility: CLINIC | Age: 40
End: 2025-06-18
Attending: FAMILY MEDICINE
Payer: COMMERCIAL

## 2025-06-18 VITALS — HEIGHT: 69 IN | WEIGHT: 230.6 LBS | BODY MASS INDEX: 34.16 KG/M2

## 2025-06-18 DIAGNOSIS — G89.3 CANCER RELATED PAIN: ICD-10-CM

## 2025-06-18 DIAGNOSIS — F11.90 OPIOID USE DISORDER: ICD-10-CM

## 2025-06-18 DIAGNOSIS — N64.4 BREAST PAIN: ICD-10-CM

## 2025-06-18 DIAGNOSIS — Z79.891 ENCOUNTER FOR LONG-TERM USE OF OPIATE ANALGESIC: ICD-10-CM

## 2025-06-18 DIAGNOSIS — G89.18 ACUTE POST-OPERATIVE PAIN: ICD-10-CM

## 2025-06-18 DIAGNOSIS — Z51.5 PALLIATIVE CARE PATIENT: Primary | ICD-10-CM

## 2025-06-18 DIAGNOSIS — Z51.5 ENCOUNTER FOR PALLIATIVE CARE: ICD-10-CM

## 2025-06-18 RX ORDER — MORPHINE SULFATE 100 MG/5ML
20 SOLUTION ORAL EVERY 6 HOURS PRN
Qty: 30 ML | Refills: 0 | Status: SHIPPED | OUTPATIENT
Start: 2025-06-18 | End: 2025-07-18

## 2025-06-18 ASSESSMENT — PAIN SCALES - GENERAL: PAINLEVEL_OUTOF10: SEVERE PAIN (7)

## 2025-06-18 NOTE — NURSING NOTE
Patient reviewed medications and allergies in [facility] during e-check in and said everything looked correct.          Current patient location: [address] [place] [address]    Is the patient currently in the state of [place]? NO    Visit mode: VIDEO    If the visit is dropped, the patient can be reconnected by:VIDEO VISIT: Text to cell phone:   Telephone Information:   Mobile [phone]       Will anyone else be joining the visit? NO  (If patient encounters technical issues they should call [phone]    Are changes needed to the allergy or medication list? No    Are refills needed on medications prescribed by this physician? NO    Rooming Documentation:  Questionnaire(s) completed    Reason for visit: RECHECK (Pain medications are unavailable at the pharmacy )    Sienna VILLAGOMEZ
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
## 2025-06-24 ENCOUNTER — MYC REFILL (OUTPATIENT)
Dept: PALLIATIVE CARE | Facility: CLINIC | Age: 40
End: 2025-06-24
Payer: COMMERCIAL

## 2025-06-24 DIAGNOSIS — C50.412 MALIGNANT NEOPLASM OF UPPER-OUTER QUADRANT OF LEFT BREAST IN FEMALE, ESTROGEN RECEPTOR POSITIVE (H): ICD-10-CM

## 2025-06-24 DIAGNOSIS — G89.3 CANCER RELATED PAIN: ICD-10-CM

## 2025-06-24 DIAGNOSIS — Z17.0 MALIGNANT NEOPLASM OF UPPER-OUTER QUADRANT OF LEFT BREAST IN FEMALE, ESTROGEN RECEPTOR POSITIVE (H): ICD-10-CM

## 2025-06-24 DIAGNOSIS — N64.4 BREAST PAIN: ICD-10-CM

## 2025-06-24 DIAGNOSIS — G89.18 ACUTE POST-OPERATIVE PAIN: ICD-10-CM

## 2025-06-24 DIAGNOSIS — Z79.891 ENCOUNTER FOR LONG-TERM USE OF OPIATE ANALGESIC: ICD-10-CM

## 2025-06-24 RX ORDER — MORPHINE SULFATE 100 MG/5ML
20 SOLUTION ORAL EVERY 6 HOURS PRN
Qty: 30 ML | Refills: 0 | Status: SHIPPED | OUTPATIENT
Start: 2025-06-25

## 2025-06-24 RX ORDER — METHOCARBAMOL 500 MG/1
1500 TABLET, FILM COATED ORAL 3 TIMES DAILY PRN
Qty: 270 TABLET | Refills: 1 | Status: SHIPPED | OUTPATIENT
Start: 2025-06-24

## 2025-06-24 NOTE — TELEPHONE ENCOUNTER
Received electronic request from patient requesting refill of methocarbamol.    Last office visit: 6/18/25  Scheduled for follow up per check out request     Will route request to MD/DO for review.

## 2025-06-24 NOTE — TELEPHONE ENCOUNTER
Received electronic request from patient requesting refill of morphine solution.     Last refill: 6/18/25  Last office visit: 6/18/25  Scheduled for follow up per check out request     Will route request to MD/ for review.     Reviewed [place]  Report.

## 2025-07-01 ENCOUNTER — MYC REFILL (OUTPATIENT)
Dept: PALLIATIVE CARE | Facility: CLINIC | Age: 40
End: 2025-07-01
Payer: COMMERCIAL

## 2025-07-01 DIAGNOSIS — G89.18 ACUTE POST-OPERATIVE PAIN: ICD-10-CM

## 2025-07-01 DIAGNOSIS — G89.3 CANCER RELATED PAIN: ICD-10-CM

## 2025-07-01 DIAGNOSIS — Z79.891 ENCOUNTER FOR LONG-TERM USE OF OPIATE ANALGESIC: ICD-10-CM

## 2025-07-01 DIAGNOSIS — N64.4 BREAST PAIN: ICD-10-CM

## 2025-07-01 RX ORDER — MORPHINE SULFATE 100 MG/5ML
20 SOLUTION ORAL EVERY 6 HOURS PRN
Qty: 30 ML | Refills: 0 | Status: SHIPPED | OUTPATIENT
Start: 2025-07-02

## 2025-07-01 NOTE — TELEPHONE ENCOUNTER
Received [employer] message from patient requesting refill of morphine solution.     Last refill: 6/25/25  Last office visit: 6/18/25  Scheduled for follow up per check out request     Will route request to MD/ for review.     Reviewed [place]  Report.

## 2025-07-09 DIAGNOSIS — N64.4 BREAST PAIN: ICD-10-CM

## 2025-07-09 DIAGNOSIS — C50.412 MALIGNANT NEOPLASM OF UPPER-OUTER QUADRANT OF LEFT BREAST IN FEMALE, ESTROGEN RECEPTOR POSITIVE (H): ICD-10-CM

## 2025-07-09 DIAGNOSIS — G89.18 ACUTE POST-OPERATIVE PAIN: ICD-10-CM

## 2025-07-09 DIAGNOSIS — Z79.891 ENCOUNTER FOR LONG-TERM USE OF OPIATE ANALGESIC: ICD-10-CM

## 2025-07-09 DIAGNOSIS — G89.3 CANCER RELATED PAIN: ICD-10-CM

## 2025-07-09 DIAGNOSIS — Z17.0 MALIGNANT NEOPLASM OF UPPER-OUTER QUADRANT OF LEFT BREAST IN FEMALE, ESTROGEN RECEPTOR POSITIVE (H): ICD-10-CM

## 2025-07-09 DIAGNOSIS — G89.3 CANCER RELATED PAIN: Primary | ICD-10-CM

## 2025-07-09 RX ORDER — MORPHINE SULFATE 10 MG/5ML
20 SOLUTION ORAL EVERY 6 HOURS PRN
Qty: 280 ML | Refills: 0 | Status: SHIPPED | OUTPATIENT
Start: 2025-07-09

## 2025-07-09 RX ORDER — MORPHINE SULFATE 100 MG/5ML
20 SOLUTION ORAL EVERY 6 HOURS PRN
Qty: 30 ML | Refills: 0 | Status: SHIPPED | OUTPATIENT
Start: 2025-07-09 | End: 2025-07-09

## 2025-07-09 NOTE — TELEPHONE ENCOUNTER
Received [employer] message from patient requesting refill of morphine solution.     Last refill: 7/2/25  Last office visit: 6/18/25  Scheduled for follow up pending     Will route request to MD/DO for review.     Reviewed [place]  Report.

## 2025-07-16 ENCOUNTER — VIRTUAL VISIT (OUTPATIENT)
Dept: PALLIATIVE CARE | Facility: CLINIC | Age: 40
End: 2025-07-16
Attending: FAMILY MEDICINE
Payer: COMMERCIAL

## 2025-07-16 VITALS — BODY MASS INDEX: 34.07 KG/M2 | HEIGHT: 69 IN | WEIGHT: 230 LBS

## 2025-07-16 DIAGNOSIS — Z79.891 ENCOUNTER FOR LONG-TERM USE OF OPIATE ANALGESIC: ICD-10-CM

## 2025-07-16 DIAGNOSIS — S22.009A: ICD-10-CM

## 2025-07-16 DIAGNOSIS — Z17.0 MALIGNANT NEOPLASM OF UPPER-OUTER QUADRANT OF LEFT BREAST IN FEMALE, ESTROGEN RECEPTOR POSITIVE (H): ICD-10-CM

## 2025-07-16 DIAGNOSIS — C50.412 MALIGNANT NEOPLASM OF UPPER-OUTER QUADRANT OF LEFT BREAST IN FEMALE, ESTROGEN RECEPTOR POSITIVE (H): ICD-10-CM

## 2025-07-16 DIAGNOSIS — G89.3 CANCER RELATED PAIN: ICD-10-CM

## 2025-07-16 DIAGNOSIS — Z51.5 PALLIATIVE CARE PATIENT: Primary | ICD-10-CM

## 2025-07-16 RX ORDER — HYDROCODONE BITARTRATE AND ACETAMINOPHEN 10; 325 MG/1; MG/1
1 TABLET ORAL EVERY 4 HOURS PRN
Qty: 96 TABLET | Refills: 0 | Status: SHIPPED | OUTPATIENT
Start: 2025-07-16 | End: 2025-07-16

## 2025-07-16 RX ORDER — HYDROCODONE BITARTRATE AND ACETAMINOPHEN 10; 325 MG/1; MG/1
1 TABLET ORAL EVERY 4 HOURS PRN
Qty: 96 TABLET | Refills: 0 | Status: SHIPPED | OUTPATIENT
Start: 2025-07-16

## 2025-07-16 ASSESSMENT — PAIN SCALES - GENERAL: PAINLEVEL_OUTOF10: SEVERE PAIN (8)

## 2025-07-16 NOTE — TELEPHONE ENCOUNTER
Resending norco prescription to a different pharmacy who has it in stock.    Cecy PLUMMER BSN, RN  Palliative Care Nurse Clinician    [phone] (Direct)  [phone] (Main)  [phone] (Appointment Scheduling)
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
## 2025-07-16 NOTE — PATIENT INSTRUCTIONS
It was good to see you today, Eliezer.    Here are the things we talked about:  We'll switch back to [place]. I sent a prescription for 96 tablets for the next 16 days and then we'll sync up with your every two week suboxone fills with 84 tablet fills.    We'll also think about a very slow taper of your opiates at our next visit in early September.    Someone from the team will reach out to schedule a follow up appointment in 6 weeks.     How to get a hold of us:  For non-urgent matters, MyChart works best.    For more urgent matters, or if you prefer not to use MyChart, call our clinic nurse coordinator Cecy BREEN at [phone]    We have an on-call number for evenings and weekends. Please call this only if you are having uncontrolled symptoms or serious side effects from your medicines: [phone].     For refills, please give us a week (5 working days) notice. We don't always have providers available everyday to do refills. If you call the day you run out of your medicine, we may not be able to refill it in time, so call 5 days in advance!    Estevan Singh MD MS FAAFP CAQHPM  [facility]  Office [phone]  Fax [phone]

## 2025-07-16 NOTE — NURSING NOTE
Current patient location: [address] [place] [id number]    Is the patient currently in the state of [place]? NO    Visit mode: VIDEO    If the visit is dropped, the patient can be reconnected by:VIDEO VISIT: Text to cell phone:   Telephone Information:   Mobile [phone]       Will anyone else be joining the visit? NO  (If patient encounters technical issues they should call [phone]    Are changes needed to the allergy or medication list? No    Are refills needed on medications prescribed by this physician? Discuss with provider    Rooming Documentation:  Questionnaire(s) not done per department protocol    Reason for visit: WILI VILLAGOMEZ

## 2025-07-28 ENCOUNTER — MYC REFILL (OUTPATIENT)
Dept: PALLIATIVE CARE | Facility: CLINIC | Age: 40
End: 2025-07-28
Payer: COMMERCIAL

## 2025-07-28 DIAGNOSIS — Z17.0 MALIGNANT NEOPLASM OF UPPER-OUTER QUADRANT OF LEFT BREAST IN FEMALE, ESTROGEN RECEPTOR POSITIVE (H): ICD-10-CM

## 2025-07-28 DIAGNOSIS — C50.412 MALIGNANT NEOPLASM OF UPPER-OUTER QUADRANT OF LEFT BREAST IN FEMALE, ESTROGEN RECEPTOR POSITIVE (H): ICD-10-CM

## 2025-07-28 DIAGNOSIS — G89.3 CANCER RELATED PAIN: ICD-10-CM

## 2025-07-28 DIAGNOSIS — Z79.891 ENCOUNTER FOR LONG-TERM USE OF OPIATE ANALGESIC: ICD-10-CM

## 2025-07-28 DIAGNOSIS — Z51.5 ENCOUNTER FOR PALLIATIVE CARE: ICD-10-CM

## 2025-07-28 DIAGNOSIS — G89.18 ACUTE POST-OPERATIVE PAIN: ICD-10-CM

## 2025-07-28 DIAGNOSIS — S22.009A: ICD-10-CM

## 2025-07-28 DIAGNOSIS — N64.4 BREAST PAIN: ICD-10-CM

## 2025-07-28 DIAGNOSIS — F11.90 OPIOID USE DISORDER: ICD-10-CM

## 2025-07-29 RX ORDER — HYDROCODONE BITARTRATE AND ACETAMINOPHEN 10; 325 MG/1; MG/1
1 TABLET ORAL EVERY 4 HOURS PRN
Qty: 96 TABLET | Refills: 0 | Status: SHIPPED | OUTPATIENT
Start: 2025-07-30

## 2025-07-29 RX ORDER — BUPRENORPHINE HYDROCHLORIDE AND NALOXONE HYDROCHLORIDE DIHYDRATE 8; 2 MG/1; MG/1
1 TABLET SUBLINGUAL 4 TIMES DAILY
Qty: 56 TABLET | Refills: 5 | Status: SHIPPED | OUTPATIENT
Start: 2025-08-01

## 2025-07-29 RX ORDER — METHOCARBAMOL 500 MG/1
1500 TABLET, FILM COATED ORAL 3 TIMES DAILY PRN
Qty: 270 TABLET | Refills: 1 | Status: SHIPPED | OUTPATIENT
Start: 2025-07-29

## 2025-07-29 NOTE — TELEPHONE ENCOUNTER
Received [employer] message from patient requesting refill of Suboxone and robaxin.     Last refill of suboxone: 7/18/25  Last office visit: 7/16/25  Scheduled for follow up per check out request    Will route request to MD/ for review.     Reviewed [place]  Report.

## 2025-07-29 NOTE — TELEPHONE ENCOUNTER
Received [employer] message from patient requesting refill of Norco.     Last refill: 7/16/25  Last office visit: 7/16/25  Scheduled for follow up per check out request    Will route request to MD/ for review.     Reviewed [place]  Report.

## 2025-07-31 ENCOUNTER — OFFICE VISIT (OUTPATIENT)
Dept: DERMATOLOGY | Facility: CLINIC | Age: 40
End: 2025-07-31
Payer: COMMERCIAL

## 2025-07-31 DIAGNOSIS — D48.5 NEOPLASM OF UNCERTAIN BEHAVIOR OF SKIN: Primary | ICD-10-CM

## 2025-07-31 NOTE — LETTER
7/31/2025      Eliezer Izquierdo  [address] [address] [id number]      Dear Colleague,    Thank you for referring your patient, Eliezer Izquierdo, to the [facility]. Please see a copy of my visit note below.    [facility] Note  Encounter Date: Jul 31, 2025  Office Visit     Reviewed patients past medical history and pertinent chart review prior to patients visit today.     Dermatology Problem List:  0. NUB, suprapubic area, s/p shave biopsy Jul 31, 2025     1. History of melanoma  -Left back and right foot, diagnosed and treated at an outside facility about 15 years ago, [place] depths unknown, lymph node biopsies not needed per patient    Family Hx: Mother, history of melanoma    History of breast cancer    ____________________________________________    CC: Derm Problem (Groin area, growing and changing)    HPI:  Ms. Eliezer Izquierdo is a(n) 40 year old female who presents today as a new patient due to a concern of a lesion. She is accompanied by her son. The patient notes a lesion on the suprapubic area that has been present for two years. This is has been growing in size. It is asymptomatic.     Patient is otherwise feeling well, without additional skin concerns.    Medications:  Current Outpatient Medications   Medication Sig Dispense Refill     acetaminophen (TYLENOL) 500 MG tablet Take 2 tablets (1,000 mg) by mouth every 8 hours as needed for mild pain       albuterol (PROAIR HFA/PROVENTIL HFA/VENTOLIN HFA) 108 (90 Base) MCG/ACT inhaler Inhale 2 puffs into the lungs every 4 hours as needed       amLODIPine (NORVASC) 10 MG tablet Take 10 mg by mouth at bedtime       anastrozole (ARIMIDEX) 1 MG tablet Take 1 tablet (1 mg) by mouth daily. 30 tablet 11     atorvastatin (LIPITOR) 10 MG tablet Take 10 mg by mouth at bedtime       Blood Glucose Monitoring Suppl (ONETOUCH VERIO FLEX SYSTEM) w/Device KIT        [START ON 8/1/2025]  buprenorphine-naloxone (SUBOXONE) 8-2 MG SUBL sublingual tablet Place 1 tablet under the tongue 4 times daily. 56 tablet 5     cetirizine (ZYRTEC) 10 MG tablet Take 5 mg by mouth as needed       citalopram (CELEXA) 20 MG tablet Take 1 tablet (20 mg) by mouth daily.       COMPOUND CONTAINING CONTROLLED SUBSTANCE (CMPD RX) - PHARMACY TO MIX COMPOUNDED MEDICATION Ketamine 20 mg candy, take 1 PO Q 8 hours for cancer associated pain. 90 Candy 0     EPINEPHrine (ANY BX GENERIC EQUIV) 0.3 MG/0.3ML injection 2-pack        esomeprazole (NEXIUM) 20 MG DR capsule Take 20 mg by mouth as needed       fluticasone-salmeterol (ADVAIR HFA) 115-21 MCG/ACT inhaler Inhale 2 puffs into the lungs 2 times daily       hydrochlorothiazide (HYDRODIURIL) 25 MG tablet Take 25 mg by mouth as needed       HYDROcodone-acetaminophen (NORCO)  MG per tablet Take 1 tablet by mouth every 4 hours as needed for severe pain. 96 tablet 0     ibuprofen (ADVIL/MOTRIN) 200 MG tablet Take 400 mg by mouth every 4 hours as needed.       insulin glargine (LANTUS PEN) 100 UNIT/ML pen Inject 35 Units subcutaneously at bedtime.       JANUVIA 100 MG tablet Take 100 mg by mouth at bedtime       LORazepam (ATIVAN) 1 MG tablet Take 0.5-1 tablets (0.5-1 mg) by mouth every 6 hours as needed for anxiety, nausea or sleep. 100 tablet 3     methocarbamol (ROBAXIN) 500 MG tablet Take 3 tablets (1,500 mg) by mouth 3 times daily as needed for muscle spasms. 270 tablet 1     naloxone (NARCAN) 4 MG/0.1ML nasal spray Spray 4 mg into one nostril alternating nostrils as needed for opioid reversal every 2-3 minutes until assistance arrives       OLANZapine (ZYPREXA) 2.5 MG tablet Take 1-2 tablets (2.5-5 mg) by mouth 3 times daily as needed (Nausea or anxiety). 150 tablet 2     ONETOUCH VERIO IQ test strip        PARoxetine (PAXIL) 20 MG tablet Take 1 tablet (20 mg) by mouth 2 times daily.       prochlorperazine (COMPAZINE) 10 MG tablet Take 1 tablet (10 mg) by mouth every 6  "hours as needed for nausea or vomiting. 30 tablet 1     Prochlorperazine Maleate (COMPAZINE PO) Take 10 mg by mouth 3 times daily as needed for nausea       TRUE COMFORT PRO PEN NEEDLES 31G X 5 MM miscellaneous        No current facility-administered medications for this visit.      Past Medical History:   Patient Active Problem List   Diagnosis     Malignant neoplasm of upper-outer quadrant of left breast in female, estrogen receptor positive (H)     Drug-induced androgenic alopecia     Chemotherapy induced nausea and vomiting     Carrier of high risk cancer gene mutation - MC1R increased risk variants     Past Medical History:   Diagnosis Date     Breast cancer (H)      Carrier of high risk cancer gene mutation - MC1R increased risk variants 04/22/2024    Two MC1R \"increased risk\" variants - c.451C>T and c.478C>T  [facility] 3/26/2024       Hypercholesteraemia      Irritable bowel      Malignant melanoma (H)      Migraines        ____________________________________________     Physical Exam:  Vitals: There were no vitals taken for this visit.   SKIN: The exam included groin.  - Guerra II.  -Suprapubic area, 1.3 cm brown irregular patch    - No other lesions of concern on areas examined.       _________________________________________    Assessment & Plan:   # Neoplasm of uncertain behavior:  Suprapubic area.  DDx includes atypical nevus vs melanoma. Shave biopsy today.  Procedure Note: Biopsy by shave technique  The risks and benefits of the procedure were described to the patient. These include but are not limited to bleeding, infection, scar, incomplete removal, and non-diagnostic biopsy. Verbal informed consent was obtained. The above site(s) was cleansed with an alcohol pad and injected with 1% lidocaine with epinephrine. Once anesthesia was obtained, a biopsy(ies) was performed with DermaBlade. The tissue(s) was placed in a labeled container(s) with formalin and sent to pathology. " Hemostasis was achieved with aluminum chloride. Vaseline and a bandage were applied to the wound(s). The patient tolerated the procedure well and was given post biopsy care instructions.     Follow-up:  Patient is scheduled for a full skin check on 8/21/2025    We were going to request outside records, but the patient does not recall which derm facility in [place] she used to go to.     All risks, benefits and alternatives were discussed with patient.  Patient is in agreement and understands the assessment and plan.  All questions were answered.    Catalina Parra PA-C  [facility]    CC No referring provider defined for this encounter. on close of this encounter.    Again, thank you for allowing me to participate in the care of your patient.        Sincerely,        Catalina Parra PA-C    Electronically signed

## 2025-07-31 NOTE — PROGRESS NOTES
[facility] Dermatology Note  Encounter Date: Jul 31, 2025  Office Visit     Reviewed patients past medical history and pertinent chart review prior to patients visit today.     Dermatology Problem List:  0. NUB, suprapubic area, s/p shave biopsy Jul 31, 2025     1. History of melanoma  -Left back and right foot, diagnosed and treated at an outside facility about 15 years ago, [place] depths unknown, lymph node biopsies not needed per patient    Family Hx: Mother, history of melanoma    History of breast cancer    ____________________________________________    CC: Derm Problem (Groin area, growing and changing)    HPI:  Ms. Eliezer Izquierdo is a(n) 40 year old female who presents today as a new patient due to a concern of a lesion. She is accompanied by her son. The patient notes a lesion on the suprapubic area that has been present for two years. This is has been growing in size. It is asymptomatic.     Patient is otherwise feeling well, without additional skin concerns.    Medications:  Current Outpatient Medications   Medication Sig Dispense Refill    acetaminophen (TYLENOL) 500 MG tablet Take 2 tablets (1,000 mg) by mouth every 8 hours as needed for mild pain      albuterol (PROAIR HFA/PROVENTIL HFA/VENTOLIN HFA) 108 (90 Base) MCG/ACT inhaler Inhale 2 puffs into the lungs every 4 hours as needed      amLODIPine (NORVASC) 10 MG tablet Take 10 mg by mouth at bedtime      anastrozole (ARIMIDEX) 1 MG tablet Take 1 tablet (1 mg) by mouth daily. 30 tablet 11    atorvastatin (LIPITOR) 10 MG tablet Take 10 mg by mouth at bedtime      Blood Glucose Monitoring Suppl (ONETOUCH VERIO FLEX SYSTEM) w/Device KIT       [START ON 8/1/2025] buprenorphine-naloxone (SUBOXONE) 8-2 MG SUBL sublingual tablet Place 1 tablet under the tongue 4 times daily. 56 tablet 5    cetirizine (ZYRTEC) 10 MG tablet Take 5 mg by mouth as needed      citalopram (CELEXA) 20 MG tablet Take 1 tablet (20 mg) by mouth daily.       COMPOUND CONTAINING CONTROLLED SUBSTANCE (CMPD RX) - PHARMACY TO MIX COMPOUNDED MEDICATION Ketamine 20 mg candy, take 1 PO Q 8 hours for cancer associated pain. 90 Candy 0    EPINEPHrine (ANY BX GENERIC EQUIV) 0.3 MG/0.3ML injection 2-pack       esomeprazole (NEXIUM) 20 MG DR capsule Take 20 mg by mouth as needed      fluticasone-salmeterol (ADVAIR HFA) 115-21 MCG/ACT inhaler Inhale 2 puffs into the lungs 2 times daily      hydrochlorothiazide (HYDRODIURIL) 25 MG tablet Take 25 mg by mouth as needed      HYDROcodone-acetaminophen (NORCO)  MG per tablet Take 1 tablet by mouth every 4 hours as needed for severe pain. 96 tablet 0    ibuprofen (ADVIL/MOTRIN) 200 MG tablet Take 400 mg by mouth every 4 hours as needed.      insulin glargine (LANTUS PEN) 100 UNIT/ML pen Inject 35 Units subcutaneously at bedtime.      JANUVIA 100 MG tablet Take 100 mg by mouth at bedtime      LORazepam (ATIVAN) 1 MG tablet Take 0.5-1 tablets (0.5-1 mg) by mouth every 6 hours as needed for anxiety, nausea or sleep. 100 tablet 3    methocarbamol (ROBAXIN) 500 MG tablet Take 3 tablets (1,500 mg) by mouth 3 times daily as needed for muscle spasms. 270 tablet 1    naloxone (NARCAN) 4 MG/0.1ML nasal spray Spray 4 mg into one nostril alternating nostrils as needed for opioid reversal every 2-3 minutes until assistance arrives      OLANZapine (ZYPREXA) 2.5 MG tablet Take 1-2 tablets (2.5-5 mg) by mouth 3 times daily as needed (Nausea or anxiety). 150 tablet 2    ONETOUCH VERIO IQ test strip       PARoxetine (PAXIL) 20 MG tablet Take 1 tablet (20 mg) by mouth 2 times daily.      prochlorperazine (COMPAZINE) 10 MG tablet Take 1 tablet (10 mg) by mouth every 6 hours as needed for nausea or vomiting. 30 tablet 1    Prochlorperazine Maleate (COMPAZINE PO) Take 10 mg by mouth 3 times daily as needed for nausea      TRUE COMFORT PRO PEN NEEDLES 31G X 5 MM miscellaneous        No current facility-administered medications for this visit.      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   SKIN: The exam included groin.  - Guerra II.  -Suprapubic area, 1.3 cm brown irregular patch    - No other lesions of concern on areas examined.       _________________________________________    Assessment & Plan:   # Neoplasm of uncertain behavior:  Suprapubic area.  DDx includes atypical nevus vs melanoma. Shave biopsy today.  Procedure Note: Biopsy by shave technique  The risks and benefits of the procedure were described to the patient. These include but are not limited to bleeding, infection, scar, incomplete removal, and non-diagnostic biopsy. Verbal informed consent was obtained. The above site(s) was cleansed with an alcohol pad and injected with 1% lidocaine with epinephrine. Once anesthesia was obtained, a biopsy(ies) was performed with DermaBlade. The tissue(s) was placed in a labeled container(s) with formalin and sent to pathology. Hemostasis was achieved with aluminum chloride. Vaseline and a bandage were applied to the wound(s). The patient tolerated the procedure well and was given post biopsy care instructions.     Follow-up:  Patient is scheduled for a full skin check on 8/21/2025    We were going to request outside " records, but the patient does not recall which derm facility in [place] she used to go to.     All risks, benefits and alternatives were discussed with patient.  Patient is in agreement and understands the assessment and plan.  All questions were answered.    Catalina Parra PA-C  [facility]    CC No referring provider defined for this encounter. on close of this encounter.

## 2025-07-31 NOTE — PATIENT INSTRUCTIONS
Wound Care After a Biopsy    What is a skin biopsy?  A skin biopsy allows the doctor to examine a very small piece of tissue under the microscope to determine the diagnosis and the best treatment for the skin condition. A local anesthetic (numbing medicine) is injected with a very small needle into the skin area to be tested. A small piece of skin is taken from the area. Sometimes a suture (stitch) is used.     What are the risks of a skin biopsy?  I will experience scar, bleeding, swelling, pain, crusting and redness. I may experience incomplete removal or recurrence. Risks of this procedure are excessive bleeding, bruising, infection, nerve damage, numbness, thick (hypertrophic or keloidal) scar and non-diagnostic biopsy.    How should I care for my wound for the first 24 hours?  Keep the wound dry and covered for 24 hours  If it bleeds, hold direct pressure on the area for 15 minutes. If bleeding does not stop, call us or go to the emergency room  Avoid strenuous exercise the first 1-2 days or as your doctor instructs you    How should I care for the wound after 24 hours?  After 24 hours, remove the bandage  You may bathe or shower as normal  If you had a scalp biopsy, you can shampoo as usual and can use shower water to clean the biopsy site daily  Clean the wound once a day with gentle soap and water  Do not scrub, be gentle  Apply white petroleum/Vaseline after cleaning the wound with a cotton swab or a clean finger, and keep the site covered with a Bandaid /bandage. Bandages are not necessary with a scalp biopsy  If you are unable to cover the site with a Bandaid /bandage, re-apply ointment 2-3 times a day to keep the site moist. Moisture will help with healing  Avoid strenuous activity for first 1-2 days  Avoid lakes, rivers, pools, and oceans until the stitches are removed or the site is healed    How do I clean my wound?  Wash hands thoroughly with soap or use hand  before all wound  care  Clean the wound with gentle soap and water  Apply white petroleum/Vaseline  to wound after it is clean  Replace the Bandaid /bandage to keep the wound covered for the first few days or as instructed by your doctor  If you had a scalp biopsy, warm shower water to the area on a daily basis should suffice    What should I use to clean my wound?   Cotton-tipped applicators (Qtips )  White petroleum jelly (Vaseline ). Use a clean new container and use Q-tips to apply.  Bandaids  as needed  Gentle soap     How should I care for my wound long term?  Do not get your wound dirty  Keep up with wound care for one week or until the area is healed.  A small scab will form and fall off by itself when the area is completely healed. The area will be red and will become pink in color as it heals. Sun protection is very important for how your scar will turn out. Sunscreen with an SPF 30 or greater is recommended once the area is healed.  You should have some soreness but it should be mild and slowly go away over several days. Talk to your doctor about using tylenol for pain,    When should I call my doctor?  If you have increased:   Pain or swelling  Pus or drainage (clear or slightly yellow drainage is ok)  Temperature over 100F  Spreading redness or warmth around wound    When will I hear about my results?  The biopsy results can take 2 weeks to come back.  Your results will automatically release to apartum before your provider has even reviewed them.  The clinic will call you with the results, send you a apartum message, or have you schedule a follow-up clinic or phone time to discuss the results.  Contact our clinics if you do not hear from us in 2 weeks.    Who should I call with questions?  [facility]: [phone]  [facility]: [phone]  For urgent needs outside of business hours call the [facility] at [phone] and ask for the dermatology  resident on call     Proper skin care from [facility]:    -Eliminate harsh soaps as they strip the natural oils from the skin, often resulting in dry itchy skin ( i.e. Dial, Zest, [place])  -Use mild soaps such as Cetaphil or Dove Sensitive Skin in the shower. You do not need to use soap on arms, legs, and trunk every time you shower unless visibly soiled.   -Avoid hot or cold showers.  -After showering, lightly (pat) dry off and apply moisturizing within 2-3 minutes. This will help trap moisture in the skin.   -Aggressive use of a moisturizer at least 1-2 times a day to the entire body (including -Vanicream, Cetaphil, Aquaphor or Cerave) and moisturize hands after every washing.  -We recommend using moisturizers that come in a tub that needs to be scooped out, not a pump. This has more of an oil base. It will hold moisture in your skin much better than a water base moisturizer. The above recommended are non-pore clogging.      Wear a sunscreen with at least SPF 30 on your face, ears, neck and V of the chest daily. Wear sunscreen on other areas of the body if those areas are exposed to the sun throughout the day. Sunscreens can contain physical and/or chemical blockers. Physical blockers are less likely to clog pores, these include zinc oxide and titanium dioxide. Reapply every two hour and after swimming.     Sunscreen examples: https://www.ewg.org/sunscreen/    UV radiation  UVA radiation remains constant throughout the day and throughout the year. It is a longer wavelength than UVB and therefore penetrates deeper into the skin leading to immediate and delayed tanning, photoaging, and skin cancer. 70-80% of UVA and UVB radiation occurs between the hours of 10am-2pm.  UVB radiation  UVB radiation causes the most harmful effects and is more significant during the summer months. However, snow and ice can reflect UVB radiation leading to skin damage during the winter months as well. UVB radiation is  responsible for tanning, burning, inflammation, delayed erythema (pinkness), pigmentation (brown spots), and skin cancer.     I recommend self monthly full body exams and yearly full body exams with a dermatology provider. If you develop a new or changing lesion please follow up for examination. Most skin cancers are pink and scaly or pink and pearly. However, we do see blue/brown/black skin cancers.  Consider the ABCDEs of melanoma when giving yourself your monthly full body exam ( don't forget the groin, buttocks, feet, toes, etc). A-asymmetry, B-borders, C-color, D-diameter, E-elevation or evolving. If you see any of these changes please follow up in clinic. If you cannot see your back I recommend purchasing a hand held mirror to use with a larger wall mirror.       Checking for Skin Cancer  You can find cancer early by checking your skin each month. There are 3 kinds of skin cancer. They are melanoma, basal cell carcinoma, and squamous cell carcinoma. Doing monthly skin checks is the best way to find new marks or skin changes. Follow the instructions below for checking your skin.   The ABCDEs of checking moles for melanoma   Check your moles or growths for signs of melanoma using ABCDE:   Asymmetry: the sides of the mole or growth don t match  Border: the edges are ragged, notched, or blurred  Color: the color within the mole or growth varies  Diameter: the mole or growth is larger than 6 mm (size of a pencil eraser)  Evolving: the size, shape, or color of the mole or growth is changing (evolving is not shown in the images below)    Checking for other types of skin cancer  Basal cell carcinoma or squamous cell carcinoma have symptoms such as:     A spot or mole that looks different from all other marks on your skin  Changes in how an area feels, such as itching, tenderness, or pain  Changes in the skin's surface, such as oozing, bleeding, or scaliness  A sore that does not heal  New swelling or redness beyond  the border of a mole    Who s at risk?  Anyone can get skin cancer. But you are at greater risk if you have:   Fair skin, light-colored hair, or light-colored eyes  Many moles or abnormal moles on your skin  A history of sunburns from sunlight or tanning beds  A family history of skin cancer  A history of exposure to radiation or chemicals  A weakened immune system  If you have had skin cancer in the past, you are at risk for recurring skin cancer.   How to check your skin  Do your monthly skin checkups in front of a full-length mirror. Check all parts of your body, including your:   Head (ears, face, neck, and scalp)  Torso (front, back, and sides)  Arms (tops, undersides, upper, and lower armpits)  Hands (palms, backs, and fingers, including under the nails)  Buttocks and genitals  Legs (front, back, and sides)  Feet (tops, soles, toes, including under the nails, and between toes)  If you have a lot of moles, take digital photos of them each month. Make sure to take photos both up close and from a distance. These can help you see if any moles change over time.   Most skin changes are not cancer. But if you see any changes in your skin, call your doctor right away. Only he or she can diagnose a problem. If you have skin cancer, seeing your doctor can be the first step toward getting the treatment that could save your life.   [employer] last reviewed this educational content on 4/1/2019 2000-2020 [employer]. [address] [place] [address], [place] [address]. All rights reserved. This information is not intended as a substitute for professional medical care. Always follow your healthcare professional's instructions.       When should I call my doctor?  If you are worsening or not improving, please, contact us or seek urgent care as noted below.     Who should I call with questions (adults)?    [facility] [place] [phone]  For urgent needs outside of business hours call the [facility] at [phone] and ask for the dermatology resident on call to be paged  If this is a medical emergency and you are unable to reach an ER, Call [phone]      If you need a prescription refill, please contact your pharmacy. Refills are approved or denied by our Physicians during normal business hours, Monday through Friday.  Per office policy, refills will not be granted if you have not been seen within the past year (or sooner depending on the condition).

## 2025-08-05 LAB
PATH REPORT.COMMENTS IMP SPEC: NORMAL
PATH REPORT.FINAL DX SPEC: NORMAL
PATH REPORT.GROSS SPEC: NORMAL
PATH REPORT.MICROSCOPIC SPEC OTHER STN: NORMAL
PATH REPORT.RELEVANT HX SPEC: NORMAL

## 2025-08-11 ENCOUNTER — MYC REFILL (OUTPATIENT)
Dept: PALLIATIVE CARE | Facility: CLINIC | Age: 40
End: 2025-08-11
Payer: COMMERCIAL

## 2025-08-11 DIAGNOSIS — Z79.891 ENCOUNTER FOR LONG-TERM USE OF OPIATE ANALGESIC: ICD-10-CM

## 2025-08-11 DIAGNOSIS — Z17.0 MALIGNANT NEOPLASM OF UPPER-OUTER QUADRANT OF LEFT BREAST IN FEMALE, ESTROGEN RECEPTOR POSITIVE (H): ICD-10-CM

## 2025-08-11 DIAGNOSIS — C50.412 MALIGNANT NEOPLASM OF UPPER-OUTER QUADRANT OF LEFT BREAST IN FEMALE, ESTROGEN RECEPTOR POSITIVE (H): ICD-10-CM

## 2025-08-11 DIAGNOSIS — S22.009A: ICD-10-CM

## 2025-08-11 DIAGNOSIS — G89.3 CANCER RELATED PAIN: ICD-10-CM

## 2025-08-11 RX ORDER — HYDROCODONE BITARTRATE AND ACETAMINOPHEN 10; 325 MG/1; MG/1
1 TABLET ORAL EVERY 4 HOURS PRN
Qty: 84 TABLET | Refills: 0 | Status: SHIPPED | OUTPATIENT
Start: 2025-08-13

## 2025-08-26 ENCOUNTER — MYC REFILL (OUTPATIENT)
Dept: PALLIATIVE CARE | Facility: CLINIC | Age: 40
End: 2025-08-26
Payer: COMMERCIAL

## 2025-08-26 DIAGNOSIS — Z17.0 MALIGNANT NEOPLASM OF UPPER-OUTER QUADRANT OF LEFT BREAST IN FEMALE, ESTROGEN RECEPTOR POSITIVE (H): ICD-10-CM

## 2025-08-26 DIAGNOSIS — N64.4 BREAST PAIN: ICD-10-CM

## 2025-08-26 DIAGNOSIS — G89.3 CANCER RELATED PAIN: ICD-10-CM

## 2025-08-26 DIAGNOSIS — S22.009A: ICD-10-CM

## 2025-08-26 DIAGNOSIS — Z79.891 ENCOUNTER FOR LONG-TERM USE OF OPIATE ANALGESIC: ICD-10-CM

## 2025-08-26 DIAGNOSIS — S32.012D OPEN UNSTABLE BURST FRACTURE OF FIRST LUMBAR VERTEBRA WITH ROUTINE HEALING, SUBSEQUENT ENCOUNTER: ICD-10-CM

## 2025-08-26 DIAGNOSIS — C50.412 MALIGNANT NEOPLASM OF UPPER-OUTER QUADRANT OF LEFT BREAST IN FEMALE, ESTROGEN RECEPTOR POSITIVE (H): ICD-10-CM

## 2025-08-26 RX ORDER — HYDROCODONE BITARTRATE AND ACETAMINOPHEN 10; 325 MG/1; MG/1
1 TABLET ORAL EVERY 4 HOURS PRN
Qty: 84 TABLET | Refills: 0 | Status: SHIPPED | OUTPATIENT
Start: 2025-08-26

## (undated) DEVICE — SU SILK 3-0 SH 30" K832H

## (undated) DEVICE — GLOVE BIOGEL PI MICRO SZ 6.0 48560

## (undated) DEVICE — NDL 30GA 0.5" 305106

## (undated) DEVICE — ESU PENCIL SMOKE EVAC W/ROCKER SWITCH 0703-047-000

## (undated) DEVICE — GOWN IMPERVIOUS BREATHABLE SMART LG 89015

## (undated) DEVICE — SET BREAST BIOPSY LOCALIZER 20 PROBE 8MM PENCIL 09-0006

## (undated) DEVICE — SYR 10ML FINGER CONTROL W/O NDL 309695

## (undated) DEVICE — SUCTION SLEEVE NEPTUNE 2 125MM 0703-005-125

## (undated) DEVICE — FILTER HEPA FLUID TRAP NEPTUNE 0703-040-001

## (undated) DEVICE — Device

## (undated) DEVICE — ESU GROUND PAD ADULT W/CORD E7507

## (undated) DEVICE — SU DERMABOND PRINEO 42CM CLR422US

## (undated) DEVICE — DRSG PRIMAPORE 03 1/8X6" 66000318

## (undated) DEVICE — SOL WATER IRRIG 1000ML BOTTLE 2F7114

## (undated) DEVICE — SU MONOCRYL 4-0 P-3 18" UND Y494G

## (undated) DEVICE — SU VICRYL 3-0 SH 27" UND J416H

## (undated) DEVICE — DRSG MEDIPORE 3 1/2X13 3/4" 3573

## (undated) DEVICE — LINEN TOWEL PACK X6 WHITE 5487

## (undated) DEVICE — SUCTION MANIFOLD NEPTUNE 2 SYS 4 PORT 0702-020-000

## (undated) DEVICE — DRAPE SHEET REV FOLD 3/4 9349

## (undated) DEVICE — SPONGE LAP 18X18" 23250-400A

## (undated) DEVICE — DRSG PRIMAPORE 02X3" 7133

## (undated) DEVICE — DRSG KERLIX 4 1/2"X4YDS ROLL 6715

## (undated) DEVICE — SYR 01ML LL W/O NDL LATEX FREE 309628

## (undated) DEVICE — LINEN TOWEL PACK X5 5464

## (undated) DEVICE — DRSG STERI STRIP 1/2X4" R1547

## (undated) DEVICE — MARKER MARGIN MARKER STD 6 COLOR SGL USE MMS6

## (undated) DEVICE — GLOVE BIOGEL PI MICRO INDICATOR UNDERGLOVE SZ 6.0 48960

## (undated) DEVICE — COVER ULTRASOUND PROBE GAMMA WIRELESS TMPH-2002

## (undated) DEVICE — CLIP HORIZON SM RED WIDE SLOT 001201

## (undated) DEVICE — DRAPE U SPLIT 74X120" 29440

## (undated) DEVICE — DRAPE IOBAN INCISE 23X17" 6650EZ

## (undated) DEVICE — DRAPE SHEET MED 44X70" 9355

## (undated) DEVICE — SURGICEL ABSORBABLE HEMOSTAT SNOW 4"X4" 2083

## (undated) DEVICE — PREP CHLORAPREP 26ML TINTED HI-LITE ORANGE 930815

## (undated) DEVICE — SU VICRYL+ 3-0 27IN SH UND VCP416H

## (undated) DEVICE — CLIP HORIZON MED BLUE 002200

## (undated) DEVICE — ESU ELEC BLADE 2.75" COATED/INSULATED E1455

## (undated) RX ORDER — PROPOFOL 10 MG/ML
INJECTION, EMULSION INTRAVENOUS
Status: DISPENSED
Start: 2024-07-24

## (undated) RX ORDER — FENTANYL CITRATE 50 UG/ML
INJECTION, SOLUTION INTRAMUSCULAR; INTRAVENOUS
Status: DISPENSED
Start: 2024-07-24

## (undated) RX ORDER — OXYCODONE HYDROCHLORIDE 10 MG/1
TABLET ORAL
Status: DISPENSED
Start: 2024-07-24

## (undated) RX ORDER — METHOCARBAMOL 500 MG/1
TABLET, FILM COATED ORAL
Status: DISPENSED
Start: 2024-07-24

## (undated) RX ORDER — CEFAZOLIN SODIUM/WATER 3 G/30 ML
SYRINGE (ML) INTRAVENOUS
Status: DISPENSED
Start: 2024-07-24

## (undated) RX ORDER — ACETAMINOPHEN 325 MG/1
TABLET ORAL
Status: DISPENSED
Start: 2024-07-24

## (undated) RX ORDER — LIDOCAINE HYDROCHLORIDE 10 MG/ML
INJECTION, SOLUTION INFILTRATION; PERINEURAL
Status: DISPENSED
Start: 2024-03-11

## (undated) RX ORDER — FENTANYL CITRATE-0.9 % NACL/PF 10 MCG/ML
PLASTIC BAG, INJECTION (ML) INTRAVENOUS
Status: DISPENSED
Start: 2024-07-24

## (undated) RX ORDER — HEPARIN SODIUM (PORCINE) LOCK FLUSH IV SOLN 100 UNIT/ML 100 UNIT/ML
SOLUTION INTRAVENOUS
Status: DISPENSED
Start: 2024-03-11

## (undated) RX ORDER — EPHEDRINE SULFATE 50 MG/ML
INJECTION, SOLUTION INTRAMUSCULAR; INTRAVENOUS; SUBCUTANEOUS
Status: DISPENSED
Start: 2024-07-24

## (undated) RX ORDER — LIDOCAINE HYDROCHLORIDE 10 MG/ML
INJECTION, SOLUTION EPIDURAL; INFILTRATION; INTRACAUDAL; PERINEURAL
Status: DISPENSED
Start: 2024-07-24

## (undated) RX ORDER — DEXAMETHASONE SODIUM PHOSPHATE 4 MG/ML
INJECTION, SOLUTION INTRA-ARTICULAR; INTRALESIONAL; INTRAMUSCULAR; INTRAVENOUS; SOFT TISSUE
Status: DISPENSED
Start: 2024-07-24

## (undated) RX ORDER — APREPITANT 40 MG/1
CAPSULE ORAL
Status: DISPENSED
Start: 2024-07-24

## (undated) RX ORDER — FENTANYL CITRATE 50 UG/ML
INJECTION, SOLUTION INTRAMUSCULAR; INTRAVENOUS
Status: DISPENSED
Start: 2024-03-11

## (undated) RX ORDER — IBUPROFEN 200 MG
TABLET ORAL
Status: DISPENSED
Start: 2024-07-24

## (undated) RX ORDER — ISOSULFAN BLUE 50 MG/5ML
INJECTION, SOLUTION SUBCUTANEOUS
Status: DISPENSED
Start: 2024-07-24